# Patient Record
Sex: FEMALE | Race: WHITE | NOT HISPANIC OR LATINO | Employment: OTHER | URBAN - METROPOLITAN AREA
[De-identification: names, ages, dates, MRNs, and addresses within clinical notes are randomized per-mention and may not be internally consistent; named-entity substitution may affect disease eponyms.]

---

## 2018-04-23 ENCOUNTER — HOSPITAL ENCOUNTER (OUTPATIENT)
Dept: RADIOLOGY | Facility: HOSPITAL | Age: 83
Discharge: HOME | End: 2018-04-23
Attending: FAMILY MEDICINE
Payer: MEDICARE

## 2018-04-23 ENCOUNTER — TRANSCRIBE ORDERS (OUTPATIENT)
Dept: REGISTRATION | Facility: HOSPITAL | Age: 83
End: 2018-04-23

## 2018-04-23 DIAGNOSIS — R05.9 COUGH: Primary | ICD-10-CM

## 2018-04-23 DIAGNOSIS — R05.9 COUGH: ICD-10-CM

## 2018-04-23 PROCEDURE — 71046 X-RAY EXAM CHEST 2 VIEWS: CPT

## 2018-05-17 ENCOUNTER — APPOINTMENT (OUTPATIENT)
Dept: LAB | Facility: HOSPITAL | Age: 83
End: 2018-05-17
Attending: OPHTHALMOLOGY
Payer: MEDICARE

## 2018-05-17 ENCOUNTER — APPOINTMENT (OUTPATIENT)
Dept: PREADMISSION TESTING | Facility: HOSPITAL | Age: 83
End: 2018-05-17
Attending: OPHTHALMOLOGY
Payer: MEDICARE

## 2018-05-17 ENCOUNTER — HOSPITAL ENCOUNTER (OUTPATIENT)
Dept: CARDIOLOGY | Facility: HOSPITAL | Age: 83
Discharge: HOME | End: 2018-05-17
Attending: OPHTHALMOLOGY
Payer: MEDICARE

## 2018-05-17 ENCOUNTER — TRANSCRIBE ORDERS (OUTPATIENT)
Dept: PREADMISSION TESTING | Facility: HOSPITAL | Age: 83
End: 2018-05-17

## 2018-05-17 VITALS
RESPIRATION RATE: 18 BRPM | HEIGHT: 67 IN | WEIGHT: 158 LBS | TEMPERATURE: 96.4 F | DIASTOLIC BLOOD PRESSURE: 75 MMHG | SYSTOLIC BLOOD PRESSURE: 150 MMHG | BODY MASS INDEX: 24.8 KG/M2 | HEART RATE: 85 BPM

## 2018-05-17 DIAGNOSIS — D23.10: ICD-10-CM

## 2018-05-17 DIAGNOSIS — H02.835 DERMATOCHALASIS OF LEFT LOWER EYELID: ICD-10-CM

## 2018-05-17 DIAGNOSIS — H02.832 DERMATOCHALASIS OF RIGHT LOWER EYELID: ICD-10-CM

## 2018-05-17 DIAGNOSIS — Z01.818 PREOP EXAMINATION: Primary | ICD-10-CM

## 2018-05-17 DIAGNOSIS — H02.834 DERMATOCHALASIS OF LEFT UPPER EYELID: ICD-10-CM

## 2018-05-17 DIAGNOSIS — H02.423 MYOGENIC PTOSIS OF EYELID OF BOTH EYES: ICD-10-CM

## 2018-05-17 DIAGNOSIS — H02.831 DERMATOCHALASIS OF RIGHT UPPER EYELID: ICD-10-CM

## 2018-05-17 DIAGNOSIS — H02.423 MYOGENIC PTOSIS OF EYELID OF BOTH EYES: Primary | ICD-10-CM

## 2018-05-17 LAB
ANION GAP SERPL CALC-SCNC: 9 MEQ/L (ref 3–15)
ATRIAL RATE: 72
BUN SERPL-MCNC: 15 MG/DL (ref 8–20)
CALCIUM SERPL-MCNC: 10.5 MG/DL (ref 8.9–10.3)
CHLORIDE SERPL-SCNC: 102 MMOL/L (ref 98–109)
CO2 SERPL-SCNC: 24 MMOL/L (ref 22–32)
CREAT SERPL-MCNC: 0.9 MG/DL (ref 0.6–1.1)
ERYTHROCYTE [DISTWIDTH] IN BLOOD BY AUTOMATED COUNT: 12.9 % (ref 11.7–14.4)
GFR SERPL CREATININE-BSD FRML MDRD: >60 ML/MIN/1.73M*2
GLUCOSE SERPL-MCNC: 142 MG/DL (ref 70–99)
HCT VFR BLDCO AUTO: 45.7 % (ref 35–45)
HGB BLD-MCNC: 15.2 G/DL (ref 11.8–15.7)
MCH RBC QN AUTO: 30.2 PG (ref 28–33.2)
MCHC RBC AUTO-ENTMCNC: 33.3 G/DL (ref 32.2–35.5)
MCV RBC AUTO: 90.9 FL (ref 83–98)
P AXIS: 24
PDW BLD AUTO: 10.4 FL (ref 9.4–12.3)
PLATELET # BLD AUTO: 236 K/UL (ref 150–369)
POTASSIUM SERPL-SCNC: 4.1 MMOL/L (ref 3.6–5.1)
PR INTERVAL: 174
QRS DURATION: 86
QT INTERVAL: 378
QTC CALCULATION(BAZETT): 413
R AXIS: -14
RBC # BLD AUTO: 5.03 M/UL (ref 3.93–5.22)
SODIUM SERPL-SCNC: 135 MMOL/L (ref 136–144)
T WAVE AXIS: 29
VENTRICULAR RATE: 72
WBC # BLD AUTO: 6.22 K/UL (ref 3.8–10.5)

## 2018-05-17 PROCEDURE — 80048 BASIC METABOLIC PNL TOTAL CA: CPT

## 2018-05-17 PROCEDURE — 93010 ELECTROCARDIOGRAM REPORT: CPT | Performed by: INTERNAL MEDICINE

## 2018-05-17 PROCEDURE — 85027 COMPLETE CBC AUTOMATED: CPT

## 2018-05-17 PROCEDURE — 93005 ELECTROCARDIOGRAM TRACING: CPT

## 2018-05-17 PROCEDURE — 36415 COLL VENOUS BLD VENIPUNCTURE: CPT

## 2018-05-17 RX ORDER — AMLODIPINE BESYLATE 5 MG/1
5 TABLET ORAL DAILY
COMMUNITY
End: 2021-10-18

## 2018-05-17 RX ORDER — BRIMONIDINE TARTRATE 1 MG/ML
1 SOLUTION/ DROPS OPHTHALMIC 2 TIMES DAILY
Status: ON HOLD | COMMUNITY
End: 2018-06-06 | Stop reason: SDDI

## 2018-05-17 ASSESSMENT — PAIN SCALES - GENERAL: PAINLEVEL: 0-NO PAIN

## 2018-05-17 NOTE — PRE-PROCEDURE INSTRUCTIONS
1. We will call you between 3 pm and 7 pm on June 5, 2018 to determine that arrival time for your procedure. If you do not hear by 6PM. Please call [phone] for arrival time.    2. Please report to [facility] [place], walk into [email or web address] and report to the admission desk on first floor on the day of your procedure.   3. Please follow the following fasting guidelines:   Nothing to eat or drink after midnight unless otherwise instructed by  your physician.    4. Early on the morning of the procedure please take your usual dose of the listed medications AMLODIPINE with a sip of water:    AVOID ASPIRIN . ANITI-INFLAMMATORY MEDICATIONS 1 WEEK PRIOR TO SURGERY   5. Other Instructions:    6. If you develop a cold, cough, fever, rash, or other symptom prior to the data of the procedure, please report it to your physician immediately.   7. If you need to cancel the procedure for any reason, please contact your physician or call the unit listed above.   8. Make arrangements to have someone drive you home from the procedure. If you have not arranged for transportation home, your surgery may be cancelled.    9. You may not take public transportation unless accompanied by a responsible person.   10. You may not drive a car or operate complex or potentially dangerous machinery for 24 hours following anesthesia and/or sedation.   11. If it is medically necessary for you to have a longer stay, you will be informed as soon as the decision is made.   12. Do not wear or bring anything of value to the hospital including jewelry of any kind. Do not wear make-up or contact lenses. DO bring your glasses and hearing aid.   13. No lotion, creams, powders, or oils on skin the morning of procedure    14. Dress in comfortable clothes.   15.  If instructed, please bring a copy of your Advanced Directive (Living Will/Durable Power of ) on the day of your procedure.      Pre operative instructions given as per protocol.  Form  explained by: OSCAR Castillo     I have read and understand the above information. I have had sufficient opportunity to ask questions I might have and they have been answered to my satisfaction. I agree to comply with the Patient Responsibilities listed above and have received a copy of this form.

## 2018-05-17 NOTE — H&P
History and Physical  Pre-admission testing         HISTORY OF PRESENT ILLNESS      Mariya Porras is an 82 y.o. female with a past medical history of Hypertension, Glaucoma Ptosis BUL Lesions Os, Dermatochalasis. She  presents to [facility] for preoperative evaluation. She is scheduled for Ptosis Repair BUL,with skin Pinch Cos , Blepharoplasty BLL TCA peel Excision of  Marginal lesion YEFRI on 6/6/2018.    PAST MEDICAL AND SURGICAL HISTORY      Past Medical History:   Diagnosis Date   • Basal cell carcinoma     forehead   • Glaucoma    • Hypertension        Past Surgical History:   Procedure Laterality Date   • BLADDER SUSPENSION     • CATARACT EXTRACTION, BILATERAL     • COLONOSCOPY     • HYSTERECTOMY     • JOINT REPLACEMENT     • KNEE ARTHROPLASTY  2015    left       PROBLEM LIST   There is no problem list on file for this patient.      MEDICATIONS        Current Outpatient Prescriptions:   •  amLODIPine (NORVASC) 5 mg tablet, Take 5 mg by mouth daily., Disp: , Rfl:   •  bimatoprost (LUMIGAN) 0.01 % ophthalmic drops, Administer 1 drop into both eyes nightly., Disp: , Rfl:   •  brimonidine (ALPHAGAN P) 0.1 % drops, Administer 1 drop into both eyes 2 (two) times a day., Disp: , Rfl:   •  cranberry fruit extract (CRANBERRY EXTRACT ORAL), Take 1 tablet by mouth daily., Disp: , Rfl:   •  Lactobacillus acidophilus (PROBIOTIC ORAL), Take 1 tablet by mouth daily., Disp: , Rfl:     ALLERGIES      Allergies   Allergen Reactions   • Amitriptyline Other (see comments)     Memory changes   • Codeine Hives   • Diazepam Other (see comments)     Blurred vision   • Sulfur Hives       FAMILY HISTORY      No family history on file.    SOCIAL HISTORY      Social History     Social History   • Marital status:      Spouse name: N/A   • Number of children: N/A   • Years of education: N/A     Occupational History   • Not on file.     Social History Main Topics   • Smoking status: Former Smoker   • Smokeless tobacco: Never Used   •  "Alcohol use Yes      Comment: wine   • Drug use: No   • Sexual activity: Not on file     Other Topics Concern   • Not on file     Social History Narrative   • No narrative on file       REVIEW OF SYSTEMS      Review of Systems   All other systems reviewed and are negative.      PHYSICAL EXAMINATION      BP (!) 150/75 (BP Location: Right upper arm, Patient Position: Sitting)   Pulse 85   Temp (!) 35.8 °C (96.4 °F) (Oral)   Resp 18 Comment: oxygen sat 96%  Ht 1.702 m (5' 7\")   Wt 71.7 kg (158 lb)   BMI 24.75 kg/m²   Body mass index is 24.75 kg/m².    Physical Exam   Constitutional: She is oriented to person, place, and time. She appears well-developed and well-nourished.   HENT:   Head: Normocephalic and atraumatic.   Eyes: Conjunctivae and EOM are normal. Pupils are equal, round, and reactive to light.   Neck: Normal range of motion. Neck supple.   Cardiovascular: Normal rate, regular rhythm, normal heart sounds and intact distal pulses.    Pulmonary/Chest: Effort normal and breath sounds normal.   Abdominal: Soft. Bowel sounds are normal.   Genitourinary:   Genitourinary Comments: Examination is deferred   Musculoskeletal: Normal range of motion.   Neurological: She is oriented to person, place, and time.   Skin: Skin is warm and dry.   Keratotic lesions on the patient's back   Psychiatric: She has a normal mood and affect. Her behavior is normal.   Vitals reviewed.         OSCAR Castillo  5/17/2018     "

## 2018-06-06 ENCOUNTER — ANESTHESIA EVENT (OUTPATIENT)
Dept: SURGERY | Facility: HOSPITAL | Age: 83
Setting detail: HOSPITAL OUTPATIENT SURGERY
End: 2018-06-06
Payer: MEDICARE

## 2018-06-06 ENCOUNTER — HOSPITAL ENCOUNTER (OUTPATIENT)
Facility: HOSPITAL | Age: 83
Setting detail: HOSPITAL OUTPATIENT SURGERY
Discharge: HOME | End: 2018-06-06
Attending: OPHTHALMOLOGY | Admitting: OPHTHALMOLOGY
Payer: MEDICARE

## 2018-06-06 VITALS
HEIGHT: 66 IN | RESPIRATION RATE: 16 BRPM | TEMPERATURE: 98.4 F | WEIGHT: 153 LBS | BODY MASS INDEX: 24.59 KG/M2 | SYSTOLIC BLOOD PRESSURE: 170 MMHG | DIASTOLIC BLOOD PRESSURE: 81 MMHG | OXYGEN SATURATION: 94 % | HEART RATE: 80 BPM

## 2018-06-06 PROCEDURE — 080QXZZ ALTERATION OF RIGHT LOWER EYELID, EXTERNAL APPROACH: ICD-10-PCS | Performed by: OPHTHALMOLOGY

## 2018-06-06 PROCEDURE — 25800000 HC PHARMACY IV SOLUTIONS: Performed by: STUDENT IN AN ORGANIZED HEALTH CARE EDUCATION/TRAINING PROGRAM

## 2018-06-06 PROCEDURE — 63600000 HC DRUGS/DETAIL CODE: Performed by: ANESTHESIOLOGY

## 2018-06-06 PROCEDURE — 36000013 HC OR LEVEL 3 EA ADDL MIN

## 2018-06-06 PROCEDURE — 63600000 HC DRUGS/DETAIL CODE: Performed by: OPHTHALMOLOGY

## 2018-06-06 PROCEDURE — 0HB1XZZ EXCISION OF FACE SKIN, EXTERNAL APPROACH: ICD-10-PCS | Performed by: OPHTHALMOLOGY

## 2018-06-06 PROCEDURE — 27200000 HC STERILE SUPPLY: Performed by: OPHTHALMOLOGY

## 2018-06-06 PROCEDURE — 71000011 HC PACU PHASE 1 EA ADDL MIN: Performed by: OPHTHALMOLOGY

## 2018-06-06 PROCEDURE — 36000013 HC OR LEVEL 3 EA ADDL MIN: Performed by: OPHTHALMOLOGY

## 2018-06-06 PROCEDURE — 25000000 HC PHARMACY GENERAL: Performed by: NURSE ANESTHETIST, CERTIFIED REGISTERED

## 2018-06-06 PROCEDURE — 080RXZZ ALTERATION OF LEFT LOWER EYELID, EXTERNAL APPROACH: ICD-10-PCS | Performed by: OPHTHALMOLOGY

## 2018-06-06 PROCEDURE — 08SN0ZZ REPOSITION RIGHT UPPER EYELID, OPEN APPROACH: ICD-10-PCS | Performed by: OPHTHALMOLOGY

## 2018-06-06 PROCEDURE — 36000003 HC OR LEVEL 3 INITIAL 30MIN: Performed by: OPHTHALMOLOGY

## 2018-06-06 PROCEDURE — 71000001 HC PACU PHASE 1 INITIAL 30MIN: Performed by: OPHTHALMOLOGY

## 2018-06-06 PROCEDURE — 63600000 HC DRUGS/DETAIL CODE: Performed by: NURSE ANESTHETIST, CERTIFIED REGISTERED

## 2018-06-06 PROCEDURE — 71000002 HC PACU PHASE 2 INITIAL 30MIN: Performed by: OPHTHALMOLOGY

## 2018-06-06 PROCEDURE — 63700000 HC SELF-ADMINISTRABLE DRUG: Performed by: OPHTHALMOLOGY

## 2018-06-06 PROCEDURE — 37000002 HC ANESTHESIA MAC: Performed by: OPHTHALMOLOGY

## 2018-06-06 PROCEDURE — 08SP0ZZ REPOSITION LEFT UPPER EYELID, OPEN APPROACH: ICD-10-PCS | Performed by: OPHTHALMOLOGY

## 2018-06-06 PROCEDURE — 25000000 HC PHARMACY GENERAL: Performed by: OPHTHALMOLOGY

## 2018-06-06 PROCEDURE — 0H51XZZ DESTRUCTION OF FACE SKIN, EXTERNAL APPROACH: ICD-10-PCS | Performed by: OPHTHALMOLOGY

## 2018-06-06 PROCEDURE — 71000012 HC PACU PHASE 2 EA ADDL MIN: Performed by: OPHTHALMOLOGY

## 2018-06-06 RX ORDER — HYDROMORPHONE HYDROCHLORIDE 2 MG/ML
0.5 INJECTION, SOLUTION INTRAMUSCULAR; INTRAVENOUS; SUBCUTANEOUS
Status: DISCONTINUED | OUTPATIENT
Start: 2018-06-06 | End: 2018-06-06 | Stop reason: HOSPADM

## 2018-06-06 RX ORDER — LIDOCAINE HCL/EPINEPHRINE/PF 2%-1:200K
VIAL (ML) INJECTION AS NEEDED
Status: DISCONTINUED | OUTPATIENT
Start: 2018-06-06 | End: 2018-06-06 | Stop reason: HOSPADM

## 2018-06-06 RX ORDER — PROPOFOL 200MG/20ML
SYRINGE (ML) INTRAVENOUS AS NEEDED
Status: DISCONTINUED | OUTPATIENT
Start: 2018-06-06 | End: 2018-06-06 | Stop reason: SURG

## 2018-06-06 RX ORDER — IBUPROFEN 200 MG
16-32 TABLET ORAL AS NEEDED
Status: DISCONTINUED | OUTPATIENT
Start: 2018-06-06 | End: 2018-06-06 | Stop reason: HOSPADM

## 2018-06-06 RX ORDER — PROPOFOL 10 MG/ML
INJECTION, EMULSION INTRAVENOUS CONTINUOUS PRN
Status: DISCONTINUED | OUTPATIENT
Start: 2018-06-06 | End: 2018-06-06 | Stop reason: SURG

## 2018-06-06 RX ORDER — DEXAMETHASONE SODIUM PHOSPHATE 4 MG/ML
INJECTION, SOLUTION INTRA-ARTICULAR; INTRALESIONAL; INTRAMUSCULAR; INTRAVENOUS; SOFT TISSUE AS NEEDED
Status: DISCONTINUED | OUTPATIENT
Start: 2018-06-06 | End: 2018-06-06 | Stop reason: HOSPADM

## 2018-06-06 RX ORDER — OXYMETAZOLINE HCL 0.05 %
SPRAY, NON-AEROSOL (ML) NASAL AS NEEDED
Status: DISCONTINUED | OUTPATIENT
Start: 2018-06-06 | End: 2018-06-06 | Stop reason: HOSPADM

## 2018-06-06 RX ORDER — SODIUM CHLORIDE 9 MG/ML
INJECTION, SOLUTION INTRAVENOUS CONTINUOUS
Status: DISCONTINUED | OUTPATIENT
Start: 2018-06-06 | End: 2018-06-06 | Stop reason: HOSPADM

## 2018-06-06 RX ORDER — MIDAZOLAM HYDROCHLORIDE 2 MG/2ML
INJECTION, SOLUTION INTRAMUSCULAR; INTRAVENOUS AS NEEDED
Status: DISCONTINUED | OUTPATIENT
Start: 2018-06-06 | End: 2018-06-06 | Stop reason: SURG

## 2018-06-06 RX ORDER — DEXTROSE 50 % IN WATER (D50W) INTRAVENOUS SYRINGE
25 AS NEEDED
Status: DISCONTINUED | OUTPATIENT
Start: 2018-06-06 | End: 2018-06-06 | Stop reason: HOSPADM

## 2018-06-06 RX ORDER — PROPARACAINE HYDROCHLORIDE 5 MG/ML
SOLUTION/ DROPS OPHTHALMIC AS NEEDED
Status: DISCONTINUED | OUTPATIENT
Start: 2018-06-06 | End: 2018-06-06 | Stop reason: HOSPADM

## 2018-06-06 RX ORDER — ONDANSETRON HYDROCHLORIDE 2 MG/ML
INJECTION, SOLUTION INTRAVENOUS AS NEEDED
Status: DISCONTINUED | OUTPATIENT
Start: 2018-06-06 | End: 2018-06-06 | Stop reason: SURG

## 2018-06-06 RX ORDER — DEXTROSE 40 %
15-30 GEL (GRAM) ORAL AS NEEDED
Status: DISCONTINUED | OUTPATIENT
Start: 2018-06-06 | End: 2018-06-06 | Stop reason: HOSPADM

## 2018-06-06 RX ORDER — ACETAMINOPHEN 325 MG/1
650 TABLET ORAL EVERY 4 HOURS PRN
Status: DISCONTINUED | OUTPATIENT
Start: 2018-06-06 | End: 2018-06-06 | Stop reason: HOSPADM

## 2018-06-06 RX ORDER — TRICHLOROACETIC ACID
SOLUTION, NON-ORAL TOPICAL AS NEEDED
Status: DISCONTINUED | OUTPATIENT
Start: 2018-06-06 | End: 2018-06-06 | Stop reason: HOSPADM

## 2018-06-06 RX ORDER — HYDROCODONE BITARTRATE AND ACETAMINOPHEN 5; 325 MG/1; MG/1
1 TABLET ORAL EVERY 6 HOURS PRN
Status: DISCONTINUED | OUTPATIENT
Start: 2018-06-06 | End: 2018-06-06 | Stop reason: HOSPADM

## 2018-06-06 RX ORDER — FENTANYL CITRATE 50 UG/ML
50 INJECTION, SOLUTION INTRAMUSCULAR; INTRAVENOUS
Status: DISCONTINUED | OUTPATIENT
Start: 2018-06-06 | End: 2018-06-06 | Stop reason: HOSPADM

## 2018-06-06 RX ORDER — ONDANSETRON HYDROCHLORIDE 2 MG/ML
4 INJECTION, SOLUTION INTRAVENOUS
Status: DISCONTINUED | OUTPATIENT
Start: 2018-06-06 | End: 2018-06-06 | Stop reason: HOSPADM

## 2018-06-06 RX ORDER — DEXAMETHASONE SODIUM PHOSPHATE 4 MG/ML
INJECTION, SOLUTION INTRA-ARTICULAR; INTRALESIONAL; INTRAMUSCULAR; INTRAVENOUS; SOFT TISSUE AS NEEDED
Status: DISCONTINUED | OUTPATIENT
Start: 2018-06-06 | End: 2018-06-06 | Stop reason: SURG

## 2018-06-06 RX ORDER — FENTANYL CITRATE 50 UG/ML
INJECTION, SOLUTION INTRAMUSCULAR; INTRAVENOUS AS NEEDED
Status: DISCONTINUED | OUTPATIENT
Start: 2018-06-06 | End: 2018-06-06 | Stop reason: SURG

## 2018-06-06 RX ORDER — ONDANSETRON 4 MG/1
4 TABLET, ORALLY DISINTEGRATING ORAL EVERY 8 HOURS PRN
Status: DISCONTINUED | OUTPATIENT
Start: 2018-06-06 | End: 2018-06-06 | Stop reason: HOSPADM

## 2018-06-06 RX ORDER — KETAMINE HYDROCHLORIDE 50 MG/ML
INJECTION, SOLUTION INTRAMUSCULAR; INTRAVENOUS AS NEEDED
Status: DISCONTINUED | OUTPATIENT
Start: 2018-06-06 | End: 2018-06-06 | Stop reason: SURG

## 2018-06-06 RX ADMIN — FENTANYL CITRATE 25 MCG: 50 INJECTION, SOLUTION INTRAMUSCULAR; INTRAVENOUS at 11:32

## 2018-06-06 RX ADMIN — FENTANYL CITRATE 50 MCG: 50 INJECTION, SOLUTION INTRAMUSCULAR; INTRAVENOUS at 14:02

## 2018-06-06 RX ADMIN — FENTANYL CITRATE 12.5 MCG: 50 INJECTION, SOLUTION INTRAMUSCULAR; INTRAVENOUS at 12:40

## 2018-06-06 RX ADMIN — FENTANYL CITRATE 12.5 MCG: 50 INJECTION, SOLUTION INTRAMUSCULAR; INTRAVENOUS at 12:26

## 2018-06-06 RX ADMIN — FENTANYL CITRATE 25 MCG: 50 INJECTION, SOLUTION INTRAMUSCULAR; INTRAVENOUS at 12:45

## 2018-06-06 RX ADMIN — PROPOFOL 25 MCG/KG/MIN: 10 INJECTION, EMULSION INTRAVENOUS at 11:33

## 2018-06-06 RX ADMIN — FENTANYL CITRATE 12.5 MCG: 50 INJECTION, SOLUTION INTRAMUSCULAR; INTRAVENOUS at 12:15

## 2018-06-06 RX ADMIN — FENTANYL CITRATE 12.5 MCG: 50 INJECTION, SOLUTION INTRAMUSCULAR; INTRAVENOUS at 12:20

## 2018-06-06 RX ADMIN — FENTANYL CITRATE 25 MCG: 50 INJECTION, SOLUTION INTRAMUSCULAR; INTRAVENOUS at 11:24

## 2018-06-06 RX ADMIN — SODIUM CHLORIDE: 9 INJECTION, SOLUTION INTRAVENOUS at 09:10

## 2018-06-06 RX ADMIN — ONDANSETRON 4 MG: 2 INJECTION INTRAMUSCULAR; INTRAVENOUS at 12:28

## 2018-06-06 RX ADMIN — PROPOFOL 20 MG: 10 INJECTION, EMULSION INTRAVENOUS at 12:13

## 2018-06-06 RX ADMIN — PROPOFOL 10 MG: 10 INJECTION, EMULSION INTRAVENOUS at 12:00

## 2018-06-06 RX ADMIN — FENTANYL CITRATE 25 MCG: 50 INJECTION, SOLUTION INTRAMUSCULAR; INTRAVENOUS at 12:01

## 2018-06-06 RX ADMIN — FENTANYL CITRATE 25 MCG: 50 INJECTION, SOLUTION INTRAMUSCULAR; INTRAVENOUS at 12:55

## 2018-06-06 RX ADMIN — MIDAZOLAM HYDROCHLORIDE 2 MG: 1 INJECTION, SOLUTION INTRAMUSCULAR; INTRAVENOUS at 11:15

## 2018-06-06 RX ADMIN — DEXAMETHASONE SODIUM PHOSPHATE 4 MG: 4 INJECTION, SOLUTION INTRA-ARTICULAR; INTRALESIONAL; INTRAMUSCULAR; INTRAVENOUS; SOFT TISSUE at 12:28

## 2018-06-06 RX ADMIN — FENTANYL CITRATE 25 MCG: 50 INJECTION, SOLUTION INTRAMUSCULAR; INTRAVENOUS at 11:19

## 2018-06-06 RX ADMIN — KETAMINE HYDROCHLORIDE 20 MG: 50 INJECTION, SOLUTION INTRAMUSCULAR; INTRAVENOUS at 12:28

## 2018-06-06 ASSESSMENT — PAIN DESCRIPTION - DESCRIPTORS: DESCRIPTORS: BURNING

## 2018-06-06 ASSESSMENT — PAIN - FUNCTIONAL ASSESSMENT: PAIN_FUNCTIONAL_ASSESSMENT: 0-10

## 2018-06-06 ASSESSMENT — PAIN SCALES - GENERAL: PAIN_LEVEL: 0

## 2018-06-06 NOTE — ANESTHESIA PREPROCEDURE EVALUATION
Anesthesia ROS/MED HX      Cardiovascular   hypertension  Musculoskeletal   Arthritis  Endo/Other   Body Habitus: Normal      Past Surgical History:   Procedure Laterality Date   • BLADDER SUSPENSION     • CATARACT EXTRACTION, BILATERAL     • COLONOSCOPY     • HYSTERECTOMY     • JOINT REPLACEMENT     • KNEE ARTHROPLASTY  2015    left       Physical Exam    Airway   Mallampati: III   TM distance: >3 FB   Neck ROM: full  Cardiovascular    Rhythm: regular   Rate: normal  Pulmonary    Decreased breath sounds  Dental - normal        Anesthesia Plan    Plan: MAC    Technique: MAC     Airway: natural airway / supplemental oxygen   ASA 2  Anesthetic plan and risks discussed with: patient  Induction:    intravenous   Postop Plan:   Patient Disposition: phase II then home

## 2018-06-06 NOTE — DISCHARGE INSTRUCTIONS
RENITA SUBRAMANIAN M.D., F.A.C.S.                                  SPECIALIZING IN OCULOPLASTICS                             EYELID, LACRIMAL AND ORBITAL SURGERY     POST-OPERATIVE INSTRUCTIONS FOR EYELID SURGERY:    1. Avoid strenous exercise for one week.  No heavy lifting over 10 pounds.  2. Apply ice compresses to operated eye(s) at least four times a day for 7 days then start warm compresses twice a day for two weeks.   3.  If the surgeon has not placed a bandage over your surgical site, you may get your incisions wet.  You may shower but keep your back to the spray.  4. If severe bleeding, pain or loss of vision occurs call Dr. Subramanian/Dr. Garcia immediately at [phone].    MEDICATIONS:    1. Apply Erythromycin ointment twice a day to suture line(s) and into the eye(s) at bedtime.   2. Use eyewash in the morning to remove the residual ointment if needed.   3. Take regular Tylenol or Vicodin as instructed.   4. Use artificial tears four times a day as needed for dry eyes.   5. No  aspirin for 3 days post operatively.   6. Use medrol dose pack as directed.    ADDITIONAL INSTRUCTIONS:    1.  Keep head in position higher than your heart, especially while sleeping.  2.  Protect all bruises from sunshine exposure.  If desired, take Arnica [place] sublingual tablets (an herbal supplement) as directed on package and eat pineapple; these items work naturally to treat bruising.  3.  You may experience temporary blurring of vision, swelling, and/or bruising.  Eyelid should not swell completely shut.  If both eyes were done, these things may be more pronounced on one side.  Symmetry will be assessed at your postop exams.  4. A follow-up appointment in one week(s) can be made at the time of surgery or can be scheduled by calling the office at [phone] with Dr. Subramanian/Dr. Garcia.    FOR ANY QUESTIONS OR CONCERNS, PLEASE CALL:                       [phone]                       OR                    [phone]      [address]  [address]                                                                         [address]  [place] [address]                                                [place] [id number]

## 2018-06-06 NOTE — H&P (VIEW-ONLY)
History and Physical  Pre-admission testing         HISTORY OF PRESENT ILLNESS      Mariya Porras is an 82 y.o. female with a past medical history of Hypertension, Glaucoma Ptosis BUL Lesions Os, Dermatochalasis. She  presents to [facility] for preoperative evaluation. She is scheduled for Ptosis Repair BUL,with skin Pinch Cos , Blepharoplasty BLL TCA peel Excision of  Marginal lesion YEFRI on 6/6/2018.    PAST MEDICAL AND SURGICAL HISTORY      Past Medical History:   Diagnosis Date   • Basal cell carcinoma     forehead   • Glaucoma    • Hypertension        Past Surgical History:   Procedure Laterality Date   • BLADDER SUSPENSION     • CATARACT EXTRACTION, BILATERAL     • COLONOSCOPY     • HYSTERECTOMY     • JOINT REPLACEMENT     • KNEE ARTHROPLASTY  2015    left       PROBLEM LIST   There is no problem list on file for this patient.      MEDICATIONS        Current Outpatient Prescriptions:   •  amLODIPine (NORVASC) 5 mg tablet, Take 5 mg by mouth daily., Disp: , Rfl:   •  bimatoprost (LUMIGAN) 0.01 % ophthalmic drops, Administer 1 drop into both eyes nightly., Disp: , Rfl:   •  brimonidine (ALPHAGAN P) 0.1 % drops, Administer 1 drop into both eyes 2 (two) times a day., Disp: , Rfl:   •  cranberry fruit extract (CRANBERRY EXTRACT ORAL), Take 1 tablet by mouth daily., Disp: , Rfl:   •  Lactobacillus acidophilus (PROBIOTIC ORAL), Take 1 tablet by mouth daily., Disp: , Rfl:     ALLERGIES      Allergies   Allergen Reactions   • Amitriptyline Other (see comments)     Memory changes   • Codeine Hives   • Diazepam Other (see comments)     Blurred vision   • Sulfur Hives       FAMILY HISTORY      No family history on file.    SOCIAL HISTORY      Social History     Social History   • Marital status:      Spouse name: N/A   • Number of children: N/A   • Years of education: N/A     Occupational History   • Not on file.     Social History Main Topics   • Smoking status: Former Smoker   • Smokeless tobacco: Never Used   •  "Alcohol use Yes      Comment: wine   • Drug use: No   • Sexual activity: Not on file     Other Topics Concern   • Not on file     Social History Narrative   • No narrative on file       REVIEW OF SYSTEMS      Review of Systems   All other systems reviewed and are negative.      PHYSICAL EXAMINATION      BP (!) 150/75 (BP Location: Right upper arm, Patient Position: Sitting)   Pulse 85   Temp (!) 35.8 °C (96.4 °F) (Oral)   Resp 18 Comment: oxygen sat 96%  Ht 1.702 m (5' 7\")   Wt 71.7 kg (158 lb)   BMI 24.75 kg/m²   Body mass index is 24.75 kg/m².    Physical Exam   Constitutional: She is oriented to person, place, and time. She appears well-developed and well-nourished.   HENT:   Head: Normocephalic and atraumatic.   Eyes: Conjunctivae and EOM are normal. Pupils are equal, round, and reactive to light.   Neck: Normal range of motion. Neck supple.   Cardiovascular: Normal rate, regular rhythm, normal heart sounds and intact distal pulses.    Pulmonary/Chest: Effort normal and breath sounds normal.   Abdominal: Soft. Bowel sounds are normal.   Genitourinary:   Genitourinary Comments: Examination is deferred   Musculoskeletal: Normal range of motion.   Neurological: She is oriented to person, place, and time.   Skin: Skin is warm and dry.   Keratotic lesions on the patient's back   Psychiatric: She has a normal mood and affect. Her behavior is normal.   Vitals reviewed.         OSCAR Castillo  5/17/2018     "

## 2018-06-06 NOTE — ANESTHESIA POSTPROCEDURE EVALUATION
Patient: Mariya Porras    Procedure Summary     Date:  06/06/18 Room / Location:  [facility]    Anesthesia Start:  1115 Anesthesia Stop:  1305    Procedures:       Ptosis repair BUL with skin pinch cos blepharoplasty BLL with TCA Peel Excision of marginal Lesion YEFRI (Bilateral )      DERM CHEMICAL PEEL (TCA) (N/A ) Diagnosis:  (Ptosis BUL Lesions OS)    Surgeon:  Beth Gutierrez MD Responsible Provider:  Martell Gaytan MD    Anesthesia Type:  MAC ASA Status:  2          Anesthesia Type: MAC  PACU Vitals  6/6/2018 1302 - 6/6/2018 1402      6/6/2018 1310 6/6/2018 1315 6/6/2018 1320 6/6/2018 1330    BP: (!)  190/88 (!)  183/87 - (!)  197/95    Temp: - 36.7 °C (98 °F) - -    Pulse: 77 78 76 78    Resp: 15 (!)  21 16 20    SpO2: 95 % 95 % 96 % 95 %              6/6/2018 1345 6/6/2018 1400          BP: (!)  177/85 (!)  161/83      Temp: - -      Pulse: 78 80      Resp: 18 20      SpO2: 98 % 96 %              Anesthesia Post Evaluation    Pain score: 0  Pain management: satisfactory to patient  Mode of pain management: IV medication  Patient location during evaluation: PACU  Patient participation: complete - patient participated  Level of consciousness: awake and alert  Cardiovascular status: acceptable  Airway Patency: adequate  Respiratory status: acceptable  Hydration status: stable  Anesthetic complications: no

## 2018-06-06 NOTE — BRIEF OP NOTE
Ptosis repair BUL with skin pinch cos blepharoplasty BLL with TCA Peel Excision of marginal Lesion YEFRI (B), DERM CHEMICAL PEEL (TCA) Procedure Note    Procedure:    Ptosis repair BUL with skin pinch cos blepharoplasty BLL with TCA Peel Excision of marginal Lesion YEFRI  CPT(R) Code:  48956 - MA FIX LID PTOSIS,LEVATR RESEC,EXTERN    Procedure:    DERM CHEMICAL PEEL (TCA)        Surgeon(s) and Role:     * Bteh Gutierrez MD - Primary    Anesthesia: General    Staff:   Circulator: Priscila Varela RN; Estephanie Beth RN  Scrub Person: Tiffanie Sibley RN      Estimated Blood Loss: No blood loss documented.    Specimens:                No specimens collected during this procedure.      Drains:      Implants: * No implants in log *           Complications:  None; patient tolerated the procedure well.           Disposition: PACU - hemodynamically stable.           Condition: stable    Beth Gutierrez MD  Phone Number: [phone]

## 2018-06-06 NOTE — OP NOTE
REPORT TYPE:  Operative Note    DATE OF OPERATION:  06/06/2018      OPERATING SURGEON:  Beth Gutierrez MD    ASSISTANT SURGEON:  Thuan Chadwick DO    PREOPERATIVE DIAGNOSES:  1.  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  2.  Bilateral lower lid dermatochalasis.  3.  Lesion of bilateral upper eyelids.    POSTOPERATIVE DIAGNOSES:  1.  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  2.  Bilateral lower lid dermatochalasis.  4.  Lesion of bilateral upper eyelids.    PROCEDURES PERFORMED:  1.  Bilateral upper eyelid aponeurotic ptosis repair, levator advancement and skin pinch.  2.  Bilateral lower lid cosmetic blepharoplasty with TCA peel.  3.  Lesion excision, bilateral upper eyelids, with steroid injections.    ANESTHESIA:  MAC with local.    ESTIMATED BLOOD LOSS:  Minimal.    COMPLICATIONS:  None.    INDICATIONS:  The patient has visually significant upper eyelid ptosis as well as dermatochalasis of both lower eyelids impacting her superior field of vision in both eyes as well as cosmetic disfigurement.    DESCRIPTION OF PROCEDURE:  In the preoperative holding area, the risks, benefits and alternatives of the above procedures were again discussed in detail with the patient.  All questions were answered, informed consent was obtained and placed into the   medical record.  In the operating room, the patient was identified by name and date of birth.  Monitored anesthesia care was initiated by the anesthesiology team and the patient was marked and prepped in the usual fashion for ophthalmic plastic surgery.    Local anesthetic solution was injected into both upper and lower eyelids.  Attention was first directed to the patient's right side, where excess upper eyelid skin was grasped at its superior border and the incision site was marked.  The inferior   border was also demarcated at the level of the eyelid crease.  Incisions were made along the marked line using a 15  blade.  Aimee scissors were used to remove the excess skin as well as a thin strip of orbicularis muscle.  Hemostasis was achieved   using a combination of Gelfoam soaked in thrombin as well as bipolar cautery.  Careful dissection was performed and aponeurosis of the levator muscle was identified and found to be dehisced.  Multiple 6-0 nylon sutures were used to reattach the   aponeurosis to the superior border of tarsus.  A similar procedure was performed on the patient's left side, after symmetry between the upper eyelids was deemed satisfactory, and the surgical incision sites were closed using single running 6-0 plain gut   sutures.  Next, scleral shells were placed into both eyes and a 4-0 silk traction suture was placed through both lower lid margins.  The lids were everted over Desmarres retractors, transconjunctival incisions were made and used to gain entry into the   fat pad to the lower eyelids, which were carefully debulked, taking great care to avoid the inferior oblique muscle.  Following debulking, proper positioning of the lateral canthal region was reapproximated using 5-0 PDS sutures, which were further   reinforced with a superior and lateral transposition at the suborbicularis oculi fat.  The overlying skin incisions were then closed with 6-0 plain gut.  Both lesions on the right upper lid as well as the left upper lid margin were excised using Aimee   forceps and 15 blade, and at conclusion of the case the scleral shells and silk traction sutures were removed and a mixture of 40% trichloroacetic acid was applied to both lower eyelids, taking great care to avoid exposure to the eyes.  Ointment was   then applied to both the operative sites and inside the eyes.  The patient tolerated the procedure well and there were no complications.  She was transferred to the recovery area in stable condition.      RENITA SUBRAMANIAN MD    Dictated by: MELA HOLLEY DO        CC:     DD:  06/06/2018 18:20  DT: 06/06/2018 19:02  Voice ID: [id number]/Report ID: [id number]  [id number]

## 2018-06-06 NOTE — OR SURGEON
Pre-Procedure patient identification:  I am the primary operating surgeon/proceduralist and I have identified the patient on 06/06/18 at 9:10 AM Beth Gutierrez MD  Phone Number: [phone]

## 2018-07-16 ENCOUNTER — OFFICE VISIT (OUTPATIENT)
Dept: UROGYNECOLOGY | Facility: CLINIC | Age: 83
End: 2018-07-16
Payer: MEDICARE

## 2018-07-16 VITALS
BODY MASS INDEX: 23.14 KG/M2 | HEIGHT: 66 IN | DIASTOLIC BLOOD PRESSURE: 82 MMHG | SYSTOLIC BLOOD PRESSURE: 140 MMHG | WEIGHT: 144 LBS

## 2018-07-16 DIAGNOSIS — R35.0 FREQUENCY OF MICTURITION: ICD-10-CM

## 2018-07-16 DIAGNOSIS — N94.819 VULVODYNIA: ICD-10-CM

## 2018-07-16 DIAGNOSIS — Z87.42 HISTORY OF UTERINE PROLAPSE: ICD-10-CM

## 2018-07-16 DIAGNOSIS — R35.0 FREQUENCY OF MICTURITION: Primary | ICD-10-CM

## 2018-07-16 LAB
CLARITY, POC: CLEAR
COLOR, POC: YELLOW
LEUKOCYTE EST, POC: ABNORMAL

## 2018-07-16 PROCEDURE — 51701 INSERT BLADDER CATHETER: CPT | Performed by: OBSTETRICS & GYNECOLOGY

## 2018-07-16 PROCEDURE — 81002 URINALYSIS NONAUTO W/O SCOPE: CPT | Performed by: OBSTETRICS & GYNECOLOGY

## 2018-07-16 PROCEDURE — 87086 URINE CULTURE/COLONY COUNT: CPT | Performed by: OBSTETRICS & GYNECOLOGY

## 2018-07-16 PROCEDURE — 99214 OFFICE O/P EST MOD 30 MIN: CPT | Mod: 25 | Performed by: OBSTETRICS & GYNECOLOGY

## 2018-07-16 RX ORDER — SERTRALINE HYDROCHLORIDE 25 MG/1
25 TABLET, FILM COATED ORAL
Refills: 1 | COMMUNITY
Start: 2018-07-01 | End: 2020-12-10

## 2018-07-16 RX ORDER — AMITRIPTYLINE HYDROCHLORIDE 10 MG/1
10 TABLET, FILM COATED ORAL NIGHTLY
Qty: 90 TABLET | Refills: 3 | Status: SHIPPED | OUTPATIENT
Start: 2018-07-16 | End: 2018-09-24

## 2018-07-16 ASSESSMENT — ENCOUNTER SYMPTOMS
FATIGUE: 0
RESPIRATORY NEGATIVE: 1
CONSTITUTIONAL NEGATIVE: 1
NERVOUS/ANXIOUS: 1
FREQUENCY: 1
CONFUSION: 0
NEUROLOGICAL NEGATIVE: 1
ANAL BLEEDING: 0
ROS GI COMMENTS: HEMORRHOIDS
DECREASED CONCENTRATION: 0
ALLERGIC/IMMUNOLOGIC NEGATIVE: 1
BLOOD IN STOOL: 0
UNEXPECTED WEIGHT CHANGE: 0
CONSTIPATION: 0
MUSCULOSKELETAL NEGATIVE: 1
CHILLS: 0
DEPRESSION: 1
ABDOMINAL PAIN: 0
ENDOCRINE NEGATIVE: 1
DIFFICULTY URINATING: 0
CARDIOVASCULAR NEGATIVE: 1
FEVER: 0

## 2018-07-16 NOTE — PROGRESS NOTES
"Visit Date: 7/16/2018   Mariya Porras is 83 y.o. female  who is here for a re-evaluation of vaginal discomfort and urinary frequency,  ?UTI.  She has irritation and discomfort in vagina and rectum.   She is voiding every hour,  No dysuria, no vaginal bleeding, no vaginal discharge, no itching but has burning.  No fever, flank pain or hematuria    She was last seen a year ago  She has not been compliant with either the elavil or vaginal estrogen. She had two utis last year with proteus.  None since.  She recently had her eyelids lifted.  No other change to health.      /82   Ht 1.676 m (5' 6\")   Wt 65.3 kg (144 lb)   BMI 23.24 kg/m²     Medications:   Current Outpatient Prescriptions:   •  amLODIPine (NORVASC) 5 mg tablet, Take 5 mg by mouth daily., Disp: , Rfl:   •  bimatoprost (LUMIGAN) 0.01 % ophthalmic drops, Administer 1 drop into both eyes nightly., Disp: , Rfl:   •  cranberry fruit extract (CRANBERRY EXTRACT ORAL), Take 1 tablet by mouth daily., Disp: , Rfl:   •  Lactobacillus acidophilus (PROBIOTIC ORAL), Take 1 tablet by mouth daily., Disp: , Rfl:   •  sertraline (ZOLOFT) 25 mg tablet, Take 25 mg by mouth once daily., Disp: , Rfl: 1  •  amitriptyline (ELAVIL) 10 mg tablet, Take 1 tablet (10 mg total) by mouth nightly., Disp: 90 tablet, Rfl: 3    Allergies: is allergic to codeine; diazepam; sulfur; and sulfa (sulfonamide antibiotics).     Past Medical History:  has a past medical history of Basal cell carcinoma; Glaucoma; and Hypertension.  Past Surgical History:  has a past surgical history that includes Joint replacement; Cataract extraction, bilateral; Colonoscopy; Hysterectomy; Bladder suspension (2009); and Knee Arthroplasty (2015).  Family History: family history is not on file.  Social History:  reports that she has quit smoking. She has never used smokeless tobacco. She reports that she drinks alcohol. She reports that she does not use drugs.      Review of Systems   Constitutional: " Negative.  Negative for chills, fatigue, fever and unexpected weight change.   HENT: Negative.    Respiratory: Negative.    Cardiovascular: Negative.    Gastrointestinal: Negative for abdominal pain, anal bleeding, blood in stool and constipation.        Hemorrhoids   Endocrine: Negative.    Genitourinary: Positive for frequency, nocturia, vaginal itching and vaginal pain. Negative for difficulty urinating, pelvic pain, urgency, vaginal bleeding and vaginal discharge.   Musculoskeletal: Negative.    Allergic/Immunologic: Negative.         Updated her allergies   Neurological: Negative.    Psychiatric/Behavioral: Positive for depression. Negative for behavioral problems, confusion and decreased concentration. The patient is nervous/anxious.      Physical Exam   Constitutional: She is oriented to person, place, and time. She appears well-developed and well-nourished. No distress.   Genitourinary: Vagina normal. No labial rash. No signs of atrophy.   Urethral meatus normal. There is no urethral meatus tenderness. There is no urethral tenderness or urethrocele. No stress urinary incontinence.  Vagina normal. No foreign body (no exposed mesh) in the vagina. No erythema or bleeding in the vagina. No vaginal discharge found. There is no cystocele, uterine prolapse or rectocele present.   Cervix exam normal.  Uterus is absent.     POP-Q measurements were:   Aa: -2, Ba: -2, C: -8  gh: pb: TVL: 10  Ap: 0, Bp: 0, D: x  Neck: Normal range of motion.   Musculoskeletal: She exhibits no edema or tenderness.   Neurological: She is alert and oriented to person, place, and time.   Skin: Skin is warm and dry.   Psychiatric: She has a normal mood and affect. Her behavior is normal. Judgment and thought content normal.       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     Fred Vickers MD

## 2018-07-16 NOTE — ASSESSMENT & PLAN NOTE
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

## 2018-07-16 NOTE — PROCEDURES
Procedure Note:  (84006) The urethra was prepped, and a lubricated 12 Palau[place] catheter was inserted to collect a post void residual.  The procedure was well tolerated by the patient  The PVR amount is recorded (120 ml)    UA is negative

## 2018-07-16 NOTE — ASSESSMENT & PLAN NOTE
She is maintaining excellent support nine years s/p vaginal mesh procedure  No evidence of mesh exposure  I do not think current complaints are related

## 2018-07-16 NOTE — ASSESSMENT & PLAN NOTE
Her UA is negative  Will check cath culture  Her PVR is normal  Unclear if this is related to bacterial cystitis

## 2018-07-17 LAB — BACTERIA UR CULT: NORMAL

## 2018-07-30 ENCOUNTER — TELEPHONE (OUTPATIENT)
Dept: UROGYNECOLOGY | Facility: CLINIC | Age: 83
End: 2018-07-30

## 2018-07-30 NOTE — TELEPHONE ENCOUNTER
Pt called stating the Amitriptyline is making her feel very agitated.  Instructed patient to discontinue medication to see if the medication is the cause.

## 2018-08-22 ENCOUNTER — OFFICE VISIT (OUTPATIENT)
Dept: UROGYNECOLOGY | Facility: CLINIC | Age: 83
End: 2018-08-22
Payer: MEDICARE

## 2018-08-22 VITALS
HEIGHT: 66 IN | BODY MASS INDEX: 23.14 KG/M2 | DIASTOLIC BLOOD PRESSURE: 72 MMHG | SYSTOLIC BLOOD PRESSURE: 126 MMHG | WEIGHT: 144 LBS

## 2018-08-22 DIAGNOSIS — N94.819 VULVODYNIA: ICD-10-CM

## 2018-08-22 DIAGNOSIS — R82.71 BACTERIURIA: Primary | ICD-10-CM

## 2018-08-22 PROCEDURE — 99213 OFFICE O/P EST LOW 20 MIN: CPT | Mod: 25 | Performed by: OBSTETRICS & GYNECOLOGY

## 2018-08-22 PROCEDURE — 51701 INSERT BLADDER CATHETER: CPT | Performed by: OBSTETRICS & GYNECOLOGY

## 2018-08-22 RX ORDER — TRIAMCINOLONE ACETONIDE 1 MG/G
OINTMENT TOPICAL
Qty: 30 G | Refills: 1 | Status: SHIPPED | OUTPATIENT
Start: 2018-08-22 | End: 2020-12-10

## 2018-08-22 NOTE — ASSESSMENT & PLAN NOTE
Nitrites detected on a catheterized urine specimen.  A urine culture was therefore ordered.  I will follow-up on the result of her urine culture, and will offer antibiotics, if appropriate.

## 2018-08-22 NOTE — ASSESSMENT & PLAN NOTE
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  I recommended topical steroids, and e-prescribed Triamcinolone 0.1% ointment to be used daily.  I explained that if she is particularly symptomatic, she can use it BID if needed.  She will follow-up with Diann Ortiz in one month to see how she is doing after using the steroid ointment.

## 2018-08-22 NOTE — PATIENT INSTRUCTIONS
If you have any problems or concerns, call our office at [phone].  If you think you are having a medical emergency, please call [phone].

## 2018-08-22 NOTE — PROGRESS NOTES
"Mariya oPrras presents today for follow-up on vulvar pain.    S: Ms. Porras presents today in scheduled follow-up on vulvar pain.  She describes vulvar pain that she describes as \"burning.\"  She notes that when she has urinary tract infections, the burning is high at the top of the vagina, but the vulvar pain feels lower.  She reports the vulvar pain flares 3-4 times per year.  She has low-level discomfort that she thinks may be due to not using estrogen cream (she is too scared of the possible side-effects).  The discomfort is described as \"irritation.\"    She denies vaginal bleeding or discharge.  She denies itching or burning on the outside.    When she gets a flare of the vulvar pain, it spontaneously resolves without treatment.  She hasn't identified any aggravating or alleviating factors.  She stopped taking Amitriptyline because it made her feel \"nervous.\"    Review of Systems   All other systems reviewed and are negative.       O:   Vitals:    08/22/18 1543   BP: 126/72      Body mass index is 23.24 kg/m².   Gen: NAD  Pelvic exam (with a female chaperone present): External genitalia suggestive of mild lichen sclerosus, with thin skin around the introitus and small labia minora with fusion at the interlabial sulci bilaterally.  No raised or suspicious skin lesions warranting vulvar biopsy.    PVR via straight catheterization: 40 mL  POC UA: +ve nitrites.  The catheterized urine specimen was therefore sent for culture and sensitivities.    Assessment/Plan     Bacteriuria  Nitrites detected on a catheterized urine specimen.  A urine culture was therefore ordered.  I will follow-up on the result of her urine culture, and will offer antibiotics, if appropriate.     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  I recommended topical steroids, and e-prescribed Triamcinolone 0.1% ointment to be used daily.  I explained that if she is particularly symptomatic, she can use it BID if needed.  She " will follow-up with Diann Ortiz in one month to see how she is doing after using the steroid ointment.      Return in about 1 month (around 9/22/2018) for medication follow-up, with Diann Ortiz.       Oleg Aponte MD PhD

## 2018-08-23 PROCEDURE — 87186 SC STD MICRODIL/AGAR DIL: CPT | Performed by: OBSTETRICS & GYNECOLOGY

## 2018-08-26 LAB
BACTERIA UR CULT: ABNORMAL
BACTERIA UR CULT: ABNORMAL

## 2018-08-27 ENCOUNTER — TELEPHONE (OUTPATIENT)
Dept: UROGYNECOLOGY | Facility: CLINIC | Age: 83
End: 2018-08-27

## 2018-08-27 RX ORDER — CEFUROXIME AXETIL 250 MG/1
250 TABLET ORAL 2 TIMES DAILY
Qty: 14 TABLET | Refills: 0 | Status: SHIPPED | OUTPATIENT
Start: 2018-08-27 | End: 2018-09-03

## 2018-08-27 NOTE — TELEPHONE ENCOUNTER
I called to let Ms. Porras know that her recent urine culture shows she has a UTI.  I sent a prescription for Ceftin 250 mg, Si tab PO BID x 7 days to her pharmacy ([facility] in [place]).    Oleg Aponte MD PhD

## 2018-09-11 ENCOUNTER — OFFICE VISIT (OUTPATIENT)
Dept: SURGERY | Facility: CLINIC | Age: 83
End: 2018-09-11
Payer: MEDICARE

## 2018-09-11 VITALS
DIASTOLIC BLOOD PRESSURE: 100 MMHG | HEIGHT: 66 IN | WEIGHT: 153 LBS | BODY MASS INDEX: 24.59 KG/M2 | SYSTOLIC BLOOD PRESSURE: 180 MMHG

## 2018-09-11 DIAGNOSIS — R10.2 PELVIC PAIN: Primary | ICD-10-CM

## 2018-09-11 PROCEDURE — 99204 OFFICE O/P NEW MOD 45 MIN: CPT | Performed by: SURGERY

## 2018-09-11 RX ORDER — HYDROGEN PEROXIDE 3 %
20 SOLUTION, NON-ORAL MISCELLANEOUS
COMMUNITY
End: 2020-12-10

## 2018-09-11 NOTE — PROGRESS NOTES
"    Chief Complaint:   Chief Complaint   Patient presents with   • Consult     hemorrhoids   .    HPI     Patient is a 83 y.o. female who presents with the following:  Pt has hemorrhoids and they bother her at night - aching - not burning -     Overall has an ache - \"it hurts\" - more than a year - had a problem with her stomach and she was told she had hemorrhoids by Dr. Richards - this was 3y ago -     Pt feels the ache and this wakes her up night - feels like this is every night - does not have as much during the day - worse when sitting - better walking around -     Can have pain with bM - no blood at all - none in bowl none on TP -     Feels like a dull ache - not sharp - NO itching or irritation    Nothing really affects the ache - uses prep H - maybe soaks in a tub - feels like prep H helps -     Does not have back troubles - had spasm in the past -     Pt had a hysterectomy and bladder lift - Toglia did bladder lift - saw Fay in his office for vaginal burning -     Medical History:   Past Medical History:   Diagnosis Date   • Basal cell carcinoma     forehead   • Glaucoma    • Hypertension        Surgical History:   Past Surgical History:   Procedure Laterality Date   • BLADDER SUSPENSION  2009    Prolift M   • CATARACT EXTRACTION, BILATERAL     • COLONOSCOPY     • HYSTERECTOMY     • JOINT REPLACEMENT     • KNEE ARTHROPLASTY  2015    left       Social History:   Social History     Social History   • Marital status:      Spouse name: N/A   • Number of children: N/A   • Years of education: N/A     Occupational History   • Not on file.     Social History Main Topics   • Smoking status: Former Smoker   • Smokeless tobacco: Never Used   • Alcohol use Yes      Comment: wine   • Drug use: No   • Sexual activity: Not on file     Other Topics Concern   • Not on file     Social History Narrative   • No narrative on file       Family History:   History reviewed. No pertinent family history.    Allergies: "   Codeine; Diazepam; Sulfur; and Sulfa (sulfonamide antibiotics)    Current Medications:      Current Outpatient Prescriptions:   •  amLODIPine (NORVASC) 5 mg tablet, Take 5 mg by mouth daily., Disp: , Rfl:   •  bimatoprost (LUMIGAN) 0.01 % ophthalmic drops, Administer 1 drop into both eyes nightly., Disp: , Rfl:   •  cranberry fruit extract (CRANBERRY EXTRACT ORAL), Take 1 tablet by mouth daily., Disp: , Rfl:   •  esomeprazole (NexIUM) 20 mg capsule, Take 20 mg by mouth daily before breakfast., Disp: , Rfl:   •  Lactobacillus acidophilus (PROBIOTIC ORAL), Take 1 tablet by mouth daily., Disp: , Rfl:   •  sertraline (ZOLOFT) 25 mg tablet, Take 25 mg by mouth once daily., Disp: , Rfl: 1  •  triamcinolone (KENALOG) 0.1 % ointment, Apply a small amount to the affected area daily., Disp: 30 g, Rfl: 1  •  amitriptyline (ELAVIL) 10 mg tablet, Take 1 tablet (10 mg total) by mouth nightly. (Patient not taking: Reported on 8/22/2018 ), Disp: 90 tablet, Rfl: 3    Review of Systems  All 14 systems reviewed and the findings are not pertinent to the current problem.    Objective     Vital Signs  Vitals:    09/11/18 1031   BP: (!) 180/100     Body mass index is 24.69 kg/m².      Physicial Exam  General Appearance:  Well developed.  Well nourished.  In no acute distress. Patient was not obese.  Head:  Posture of the head was normal.  Neck:  External features of the neck was normal.  Trachea:  Showed no abnormalities.  Trachea midline.  Extraocular Movements:  Normal.  Pupils:  Reactive to light.   Not deformed.  Oral Cavity:  Mixed dentition was not observed.  Tongue was midline.  Peripheral edema:  Not present.  Genitalia:  Normal.    Anorectal Examination:    No pilonidal sinus was observed.   No pilonidal cyst was observed.   Perianal skin was not erythematous.  No perianal wart was observed.  No anal fissure was observed.   Anus did not have a fistula.   Anal sphincter tone was normal.   No hemorrhoids were seen.   No external  anal skin tags were observed.   Anus had no mass.  Rectum:  No rectal abscess was observed.   Rectal prolapse was not observed.   No rectal fistula was observed.   Rectal exam was not tender.   No rectal fluctuance was observed.  Rectal exam showed no mass.   Normal roxanna-anal skin with no lesions or dermatitis    She has pelvic floor tenderness and mod int hems.    Other Exam:  Musculoskeletal System:  No gross defecits or defects of the upper or lower extremities.  No cyanosis of the fingers.  Lumbar / Lumbosacral Spine:  Lumbar/lumbosacral spine exhibited no abnormalities.  Bilateral thighs:   Posterior aspect was not swollen.   Posterior aspect showed no induration.   Posterior aspect was not erythematous.   Posterior aspect was not warm.   No mass on the posterior aspect.  Functional Exam:   General/bilateral:  Mobility was not limited.  Neurological:   No confusion was observed.   No disorientation was observed.  Speech:  Normal.  Motor:  No tremor was seen.  Balance:  Normal.  Gait And Stance:  Normal.  Psychiatric:  Mood:  Euthymic.  Affect:  Normal.  Skin:  No clubbing of the fingernails.  Normal upper and lower extremity skin exam.      Problem List Items Addressed This Visit     Pelvic pain - Primary     This lady has pounding pain in the pelvis which wakes her up at night.  She also has vagina pain recently.  On exam its clear that the hemorrhoids are not the source of pain.  She does have pelvic floor tenderness.  My plan is pelvic floor PT in the future.           Relevant Orders    Ambulatory referral to Physical Therapy              Dung Murphy MD 9/11/2018 2:52 PM

## 2018-09-11 NOTE — LETTER
"September 11, 2018     Shant Corona MD  [address] [facility] [address]    Patient: Mariya Porras   YOB: 1935   Date of Visit: 9/11/2018       Dear Dr. Corona:    Thank you for referring Mariya Porras to me for evaluation. Below are my notes for this consultation.    If you have questions, please do not hesitate to call me. I look forward to following your patient along with you.         Sincerely,        Dung Murphy MD        CC: MD Katherine Majano, Dung MARR MD  9/11/2018  2:53 PM  Sign at close encounter      Chief Complaint:   Chief Complaint   Patient presents with   • Consult     hemorrhoids   .    HPI     Patient is a 83 y.o. female who presents with the following:  Pt has hemorrhoids and they bother her at night - aching - not burning -     Overall has an ache - \"it hurts\" - more than a year - had a problem with her stomach and she was told she had hemorrhoids by Dr. Richards - this was 3y ago -     Pt feels the ache and this wakes her up night - feels like this is every night - does not have as much during the day - worse when sitting - better walking around -     Can have pain with bM - no blood at all - none in bowl none on TP -     Feels like a dull ache - not sharp - NO itching or irritation    Nothing really affects the ache - uses prep H - maybe soaks in a tub - feels like prep H helps -     Does not have back troubles - had spasm in the past -     Pt had a hysterectomy and bladder lift - Alee did bladder lift - saw Fay in his office for vaginal burning -     Medical History:   Past Medical History:   Diagnosis Date   • Basal cell carcinoma     forehead   • Glaucoma    • Hypertension        Surgical History:   Past Surgical History:   Procedure Laterality Date   • BLADDER SUSPENSION  2009    Prolift M   • CATARACT EXTRACTION, BILATERAL     • COLONOSCOPY     • HYSTERECTOMY     • JOINT REPLACEMENT     • KNEE ARTHROPLASTY  2015    left       Social History: "   Social History     Social History   • Marital status:      Spouse name: N/A   • Number of children: N/A   • Years of education: N/A     Occupational History   • Not on file.     Social History Main Topics   • Smoking status: Former Smoker   • Smokeless tobacco: Never Used   • Alcohol use Yes      Comment: wine   • Drug use: No   • Sexual activity: Not on file     Other Topics Concern   • Not on file     Social History Narrative   • No narrative on file       Family History:   History reviewed. No pertinent family history.    Allergies:   Codeine; Diazepam; Sulfur; and Sulfa (sulfonamide antibiotics)    Current Medications:      Current Outpatient Prescriptions:   •  amLODIPine (NORVASC) 5 mg tablet, Take 5 mg by mouth daily., Disp: , Rfl:   •  bimatoprost (LUMIGAN) 0.01 % ophthalmic drops, Administer 1 drop into both eyes nightly., Disp: , Rfl:   •  cranberry fruit extract (CRANBERRY EXTRACT ORAL), Take 1 tablet by mouth daily., Disp: , Rfl:   •  esomeprazole (NexIUM) 20 mg capsule, Take 20 mg by mouth daily before breakfast., Disp: , Rfl:   •  Lactobacillus acidophilus (PROBIOTIC ORAL), Take 1 tablet by mouth daily., Disp: , Rfl:   •  sertraline (ZOLOFT) 25 mg tablet, Take 25 mg by mouth once daily., Disp: , Rfl: 1  •  triamcinolone (KENALOG) 0.1 % ointment, Apply a small amount to the affected area daily., Disp: 30 g, Rfl: 1  •  amitriptyline (ELAVIL) 10 mg tablet, Take 1 tablet (10 mg total) by mouth nightly. (Patient not taking: Reported on 8/22/2018 ), Disp: 90 tablet, Rfl: 3    Review of Systems  All 14 systems reviewed and the findings are not pertinent to the current problem.    Objective     Vital Signs  Vitals:    09/11/18 1031   BP: (!) 180/100     Body mass index is 24.69 kg/m².      Physicial Exam  General Appearance:  Well developed.  Well nourished.  In no acute distress. Patient was not obese.  Head:  Posture of the head was normal.  Neck:  External features of the neck was normal.  Trachea:   Showed no abnormalities.  Trachea midline.  Extraocular Movements:  Normal.  Pupils:  Reactive to light.   Not deformed.  Oral Cavity:  Mixed dentition was not observed.  Tongue was midline.  Peripheral edema:  Not present.  Genitalia:  Normal.    Anorectal Examination:    No pilonidal sinus was observed.   No pilonidal cyst was observed.   Perianal skin was not erythematous.  No perianal wart was observed.  No anal fissure was observed.   Anus did not have a fistula.   Anal sphincter tone was normal.   No hemorrhoids were seen.   No external anal skin tags were observed.   Anus had no mass.  Rectum:  No rectal abscess was observed.   Rectal prolapse was not observed.   No rectal fistula was observed.   Rectal exam was not tender.   No rectal fluctuance was observed.  Rectal exam showed no mass.   Normal roxanna-anal skin with no lesions or dermatitis    She has pelvic floor tenderness and mod int hems.    Other Exam:  Musculoskeletal System:  No gross defecits or defects of the upper or lower extremities.  No cyanosis of the fingers.  Lumbar / Lumbosacral Spine:  Lumbar/lumbosacral spine exhibited no abnormalities.  Bilateral thighs:   Posterior aspect was not swollen.   Posterior aspect showed no induration.   Posterior aspect was not erythematous.   Posterior aspect was not warm.   No mass on the posterior aspect.  Functional Exam:   General/bilateral:  Mobility was not limited.  Neurological:   No confusion was observed.   No disorientation was observed.  Speech:  Normal.  Motor:  No tremor was seen.  Balance:  Normal.  Gait And Stance:  Normal.  Psychiatric:  Mood:  Euthymic.  Affect:  Normal.  Skin:  No clubbing of the fingernails.  Normal upper and lower extremity skin exam.      Problem List Items Addressed This Visit     Pelvic pain - Primary     This lady has pounding pain in the pelvis which wakes her up at night.  She also has vagina pain recently.  On exam its clear that the hemorrhoids are not the source  of pain.  She does have pelvic floor tenderness.  My plan is pelvic floor PT in the future.           Relevant Orders    Ambulatory referral to Physical Therapy              Dung Murphy MD 9/11/2018 2:52 PM

## 2018-09-11 NOTE — ASSESSMENT & PLAN NOTE
This lady has pounding pain in the pelvis which wakes her up at night.  She also has vagina pain recently.  On exam its clear that the hemorrhoids are not the source of pain.  She does have pelvic floor tenderness.  My plan is pelvic floor PT in the future.

## 2018-09-11 NOTE — PATIENT INSTRUCTIONS
"Pelvic Floor Disorders    Q. What is the pelvic floor?   A. Both men and women have a pelvic floor. In women, the pelvic floor is the muscles, ligaments, connective tissues and nerves that support the bladder, uterus, vagina and rectum and help these pelvic organs function. In men, the pelvic floor includes the muscles, tissues and nerves that support the bladder, rectum and other pelvic organs.  For many people, particularly women, the pelvic floor does not work as well as it should. Almost one-quarter of women face pelvic floor disorders, according to a study funded the [employer]. The study found that pelvic floor disorders affect about 10 percent of women ages 20 to 39, 27 percent of women ages 40 to 59, 37 percent of women ages 60 to 79 and nearly half of women age 80 or older.    Q. What are pelvic floor disorders?   A. Pelvic floor disorders occur when the \"sling\" or \"hammock\" that supports the pelvic organs becomes weak or damaged. The three main types of pelvic floor disorders are:   • Urinary incontinence, or lack of bladder control   • Fecal incontinence, or lack of bowel control   • Pelvic organ prolapse, a condition in which the uterus, bladder and bowel may \"drop\" onto the vagina and cause a bulge through the vaginal canal    Q. What are the symptoms of pelvic floor disorders?   A. People with pelvic floor disorders may experience:   • Urinary problems, such as an urgent need to urinate, painful urination or incomplete emptying of their bladder   • Constipation, straining or pain during bowel movements   • Pain or pressure in the vagina or rectum   • A heavy feeling in the pelvis or a bulge in the vagina or rectum   • Muscle spasms in the pelvis    Q. Are pelvic floor disorders a normal part of aging?   A. While pelvic floor disorders become more common as women get older, they are not a normal or acceptable part of aging. These problems can have a significant impact on a person’s " quality of life. Fortunately, these disorders often can be reversed with treatment.     Q. What causes pelvic floor disorders?   A. Childbirth is one of the main causes of pelvic floor disorders. A woman’s risk tends to increase the more times she has given birth.  Women who are overweight or obese also have a greater risk for pelvic floor disorders.  Having pelvic surgery or radiation treatments also can cause these disorders. For example, these treatments can damage nerves and other tissues in the pelvic floor.  Other factors that can increase the risk include repeated heavy lifting or even genes.     Q. Who treats pelvic floor disorders?   A. A variety of experts can treat these problems, and often, a combination of experts provides the best outcome for patients. At the [facility], women and men are treated by a multidisciplinary team of specialists that includes:   • Urogynecologists, who are obstetrician/gynecologists who specialize in the care of women with pelvic floor disorders   • Urologists, who specialize in the treatment of urinary disorders in women and men   • Colorectal surgeons, who provide surgical treatment of the digestive system   • Gastroenterologists, who treat the digestive system   • Plastic and reconstructive surgeons, who use advanced reconstructive techniques to rebuild damaged tissues in the pelvis   • Physical therapists, who can help women and men learn exercises to improve their symptoms   • Radiologists, who perform advanced studies to determine the cause of pelvic floor disorders   • Nurses, who are often the first line of contact for patients and can help coordinate care through the [facility]    Q. When should I seek help for pelvic floor disorders?   A. Many people don’t feel comfortable talking about personal topics like pelvic floor disorders and symptoms such as incontinence. But these are actually very common medical problems  that can be treated successfully. Millions of people have the same issues, but many don’t seek treatment and compromise their quality of life.  If you have a pelvic health issue, don’t hesitate to learn more about your treatment options. If your doctor doesn’t treat these issues regularly, seek out an expert. The [facility] includes urologists, urogynecologists and other caregivers who treat their patients with dignity and compassion.     Q. What causes pelvic pain?   A. Pelvic pain is discomfort that occurs in the lower abdomen below the belly button. More common in women than in men, pelvic pain can be a sign of other health problems, some of which may be serious.  In women, pelvic pain may be caused by:   • Endometriosis, a condition in which the tissue that normally lines the uterus grows on the ovaries, bladder or other organs   • Chronic pelvic inflammatory disease, an infection that affects the female reproductive organs   • Fibroids, which are noncancerous tumors that grow in the uterus   • Interstitial cystitis, an inflammation of the bladder that may cause urinary urgency or pelvic pain   • Digestive diseases such as colitis, diverticulosis and diverticulitis  If you have pelvic pain, see a doctor who treats these problems regularly. Many treatments are available to help reduce your discomfort. And if left untreated, pelvic pain could lead to a more serious problem.     Q. What role do pelvic floor muscles play in bowel problems?   A. The pelvic floor muscles play an important role in the process of having a bowel movement. The muscles of the pelvis must relax and contract in a coordinated way to eliminate stool.  However, some people cannot control these muscle movements and need to strain or assume different positions to achieve a bowel movement.     Q. What is pelvic floor dysfunction, and what are the symptoms?   A. Pelvic floor dysfunction is when you are unable to  control the muscles that help you have a complete bowel movement. It can affect women and men. The symptoms include:   • Constipation, straining and pain with bowel movements   • Unexplained pain in the lower back, pelvis, genitals or rectum   • Pelvic muscle spasms   • A frequent need to urinate   • Painful intercourse for women  Experts do not know for sure what causes pelvic floor dysfunction. However, people who have it tend to contract their pelvic floor muscles rather than relax the muscles, which allows the bowel to empty.     Q. How is pelvic floor dysfunction treated?   A. Treatment can have a dramatic effect on pelvic floor dysfunction. For most people, this usually involves:   • Behavior changes, such as avoiding pushing or straining when urinating and having a bowel movement. This also might include learning how to relax the muscles in the pelvic floor area. For example, warm baths and yoga can help relax these muscles.   • Medicines, such as low doses of muscle relaxants like diazepam   • Physical therapy and biofeedback, which can help you learn how to relax and coordinate the movement of your pelvic floor muscles

## 2018-09-18 ENCOUNTER — OFFICE VISIT (OUTPATIENT)
Dept: UROGYNECOLOGY | Facility: CLINIC | Age: 83
End: 2018-09-18
Payer: MEDICARE

## 2018-09-18 VITALS
HEIGHT: 66 IN | BODY MASS INDEX: 23.14 KG/M2 | SYSTOLIC BLOOD PRESSURE: 124 MMHG | DIASTOLIC BLOOD PRESSURE: 84 MMHG | WEIGHT: 144 LBS

## 2018-09-18 DIAGNOSIS — R82.71 BACTERIURIA: Primary | ICD-10-CM

## 2018-09-18 PROCEDURE — 87186 SC STD MICRODIL/AGAR DIL: CPT | Performed by: NURSE PRACTITIONER

## 2018-09-18 PROCEDURE — 99213 OFFICE O/P EST LOW 20 MIN: CPT | Performed by: NURSE PRACTITIONER

## 2018-09-18 ASSESSMENT — ENCOUNTER SYMPTOMS
NAUSEA: 0
LIGHT-HEADEDNESS: 0
CHILLS: 0
CONSTIPATION: 0
WHEEZING: 0
AGITATION: 0
HALLUCINATIONS: 0
NECK PAIN: 0
APPETITE CHANGE: 0
DIARRHEA: 0
FATIGUE: 0
PALPITATIONS: 0
JOINT SWELLING: 0
SEIZURES: 0
DIAPHORESIS: 0
FACIAL ASYMMETRY: 0
COUGH: 0
FEVER: 0
POLYDIPSIA: 0
ADENOPATHY: 0
ABDOMINAL PAIN: 0
BREAST MASS: 0
BRUISES/BLEEDS EASILY: 0
SHORTNESS OF BREATH: 0
NECK STIFFNESS: 0
BREAST PAIN: 0

## 2018-09-18 NOTE — PROGRESS NOTES
"Patient ID: Mariya Porras   : 1935  MRN: [id number]   Visit Date: 2018    Subjective   Mariya Porras is presenting today for Pelvic Pain    She was seen by Dr. Murphy for hemorrhoids. He rx'd PFPT but she has not pursued this yet. She c/o rectal pain since her exam with Dr. Murphy. She was told by Dr. Aponte to use triamcinolone BID but she is unable to articulate if it has been helpful and has not used it as directed as she reports that she hasn't had any irritation of the vulva recently.      Objective   Vital Signs for this encounter: /84   Ht 1.676 m (5' 6\")   Wt 65.3 kg (144 lb)   BMI 23.24 kg/m²   The following have been marked reviewed and updated as appropriate in this visit:  Tobacco  Allergies  Meds  Problems  Med Hx  Surg Hx  Fam Hx  Soc Hx        Review of Systems   Constitutional: Negative for appetite change, chills, diaphoresis, fatigue and fever.   Respiratory: Negative for cough, shortness of breath and wheezing.    Cardiovascular: Negative for chest pain and palpitations.   Gastrointestinal: Negative for abdominal pain, constipation, diarrhea and nausea.   Endocrine: Negative for polydipsia.   Genitourinary:        See HPI   Breast: Negative for breast discharge, breast mass and breast pain.   Musculoskeletal: Negative for joint swelling, neck pain and neck stiffness.   Skin: Negative for pallor and rash.   Neurological: Negative for seizures, facial asymmetry and light-headedness.   Hematological: Negative for adenopathy. Does not bruise/bleed easily.   Psychiatric/Behavioral: Negative for agitation and hallucinations.     Physical Exam   Constitutional: She appears well-developed and well-nourished.   Genitourinary: Pelvic exam was performed with patient in lithotomy exam position.     External female genitalia normal.   Urethral meatus normal.   Urethra normal.   HENT:   Head: Normocephalic and atraumatic.   Neck: No thyromegaly present. "   Cardiovascular: Normal rate, regular rhythm and normal heart sounds.    Pulmonary/Chest: Effort normal.   Skin: Skin is warm and dry.   Psychiatric: She has a normal mood and affect. Her behavior is normal. Judgment and thought content normal.   Vitals reviewed.       Assessment/Plan   Plan:  Vulvodynia  She is not having complaints of vulvar pain today. She is a poor historian and shows little insight into her conditions. Recommended use of the triamcinolone BID if the vulvar irritation reoccurs.  She may benefit from PFPT.    Return if symptoms worsen or fail to improve.

## 2018-09-18 NOTE — ASSESSMENT & PLAN NOTE
She is not having complaints of vulvar pain today. She is a poor historian and shows little insight into her conditions. Recommended use of the triamcinolone BID if the vulvar irritation reoccurs.  She may benefit from PFPT.

## 2018-09-20 LAB
BACTERIA UR CULT: ABNORMAL
BACTERIA UR CULT: ABNORMAL

## 2018-09-21 ENCOUNTER — TELEPHONE (OUTPATIENT)
Dept: UROGYNECOLOGY | Facility: CLINIC | Age: 83
End: 2018-09-21

## 2018-09-21 RX ORDER — GRANULES FOR ORAL 3 G/1
3 POWDER ORAL ONCE
Qty: 1 PACKET | Refills: 0 | Status: SHIPPED | OUTPATIENT
Start: 2018-09-21 | End: 2018-09-21

## 2018-09-24 ENCOUNTER — TELEPHONE (OUTPATIENT)
Dept: UROGYNECOLOGY | Facility: CLINIC | Age: 83
End: 2018-09-24

## 2018-09-24 ENCOUNTER — OFFICE VISIT (OUTPATIENT)
Dept: UROGYNECOLOGY | Facility: CLINIC | Age: 83
End: 2018-09-24
Payer: MEDICARE

## 2018-09-24 VITALS — DIASTOLIC BLOOD PRESSURE: 70 MMHG | SYSTOLIC BLOOD PRESSURE: 168 MMHG

## 2018-09-24 DIAGNOSIS — Z16.12 ESBL (EXTENDED SPECTRUM BETA-LACTAMASE) PRODUCING BACTERIA INFECTION: ICD-10-CM

## 2018-09-24 DIAGNOSIS — N94.819 VULVODYNIA: ICD-10-CM

## 2018-09-24 DIAGNOSIS — R31.29 MICROSCOPIC HEMATURIA: Primary | ICD-10-CM

## 2018-09-24 DIAGNOSIS — A49.9 ESBL (EXTENDED SPECTRUM BETA-LACTAMASE) PRODUCING BACTERIA INFECTION: ICD-10-CM

## 2018-09-24 PROCEDURE — 99213 OFFICE O/P EST LOW 20 MIN: CPT | Performed by: OBSTETRICS & GYNECOLOGY

## 2018-09-24 ASSESSMENT — ENCOUNTER SYMPTOMS
FREQUENCY: 1
CONFUSION: 0
NEUROLOGICAL NEGATIVE: 1
NUMBNESS: 0
ENDOCRINE NEGATIVE: 1
DYSURIA: 0
CARDIOVASCULAR NEGATIVE: 1
CONSTITUTIONAL NEGATIVE: 1
DIARRHEA: 0
AGITATION: 0
FLANK PAIN: 0
NERVOUS/ANXIOUS: 1
RESPIRATORY NEGATIVE: 1
DIFFICULTY URINATING: 0
CONSTIPATION: 0
LIGHT-HEADEDNESS: 0
EYES NEGATIVE: 1
HEMATOLOGIC/LYMPHATIC NEGATIVE: 1
DEPRESSION: 1
ALLERGIC/IMMUNOLOGIC NEGATIVE: 1

## 2018-09-24 NOTE — ASSESSMENT & PLAN NOTE
She has chronic vulvar burining and discomfort  I cannot identify anything else that is treatable  She recently stopped triamcinolone - ? Rebound effect, will restart  Wet prep is negative for yeast  Will check candida one swab  Will recheck UA and culture  No evidence of mesh related complications  I have explained to her that I have limited options to offer

## 2018-09-24 NOTE — PROGRESS NOTES
Visit Date: 9/24/2018   Mariya Porras is 83 y.o. female who is here for a re-evaluation of Follow-up  She has persistent, chronic vaginal pain.  She is a poor historian. She was not compliant with the amitriptyline, which she discontinued after several days, secondary to side effects.  She then Dr. Aponte who felt that she had Lichen sclerosus and was prescribed triamcinolone which she used for several weeks.    Last night she had poor sleep she awoke with vulvovaginal burning and itching and also around the rectum.  No vaginal bleeding or discharge.  She voided several times over night ? 7 times. Denies dysuria, hematuria. She recently took fosfamycin  For e coli ESBL    The following have been reviewed and updated as appropriate in this visit:      BP (!) 168/70     Review of Systems   Constitutional: Negative.    HENT: Negative.    Eyes: Negative.    Respiratory: Negative.    Cardiovascular: Negative.    Gastrointestinal: Negative for constipation and diarrhea.   Endocrine: Negative.    Genitourinary: Positive for frequency, vaginal itching and vaginal pain (vulvar burn and itch). Negative for difficulty urinating, dysuria, flank pain, genital sores, vaginal bleeding and vaginal discharge.   Allergic/Immunologic: Negative.    Neurological: Negative.  Negative for light-headedness and numbness.   Hematological: Negative.    Psychiatric/Behavioral: Positive for depression. Negative for agitation and confusion. The patient is nervous/anxious.      Physical Exam   Constitutional: She is oriented to person, place, and time. She appears well-developed and well-nourished.   Genitourinary: Uterus normal. Pelvic exam was performed with patient in lithotomy exam position.   No labial rash.   External female genitalia normal. No signs of erythema or atrophy.   Urethral meatus normal.   Urethra normal. There is no urethral urethrocele or urethral diverticulum. No stress urinary incontinence.  Normal bladder. The vagina  is not narrow. No foreign body in the vagina. There is tenderness (vestibular tenderness around introitus) in the vagina. No erythema or bleeding in the vagina. No signs of injury around the vagina. No vaginal discharge found. There is no cystocele present. Perineum intact.   Cervix exam normal.  Uterus is normal.   Adnexa normal.   Rectal exam shows no rectal prolapse and no external hemorrhoid.   Neck: No JVD present. No thyromegaly present.   Cardiovascular: Intact distal pulses and normal pulses.    No  JVD  No peripheral edema  Peripheral vascular normal   Pulmonary/Chest: Effort normal and breath sounds normal. No tachypnea. No respiratory distress.   Abdominal: Soft. Normal appearance. She exhibits no distension and no ascites. There is no hepatosplenomegaly. There is no tenderness. There is no CVA tenderness. No hernia.   Lymphadenopathy:        Right: No inguinal adenopathy present.        Left: No inguinal adenopathy present.   Neurological: She is alert and oriented to person, place, and time. No sensory deficit.   Skin: No bruising and no rash noted.   Psychiatric: Her behavior is normal. Her affect is not inappropriate. Cognition and memory are normal. She is attentive.       PLAN:  ESBL (extended spectrum beta-lactamase) producing bacteria infection  She was recently treated for e coli ESBL with fosfomycin  Her UA is negative today  Will recheck culture    Vulvodynia  She has chronic vulvar burining and discomfort  I cannot identify anything else that is treatable  She recently stopped triamcinolone - ? Rebound effect, will restart  Wet prep is negative for yeast  Will check candida one swab  Will recheck UA and culture  No evidence of mesh related complications  I have explained to her that I have limited options to offer      Return if symptoms worsen or fail to improve.      Fred Vickers MD

## 2018-09-25 ENCOUNTER — LAB REQUISITION (OUTPATIENT)
Dept: LAB | Facility: HOSPITAL | Age: 83
End: 2018-09-25
Attending: OBSTETRICS & GYNECOLOGY
Payer: MEDICARE

## 2018-09-25 DIAGNOSIS — R31.29 OTHER MICROSCOPIC HEMATURIA: ICD-10-CM

## 2018-09-25 LAB
BACTERIA URNS QL MICRO: ABNORMAL /HPF
BILIRUB UR QL STRIP.AUTO: NEGATIVE MG/DL
CLARITY UR REFRACT.AUTO: CLEAR
COLOR UR AUTO: YELLOW
GLUCOSE UR STRIP.AUTO-MCNC: NEGATIVE MG/DL
HGB UR QL STRIP.AUTO: NEGATIVE
HYALINE CASTS #/AREA URNS LPF: ABNORMAL /LPF
KETONES UR STRIP.AUTO-MCNC: NEGATIVE MG/DL
LEUKOCYTE ESTERASE UR QL STRIP.AUTO: NEGATIVE
NITRITE UR QL STRIP.AUTO: NEGATIVE
PH UR STRIP.AUTO: 6 [PH]
PROT UR QL STRIP.AUTO: NEGATIVE
RBC #/AREA URNS HPF: ABNORMAL /HPF
SP GR UR REFRACT.AUTO: 1.02
SQUAMOUS URNS QL MICRO: ABNORMAL /HPF
UROBILINOGEN UR STRIP-ACNC: 0.2 EU/DL
WBC #/AREA URNS HPF: ABNORMAL /HPF

## 2018-09-25 PROCEDURE — 87086 URINE CULTURE/COLONY COUNT: CPT | Performed by: OBSTETRICS & GYNECOLOGY

## 2018-09-25 PROCEDURE — 81001 URINALYSIS AUTO W/SCOPE: CPT | Performed by: OBSTETRICS & GYNECOLOGY

## 2018-09-26 LAB — BACTERIA UR CULT: NORMAL

## 2018-09-27 RX ORDER — ESTRADIOL 0.1 MG/G
1 CREAM VAGINAL 2 TIMES WEEKLY
Qty: 42.5 G | Refills: 5 | Status: SHIPPED | OUTPATIENT
Start: 2018-09-27 | End: 2020-12-10

## 2018-11-05 ENCOUNTER — TRANSCRIBE ORDERS (OUTPATIENT)
Dept: SCHEDULING | Age: 83
End: 2018-11-05

## 2018-11-05 DIAGNOSIS — R60.9 EDEMA: Primary | ICD-10-CM

## 2018-11-06 ENCOUNTER — HOSPITAL ENCOUNTER (OUTPATIENT)
Dept: CARDIOLOGY | Facility: HOSPITAL | Age: 83
Discharge: HOME | End: 2018-11-06
Attending: FAMILY MEDICINE
Payer: MEDICARE

## 2018-11-06 ENCOUNTER — TRANSCRIBE ORDERS (OUTPATIENT)
Dept: REGISTRATION | Facility: HOSPITAL | Age: 83
End: 2018-11-06

## 2018-11-06 DIAGNOSIS — R60.9 EDEMA: ICD-10-CM

## 2018-11-06 PROCEDURE — 93971 EXTREMITY STUDY: CPT | Mod: RT

## 2019-05-01 ENCOUNTER — TRANSCRIBE ORDERS (OUTPATIENT)
Dept: SCHEDULING | Facility: REHABILITATION | Age: 84
End: 2019-05-01

## 2019-05-01 DIAGNOSIS — R26.81 UNSTEADINESS ON FEET: Primary | ICD-10-CM

## 2019-05-24 ENCOUNTER — HOSPITAL ENCOUNTER (OUTPATIENT)
Dept: PHYSICAL THERAPY | Facility: REHABILITATION | Age: 84
Setting detail: THERAPIES SERIES
Discharge: HOME | End: 2019-05-24
Attending: FAMILY MEDICINE
Payer: MEDICARE

## 2019-05-24 VITALS — DIASTOLIC BLOOD PRESSURE: 68 MMHG | SYSTOLIC BLOOD PRESSURE: 120 MMHG | HEART RATE: 80 BPM

## 2019-05-24 DIAGNOSIS — R26.81 UNSTEADINESS ON FEET: Primary | ICD-10-CM

## 2019-05-24 PROCEDURE — 97162 PT EVAL MOD COMPLEX 30 MIN: CPT | Mod: GP

## 2019-05-24 RX ORDER — PSYLLIUM HUSK 0.4 G
1 CAPSULE ORAL 2 TIMES DAILY WITH MEALS
COMMUNITY
End: 2021-10-18

## 2019-05-24 NOTE — PROGRESS NOTES
Referring Provider: By co-signing this Plan of Care (POC), you agree with the planned services and interventions recommended by the therapist.         PT EVALUATION FOR OUTPATIENT THERAPY    Patient: Mariya Porras    MRN: [id number]  : 1935 83 y.o.   Referring Physician: Shant Corona MD  Date of Visit: 2019      Certification Dates:   19 through 19    Recommended Frequency & Duration:  2 times/week for up to 3 months     Diagnosis:   1. Unsteadiness on feet        Chief Complaints:   Chief Complaint   Patient presents with   • Difficulty Walking   • Balance Deficits   • Dec Strength       Precautions: fall    Past Medical History:   Past Medical History:   Diagnosis Date   • Anxiety    • Arthritis    • Basal cell carcinoma     forehead   • Glaucoma    • Hypertension        Past Surgical History:   Past Surgical History:   Procedure Laterality Date   • BLADDER SUSPENSION      Prolift M   • CATARACT EXTRACTION, BILATERAL     • COLONOSCOPY     • HYSTERECTOMY     • JOINT REPLACEMENT     • KNEE ARTHROPLASTY      left         OBJECTIVE MEASUREMENTS/DATA:    Eval Assessment          Evaluation Assessment and Plan - 19 1400        Evaluation Assessment and Plan    System Pathology/Pathophysiology Noted musculoskeletal;neuromuscular;cardiovascular    Functional Limitations in Following Categories (PT Eval) self-care;home management;community/leisure    Rehab Potential/Prognosis good, to achieve stated therapy goals    Problem List decreased flexibility;decreased ROM;decreased strength    Clinical Assessment See note     Planned Services CPT 20688 Aquatic therapy/exercises;CPT 17720 Gait training;CPT 60632 Manual therapy;CPT 46573 Neuromuscular Reeducation;CPT 41651 Orthotics training (Initial encounter);CPT 96422 Orthotics/Prosthetics management and training (Subsequent encounters);CPT 47743 Therpeutic exercises;CPT 44852 Hot/Cold Packs therapy;CPT 77052 Electrical  stimulation UNATTENDED;CPT 51313 Electrical stimulation ATTENDED;CPT 51368 Therapeutic activities        General Info         General Information - 05/24/19 1152        Session Details    Document Type initial evaluation    Mode of Treatment individual therapy;physical therapy    OP Specialty Neuro       Time Calculation    Start Time 1000    Stop Time 1100    Time Calculation (min) 60 min       General Information    Patient Profile Reviewed? yes    Referring Physician Dr. Corona     Pertinent History of Current Functional Problem Reports gradual decline in balance over the past year. No Falls. Also reports generalized fatigue.  Reports numbness/ tingling in both feet. She does report persistant upset stomach / nausea which is being treated by GI. She does have history of  Stomach Ulcer.     Existing Precautions/Restrictions fall        Pain and Vitals         Pain/Vitals - 05/24/19 1153        Pain/Comfort/Sleep    Presence of Pain patient sleeping       Pre Activity Vital Signs    Pulse 80    /68       Pain Intervention    Intervention  no c/o pain     Post Intervention Comments no c/o pain        Falls Assessment          Falls - 05/24/19 1017        Initial Falls Assessment    One or more falls in the last year No        Living Environment          Living Environment - 05/24/19 1020        Living Environment    Lives With spouse;child(tony), adult    Living Arrangements house    Living Environment Comment 2 [employer], Powder room on first floor, Bed/ Bathroom on second floor, 12 steps with Left hand rail         PLOF         Prior Level of Function - 05/24/19 1021        OTHER    Previous level of function Attends [facility] silver sneakers program, walk for exercsie has declined over the past month. + .  Previously  Golfs 1 -2  X per week.         Neuromuscular      Sensory Tests         Sensory Testing - 05/24/19 1338        Sensory    Sensory General Assessment no sensation deficits identified;proprioception  deficits identified       Proprioception Assessment    Left Lower Extremity: Proprioception Assessment intact    Right Lower Extremity: Proprioception Assessment intact        Skin         Skin Assessment - 05/24/19 1343        Skin    Skin Condition Intact        ROM         Range of Motion - 05/24/19 1300        RIGHT: Lower Extremity PROM Assessment    Ankle Dorsiflexion Deficit 0 degrees       LEFT: Lower Extremity PROM Assessment    Ankle Dorsiflexion Deficit 0 degrees       Additional ROM Measurements    Additional ROM  bilateral hamstring >  75 degrees         MMT         Manual Muscle Tests - 05/24/19 1344        RIGHT: Lower Extremity Manual Muscle Test Assessment    Hip Flexion gross movement (4+/5) good plus    Hip Extension gross movement (4+/5) good plus    Hip Abduction gross movement (4+/5) good plus    Hip Adduction gross movement (4+/5) good plus    Hip Internal Rotation gross movement (4+/5) good plus    Hip External Rotation gross movement (4+/5) good plus    Knee Flexion strength (5/5) normal    Knee Extension strength (5/5) normal    Ankle Dorsiflexion gross movement (5/5) normal    Ankle Plantarflexion gross movement (5/5) normal    Ankle Inversion gross movement (5/5) normal    Ankle Eversion gross movement (5/5) normal       LEFT: Lower Extremity Manual Muscle Test Assessment    Hip Flexion gross movement (4+/5) good plus    Hip Extension gross movement (4+/5) good plus    Hip Abduction gross movement (4+/5) good plus    Hip Internal Rotation gross movement (4+/5) good plus    Hip External Rotation gross movement (4+/5) good plus    Knee Flexion strength (5/5) normal    Knee Extension strength (5/5) normal    Ankle Dorsiflexion gross movement (5/5) normal        Posture         Posture - 05/24/19 1353        Postural Deviations    Shoulder right shoulder forward;left shoulder forward        Palpation     Transfers         Transfers - 05/24/19 1346        Bed to Chair Transfer    [place],  Bed to Chair modified independence       Stand to Sit Transfer    [place], Stand to Sit Transfer modified independence       Stand Pivot/Stand Step Transfer    [place], Stand Pivot/Stand Step Transfer modified independence        Gait and Mobility         Gait and Mobility - 05/24/19 1349        Gait Training    [place], Gait modified independence    Distance in Feet 500 feet    Gait Deviations Identified decreased aydin;decreased gait speed;decreased step length;decreased stride length       Stairs Assessment    Comment TBA         Balance         Balance - 05/24/19 1352        Timed Up and Go Test    Results, Timed Up and Go Test (Balance) 14 sec        Other Balance Scores    Gait Speed (m/sec) 0.45 m/sec            Goals        Patient Stated    • PT Pt goal  (pt-stated)            Improve Balance Confidence. Return to [place] return golf          Other    • PT Goals             Outcome Eval     ABC  46%     TUG 14 sec     10 MWT .52 m/s.     5 X STS 17 sec      Mot[place] TBA     FGA TBA     PSFS:     5  1. Stairs        2 Folding the laundry       3. Golf        1. 7       2. 5        3. 0     40%         Short term goals   Short Term Goals Time Frame Result Comment/Progress   Improve ABC to 75%  4 weeks     Improve PSFS to 60% 4 weeks      Pt will be  Mod I for FF with rail  4 weeks      Assess  [place], 6 MWT,  FGA 4 weeks      Improve 5 X STS to 15 seconds  4 weeks      Tolerate 10   min of CV activity with VSS 4 weeks      Improve TUG to 12 sec  4 weeks     Progress gait speed to .75 m/sec without decline in safety or quality 4 weeks      Pt and caregiver will be mod I with HEP 4 weeks              Long term goals   Long Term Goals Time Frame Result Comment/Progress   Improve ABC to 80 %  12 weeks     Improve PSFS to 75%  12 weeks      Pt will be I  to ascend/descend FF without rail 12 weeks     Progress TUG to </= 10 seconds 12 weeks     Pt will demonstrate improved balance via Motley >/= 50/ 56 12  weeks     Pt will demonstrate improved dynamic balance and decreased risk for falls via FGA >/= 24/ 30 12 weeks     Progress functional endurance via 6MWT to >/= 1200 ft  12 weeks     Progress comfortable gait speed to 1.2 m/s (age norm) 12 weeks     Pt will be mod I with updated HEP 12 weeks     Tolerate 20  minutes of CV activity with VSS 12 weeks     Pt [facility]  12 weeks       Progress 5 X sit to stand to 15 sec  without decline sin safety or technique 12 weeks                       TREATMENT PLAN:            ASSESSMENT:  Pt is an 82 yo female  who presents to out- patient physical therapy with decline in balance.  Pt currently presents with dcreased balance, endurance, strength, and ROM causing a decrease in functional mobility and independence.  She was advised to use SPC due risk for falls.  She will benefit from skilled out-patient Physical Therapy to address these impairments to improve function and independence.          Planned Services: The patient's treatment will include CPT 46242 Aquatic therapy/exercises, CPT 03911 Gait training, CPT 26108 Manual therapy, CPT 37329 Neuromuscular Reeducation, CPT 66181 Orthotics training (Initial encounter), CPT 05387 Orthotics/Prosthetics management and training (Subsequent encounters), CPT 98707 Therpeutic exercises, CPT 76641 Hot/Cold Packs therapy, CPT 52828 Electrical stimulation UNATTENDED, CPT 54213 Electrical stimulation ATTENDED, CPT 74687 Therapeutic activities, .

## 2019-05-24 NOTE — LETTER
[address] [place] [address]  [phone]  [facility] Fax: [phone]    PHYSICAL THERAPY PLAN OF CARE    Patient Name: Mariya Porras    Certification Dates:  From 19  To: 19  Frequency: 2 times/week Duration: 3 months  Other:      Provider: Nadria Caputo PT   Referring Provider: Shant Corona MD  PCP: Shant Corona MD      Payor: Payor: MEDICARE / Plan: MEDICARE RAILROAD / Product Type: Medicare /   Medical Diagnosis: Unsteadiness on feet [R26.81]     Rehab Potential: good, to achieve stated therapy goals    Planned Services: The patient's treatment will include CPT 73087 Aquatic therapy/exercises, CPT 84955 Gait training, CPT 85413 Manual therapy, CPT 54430 Neuromuscular Reeducation, CPT 45028 Orthotics training (Initial encounter), CPT 89558 Orthotics/Prosthetics management and training (Subsequent encounters), CPT 24092 Therpeutic exercises, CPT 87922 Hot/Cold Packs therapy, CPT 32346 Electrical stimulation UNATTENDED, CPT 62283 Electrical stimulation ATTENDED, CPT 36634 Therapeutic activities, .     By signing this plan of care, I certify this plan of care as correct and necessary for the patient.        Physician Signature: _________________________________________ Date: _________________    Thank you for this referral. Please contact our department with any questions.      Nadira Caputo PT        Referring Provider: By co-signing this Plan of Care (POC), you agree with the planned services and interventions recommended by the therapist.         PT EVALUATION FOR OUTPATIENT THERAPY    Patient: Mariya Porras    MRN: [id number]  : 1935 83 y.o.   Referring Physician: Shant Corona MD  Date of Visit: 2019      Certification Dates:   19 through 19    Recommended Frequency & Duration:  2 times/week for up to 3 months     Diagnosis:   1. Unsteadiness on feet        Chief Complaints:   Chief Complaint   Patient presents with   •  Difficulty Walking   • Balance Deficits   • Dec Strength       Precautions: fall    Past Medical History:   Past Medical History:   Diagnosis Date   • Anxiety    • Arthritis    • Basal cell carcinoma     forehead   • Glaucoma    • Hypertension        Past Surgical History:   Past Surgical History:   Procedure Laterality Date   • BLADDER SUSPENSION  2009    Prolift M   • CATARACT EXTRACTION, BILATERAL     • COLONOSCOPY     • HYSTERECTOMY     • JOINT REPLACEMENT     • KNEE ARTHROPLASTY  2015    left         OBJECTIVE MEASUREMENTS/DATA:    Eval Assessment          Evaluation Assessment and Plan - 05/24/19 1400        Evaluation Assessment and Plan    System Pathology/Pathophysiology Noted musculoskeletal;neuromuscular;cardiovascular    Functional Limitations in Following Categories (PT Eval) self-care;home management;community/leisure    Rehab Potential/Prognosis good, to achieve stated therapy goals    Problem List decreased flexibility;decreased ROM;decreased strength    Clinical Assessment See note     Planned Services CPT 31015 Aquatic therapy/exercises;CPT 15719 Gait training;CPT 51979 Manual therapy;CPT 87522 Neuromuscular Reeducation;CPT 52215 Orthotics training (Initial encounter);CPT 52394 Orthotics/Prosthetics management and training (Subsequent encounters);CPT 70660 Therpeutic exercises;CPT 55300 Hot/Cold Packs therapy;CPT 34932 Electrical stimulation UNATTENDED;CPT 41583 Electrical stimulation ATTENDED;CPT 44609 Therapeutic activities        General Info         General Information - 05/24/19 1152        Session Details    Document Type initial evaluation    Mode of Treatment individual therapy;physical therapy    OP Specialty Neuro       Time Calculation    Start Time 1000    Stop Time 1100    Time Calculation (min) 60 min       General Information    Patient Profile Reviewed? yes    Referring Physician Dr. Corona     Pertinent History of Current Functional Problem Reports gradual decline in balance over  the past year. No Falls. Also reports generalized fatigue.  Reports numbness/ tingling in both feet. She does report persistant upset stomach / nausea which is being treated by GI. She does have history of  Stomach Ulcer.     Existing Precautions/Restrictions fall        Pain and Vitals         Pain/Vitals - 05/24/19 1153        Pain/Comfort/Sleep    Presence of Pain patient sleeping       Pre Activity Vital Signs    Pulse 80    /68       Pain Intervention    Intervention  no c/o pain     Post Intervention Comments no c/o pain        Falls Assessment          Falls - 05/24/19 1017        Initial Falls Assessment    One or more falls in the last year No        Living Environment          Living Environment - 05/24/19 1020        Living Environment    Lives With spouse;child(tony), adult    Living Arrangements house    Living Environment Comment 2 [address], Powder room on first floor, Bed/ Bathroom on second floor, 12 steps with Left hand rail         PLOF         Prior Level of Function - 05/24/19 1021        OTHER    Previous level of function Attends [employer] sneakers program, walk for exercsie has declined over the past month. + .  Previously  Golfs 1 -2  X per week.         Neuromuscular      Sensory Tests         Sensory Testing - 05/24/19 1338        Sensory    Sensory General Assessment no sensation deficits identified;proprioception deficits identified       Proprioception Assessment    Left Lower Extremity: Proprioception Assessment intact    Right Lower Extremity: Proprioception Assessment intact        Skin         Skin Assessment - 05/24/19 1343        Skin    Skin Condition Intact        ROM         Range of Motion - 05/24/19 1300        RIGHT: Lower Extremity PROM Assessment    Ankle Dorsiflexion Deficit 0 degrees       LEFT: Lower Extremity PROM Assessment    Ankle Dorsiflexion Deficit 0 degrees       Additional ROM Measurements    Additional ROM  bilateral hamstring >  75 degrees         MMT          Manual Muscle Tests - 05/24/19 1344        RIGHT: Lower Extremity Manual Muscle Test Assessment    Hip Flexion gross movement (4+/5) good plus    Hip Extension gross movement (4+/5) good plus    Hip Abduction gross movement (4+/5) good plus    Hip Adduction gross movement (4+/5) good plus    Hip Internal Rotation gross movement (4+/5) good plus    Hip External Rotation gross movement (4+/5) good plus    Knee Flexion strength (5/5) normal    Knee Extension strength (5/5) normal    Ankle Dorsiflexion gross movement (5/5) normal    Ankle Plantarflexion gross movement (5/5) normal    Ankle Inversion gross movement (5/5) normal    Ankle Eversion gross movement (5/5) normal       LEFT: Lower Extremity Manual Muscle Test Assessment    Hip Flexion gross movement (4+/5) good plus    Hip Extension gross movement (4+/5) good plus    Hip Abduction gross movement (4+/5) good plus    Hip Internal Rotation gross movement (4+/5) good plus    Hip External Rotation gross movement (4+/5) good plus    Knee Flexion strength (5/5) normal    Knee Extension strength (5/5) normal    Ankle Dorsiflexion gross movement (5/5) normal        Posture         Posture - 05/24/19 1353        Postural Deviations    Shoulder right shoulder forward;left shoulder forward        Palpation     Transfers         Transfers - 05/24/19 1346        Bed to Chair Transfer    [place], Bed to Chair modified independence       Stand to Sit Transfer    [place], Stand to Sit Transfer modified independence       Stand Pivot/Stand Step Transfer    [place], Stand Pivot/Stand Step Transfer modified independence        Gait and Mobility         Gait and Mobility - 05/24/19 1349        Gait Training    [place], Gait modified independence    Distance in Feet 500 feet    Gait Deviations Identified decreased aydin;decreased gait speed;decreased step length;decreased stride length       Stairs Assessment    Comment TBA         Balance          Balance - 05/24/19 1352        Timed Up and Go Test    Results, Timed Up and Go Test (Balance) 14 sec        Other Balance Scores    Gait Speed (m/sec) 0.45 m/sec            Goals        Patient Stated    • PT Pt goal  (pt-stated)            Improve Balance Confidence. Return to [facility] return golf          Other    • PT Goals             Outcome Eval     ABC  46%     TUG 14 sec     10 MWT .52 m/s.     5 X STS 17 sec      Motley TBA     FGA TBA     PSFS:     5  1. Stairs        2 Folding the laundry       3. Golf        1. 7       2. 5        3. 0     40%         Short term goals   Short Term Goals Time Frame Result Comment/Progress   Improve ABC to 75%  4 weeks     Improve PSFS to 60% 4 weeks      Pt will be  Mod I for FF with rail  4 weeks      Assess  MOTLEY, 6 MWT,  FGA 4 weeks      Improve 5 X STS to 15 seconds  4 weeks      Tolerate 10   min of CV activity with VSS 4 weeks      Improve TUG to 12 sec  4 weeks     Progress gait speed to .75 m/sec without decline in safety or quality 4 weeks      Pt and caregiver will be mod I with HEP 4 weeks              Long term goals   Long Term Goals Time Frame Result Comment/Progress   Improve ABC to 80 %  12 weeks     Improve PSFS to 75%  12 weeks      Pt will be I  to ascend/descend FF without rail 12 weeks     Progress TUG to </= 10 seconds 12 weeks     Pt will demonstrate improved balance via Motley >/= 50/ 56 12 weeks     Pt will demonstrate improved dynamic balance and decreased risk for falls via FGA >/= 24/ 30 12 weeks     Progress functional endurance via 6MWT to >/= 1200 ft  12 weeks     Progress comfortable gait speed to 1.2 m/s (age norm) 12 weeks     Pt will be mod I with updated HEP 12 weeks     Tolerate 20  minutes of CV activity with VSS 12 weeks     Pt will return to [facility] silver sneakers program  12 weeks       Progress 5 X sit to stand to 15 sec  without decline sin safety or technique 12 weeks                       TREATMENT PLAN:            ASSESSMENT:  Pt is  an 84 yo female  who presents to out- patient physical therapy with decline in balance.  Pt currently presents with dcreased balance, endurance, strength, and ROM causing a decrease in functional mobility and independence.  She was advised to use SPC due risk for falls.  She will benefit from skilled out-patient Physical Therapy to address these impairments to improve function and independence.          Planned Services: The patient's treatment will include CPT 40799 Aquatic therapy/exercises, CPT 85657 Gait training, CPT 50267 Manual therapy, CPT 79774 Neuromuscular Reeducation, CPT 16022 Orthotics training (Initial encounter), CPT 48406 Orthotics/Prosthetics management and training (Subsequent encounters), CPT 46316 Therpeutic exercises, CPT 86897 Hot/Cold Packs therapy, CPT 73516 Electrical stimulation UNATTENDED, CPT 42940 Electrical stimulation ATTENDED, CPT 41650 Therapeutic activities, .

## 2019-05-24 NOTE — OP PT TREATMENT LOG
PT Neuro Exercises Current Session Time   THER ACT  TOTAL TIME FOR SESSION 0-7 Minutes   Bed mobility    Transfer training    Patient Education Discussed POC role of PT    THER EX TOTAL TIME FOR SESSION Not performed   STRETCHING    Stretching     CARDIOVASCULAR    Nu Step    Stationary Bike    Treadmill    Standing Ther-Ex    Supine Ther-Ex    NEURO RE-ED TOTAL TIME FOR SESSION Not performed   COORDINATION    POSTURAL RE-ED    PRE-GAIT ACTIVITIES     BALANCE TRAINING     Balance Assessment  JENISE Muhammad    Sitting balance    Standing balance - static    Standing balance - dynamic    Standing balance - varied surface    GAIT TRAINING TOTAL TIME FOR SESSION Not performed   Ambulation with AD  Trial SPC    Dynamic gait     Stair negotiation    Curb negotiation    Ramp negotiation    Outdoor ambulation    BWS/vector training    MANUAL TOTAL TIME FOR SESSION Not performed   /PROM    Mobilization     MODALITIES TOTAL TIME FOR SESSION Not performed   Ice    Heat    ATTENDED E-STIM TOTAL TIME FOR SESSION Not performed   Attended E-Stim

## 2019-05-30 ENCOUNTER — HOSPITAL ENCOUNTER (OUTPATIENT)
Dept: PHYSICAL THERAPY | Facility: REHABILITATION | Age: 84
Setting detail: THERAPIES SERIES
Discharge: HOME | End: 2019-05-30
Attending: FAMILY MEDICINE
Payer: MEDICARE

## 2019-05-30 VITALS — SYSTOLIC BLOOD PRESSURE: 128 MMHG | DIASTOLIC BLOOD PRESSURE: 60 MMHG | HEART RATE: 80 BPM

## 2019-05-30 DIAGNOSIS — R26.81 UNSTEADINESS ON FEET: Primary | ICD-10-CM

## 2019-05-30 PROCEDURE — 97110 THERAPEUTIC EXERCISES: CPT | Mod: GP

## 2019-05-30 PROCEDURE — 97116 GAIT TRAINING THERAPY: CPT | Mod: GP

## 2019-05-30 PROCEDURE — 97112 NEUROMUSCULAR REEDUCATION: CPT | Mod: GP

## 2019-05-30 ASSESSMENT — BALANCE ASSESSMENTS: TOTAL SCORE: 45

## 2019-05-30 NOTE — PROGRESS NOTES
PT DAILY NOTE FOR OUTPATIENT THERAPY    Patient: Mariya Porras   MRN: [id number]  : 1935 83 y.o.  Referring Physician: Shant Corona MD  Date of Visit: 2019      Certification Dates: 19 through 19    Diagnosis:   1. Unsteadiness on feet        Chief Complaints: No chief complaint on file.      Precautions: fall      TODAY'S VISIT          General Information - 19 1706        Session Details    Document Type daily treatment    Mode of Treatment individual therapy;physical therapy    Patient/Family Observations Reports continued upset stomach     OP Specialty Neuro       Time Calculation    Start Time 1700    Stop Time 1800    Time Calculation (min) 60 min       General Information    Patient Profile Reviewed? yes    Existing Precautions/Restrictions fall              Pain/Vitals - 19 1709        Pain/Comfort/Sleep    Presence of Pain denies       Pre Activity Vital Signs    Pulse 80    /60       Pain Intervention    Intervention  no c/o pain     Post Intervention Comments no c/o pain               Daily Falls Screen - 19 1706        Daily Falls Assessment    Patient reported fall since last visit No              Daily Treatment Assessment and Plan - 19 1800        Daily Treatment Assessment and Plan    Progress toward goals Progressing    Daily Outcome Summary Issued and reviewed inital HEP. Advised use of SPC of pt has increased gait speed and improve mechanics.     Plan and Recommendations Cont with POC focus on balance, coordination, hip/ trunk strengthening           OBJECTIVE DATA TAKEN TODAY:    None taken    Today's Treatment:      PT Neuro Exercises Current Session Time Done Today    THER ACT  TOTAL TIME FOR SESSION 0-7 Minutes    Bed mobility     Transfer training     HEP Develop and reviewed  1. Clam shell  2. Bridge   3. Repeated sit to stand    Yes   Patient Education Discussed POC role of PT     THER EX TOTAL TIME FOR SESSION 8-22 minutes      STRETCHING     Stretching      CARDIOVASCULAR     Nu Step X 7 minutes  Level 1     Stationary Bike     Treadmill     Standing Ther-Ex Repeated sit to stand 2 x 10  yes   Supine Ther-Ex Bridge 2 X 10   Clam shell 2 X 10 yes   NEURO RE-ED TOTAL TIME FOR SESSION 23-27  minutes     COORDINATION     POSTURAL RE-ED     PRE-GAIT ACTIVITIES      BALANCE TRAINING      Balance Assessment  JENISE Muhammad  Yes   Sitting balance     Standing balance - static     Standing balance - dynamic     Standing balance - varied surface     GAIT TRAINING TOTAL TIME FOR SESSION 8-22 minutes     Ambulation with AD  X 200ft with SPC cues for sequence     Dynamic gait      Stair negotiation     Curb negotiation     Ramp negotiation     Outdoor ambulation     BWS/vector training     MANUAL TOTAL TIME FOR SESSION Not performed    /PROM     Mobilization      MODALITIES TOTAL TIME FOR SESSION Not performed    Ice     Heat     ATTENDED E-STIM TOTAL TIME FOR SESSION Not performed    Attended E-Stim

## 2019-05-30 NOTE — OP PT TREATMENT LOG
PT Neuro Exercises Current Session Time Done Today    THER ACT  TOTAL TIME FOR SESSION 0-7 Minutes    Bed mobility     Transfer training     HEP Develop and reviewed  1. Clam shell  2. Bridge   3. Repeated sit to stand    Yes   Patient Education Discussed POC role of PT     THER EX TOTAL TIME FOR SESSION 8-22 minutes     STRETCHING     Stretching      CARDIOVASCULAR     Nu Step X 7 minutes  Level 1     Stationary Bike     Treadmill     Standing Ther-Ex Repeated sit to stand 2 x 10  yes   Supine Ther-Ex Bridge 2 X 10   Clam shell 2 X 10 yes   NEURO RE-ED TOTAL TIME FOR SESSION 23-27  minutes     COORDINATION     POSTURAL RE-ED     PRE-GAIT ACTIVITIES      BALANCE TRAINING      Balance Assessment  Muhammad , FGA  Yes   Sitting balance     Standing balance - static     Standing balance - dynamic     Standing balance - varied surface     GAIT TRAINING TOTAL TIME FOR SESSION 8-22 minutes     Ambulation with AD  X 200ft with SPC cues for sequence     Dynamic gait      Stair negotiation     Curb negotiation     Ramp negotiation     Outdoor ambulation     BWS/vector training     MANUAL TOTAL TIME FOR SESSION Not performed    /PROM     Mobilization      MODALITIES TOTAL TIME FOR SESSION Not performed    Ice     Heat     ATTENDED E-STIM TOTAL TIME FOR SESSION Not performed    Attended E-Stim

## 2019-05-31 NOTE — ADDENDUM NOTE
Encounter addended by: Nadira Caputo, PT on: 5/30/2019  9:06 PM<BR>    Actions taken: Visit Navigator Flowsheet section accepted, Sign clinical note

## 2019-06-01 NOTE — ADDENDUM NOTE
Encounter addended by: Nadira Caputo PT on: 5/31/2019  8:27 PM<BR>    Actions taken: Charge Capture section accepted

## 2019-06-04 ENCOUNTER — HOSPITAL ENCOUNTER (OUTPATIENT)
Dept: PHYSICAL THERAPY | Facility: REHABILITATION | Age: 84
Setting detail: THERAPIES SERIES
Discharge: HOME | End: 2019-06-04
Attending: FAMILY MEDICINE
Payer: MEDICARE

## 2019-06-04 DIAGNOSIS — R26.81 UNSTEADINESS ON FEET: Primary | ICD-10-CM

## 2019-06-04 PROCEDURE — 97116 GAIT TRAINING THERAPY: CPT | Mod: GP

## 2019-06-04 PROCEDURE — 97110 THERAPEUTIC EXERCISES: CPT | Mod: GP

## 2019-06-04 PROCEDURE — 97112 NEUROMUSCULAR REEDUCATION: CPT | Mod: GP

## 2019-06-04 NOTE — OP PT TREATMENT LOG
PT Neuro Exercises Current Session Time Done Today    THER ACT  TOTAL TIME FOR SESSION 0-7 Minutes    Bed mobility     Transfer training     HEP Develop and reviewed  1. Clam shell with OTB  2. Bridge   3. Repeated sit to stand     Tandem balance at counter 20 sec x 4 B   Yes   Patient Education Discussed POC role of PT     THER EX TOTAL TIME FOR SESSION 8-22 minutes  30   STRETCHING     Stretching      CARDIOVASCULAR     Nu Step X 7 minutes  Level 1  no   Stationary Bike expresso bike L5-7 x10 min yes   Treadmill     Standing Ther-Ex Repeated sit to stand 2 x 10  yes   SLR 2x10 B yes                       Supine Ther-Ex Bridge 2 X 10   Clam shell 2 X 10 yes   NEURO RE-ED TOTAL TIME FOR SESSION 23-27  minutes  20   COORDINATION     POSTURAL RE-ED     PRE-GAIT ACTIVITIES      BALANCE TRAINING      Balance Assessment  Jb , FGA  no   Sitting balance     Standing balance - static NBOS on Airex with head turns x 10 B yes   Standing balance - dynamic A/p on rocker board x 20  Tandem ambulation 10 ft x 4 yes   Standing balance - varied surface     GAIT TRAINING TOTAL TIME FOR SESSION 8-22 minutes  10   Ambulation with AD  X 400 ft with SPC cues for sequence outdoors. LOB x 2 outdoors requiring min A to recover yes   Dynamic gait      Stair negotiation     Curb negotiation     Ramp negotiation     Outdoor ambulation     BWS/vector training     MANUAL TOTAL TIME FOR SESSION Not performed    /PROM     Mobilization      MODALITIES TOTAL TIME FOR SESSION Not performed    Ice     Heat     ATTENDED E-STIM TOTAL TIME FOR SESSION Not performed    Attended E-Stim

## 2019-06-04 NOTE — PROGRESS NOTES
PT DAILY NOTE FOR OUTPATIENT THERAPY    Patient: Mariya Porras   MRN: [id number]  : 1935 83 y.o.  Referring Physician: Shant Corona MD  Date of Visit: 2019      Certification Dates: 19 through 19    Diagnosis:   1. Unsteadiness on feet        Chief Complaints:   Chief Complaint   Patient presents with   • Difficulty Walking   • Balance Deficits   • Dec ROM   • Dec Strength   • Dec Coordination   • Decreased Endurance   • Abnormality Of Gait   •  Imbalance       Precautions: fall      TODAY'S VISIT          General Information - 19 1509        Session Details    Document Type daily treatment    Mode of Treatment individual therapy;physical therapy    Patient/Family Observations Pt reports that she feels a bit uncomfortable due to her calcium levels being off. Pt denied having pain.        Time Calculation    Start Time 1500    Stop Time 1600    Time Calculation (min) 60 min       General Information    Existing Precautions/Restrictions fall              Pain/Vitals - 19 1508        Pain/Comfort/Sleep    Presence of Pain denies              Daily Falls Screen - 19 1511        Daily Falls Assessment    Patient reported fall since last visit No              Daily Treatment Assessment and Plan - 19 1601        Daily Treatment Assessment and Plan    Progress toward goals Progressing    Daily Outcome Summary Pt able to initiate balance and gait activities today. (+) LOB x 2 walking outdoors with SPC with cues for sequencing. Added tandem balance at kitchen counter to HEP for additional balance practice. Pt demonstrated and verablized understanding.     Plan and Recommendations Cont per POC          OBJECTIVE DATA TAKEN TODAY:    None taken    Today's Treatment:      PT Neuro Exercises Current Session Time Done Today    THER ACT  TOTAL TIME FOR SESSION 0-7 Minutes    Bed mobility     Transfer training     HEP Develop and reviewed  1. Clam shell with OTB  2. Bridge    3. Repeated sit to stand     Tandem balance at counter 20 sec x 4 B   Yes   Patient Education Discussed POC role of PT     THER EX TOTAL TIME FOR SESSION 8-22 minutes  30   STRETCHING     Stretching      CARDIOVASCULAR     Nu Step X 7 minutes  Level 1  no   Stationary Bike expresso bike L5-7 x10 min yes   Treadmill     Standing Ther-Ex Repeated sit to stand 2 x 10  yes   SLR 2x10 B yes                       Supine Ther-Ex Bridge 2 X 10   Clam shell 2 X 10 yes   NEURO RE-ED TOTAL TIME FOR SESSION 23-27  minutes  20   COORDINATION     POSTURAL RE-ED     PRE-GAIT ACTIVITIES      BALANCE TRAINING      Balance Assessment  JENISE Muhammad  no   Sitting balance     Standing balance - static NBOS on Airex with head turns x 10 B yes   Standing balance - dynamic A/p on rocker board x 20  Tandem ambulation 10 ft x 4 yes   Standing balance - varied surface     GAIT TRAINING TOTAL TIME FOR SESSION 8-22 minutes  10   Ambulation with AD  X 400 ft with SPC cues for sequence outdoors. LOB x 2 outdoors requiring min A to recover yes   Dynamic gait      Stair negotiation     Curb negotiation     Ramp negotiation     Outdoor ambulation     BWS/vector training     MANUAL TOTAL TIME FOR SESSION Not performed    /PROM     Mobilization      MODALITIES TOTAL TIME FOR SESSION Not performed    Ice     Heat     ATTENDED E-STIM TOTAL TIME FOR SESSION Not performed    Attended E-Stim

## 2019-06-06 ENCOUNTER — HOSPITAL ENCOUNTER (OUTPATIENT)
Dept: PHYSICAL THERAPY | Facility: REHABILITATION | Age: 84
Setting detail: THERAPIES SERIES
Discharge: HOME | End: 2019-06-06
Attending: FAMILY MEDICINE
Payer: MEDICARE

## 2019-06-06 VITALS — OXYGEN SATURATION: 96 % | SYSTOLIC BLOOD PRESSURE: 144 MMHG | HEART RATE: 71 BPM | DIASTOLIC BLOOD PRESSURE: 84 MMHG

## 2019-06-06 DIAGNOSIS — R26.81 UNSTEADINESS ON FEET: Primary | ICD-10-CM

## 2019-06-06 PROCEDURE — 97112 NEUROMUSCULAR REEDUCATION: CPT | Mod: GP

## 2019-06-06 PROCEDURE — 97116 GAIT TRAINING THERAPY: CPT | Mod: GP

## 2019-06-06 PROCEDURE — 97110 THERAPEUTIC EXERCISES: CPT | Mod: GP

## 2019-06-06 NOTE — PROGRESS NOTES
PT DAILY NOTE FOR OUTPATIENT THERAPY    Patient: Mariya Porras   MRN: [id number]  : 1935 83 y.o.  Referring Physician: Shant Corona MD  Date of Visit: 2019      Certification Dates: 19 through 19    Diagnosis:   1. Unsteadiness on feet        Chief Complaints:   Chief Complaint   Patient presents with   • Difficulty Walking   • Balance Deficits   • Dec ROM   • Dec Strength   • Decreased Endurance   • Dec Coordination       Precautions: fall      TODAY'S VISIT          General Information - 19 1702        Session Details    Document Type daily treatment    Mode of Treatment individual therapy;physical therapy    Patient/Family Observations Pt reports minor knee pain when descending stairs.  No change in meds, no falls reported.    OP Specialty Neuro       Time Calculation    Start Time 1700    Stop Time 1800    Time Calculation (min) 60 min       General Information    Patient Profile Reviewed? yes    Existing Precautions/Restrictions fall              Pain/Vitals - 19 1658        Pain/Comfort/Sleep    Presence of Pain complains of pain/discomfort    Preferred Pain Scale number (Numeric Rating Pain Scale)    Pain Body Location knee    Pain Rating (0-10): Pre Activity 0    Pain Rating (0-10): Activity 1    Pain Rating (0-10): Post Activity 0    Pain Onset/Duration Pt reports minor R knee pain she feels was from NuStep       Pre Activity Vital Signs    Pulse 71    SpO2 96 %    BP (!)  144/84    BP Location Left upper arm    BP Method Manual    Patient Position Sitting       Pain Intervention    Intervention  stretches, there ex, stair avoidance    Post Intervention Comments same              Daily Falls Screen - 19 1704        Daily Falls Assessment    Patient reported fall since last visit No              Daily Treatment Assessment and Plan - 19 1755        Daily Treatment Assessment and Plan    Progress toward goals Progressing    Daily Outcome Summary Pt  "tolerated session well.  Slight  LOB x 2 reaching for cones when stdg on airex.  Pt able to navigate over 6\" hurdles initially w then repeated w/o UE support (cued to decrease speed)  w/ LOB x 1.    Plan and Recommendations Cont w/ POC as directed by primary PT for gait mechanics, balance, coordination, strength and endurance.                  Today's Treatment:      PT Neuro Exercises Current Session Time Done Today    THER ACT  TOTAL TIME FOR SESSION 0-7 Minutes    Bed mobility     Transfer training     HEP Develop and reviewed  1. Clam shell with OTB  2. Bridge   3. Repeated sit to stand     Tandem balance at counter 20 sec x 4 B      Patient Education Pt educated on balance strategies, posture, gait mechanics yes   THER EX TOTAL TIME FOR SESSION 8-22 minutes  20   STRETCHING     Stretching  B calf stretches on #2 incline;    B HS stretches seated w/ ft on block  20-30 sec x 3 Yes  yes   CARDIOVASCULAR     Nu Step X 7 minutes  Level 1  no   Stationary Bike Rec bike   L 3 x10 min yes   Treadmill     Standing Ther-Ex Repeated sit to stand 2 x 10   4 way kicks w/ 1 LE on 2\" block, hip flex, abd, ext and add 10x each direction  Mini squats 2 x 10 Yes  Yes    yes   SLR 2x10 B no                       Supine Ther-Ex Bridge 2 X 10   Clam shell 2 X 10 no   NEURO RE-ED TOTAL TIME FOR SESSION 23-27  minutes  30   COORDINATION     POSTURAL RE-ED     PRE-GAIT ACTIVITIES      BALANCE TRAINING      Balance Assessment  Jb , JENISE  no   Sitting balance     Standing balance - static NBOS on Airex with head turns x 10 B yes   Standing balance - dynamic A/p on rocker board x 20  Tandem ambulation 10 ft x 4  6\" hurdles, lateral and fwd step overs Yes  Yes  yes   Standing balance - varied surface Stdg on airex, reaching for cones, all directions, levels,   Stdg on airex, ball toss/bounce, catch Yes    yes   GAIT TRAINING TOTAL TIME FOR SESSION 8-22 minutes  10   Ambulation with AD  Gait indoors w/o AD (pt arrived w/o SPC) w/ " horizontal and vertical head turns w/ cueing for heel strike and postue   ambulation around slalom course of cones Yes      yes   Dynamic gait      Stair negotiation     Curb negotiation     Ramp negotiation     Outdoor ambulation X 400 ft with SPC cues for sequence outdoors. LOB x 2 outdoors requiring min A to recover no   BWS/vector training     MANUAL TOTAL TIME FOR SESSION Not performed    /PROM     Mobilization      MODALITIES TOTAL TIME FOR SESSION Not performed    Ice     Heat     ATTENDED E-STIM TOTAL TIME FOR SESSION Not performed    Attended E-Stim

## 2019-06-10 ENCOUNTER — HOSPITAL ENCOUNTER (OUTPATIENT)
Dept: PHYSICAL THERAPY | Facility: REHABILITATION | Age: 84
Setting detail: THERAPIES SERIES
Discharge: HOME | End: 2019-06-10
Attending: FAMILY MEDICINE
Payer: MEDICARE

## 2019-06-10 VITALS — DIASTOLIC BLOOD PRESSURE: 78 MMHG | SYSTOLIC BLOOD PRESSURE: 154 MMHG

## 2019-06-10 DIAGNOSIS — R26.81 UNSTEADINESS ON FEET: Primary | ICD-10-CM

## 2019-06-10 PROCEDURE — 97112 NEUROMUSCULAR REEDUCATION: CPT | Mod: GP

## 2019-06-10 PROCEDURE — 97116 GAIT TRAINING THERAPY: CPT | Mod: GP

## 2019-06-10 PROCEDURE — 97110 THERAPEUTIC EXERCISES: CPT | Mod: GP

## 2019-06-10 NOTE — OP PT TREATMENT LOG
"PT Neuro Exercises Current Session Time Done Today    THER ACT  TOTAL TIME FOR SESSION 0-7 Minutes    Bed mobility     Transfer training     HEP Develop and reviewed  1. Clam shell with OTB  2. Bridge   3. Repeated sit to stand     Tandem balance at counter 20 sec x 4 B      Patient Education Pt educated on balance strategies, posture, gait mechanics yes   THER EX TOTAL TIME FOR SESSION 23 - 37 minutes (25)    STRETCHING     Stretching  B calf stretches on #3 incline; 1 min x 3   B HS stretches seated w/ ft on block  20-30 sec x 3 each side Yes  yes   CARDIOVASCULAR     Nu Step X 7 minutes  Level 1  no   Stationary Bike Rec bike   L 3 x10 min yes   Treadmill     Standing Ther-Ex Repeated sit to stand 2 x 10   4 way kicks w/ 1 LE on 2\" block, hip flex, abd, ext and add 10x each direction  Mini squats 2 x 10 Yes  Yes    yes   SLR 2x10 B no                       Supine Ther-Ex Bridge 2 X 10   Clam shell 2 X 10 no   NEURO RE-ED TOTAL TIME FOR SESSION 8-22 min (20) 30   COORDINATION     POSTURAL RE-ED     PRE-GAIT ACTIVITIES      BALANCE TRAINING      Balance Assessment  Jb , FGFILOMENA  no   Sitting balance     Standing balance - static NBOS on Airex:  HT 10x  HN 10x  EC 10 sec x 3 Cl S / GCA  Staggered stance 10 sec 3x each foot direction  yes   Standing balance - dynamic A/p on rocker board x 20  Tandem ambulation 10 ft x 4  6\" hurdles, lateral and fwd step overs No  No  Yes     Standing balance - varied surface Stdg on airex, reaching for cones, all directions, levels,   Stdg on airex, ball toss/bounce, catch no   GAIT TRAINING TOTAL TIME FOR SESSION 8-22 minutes (15)    Ambulation with AD  Gait indoors w/o AD (pt arrived w/o SPC) w/ horizontal and vertical head turns w/ cueing for heel strike and postue   ambulation around slalom course of cones yes   Dynamic gait  Side stepping 20' x 2  Backward walking 20' x 2 yes   Stair negotiation     Curb negotiation     Ramp negotiation     Outdoor ambulation X 400 ft with SPC " cues for sequence outdoors. LOB x 2 outdoors requiring min A to recover no   BWS/vector training     MANUAL TOTAL TIME FOR SESSION Not performed    /PROM     Mobilization      MODALITIES TOTAL TIME FOR SESSION Not performed    Ice     Heat     ATTENDED E-STIM TOTAL TIME FOR SESSION Not performed    Attended E-Stim

## 2019-06-10 NOTE — PROGRESS NOTES
PT DAILY NOTE FOR OUTPATIENT THERAPY    Patient: Mariya Porras   MRN: [id number]  : 1935 83 y.o.  Referring Physician: Shant Corona MD  Date of Visit: 6/10/2019      Certification Dates: 19 through 19    Diagnosis:   1. Unsteadiness on feet        Chief Complaints: No chief complaint on file.      Precautions: fall      TODAY'S VISIT          General Information - 06/10/19 1103        Session Details    Document Type daily treatment    Mode of Treatment individual therapy;physical therapy    Patient/Family Observations Denies falls since last session     OP Specialty Neuro       Time Calculation    Start Time 1100    Stop Time 1200    Time Calculation (min) 60 min       General Information    Patient Profile Reviewed? yes    Existing Precautions/Restrictions fall              Pain/Vitals - 06/10/19 1101        Pain/Comfort/Sleep    Presence of Pain complains of pain/discomfort    Preferred Pain Scale number (Numeric Rating Pain Scale)    Pain Body Location knee    Pain Rating (0-10): Pre Activity 1    Pain Rating (0-10): Activity 1    Pain Rating (0-10): Post Activity 1       Pre Activity Vital Signs    BP (!)  154/78       Pain Intervention    Intervention  position / activity modificaiton     Post Intervention Comments not increased at end of session               Daily Falls Screen - 06/10/19 1107        Daily Falls Assessment    Patient reported fall since last visit No              Daily Treatment Assessment and Plan - 06/10/19 1153        Daily Treatment Assessment and Plan    Progress toward goals Progressing    Daily Outcome Summary Challenged with balance EC on foam; with increased sway to R requiring CGA to maintain balance.  No LOB with dynamic gait change in direction.    Plan and Recommendations Cont POC           OBJECTIVE DATA TAKEN TODAY:    None taken    Today's Treatment:      PT Neuro Exercises Current Session Time Done Today    THER ACT  TOTAL TIME FOR SESSION 0-7  "Minutes    Bed mobility     Transfer training     HEP Develop and reviewed  1. Clam shell with OTB  2. Bridge   3. Repeated sit to stand     Tandem balance at counter 20 sec x 4 B      Patient Education Pt educated on balance strategies, posture, gait mechanics yes   THER EX TOTAL TIME FOR SESSION 23 - 37 minutes (25)    STRETCHING     Stretching  B calf stretches on #3 incline; 1 min x 3   B HS stretches seated w/ ft on block  20-30 sec x 3 each side Yes  yes   CARDIOVASCULAR     Nu Step X 7 minutes  Level 1  no   Stationary Bike Rec bike   L 3 x10 min yes   Treadmill     Standing Ther-Ex Repeated sit to stand 2 x 10   4 way kicks w/ 1 LE on 2\" block, hip flex, abd, ext and add 10x each direction  Mini squats 2 x 10 Yes  Yes    yes   SLR 2x10 B no                       Supine Ther-Ex Bridge 2 X 10   Clam shell 2 X 10 no   NEURO RE-ED TOTAL TIME FOR SESSION 8-22 min (20) 30   COORDINATION     POSTURAL RE-ED     PRE-GAIT ACTIVITIES      BALANCE TRAINING      Balance Assessment  Jb , JENISE  no   Sitting balance     Standing balance - static NBOS on Airex:  HT 10x  HN 10x  EC 10 sec x 3 Cl S / GCA  Staggered stance 10 sec 3x each foot direction  yes   Standing balance - dynamic A/p on rocker board x 20  Tandem ambulation 10 ft x 4  6\" hurdles, lateral and fwd step overs No  No  Yes     Standing balance - varied surface Stdg on airex, reaching for cones, all directions, levels,   Stdg on airex, ball toss/bounce, catch no   GAIT TRAINING TOTAL TIME FOR SESSION 8-22 minutes (15)    Ambulation with AD  Gait indoors w/o AD (pt arrived w/o SPC) w/ horizontal and vertical head turns w/ cueing for heel strike and postue   ambulation around slalom course of cones yes   Dynamic gait  Side stepping 20' x 2  Backward walking 20' x 2 yes   Stair negotiation     Curb negotiation     Ramp negotiation     Outdoor ambulation X 400 ft with SPC cues for sequence outdoors. LOB x 2 outdoors requiring min A to recover no   BWS/vector " training     MANUAL TOTAL TIME FOR SESSION Not performed    /PROM     Mobilization      MODALITIES TOTAL TIME FOR SESSION Not performed    Ice     Heat     ATTENDED E-STIM TOTAL TIME FOR SESSION Not performed    Attended E-Stim

## 2019-06-12 ENCOUNTER — HOSPITAL ENCOUNTER (OUTPATIENT)
Dept: PHYSICAL THERAPY | Facility: REHABILITATION | Age: 84
Setting detail: THERAPIES SERIES
Discharge: HOME | End: 2019-06-12
Attending: FAMILY MEDICINE
Payer: MEDICARE

## 2019-06-12 DIAGNOSIS — R26.81 UNSTEADINESS ON FEET: Primary | ICD-10-CM

## 2019-06-12 PROCEDURE — 97112 NEUROMUSCULAR REEDUCATION: CPT | Mod: GP

## 2019-06-12 PROCEDURE — 97110 THERAPEUTIC EXERCISES: CPT | Mod: GP

## 2019-06-12 NOTE — PROGRESS NOTES
PT DAILY NOTE FOR OUTPATIENT THERAPY    Patient: Mariya Porras   MRN: [id number]  : 1935 83 y.o.  Referring Physician: Shant Corona MD  Date of Visit: 2019      Certification Dates: 19 through 19    Diagnosis:   1. Unsteadiness on feet        Chief Complaints:   Chief Complaint   Patient presents with   • Balance Deficits   • Difficulty Walking   • Dec ROM   • Dec Strength   • Dec Coordination   • Decreased Endurance   • Abnormality Of Gait       Precautions:        TODAY'S VISIT          General Information - 19 1602        Session Details    Document Type daily treatment    Mode of Treatment individual therapy;physical therapy    Patient/Family Observations Pt reports that her R knee is sore today /10 pain. She notes that she is still waiting to see her MD for her stomach pain.        Time Calculation    Start Time 1600    Stop Time 1700    Time Calculation (min) 60 min              Pain/Vitals - 19 1604        Pain/Comfort/Sleep    Presence of Pain complains of pain/discomfort    Preferred Pain Scale number (Numeric Rating Pain Scale)    Pain Body Location knee    Pain Rating (0-10): Pre Activity 2              Daily Falls Screen - 19 1604        Daily Falls Assessment    Patient reported fall since last visit No              Daily Treatment Assessment and Plan - 19 1654        Daily Treatment Assessment and Plan    Progress toward goals Progressing    Daily Outcome Summary Pt required cues for sequencing with SPC with gait training. Mild LOB with need for UE assist in // bars with tandem ambulation and balance exercises.     Plan and Recommendations cont per POC          OBJECTIVE DATA TAKEN TODAY:    None taken    Today's Treatment:      PT Neuro Exercises Current Session Time Done Today    THER ACT  TOTAL TIME FOR SESSION 0-7 Minutes    Bed mobility     Transfer training     HEP Develop and reviewed  1. Clam shell with OTB  2. Bridge   3. Repeated  "sit to stand     Tandem balance at counter 20 sec x 4 B      Patient Education Pt educated on balance strategies, posture, gait mechanics yes   THER EX TOTAL TIME FOR SESSION 23 - 37 minutes (25)    STRETCHING     Stretching  B calf stretches on #3 incline; 1 min x 3   B HS stretches seated w/ ft on block  20-30 sec x 3 each side Yes  yes   CARDIOVASCULAR     Nu Step X 10 minutes  Level 1  no   Stationary Bike Rec bike   L 3 x10 min yes   Treadmill     Standing Ther-Ex Repeated sit to stand 2 x 10   4 way kicks w/ 1 LE on 2\" block, hip flex, abd, ext and add 10x each direction  Mini squats 2 x 10 Yes  Yes    yes   SLR 2x10 B yes   Bridge 5\" x20 yes   Hip abd 2x10 B yes   clamshells OTB x20 B yes        Supine Ther-Ex  no   NEURO RE-ED TOTAL TIME FOR SESSION 8-22 min (30) 30   COORDINATION     POSTURAL RE-ED     PRE-GAIT ACTIVITIES      BALANCE TRAINING      Balance Assessment  Jb , JENISE  no   Sitting balance     Standing balance - static NBOS on Airex:  HT 10x  HN 10x  EC 10 sec x 3 Cl S / GCA  Staggered stance 10 sec 3x each foot direction  yes   Standing balance - dynamic A/p on rocker board x 20  Tandem ambulation 10 ft x 4  6\" hurdles, lateral and fwd step overs No  No  no     Standing balance - varied surface Stdg on airex, reaching for cones, all directions, levels,   Stdg on airex, ball toss/bounce, catch no   GAIT TRAINING TOTAL TIME FOR SESSION     Ambulation with AD  Gait indoors w/o AD (pt arrived w/o SPC) w/ horizontal and vertical head turns w/ cueing for heel strike and postue   ambulation around slalom course of cones yes   Dynamic gait  Side stepping 20' x 2  Backward walking 20' x 2 no   Stair negotiation     Curb negotiation     Ramp negotiation     Outdoor ambulation X 400 ft with SPC cues for sequence outdoors. LOB x 2 outdoors requiring min A to recover no   BWS/vector training     MANUAL TOTAL TIME FOR SESSION Not performed    /PROM     Mobilization      MODALITIES TOTAL TIME FOR SESSION Not " performed    Ice     Heat     ATTENDED E-STIM TOTAL TIME FOR SESSION Not performed    Attended E-Stim

## 2019-06-12 NOTE — OP PT TREATMENT LOG
"PT Neuro Exercises Current Session Time Done Today    THER ACT  TOTAL TIME FOR SESSION 0-7 Minutes    Bed mobility     Transfer training     HEP Develop and reviewed  1. Clam shell with OTB  2. Bridge   3. Repeated sit to stand     Tandem balance at counter 20 sec x 4 B      Patient Education Pt educated on balance strategies, posture, gait mechanics yes   THER EX TOTAL TIME FOR SESSION 23 - 37 minutes (25)    STRETCHING     Stretching  B calf stretches on #3 incline; 1 min x 3   B HS stretches seated w/ ft on block  20-30 sec x 3 each side Yes  yes   CARDIOVASCULAR     Nu Step X 10 minutes  Level 1  no   Stationary Bike Rec bike   L 3 x10 min yes   Treadmill     Standing Ther-Ex Repeated sit to stand 2 x 10   4 way kicks w/ 1 LE on 2\" block, hip flex, abd, ext and add 10x each direction  Mini squats 2 x 10 Yes  Yes    yes   SLR 2x10 B yes   Bridge 5\" x20 yes   Hip abd 2x10 B yes   clamshells OTB x20 B yes        Supine Ther-Ex  no   NEURO RE-ED TOTAL TIME FOR SESSION 8-22 min (30) 30   COORDINATION     POSTURAL RE-ED     PRE-GAIT ACTIVITIES      BALANCE TRAINING      Balance Assessment  Muhammad , FGA  no   Sitting balance     Standing balance - static NBOS on Airex:  HT 10x  HN 10x  EC 10 sec x 3 Cl S / GCA  Staggered stance 10 sec 3x each foot direction  yes   Standing balance - dynamic A/p on rocker board x 20  Tandem ambulation 10 ft x 4  6\" hurdles, lateral and fwd step overs No  No  no     Standing balance - varied surface Stdg on airex, reaching for cones, all directions, levels,   Stdg on airex, ball toss/bounce, catch no   GAIT TRAINING TOTAL TIME FOR SESSION     Ambulation with AD  Gait indoors w/o AD (pt arrived w/o SPC) w/ horizontal and vertical head turns w/ cueing for heel strike and postue   ambulation around slalom course of cones yes   Dynamic gait  Side stepping 20' x 2  Backward walking 20' x 2 no   Stair negotiation     Curb negotiation     Ramp negotiation     Outdoor ambulation X 400 ft with SPC " cues for sequence outdoors. LOB x 2 outdoors requiring min A to recover no   BWS/vector training     MANUAL TOTAL TIME FOR SESSION Not performed    /PROM     Mobilization      MODALITIES TOTAL TIME FOR SESSION Not performed    Ice     Heat     ATTENDED E-STIM TOTAL TIME FOR SESSION Not performed    Attended E-Stim

## 2019-06-18 ENCOUNTER — HOSPITAL ENCOUNTER (OUTPATIENT)
Dept: PHYSICAL THERAPY | Facility: REHABILITATION | Age: 84
Setting detail: THERAPIES SERIES
Discharge: HOME | End: 2019-06-18
Attending: FAMILY MEDICINE
Payer: MEDICARE

## 2019-06-18 DIAGNOSIS — R26.81 UNSTEADINESS ON FEET: Primary | ICD-10-CM

## 2019-06-18 PROCEDURE — 97110 THERAPEUTIC EXERCISES: CPT | Mod: GP

## 2019-06-18 PROCEDURE — 97116 GAIT TRAINING THERAPY: CPT | Mod: GP

## 2019-06-18 NOTE — OP PT TREATMENT LOG
"PT Neuro Exercises Current Session Time Done Today    THER ACT  TOTAL TIME FOR SESSION 0-7 Minutes    Bed mobility     Transfer training     HEP Develop and reviewed  1. Clam shell with OTB  2. Bridge   3. Repeated sit to stand   4.Tandem balance at counter 20 sec x 4 B  5. Standing hip flexion  6.  Standing hip abducion  7. Recumbent bike X 10 mintes  8. Heel raises  9. S/ L hip abd  10. SLR.     amb 3000 steps per step counter    Advised performance of HEP so long as not increase in fatigue/ feeling unwel.    Yes   Patient Education Pt educated on balance strategies, posture, gait mechanics. Reviewed and updated HEP. Discussed follow up with physicians about feelings of fatigue, GI issues, weight loss. Pt agreed and reports she has PCP visit this week and GI next week . yes   THER EX TOTAL TIME FOR SESSION 23 - 37 minutes (25)    STRETCHING     Stretching  B calf stretches on #3 incline; 1 min x 3   B HS stretches seated w/ ft on block  20-30 sec x 3 each side Yes  yes   CARDIOVASCULAR     Nu Step X 10 minutes  Level 1  no   Stationary Bike Rec bike   L 3 x10 min yes   Treadmill     Standing Ther-Ex Repeated sit to stand 2 x 10   Heel raise 2 X 10  ip abd 2 X 10  Hip flex 2 X 10  Yes  Yes    yes   SLR 2x10 B yes   Bridge 5\" x20 yes   Hip abd 2x10 B yes   clamshells OTB x20 B yes        STher-Ex SLR no   NEURO RE-ED TOTAL TIME FOR SESSION Not performed     COORDINATION     POSTURAL RE-ED     PRE-GAIT ACTIVITIES      BALANCE TRAINING      Balance Assessment  Jb , JENISE  no   Sitting balance     Standing balance - static NBOS on Airex:  HT 10x  HN 10x  EC 10 sec x 3 Cl S / GCA  Staggered stance 10 sec 3x each foot direction  no   Standing balance - dynamic A/p on rocker board x 20  Tandem ambulation 10 ft x 4  6\" hurdles, lateral and fwd step overs No  No  no     Standing balance - varied surface Stdg on airex, reaching for cones, all directions, levels,   Stdg on airex, ball toss/bounce, catch no   GAIT TRAINING " TOTAL TIME FOR SESSION 15    Ambulation with AD  Gait indoors w/o AD (pt arrived w/o SPC) w/ horizontal and vertical head turns w/ cueing for heel strike and postue   ambulation around slalom course of cones no   Dynamic gait  Side stepping 20' x 2  Backward walking 20' x 2 no   Gait Assessment  10 MWT, TUG yes   Stair negotiation     Curb negotiation     Ramp negotiation     Outdoor ambulation X 400 ft with SPC cues for sequence outdoors. LOB x 2 outdoors requiring min A to recover no   BWS/vector training     MANUAL TOTAL TIME FOR SESSION Not performed    /PROM     Mobilization      MODALITIES TOTAL TIME FOR SESSION Not performed    Ice     Heat     ATTENDED E-STIM TOTAL TIME FOR SESSION Not performed    Attended E-Stim

## 2019-06-18 NOTE — LETTER
[address] [place] [address]  [phone]  [facility] Fax: [phone]    PHYSICAL THERAPY UPDATE    Patient Name: Mariya Porras    Provider: Nadira Caputo PT  Referring Provider: Shant Corona MD  PCP: Shant Corona MD  Payor: Payor: MEDICARE / Plan: MEDICARE RAILROAD / Product Type: Medicare /   Medical Diagnosis: No primary diagnosis found.     Dear Dr. Corona,    Thank you for the referral of your patient Mariya Porras for physical therapy. I am writing to you today to provide you an update on the status of your patient. Please review the latest progress note below and the goals being addressed during this plan of care.     If you have any questions or concerns, please feel free to contact me. Once again, thank you for your referral and the opportunity to work with your patient.     Sincerely,  Nadira Caputo PT    Rehab Potential:        PT DISCHARGE NOTE FOR OUTPATIENT THERAPY    Patient: Mariya Porras   MRN: [id number]  : 1935 83 y.o.  Referring Physician: Shant Corona MD  Date of Visit: 2019      Certification Dates: 19 through 19    Recommended Frequency & Duration:  2 times/week for up to 3 months     Total Visit Count: 7    Diagnosis: No diagnosis found.    Chief Complaints:   Chief Complaint   Patient presents with   • Difficulty Walking   • Balance Deficits   • Dec Strength   • Dec ROM       Precautions:      TODAY'S VISIT:          General Information - 19 1225        Session Details    Document Type discharge evaluation    Mode of Treatment individual therapy;physical therapy    Patient/Family Observations Reports episode of dizziness this morning only lasted several seconds. She would like to stop therapy. She feels continued fatigue and stomach pain. She is going back to see her PCP this week and has GI follow up next week.  Arrived lated due to traffic.        Time Calculation    Start Time 1220    Stop Time 1300    Time  "Calculation (min) 40 min                Daily Falls Screen - 06/18/19 1230        Daily Falls Assessment    Patient reported fall since last visit No              Daily Treatment Assessment and Plan - 06/18/19 2130        Daily Treatment Assessment and Plan    Daily Outcome Summary See note          OBJECTIVE MEASUREMENTS/DATA:    Balance         Balance - 06/18/19 1250        Timed Up and Go Test    Results, Timed Up and Go Test (Balance) 14 sec       Other Balance Scores    Gait Speed (m/sec) 0.6 m/sec          Today's Treatment:      PT Neuro Exercises Current Session Time Done Today    THER ACT  TOTAL TIME FOR SESSION 0-7 Minutes    Bed mobility     Transfer training     HEP Develop and reviewed  1. Clam shell with OTB  2. Bridge   3. Repeated sit to stand   4.Tandem balance at counter 20 sec x 4 B  5. Standing hip flexion  6.  Standing hip abducion  7. Recumbent bike X 10 mintes  8. Heel raises  9. S/ L hip abd  10. SLR.     amb 3000 steps per step counter   Yes   Patient Education Pt educated on balance strategies, posture, gait mechanics. Reviewed and updated HE yes   THER EX TOTAL TIME FOR SESSION 23 - 37 minutes (25)    STRETCHING     Stretching  B calf stretches on #3 incline; 1 min x 3   B HS stretches seated w/ ft on block  20-30 sec x 3 each side Yes  yes   CARDIOVASCULAR     Nu Step X 10 minutes  Level 1  no   Stationary Bike Rec bike   L 3 x10 min yes   Treadmill     Standing Ther-Ex Repeated sit to stand 2 x 10   Heel raise 2 X 10  ip abd 2 X 10  Hip flex 2 X 10  Yes  Yes    yes   SLR 2x10 B yes   Bridge 5\" x20 yes   Hip abd 2x10 B yes   clamshells OTB x20 B yes        STher-Ex SLR no   NEURO RE-ED TOTAL TIME FOR SESSION Not performed     COORDINATION     POSTURAL RE-ED     PRE-GAIT ACTIVITIES      BALANCE TRAINING      Balance Assessment  Muhammad , FGA  no   Sitting balance     Standing balance - static NBOS on Airex:  HT 10x  HN 10x  EC 10 sec x 3 Cl S / GCA  Staggered stance 10 sec 3x each foot " "direction  no   Standing balance - dynamic A/p on rocker board x 20  Tandem ambulation 10 ft x 4  6\" hurdles, lateral and fwd step overs No  No  no     Standing balance - varied surface Stdg on airex, reaching for cones, all directions, levels,   Stdg on airex, ball toss/bounce, catch no   GAIT TRAINING TOTAL TIME FOR SESSION 15    Ambulation with AD  Gait indoors w/o AD (pt arrived w/o SPC) w/ horizontal and vertical head turns w/ cueing for heel strike and postue   ambulation around slalom course of cones no   Dynamic gait  Side stepping 20' x 2  Backward walking 20' x 2 no   Gait Assessment  10 MWT, TUG yes   Stair negotiation     Curb negotiation     Ramp negotiation     Outdoor ambulation X 400 ft with SPC cues for sequence outdoors. LOB x 2 outdoors requiring min A to recover no   BWS/vector training     MANUAL TOTAL TIME FOR SESSION Not performed    /PROM     Mobilization      MODALITIES TOTAL TIME FOR SESSION Not performed    Ice     Heat     ATTENDED E-STIM TOTAL TIME FOR SESSION Not performed    Attended E-Stim             Goals Addressed     • PT Goals                   Outcome Eval     ABC  46% Not re-asssed due to time constraints    TUG 14 sec 14 sec    10 MWT .52 m/s. .66 m/sec withtou AD    5 X STS 17 sec  Not re-asssed due to time constraints    Motley TBA  5/30 - 45 Not re-asssed due to time constraints    FGA TBA  20/30  Not re-asssed due to time constraints    PSFS:     5  1. Stairs        2 Folding the laundry       3. Golf        1. 7       2. 5        3. 0     40% Not assessed        Short term goals   Short Term Goals Time Frame Result Comment/Progress   Improve ABC to 75%  4 weeks Not met    Improve PSFS to 60% 4 weeks  Not met    Pt will be  Mod I for FF with rail  4 weeks  Not met    Assess  MOTLEY, 6 MWT,  FGA 4 weeks  met    Improve 5 X STS to 15 seconds  4 weeks  Not met    Tolerate 10   min of CV activity with VSS 4 weeks  met    Improve TUG to 12 sec  4 weeks Not met    Progress gait " speed to .75 m/sec without decline in safety or quality 4 weeks  Not met    Pt and caregiver will be mod I with HEP 4 weeks  Met             Long term goals   Long Term Goals Time Frame Result Comment/Progress   Improve ABC to 80 %  12 weeks Not met    Improve PSFS to 75%  12 weeks  Not met    Pt will be I  to ascend/descend FF without rail 12 weeks Not met    Progress TUG to </= 10 seconds 12 weeks Not met    Pt will demonstrate improved balance via Muhammad >/= 50/ 56 12 weeks Not met    Pt will demonstrate improved dynamic balance and decreased risk for falls via FGA >/= 24/ 30 12 weeks Not met    Progress functional endurance via 6MWT to >/= 1200 ft  12 weeks Not met    Progress comfortable gait speed to 1.2 m/s (age norm) 12 weeks Not met     Pt will be mod I with updated HEP 12 weeks Not met    Tolerate 20  minutes of CV activity with VSS 12 weeks Not met    Pt will return to [facility] [employer] program  12 weeks Not met      Progress 5 X sit to stand to 15 sec  without decline sin safety or technique 12 weeks Not met                  Clinical Impression:    Pt is an 82 yo female  who presented to out- patient physical therapy with decline in balance.  Pt  presented with dcreased balance, endurance, strength, and ROM causing a decrease in functional mobility and independence. She has attended 7 visits. Pt reported today she would like to stop therapy as she feels she can perform HEP and as her fatigue and GI upset continue and she feels these are also limiting her function. She has follow appointments this week to discuss with her physicians. She was provided updated HEP today and advised to monitor her fatigue levels while performing which she agreed.  Brief re-assessment of outcome measures with minimal improvement in gait speed noted. She was made aware she will require a new rx if she desires to return to PT - she verbalized understanding. She is now discharged from [facility] PT.   Discharge information for  [facility]:    Reason for D/C: Self discharge  Hospitalization during treatment: No  Increased independence: [place] in HEP  Interrupted for medical reason: No  Skin integrity: Intact

## 2019-06-19 NOTE — PROGRESS NOTES
PT DISCHARGE NOTE FOR OUTPATIENT THERAPY    Patient: Mariya Porras   MRN: [id number]  : 1935 83 y.o.  Referring Physician: Shant Corona MD  Date of Visit: 2019      Certification Dates: 19 through 19    Recommended Frequency & Duration:  2 times/week for up to 3 months     Total Visit Count: 7    Diagnosis: No diagnosis found.    Chief Complaints:   Chief Complaint   Patient presents with   • Difficulty Walking   • Balance Deficits   • Dec Strength   • Dec ROM       Precautions:      TODAY'S VISIT:          General Information - 19 1225        Session Details    Document Type discharge evaluation    Mode of Treatment individual therapy;physical therapy    Patient/Family Observations Reports episode of dizziness this morning only lasted several seconds. She would like to stop therapy. She feels continued fatigue and stomach pain. She is going back to see her PCP this week and has GI follow up next week.  Arrived lated due to traffic.        Time Calculation    Start Time 1220    Stop Time 1300    Time Calculation (min) 40 min                Daily Falls Screen - 19 1230        Daily Falls Assessment    Patient reported fall since last visit No              Daily Treatment Assessment and Plan - 19 2130        Daily Treatment Assessment and Plan    Daily Outcome Summary See note          OBJECTIVE MEASUREMENTS/DATA:    Balance         Balance - 19 1250        Timed Up and Go Test    Results, Timed Up and Go Test (Balance) 14 sec       Other Balance Scores    Gait Speed (m/sec) 0.6 m/sec          Today's Treatment:      PT Neuro Exercises Current Session Time Done Today    THER ACT  TOTAL TIME FOR SESSION 0-7 Minutes    Bed mobility     Transfer training     HEP Develop and reviewed  1. Clam shell with OTB  2. Bridge   3. Repeated sit to stand   4.Tandem balance at counter 20 sec x 4 B  5. Standing hip flexion  6.  Standing hip abducion  7. Recumbent bike X 10  "mintes  8. Heel raises  9. S/ L hip abd  10. SLR.     amb 3000 steps per step counter   Yes   Patient Education Pt educated on balance strategies, posture, gait mechanics. Reviewed and updated HE yes   THER EX TOTAL TIME FOR SESSION 23 - 37 minutes (25)    STRETCHING     Stretching  B calf stretches on #3 incline; 1 min x 3   B HS stretches seated w/ ft on block  20-30 sec x 3 each side Yes  yes   CARDIOVASCULAR     Nu Step X 10 minutes  Level 1  no   Stationary Bike Rec bike   L 3 x10 min yes   Treadmill     Standing Ther-Ex Repeated sit to stand 2 x 10   Heel raise 2 X 10  ip abd 2 X 10  Hip flex 2 X 10  Yes  Yes    yes   SLR 2x10 B yes   Bridge 5\" x20 yes   Hip abd 2x10 B yes   clamshells OTB x20 B yes        STher-Ex SLR no   NEURO RE-ED TOTAL TIME FOR SESSION Not performed     COORDINATION     POSTURAL RE-ED     PRE-GAIT ACTIVITIES      BALANCE TRAINING      Balance Assessment  JENISE Muhammad  no   Sitting balance     Standing balance - static NBOS on Airex:  HT 10x  HN 10x  EC 10 sec x 3 Cl S / GCA  Staggered stance 10 sec 3x each foot direction  no   Standing balance - dynamic A/p on rocker board x 20  Tandem ambulation 10 ft x 4  6\" hurdles, lateral and fwd step overs No  No  no     Standing balance - varied surface Stdg on airex, reaching for cones, all directions, levels,   Stdg on airex, ball toss/bounce, catch no   GAIT TRAINING TOTAL TIME FOR SESSION 15    Ambulation with AD  Gait indoors w/o AD (pt arrived w/o SPC) w/ horizontal and vertical head turns w/ cueing for heel strike and postue   ambulation around slalom course of cones no   Dynamic gait  Side stepping 20' x 2  Backward walking 20' x 2 no   Gait Assessment  10 MWT, TUG yes   Stair negotiation     Curb negotiation     Ramp negotiation     Outdoor ambulation X 400 ft with SPC cues for sequence outdoors. LOB x 2 outdoors requiring min A to recover no   BWS/vector training     MANUAL TOTAL TIME FOR SESSION Not performed    /PROM     Mobilization    "   MODALITIES TOTAL TIME FOR SESSION Not performed    Ice     Heat     ATTENDED E-STIM TOTAL TIME FOR SESSION Not performed    Attended E-Stim             Goals Addressed     • PT Goals                   Outcome Eval     ABC  46% Not re-asssed due to time constraints    TUG 14 sec 14 sec    10 MWT .52 m/s. .66 m/sec withtou AD    5 X STS 17 sec  Not re-asssed due to time constraints    Motley TBA  5/30 - 45 Not re-asssed due to time constraints    FGA TBA  20/30  Not re-asssed due to time constraints    PSFS:     5  1. Stairs        2 Folding the laundry       3. Golf        1. 7       2. 5        3. 0     40% Not assessed        Short term goals   Short Term Goals Time Frame Result Comment/Progress   Improve ABC to 75%  4 weeks Not met    Improve PSFS to 60% 4 weeks  Not met    Pt will be  Mod I for FF with rail  4 weeks  Not met    Assess  MOTLEY, 6 MWT,  FGA 4 weeks  met    Improve 5 X STS to 15 seconds  4 weeks  Not met    Tolerate 10   min of CV activity with VSS 4 weeks  met    Improve TUG to 12 sec  4 weeks Not met    Progress gait speed to .75 m/sec without decline in safety or quality 4 weeks  Not met    Pt and caregiver will be mod I with HEP 4 weeks  Met             Long term goals   Long Term Goals Time Frame Result Comment/Progress   Improve ABC to 80 %  12 weeks Not met    Improve PSFS to 75%  12 weeks  Not met    Pt will be I  to ascend/descend FF without rail 12 weeks Not met    Progress TUG to </= 10 seconds 12 weeks Not met    Pt will demonstrate improved balance via Motley >/= 50/ 56 12 weeks Not met    Pt will demonstrate improved dynamic balance and decreased risk for falls via FGA >/= 24/ 30 12 weeks Not met    Progress functional endurance via 6MWT to >/= 1200 ft  12 weeks Not met    Progress comfortable gait speed to 1.2 m/s (age norm) 12 weeks Not met     Pt will be mod I with updated HEP 12 weeks Not met    Tolerate 20  minutes of CV activity with VSS 12 weeks Not met    Pt will return to [facility]  [employer] program  12 weeks Not met      Progress 5 X sit to stand to 15 sec  without decline sin safety or technique 12 weeks Not met                  Clinical Impression:    Pt is an 82 yo female  who presented to out- patient physical therapy with decline in balance.  Pt  presented with dcreased balance, endurance, strength, and ROM causing a decrease in functional mobility and independence. She has attended 7 visits. Pt reported today she would like to stop therapy as she feels she can perform HEP and as her fatigue and GI upset continue and she feels these are also limiting her function. She has follow appointments this week to discuss with her physicians. She was provided updated HEP today and advised to monitor her fatigue levels while performing which she agreed.  Brief re-assessment of outcome measures with minimal improvement in gait speed noted. She was made aware she will require a new rx if she desires to return to PT - she verbalized understanding. She is now discharged from OP PT.   Discharge information for [facility]:    Reason for D/C: Self discharge  Hospitalization during treatment: No  Increased independence: [place] in HEP  Interrupted for medical reason: No  Skin integrity: Intact

## 2019-06-21 ENCOUNTER — TRANSCRIBE ORDERS (OUTPATIENT)
Dept: SCHEDULING | Age: 84
End: 2019-06-21

## 2019-06-21 DIAGNOSIS — F03.90 DEMENTIA WITHOUT BEHAVIORAL DISTURBANCE (CMS/HCC): Primary | ICD-10-CM

## 2019-06-26 ENCOUNTER — HOSPITAL ENCOUNTER (OUTPATIENT)
Dept: RADIOLOGY | Age: 84
Discharge: HOME | End: 2019-06-26
Attending: FAMILY MEDICINE
Payer: MEDICARE

## 2019-06-26 DIAGNOSIS — F03.90 DEMENTIA WITHOUT BEHAVIORAL DISTURBANCE (CMS/HCC): ICD-10-CM

## 2019-06-26 PROCEDURE — 70450 CT HEAD/BRAIN W/O DYE: CPT

## 2019-07-08 ENCOUNTER — TRANSCRIBE ORDERS (OUTPATIENT)
Dept: SCHEDULING | Age: 84
End: 2019-07-08

## 2019-07-08 DIAGNOSIS — R56.9 CONVULSIONS (CMS/HCC): Primary | ICD-10-CM

## 2019-07-08 DIAGNOSIS — R20.2 PARESTHESIA OF SKIN: ICD-10-CM

## 2019-07-09 ENCOUNTER — HOSPITAL ENCOUNTER (OUTPATIENT)
Dept: NEUROLOGY | Facility: HOSPITAL | Age: 84
Discharge: HOME | End: 2019-07-09
Attending: PSYCHIATRY & NEUROLOGY
Payer: MEDICARE

## 2019-07-09 PROCEDURE — 95816 EEG AWAKE AND DROWSY: CPT

## 2019-07-16 ENCOUNTER — TRANSCRIBE ORDERS (OUTPATIENT)
Dept: SCHEDULING | Age: 84
End: 2019-07-16

## 2019-07-16 DIAGNOSIS — E21.0 PRIMARY HYPERPARATHYROIDISM (CMS/HCC): Primary | ICD-10-CM

## 2019-07-18 ENCOUNTER — HOSPITAL ENCOUNTER (OUTPATIENT)
Dept: RADIOLOGY | Facility: HOSPITAL | Age: 84
Discharge: HOME | End: 2019-07-18
Attending: INTERNAL MEDICINE
Payer: MEDICARE

## 2019-07-18 DIAGNOSIS — E21.0 PRIMARY HYPERPARATHYROIDISM (CMS/HCC): ICD-10-CM

## 2019-07-18 PROCEDURE — 77080 DXA BONE DENSITY AXIAL: CPT

## 2019-07-19 ENCOUNTER — HOSPITAL ENCOUNTER (OUTPATIENT)
Dept: RADIOLOGY | Facility: HOSPITAL | Age: 84
Discharge: HOME | End: 2019-07-19
Attending: INTERNAL MEDICINE
Payer: MEDICARE

## 2019-07-19 DIAGNOSIS — E21.0 PRIMARY HYPERPARATHYROIDISM (CMS/HCC): ICD-10-CM

## 2019-07-19 PROCEDURE — 77081 DXA BONE DENSITY APPENDICULR: CPT

## 2019-08-07 ENCOUNTER — TRANSCRIBE ORDERS (OUTPATIENT)
Dept: SCHEDULING | Age: 84
End: 2019-08-07

## 2019-08-07 DIAGNOSIS — R19.8 OTHER SPECIFIED SYMPTOMS AND SIGNS INVOLVING THE DIGESTIVE SYSTEM AND ABDOMEN: Primary | ICD-10-CM

## 2019-08-08 ENCOUNTER — HOSPITAL ENCOUNTER (OUTPATIENT)
Dept: RADIOLOGY | Facility: HOSPITAL | Age: 84
Discharge: HOME | End: 2019-08-08
Attending: PHYSICIAN ASSISTANT
Payer: MEDICARE

## 2019-08-08 ENCOUNTER — TRANSCRIBE ORDERS (OUTPATIENT)
Dept: RADIOLOGY | Facility: HOSPITAL | Age: 84
End: 2019-08-08

## 2019-08-08 DIAGNOSIS — R19.8 OTHER SPECIFIED SYMPTOMS AND SIGNS INVOLVING THE DIGESTIVE SYSTEM AND ABDOMEN: ICD-10-CM

## 2019-08-08 PROCEDURE — 76700 US EXAM ABDOM COMPLETE: CPT

## 2020-02-07 ENCOUNTER — TRANSCRIBE ORDERS (OUTPATIENT)
Dept: RADIOLOGY | Facility: CLINIC | Age: 85
End: 2020-02-07

## 2020-02-07 ENCOUNTER — HOSPITAL ENCOUNTER (OUTPATIENT)
Dept: RADIOLOGY | Facility: CLINIC | Age: 85
Discharge: HOME | End: 2020-02-07
Attending: FAMILY MEDICINE
Payer: MEDICARE

## 2020-02-07 DIAGNOSIS — M25.572 PAIN IN LEFT ANKLE AND JOINTS OF LEFT FOOT: ICD-10-CM

## 2020-02-07 DIAGNOSIS — M79.672 PAIN IN LEFT FOOT: ICD-10-CM

## 2020-02-07 DIAGNOSIS — M79.672 PAIN IN LEFT FOOT: Primary | ICD-10-CM

## 2020-02-07 PROCEDURE — 73630 X-RAY EXAM OF FOOT: CPT | Mod: LT

## 2020-02-07 PROCEDURE — 73610 X-RAY EXAM OF ANKLE: CPT | Mod: LT

## 2020-11-24 ENCOUNTER — TRANSCRIBE ORDERS (OUTPATIENT)
Dept: SCHEDULING | Age: 85
End: 2020-11-24

## 2020-11-24 ENCOUNTER — HOSPITAL ENCOUNTER (OUTPATIENT)
Dept: RADIOLOGY | Facility: CLINIC | Age: 85
Discharge: HOME | End: 2020-11-24
Attending: FAMILY MEDICINE
Payer: MEDICARE

## 2020-11-24 DIAGNOSIS — M54.50 LOW BACK PAIN: Primary | ICD-10-CM

## 2020-11-24 DIAGNOSIS — M54.50 LOW BACK PAIN: ICD-10-CM

## 2020-11-24 PROCEDURE — 72110 X-RAY EXAM L-2 SPINE 4/>VWS: CPT

## 2020-11-25 ENCOUNTER — TRANSCRIBE ORDERS (OUTPATIENT)
Dept: SCHEDULING | Age: 85
End: 2020-11-25

## 2020-11-25 DIAGNOSIS — M89.9 DISORDER OF BONE, UNSPECIFIED: Primary | ICD-10-CM

## 2020-11-28 ENCOUNTER — HOSPITAL ENCOUNTER (OUTPATIENT)
Dept: RADIOLOGY | Facility: HOSPITAL | Age: 85
Discharge: HOME | End: 2020-11-28
Attending: FAMILY MEDICINE
Payer: MEDICARE

## 2020-11-28 ENCOUNTER — HOSPITAL ENCOUNTER (EMERGENCY)
Facility: HOSPITAL | Age: 85
Discharge: HOME | End: 2020-11-28
Payer: MEDICARE

## 2020-11-28 DIAGNOSIS — M89.9 DISORDER OF BONE, UNSPECIFIED: ICD-10-CM

## 2020-11-28 RX ORDER — GADOBUTROL 604.72 MG/ML
7 INJECTION INTRAVENOUS ONCE
Status: COMPLETED | OUTPATIENT
Start: 2020-11-28 | End: 2020-11-28

## 2020-11-28 RX ORDER — MELOXICAM 7.5 MG/1
TABLET ORAL
COMMUNITY
Start: 2020-11-23 | End: 2020-12-10 | Stop reason: SDUPTHER

## 2020-11-28 RX ADMIN — GADOBUTROL 7 ML: 604.72 INJECTION INTRAVENOUS at 13:30

## 2020-12-03 ENCOUNTER — TELEPHONE (OUTPATIENT)
Dept: NEUROSURGERY | Facility: CLINIC | Age: 85
End: 2020-12-03

## 2020-12-03 NOTE — TELEPHONE ENCOUNTER
New Patient      Referring Doctor:  PCP  Dr. Shant Corona  Direct referral to Dr. Foster      Most Recent Studies:    MRI L-spine 11/28/2020 @[facility]  L-spine XRAY 11/24/2020 @[facility]  DEXA 07/19/2019 @[facility]    No EMG        Onset of Symptoms and Reason for Visit:  After a fall 2 weeks ago, L1 compression fracture.  Has been in bed since the fall.  In a lot of pain.  Taking meloxicam and tylenol.  Using walker to get to the bathroom.  Cannot straighten her back.  Can find only 1 comfortable position for sleeping.        Insurance:  Medicare - primary  [facility] [employer] - supplemental      W/C or AUTO:  No

## 2020-12-04 ENCOUNTER — TELEPHONE (OUTPATIENT)
Dept: NEUROSURGERY | Facility: CLINIC | Age: 85
End: 2020-12-04

## 2020-12-04 NOTE — TELEPHONE ENCOUNTER
I've entered a response to the patient's message below.    Response to the message:       TLSO brace and appt.    HW spoke to her and neurologically OK and pain improving.

## 2020-12-04 NOTE — TELEPHONE ENCOUNTER
We have not evaluated this patient as of yet, but received a new patient intake.  I spoke with the patient's , as she has Parkinson and was unable to get to the phone to provide a history.  The patient's  and son were on the line and reported that she suffered a fall approximately 2 weeks ago.  This is resulted in an L1 burst fracture with significant retropulsion.  She has chronic lower back pain and peripheral neuropathy at baseline.  After the fall she has had significant lower back pain and has largely been bedbound.  It appears the patient has been ambulatory, and has been able to get out of bed approximately 4-5 times per day to walk to the bathroom.  She notes no new focal deficits in her lower extremities.    I have asked the  for [employer] to fit her in a TLSO brace as soon as possible.  Due to the fact that she is ambulatory without new deficit, I do not feel that an emergency department visit is necessary at this time. However given the significance of the fracture, we would like to see her in the office as soon as possible and a Monday appointment was given.

## 2020-12-10 ENCOUNTER — OFFICE VISIT (OUTPATIENT)
Dept: NEUROSURGERY | Facility: CLINIC | Age: 85
End: 2020-12-10
Payer: MEDICARE

## 2020-12-10 VITALS
OXYGEN SATURATION: 95 % | HEART RATE: 95 BPM | HEIGHT: 66 IN | DIASTOLIC BLOOD PRESSURE: 80 MMHG | TEMPERATURE: 96.8 F | WEIGHT: 155 LBS | BODY MASS INDEX: 24.91 KG/M2 | SYSTOLIC BLOOD PRESSURE: 130 MMHG | RESPIRATION RATE: 16 BRPM

## 2020-12-10 DIAGNOSIS — S32.010A CLOSED WEDGE COMPRESSION FRACTURE OF L1 VERTEBRA, INITIAL ENCOUNTER (CMS/HCC): Primary | ICD-10-CM

## 2020-12-10 PROCEDURE — 99204 OFFICE O/P NEW MOD 45 MIN: CPT | Performed by: NEUROLOGICAL SURGERY

## 2020-12-10 RX ORDER — CARBIDOPA AND LEVODOPA 25; 100 MG/1; MG/1
TABLET ORAL
Status: ON HOLD | COMMUNITY
Start: 2020-11-18 | End: 2021-10-19 | Stop reason: ENTERED-IN-ERROR

## 2020-12-10 RX ORDER — MELOXICAM 7.5 MG/1
7.5 TABLET ORAL DAILY
Qty: 30 TABLET | Refills: 0 | Status: SHIPPED | OUTPATIENT
Start: 2020-12-10 | End: 2021-01-06

## 2020-12-10 NOTE — PROGRESS NOTES
[facility]  Medical Office Building [address], [address]  [place] [address]  Phone: [phone]  Fax: [phone]      Patient ID: Mariya Porras                              : 1935    Visit Date: 12/10/2020    Referring Provider: Shant Corona MD    Chief Complaint / History of Present Illness:   Mariya Porras is a pleasant 85 y.o. female seen today as a consult for L1 fracture. Patient was in her usual state of health until , when she was looking for the light switch in the dark, lost her balance, and fell onto her backside. She immediately had onset of 10/10 severe low back pain, and was unable to get up from the floor. She went to her PCP for evaluation, completing a lumbar xray and lumbar MRI, showing the L1 fracture. Since the fall, patient has been bed bound due to the severity of the pain. She will sit up for meals, and walk to the bathroom with a rolling walker, but otherwise has been horizontal in bed. Patient was completely independent prior to this fall, ambulating independently and performing all activities of daily living. The pain is localized to the L1 region with some radiation laterally. There is no radiation of pain into her groin or down her legs. She denies paresthesias, numbness, and weakness. She denies any incontinence of bowel/bladder, but does report increased frequency of urination, feeling the urge to go every hour. Patient has been taking Meloxicam for pain, which helps take the edge off. Of note, patient was recently diagnosed with Parkisons. She does take Prolia for osteopenia.     Allergies:  Allergies   Allergen Reactions   • Codeine Hives     Other reaction(s): n/v   • Diazepam Other (see comments)     Blurred vision   • Sulfur Hives   • Sulfa (Sulfonamide Antibiotics)        Medications:     Current Outpatient Medications:   •  amLODIPine (NORVASC) 5 mg tablet, Take 5 mg by mouth daily., Disp: , Rfl:   •  bimatoprost  (LUMIGAN) 0.01 % ophthalmic drops, Administer 1 drop into both eyes nightly., Disp: , Rfl:   •  carbidopa-levodopa (SINEMET)  mg per tablet, 1/2 1 TAB BY MOUTH THREE TIMES A DAY BEFORE MEALS, Disp: , Rfl:   •  esomeprazole (NexIUM) 20 mg capsule, Take 20 mg by mouth daily before breakfast., Disp: , Rfl:   •  meloxicam (MOBIC) 7.5 mg tablet, , Disp: , Rfl:   •  calcium carbonate-vitamin D3 500 mg(1,250mg) -200 unit per tablet, Take 1 tablet by mouth 2 (two) times a day with meals., Disp: , Rfl:   •  cranberry fruit extract (CRANBERRY EXTRACT ORAL), Take 1 tablet by mouth daily., Disp: , Rfl:   •  estradiol (ESTRACE) 0.01 % (0.1 mg/gram) vaginal cream, Insert 1 g into the vagina 2 (two) times a week (Mon, Thu). (Patient not taking: Reported on 12/10/2020 ), Disp: 42.5 g, Rfl: 5  •  Lactobacillus acidophilus (PROBIOTIC ORAL), Take 1 tablet by mouth daily., Disp: , Rfl:   •  sertraline (ZOLOFT) 25 mg tablet, Take 25 mg by mouth once daily., Disp: , Rfl: 1  •  triamcinolone (KENALOG) 0.1 % ointment, Apply a small amount to the affected area daily. (Patient not taking: Reported on 5/24/2019 ), Disp: 30 g, Rfl: 1     Past Medical History:   Past Medical History:   Diagnosis Date   • Anxiety    • Arthritis    • Basal cell carcinoma     forehead   • Glaucoma    • Hypertension    • Low back pain    • Lumbosacral disc disease    • Parkinson's disease (CMS/HCC)    • Peripheral neuropathy        Past Surgical History:   Past Surgical History:   Procedure Laterality Date   • BLADDER SUSPENSION  2009    Prolift M   • CATARACT EXTRACTION, BILATERAL     • COLONOSCOPY     • HYSTERECTOMY     • JOINT REPLACEMENT     • KNEE ARTHROPLASTY  2015    left       Family History:  Family History   Problem Relation Age of Onset   • No Known Problems Biological Mother    • No Known Problems Biological Father        Social History:  Social History     Socioeconomic History   • Marital status:      Spouse name: Not on file   • Number  of children: Not on file   • Years of education: Not on file   • Highest education level: Not on file   Occupational History   • Not on file   Social Needs   • Financial resource strain: Not on file   • Food insecurity     Worry: Not on file     Inability: Not on file   • Transportation needs     Medical: Not on file     Non-medical: Not on file   Tobacco Use   • Smoking status: Former Smoker   • Smokeless tobacco: Never Used   • Tobacco comment: as a young teenager   Substance and Sexual Activity   • Alcohol use: Yes     Comment: wine   • Drug use: No   • Sexual activity: Not Currently     Partners: Male   Lifestyle   • Physical activity     Days per week: Not on file     Minutes per session: Not on file   • Stress: Not on file   Relationships   • Social connections     Talks on phone: Not on file     Gets together: Not on file     Attends Baptism service: Not on file     Active member of club or organization: Not on file     Attends meetings of clubs or organizations: Not on file     Relationship status: Not on file   • Intimate partner violence     Fear of current or ex partner: Not on file     Emotionally abused: Not on file     Physically abused: Not on file     Forced sexual activity: Not on file   Other Topics Concern   • Not on file   Social History Narrative   • Not on file       Vitals:   Vitals:    12/10/20 1416   BP: 130/80   Pulse: 95   Resp: 16   Temp: (!) 36 °C (96.8 °F)   SpO2: 95%       Review of Systems:  A complete 14 point review of systems was conducted and was deemed negative except what was documented in the HPI.    Physical Examination:  Physical Exam   Constitutional: Oriented to person, place, and time. Appears well-developed and well-nourished.   Head: Normocephalic and atraumatic.   Right Ear: External ear normal.   Left Ear: External ear normal.   Nose: Nose normal.   Mouth/Throat: Oropharynx is clear and moist.   Eyes: Conjunctivae and EOM are normal. Pupils are equal, round, and  reactive to light. No scleral icterus.   Neck: Normal range of motion.   Cardiovascular: Normal rate.    Pulmonary/Chest: No respiratory distress.   Abdominal: Soft. Exhibits no distension.  Musculoskeletal: Normal range of motion. No edema. Extreme tenderness to lumbar spine.   Neurological: Oriented to person, place, and time.   Skin: Skin is warm and dry. No rash noted. No erythema.   Psychiatric: Normal mood and affect. Speech is normal and behavior is normal. Thought content normal.     Vitals reviewed.    Neurological Examination:  Neurologic Exam     Mental Status   Oriented to person, place, and time.   Attention: normal.   Speech: speech is normal   Level of consciousness: alert    Cranial Nerves     CN II: Visual fields are full to confrontation.  Pupils are equal and briskly reactive to light.   CN III, IV, VI: Extra-occular muscles are intact  CN V: Facial sensation is intact and equal in V1-V3 distributions bilaterally.   CN VII: Face is symmetric with normal eye closure and smile.  CN VIII: Hearing is normal to rubbing fingers  CN IX, X: Palate elevates symmetrically. Phonation is normal.  CN XI: Head turning and shoulder shrug are intact  CN XII: Tongue is midline with normal movements and no atrophy.    Sensation ( /2)    Right Left  Right Left   C5 2 2 L2 2 2   C6 2 2 L3 2 2   C7 2 2 L4 2 2   C8 2 2 L5 2 2   T1 2 2 S1 2 2     Motor:     Deltoid Biceps Triceps Wrist ext Finger ext Hand Intrinsics Hip flexion Knee ext Dorsi-  flexion EHL Plantar Flexion   R 5 5 5 5 5 5 5 5 5 5 5   L 5 5 5 5 5 5 5 5 5 5 5     There is no pronator drift. Muscle bulk and tone are normal.     Gait, Coordination, and Reflexes       Right ankle clonus: absent  Left ankle clonus: absent    Her back is straight.  She has tenderness to palpation in the thoracolumbar junction in the midline and paraspinal muscles.  No significant spasms appreciated.  No step-off palpable.    Sitting straight leg raise negative bilaterally.   Slump test negative bilaterally.    Gait deferred secondary to pain      Data Review:    Lab Results:   Lab Results   Component Value Date    WBC 6.22 05/17/2018    HGB 15.2 05/17/2018    HCT 45.7 (H) 05/17/2018    MCV 90.9 05/17/2018     05/17/2018    RDW 12.9 05/17/2018      Lab Results   Component Value Date    GLUCOSE 142 (H) 05/17/2018    BUN 15 05/17/2018     (L) 05/17/2018    K 4.1 05/17/2018     05/17/2018    CO2 24 05/17/2018    ANIONGAP 9 05/17/2018        Imaging:   Independent review of all imaging was done by myself as well as review of the radiologists readings and comparison to prior films.      MRI LUMBAR SPINE 11/28/20  Severe compression fracture is noted involving L1 vertebral body.  There is 8 mm  posterior displacement of the fracture into the spinal canal causing mass effect  on the spinal cord.  Additionally, mild paravertebral and anterior epidural  space soft tissue prominence is noted.  Post contrast-enhanced demonstrate  enhancement of the anterior and posterior portion of the L1 vertebral body,  anterior epidural space and paravertebral soft tissue prominence.  Pathologic  fracture is not completely excluded.  No other fracture is noted.  A 1.6 cm x  1.2 cm x 1.1 cm decreased T1 and T2 signal lesion is noted within the right  aspect of L2 vertebral body without enhancement.  This probably represents a  bone island.  No other abnormal T1 signal intensity lesions are noted in.     At T12-L1 disc space, small focal enhancing soft tissue prominence is noted in  the right anterior aspect causing mild mass effect on the anterior portion of  the spinal cord without neural foramina stenosis.     At L1-L2 level, enhancing anterior soft tissue prominence is causing minimal  compression of the thecal sac without neural foramina stenosis.     At L2-L3 level, moderate broad-based posterior disc bulge is seen with minimal  bilateral facet joint hypertrophy causing mild compression of  the thecal sac and  mild bilateral neural foramina stenosis.     At L3-L4 level, mild spondylolisthesis is seen where L4 vertebral body is  anterior to L3 vertebral body.  Mild to moderate broad-based posterior disc  bulge is seen with bilateral facet joint and ligamentum flavum hypertrophy, left  worse then right.  This is causing mild compression of the thecal sac and mild  right, moderate left neural foramina stenosis.     At L4-L5 level, mild broad-based posterior disc bulge is seen with bilateral facet joint hypertrophy.  This is causing mild compression of the thecal sac and moderate right, mild left neural foramina stenosis.  Fluid is visualized within  the L4-L5 interspinous space likely from pseudoarticulation.     At L5-S1 level, moderate broad-based posterior disc bulge is seen with focal right foraminal lateral disc protrusion.  Bilateral facet joint hypertrophy is noted.  This is causing minimal mass effect on the thecal sac and moderate left, moderate to severe right neural foramina stenosis.    Assessment / Plan: In summary, Mariya Porras is a 85 y.o. female seen today for evaluation of severe L1 compression fracture.  There is retropulsed bone with abutment on the conus but no cord compression or cord signal change.  There is lateral recess narrowing left greater than right.  The conus ends at the L1-L2 junction.    I discussed the clinical and radiographic findings at length with the patient and her family.  Patient's primary complaint is back pain.  Currently she has no clear associated radicular findings.  Owing to the degree of bony retropulsion she is not a candidate for kyphoplasty.  Treatment options include continue conservative management in a TLSO brace versus surgical intervention.  She understands that without surgical intervention there is the possibility of progressive or acute neurological deficit or progressive deformity.  If surgery is opted for u would recommend L1 corpectomy  placement of expandable cage with minimally invasive pedicle screw posterior augmentation.    The risk of surgery were discussed at length.  The risk include, but are not limited to, bleeding, infection, CSF leak.  Surgery may not alleviate symptoms.  Patient may develop worsening symptoms including increased pain, weakness, numbness, paralysis, bowel or bladder dysfunction.  The hardware may fail or migrate.  The risk of failure is higher owing to the fact that the patient has osteopenia based on a DEXA scan from 7/19.  It may be suboptimally positioned and require replacement.  There is a possibility of adjacent level disease.  Is possibly positional injury, visceral injury, abdominal wall injury resulting in cosmetic deformity.  There is a possibly of a wound dehiscence.  Patient and family verbalized understanding.      At this point, the patient and family wish to consider the options.  While the fracture is significant and the patient is at risk of neurological injury and progressive deformity, surgical intervention, particularly one so extensive, clearly associated with moderate risk especially in a patient her age.    As the family considers their options, for now I have recommended that she continue to wear the TLSO brace when out of bed.  I have recommended that she call the office if there is any significant neurological change.  If she opts for nonoperative intervention would recommend repeat x-ray of the lumbar spine in approximately 4 to 6 weeks or sooner if there is any significant clinical change.     Quang Zamora MD         Patient seen earlier today. Signature timestamp does not reflect patient encounter time.   More than 50% of the time is spent counseling the patient and family and coordinating care.

## 2021-01-06 RX ORDER — MELOXICAM 7.5 MG/1
TABLET ORAL
Qty: 30 TABLET | Refills: 0 | Status: SHIPPED | OUTPATIENT
Start: 2021-01-06 | End: 2021-06-11 | Stop reason: SDUPTHER

## 2021-01-08 ENCOUNTER — TRANSCRIBE ORDERS (OUTPATIENT)
Dept: SCHEDULING | Age: 86
End: 2021-01-08

## 2021-01-08 DIAGNOSIS — M25.552 PAIN IN LEFT HIP: ICD-10-CM

## 2021-01-08 DIAGNOSIS — M25.551 PAIN IN RIGHT HIP: ICD-10-CM

## 2021-01-08 DIAGNOSIS — R10.2 PELVIC AND PERINEAL PAIN: Primary | ICD-10-CM

## 2021-01-13 ENCOUNTER — HOSPITAL ENCOUNTER (OUTPATIENT)
Dept: RADIOLOGY | Facility: HOSPITAL | Age: 86
Discharge: HOME | End: 2021-01-13
Attending: FAMILY MEDICINE
Payer: MEDICARE

## 2021-01-13 DIAGNOSIS — M25.552 PAIN IN LEFT HIP: ICD-10-CM

## 2021-01-13 DIAGNOSIS — R10.2 PELVIC AND PERINEAL PAIN: ICD-10-CM

## 2021-01-13 DIAGNOSIS — M25.551 PAIN IN RIGHT HIP: ICD-10-CM

## 2021-01-13 PROCEDURE — 73521 X-RAY EXAM HIPS BI 2 VIEWS: CPT

## 2021-01-13 RX ORDER — GADOBUTROL 604.72 MG/ML
6.5 INJECTION INTRAVENOUS ONCE
Status: COMPLETED | OUTPATIENT
Start: 2021-01-13 | End: 2021-01-13

## 2021-01-13 RX ADMIN — GADOBUTROL 6.5 ML: 604.72 INJECTION INTRAVENOUS at 12:23

## 2021-01-25 DIAGNOSIS — Z23 ENCOUNTER FOR IMMUNIZATION: ICD-10-CM

## 2021-01-30 ENCOUNTER — IMMUNIZATION (OUTPATIENT)
Dept: IMMUNIZATION | Facility: CLINIC | Age: 86
End: 2021-01-30

## 2021-03-07 ENCOUNTER — IMMUNIZATION (OUTPATIENT)
Dept: IMMUNIZATION | Facility: CLINIC | Age: 86
End: 2021-03-07
Attending: FAMILY MEDICINE

## 2021-03-15 ENCOUNTER — TRANSCRIBE ORDERS (OUTPATIENT)
Dept: SCHEDULING | Age: 86
End: 2021-03-15

## 2021-03-15 DIAGNOSIS — S32.009A UNSPECIFIED FRACTURE OF UNSPECIFIED LUMBAR VERTEBRA, INITIAL ENCOUNTER FOR CLOSED FRACTURE (CMS/HCC): Primary | ICD-10-CM

## 2021-03-17 ENCOUNTER — HOSPITAL ENCOUNTER (OUTPATIENT)
Dept: RADIOLOGY | Facility: HOSPITAL | Age: 86
Discharge: HOME | End: 2021-03-17
Attending: FAMILY MEDICINE
Payer: MEDICARE

## 2021-03-17 DIAGNOSIS — S32.009A UNSPECIFIED FRACTURE OF UNSPECIFIED LUMBAR VERTEBRA, INITIAL ENCOUNTER FOR CLOSED FRACTURE (CMS/HCC): ICD-10-CM

## 2021-03-17 RX ORDER — GADOBUTROL 604.72 MG/ML
6.4 INJECTION INTRAVENOUS ONCE
Status: COMPLETED | OUTPATIENT
Start: 2021-03-17 | End: 2021-03-17

## 2021-03-17 RX ADMIN — GADOBUTROL 6.4 ML: 604.72 INJECTION INTRAVENOUS at 14:55

## 2021-03-30 ENCOUNTER — TELEMEDICINE (OUTPATIENT)
Dept: NEUROSURGERY | Facility: CLINIC | Age: 86
End: 2021-03-30
Payer: MEDICARE

## 2021-03-30 DIAGNOSIS — S32.010A CLOSED WEDGE COMPRESSION FRACTURE OF L1 VERTEBRA, INITIAL ENCOUNTER (CMS/HCC): Primary | ICD-10-CM

## 2021-03-30 PROCEDURE — 99442 PR PHYS/QHP TELEPHONE EVALUATION 11-20 MIN: CPT | Mod: 95 | Performed by: NEUROLOGICAL SURGERY

## 2021-03-30 NOTE — LETTER
Mariya Vern  1935    Please note that the patient has the following restrictions:    No lifting greater than 15 lbs.  Avoid excessive bending and twisting.  Brace to be worn when ambulating; may be off in bed and chair.    Please contact our office with any questions or concerns.          Quang Zamora MD

## 2021-03-30 NOTE — PROGRESS NOTES
Verification of Patient Location:  The patient affirms they are currently located in the following state:[place]     Request for Consent:  You and I are about to have a telemedicine check-in or visit. This is allowed because you are already my patient, and you have requested it.  This telemedicine visit will be billed to your health insurance or you, if you are self-insured.  You understand you will be responsible for any copayments or coinsurances that apply to your telemedicine visit.  Before starting our telemedicine visit, I am required to get your consent for this virtual check-in or visit by telemedicine. Do you consent?      Patient Response to Request for Consent: Yes    The following have been reviewed and updated as appropriate in this visit:  Tobacco  Allergies  Meds  Med Hx  Surg Hx  Fam Hx  Soc Hx        Visit Documentation:  I spoke to the patient and her family today.  Patient is an 85-year-old female who was last seen in December for an L1 burst fracture after fall on 11/18/2020.  At that time she was found to have a severe burst fracture with significant retropulsed bone.  At that time I explained the treatment options including TLSO brace versus L1 corpectomy and posterior pedicle screw augmentation.  Patient and family opted for conservative management in the TLSO brace.  For the family she has been doing better.  Her pain is negative as well when she is lying flat is improved while upright although still present particularly in the axial spine.  Per the family she has not been very active recently.  Family tells me that she has been accepted to [facility] as an inpatient.  I reviewed her MRI results from 3/17/2021.  The burst fracture at L1 is relatively unchanged.  There continues to be significant retropulsed bone.  The Alves angle between L2 and T12 is approximately 20 degrees and has not changed significantly since her MRI from January.  At this point given the patient's age  and the lack of significant radiographic progression it is reasonable to continue with conservative management although she certainly continues to be at risk for the development of further deformity and/or acute or chronic neurological compromise including weakness, numbness, paralysis or bowel or bladder dysfunction.  The family has requested a list of restrictions to be observed during and after inpatient physical therapy.  I recommend that she avoid lifting greater than 10 pounds.  Would limit the amount of bending, and twisting.  Despite the fact that the fracture has healed I would still recommend the patient wear a TLSO brace when ambulating.  I have told the family that if it anytime she develops any new symptoms they should contact neurosurgery immediately.  They verbalized understanding.  Hopefully she will regain some of her strength while at physical therapy.  I have asked him to contact me with any questions or concerns moving forward.        Time Spent:  I spent 20 minutes on this date of service performing the following activities: obtaining history, documenting, preparing for visit, obtaining / reviewing records, providing counseling and education, independently reviewing study/studies and communicating results.

## 2021-06-10 ENCOUNTER — TRANSCRIBE ORDERS (OUTPATIENT)
Dept: SCHEDULING | Age: 86
End: 2021-06-10

## 2021-06-10 DIAGNOSIS — S32.010G WEDGE COMPRESSION FRACTURE OF FIRST LUMBAR VERTEBRA, SUBSEQUENT ENCOUNTER FOR FRACTURE WITH DELAYED HEALING: Primary | ICD-10-CM

## 2021-06-11 NOTE — TELEPHONE ENCOUNTER
[facility] requesting 90 day supply of meloxicam      Requested Prescriptions     Pending Prescriptions Disp Refills   • meloxicam (MOBIC) 7.5 mg tablet 90 tablet 0     Sig: Take 1 tablet (7.5 mg total) by mouth once daily.         [facility] #[id number] - [place] - [address]  [address]  [place] [address]  Phone: [phone] Fax: [phone]

## 2021-06-12 ENCOUNTER — HOSPITAL ENCOUNTER (OUTPATIENT)
Dept: RADIOLOGY | Facility: HOSPITAL | Age: 86
Discharge: HOME | End: 2021-06-12
Attending: FAMILY MEDICINE
Payer: MEDICARE

## 2021-06-12 DIAGNOSIS — S32.010G WEDGE COMPRESSION FRACTURE OF FIRST LUMBAR VERTEBRA, SUBSEQUENT ENCOUNTER FOR FRACTURE WITH DELAYED HEALING: ICD-10-CM

## 2021-06-12 RX ORDER — GADOBUTROL 604.72 MG/ML
6.4 INJECTION INTRAVENOUS ONCE
Status: COMPLETED | OUTPATIENT
Start: 2021-06-12 | End: 2021-06-12

## 2021-06-12 RX ADMIN — GADOBUTROL 6.4 ML: 604.72 INJECTION INTRAVENOUS at 15:23

## 2021-06-14 RX ORDER — MELOXICAM 7.5 MG/1
7.5 TABLET ORAL
Qty: 90 TABLET | Refills: 0 | Status: SHIPPED | OUTPATIENT
Start: 2021-06-14 | End: 2021-08-16

## 2021-06-30 ENCOUNTER — TELEMEDICINE (OUTPATIENT)
Dept: NEUROSURGERY | Facility: CLINIC | Age: 86
End: 2021-06-30
Payer: MEDICARE

## 2021-06-30 DIAGNOSIS — S32.010A CLOSED WEDGE COMPRESSION FRACTURE OF L1 VERTEBRA, INITIAL ENCOUNTER (CMS/HCC): Primary | ICD-10-CM

## 2021-06-30 PROCEDURE — 99443 PR PHYS/QHP TELEPHONE EVALUATION 21-30 MIN: CPT | Mod: 95 | Performed by: NEUROLOGICAL SURGERY

## 2021-06-30 NOTE — PROGRESS NOTES
Verification of Patient Location:  The patient affirms they are currently located in the following state:[place]     Request for Consent:  You and I are about to have a telemedicine check-in or visit. This is allowed because you are already my patient, and you have requested it.  This telemedicine visit will be billed to your health insurance or you, if you are self-insured.  You understand you will be responsible for any copayments or coinsurances that apply to your telemedicine visit.  Before starting our telemedicine visit, I am required to get your consent for this virtual check-in or visit by telemedicine. Do you consent?      Patient Response to Request for Consent: Yes    The following have been reviewed and updated as appropriate in this visit:  Allergies  Meds  Problems         Visit Documentation:      I spoke to the patient's family today.  Patient is an 85-year-old female who was last seen in December for an L1 burst fracture after fall on 11/18/2020.  At that time she was found to have a severe burst fracture with significant retropulsed bone.  At that time I explained the treatment options including TLSO brace versus L1 corpectomy and posterior pedicle screw augmentation.  Patient and family opted for conservative management in the TLSO brace.  The family reports that she is doing better and is able to tolerate laying in bed much more easily.  She is able to sit in the chair for about an hour to before the pain becomes significant.  She is unable to walk for any length of time owing to pain.  Family feels that her pain is slightly different area than before.  She has less pain at the thoracolumbar junction and more pain in the lumbar region.  She occasionally has pain radiating to the anterior aspect of her chest wall with associated spasms.  Family reports minimal pain radiating to the legs.  They do feel that she is quite weak in the legs but is not clear whether it is due to focal weakness  or more diffuse deconditioning.  She has been doing physical therapy on a regular basis.  She wears the brace only infrequently when going to office appointments or for testing.  She was recently restarted on her Parkinson's medications which were held initially for possible cognitive issues.  Her son feels that perhaps is improved her situation slightly.        I reviewed her MRI results from 3/17/2021 as well as a more recent study from 6/12/2021. The burst fracture at L1 is relatively unchanged.  There continues to be significant retropulsed bone.  The Alves angle between L2 and T12 is approximately 20 degrees on 3/17/2021.  It is now approximate 27 degrees.      I discussed the situation at length with the family.  What the patient has been improving it sounds that her quality of life is still quite impaired.  Currently it is not clear how much of her limitations are due to deconditioning versus ongoing pain secondary to deformity and/or intermittent nerve root compression.  At this point I think it is somewhat unlikely that she will continue to improve with further conservative therapy.  Patient's  is contemplating surgical intervention if there is a reasonable chance of some improvement.  He would like to talk to his family and his wife at length before any decision.  If a are seriously considering surgery I would need to see her in the office to determine location of her pain, her neurological deficits and her degree of deconditioning all of which will play a role in deciding whether surgery is reasonable.  She will also require scoliosis x-rays, CT scan of the thoracolumbar spine, and a DEXA scan before the office appointment.  Family verbalized understanding.  For now she will continue doing physical therapy.  I have told the family that if it anytime she develops any new symptoms they should contact neurosurgery immediately.  They verbalized understanding.  I have asked him to contact the office  after they decided how to proceed.  We will then provide him with prescription for the appropriate radiographic studies.           Time Spent:  I spent 26 minutes on this date of service performing the following activities: documenting, preparing for visit, obtaining / reviewing records, providing counseling and education, independently reviewing study/studies and communicating results.

## 2021-08-16 RX ORDER — MELOXICAM 7.5 MG/1
TABLET ORAL
Qty: 90 TABLET | Refills: 3 | Status: SHIPPED | OUTPATIENT
Start: 2021-08-16 | End: 2021-10-18

## 2021-08-16 NOTE — TELEPHONE ENCOUNTER
Requested Prescriptions     Pending Prescriptions Disp Refills   • meloxicam (MOBIC) 7.5 mg tablet [Pharmacy Med Name: MELOXICAM  7.5MG  TAB] 90 tablet 3     Sig: TAKE 1 TABLET BY MOUTH ONCE DAILY         [id number] - [place] - [address]  [address]  [place] [address]  Phone: [phone] Fax: [phone]    [facility] - [address], [address] [place] [address], [address] [address]  Phone: [phone] Fax: [phone]

## 2021-08-19 ENCOUNTER — LAB REQUISITION (OUTPATIENT)
Dept: LAB | Facility: HOSPITAL | Age: 86
End: 2021-08-19
Attending: INTERNAL MEDICINE
Payer: MEDICARE

## 2021-08-19 DIAGNOSIS — R35.0 FREQUENCY OF MICTURITION: ICD-10-CM

## 2021-08-19 LAB
BACTERIA URNS QL MICRO: 1 /HPF
BILIRUB UR QL STRIP.AUTO: NEGATIVE MG/DL
CLARITY UR REFRACT.AUTO: ABNORMAL
COLOR UR AUTO: YELLOW
GLUCOSE UR STRIP.AUTO-MCNC: NEGATIVE MG/DL
HGB UR QL STRIP.AUTO: NEGATIVE
HYALINE CASTS #/AREA URNS LPF: ABNORMAL /LPF
KETONES UR STRIP.AUTO-MCNC: NEGATIVE MG/DL
LEUKOCYTE ESTERASE UR QL STRIP.AUTO: 3
NITRITE UR QL STRIP.AUTO: NEGATIVE
PH UR STRIP.AUTO: 7 [PH]
PROT UR QL STRIP.AUTO: NEGATIVE
RBC #/AREA URNS HPF: ABNORMAL /HPF
SP GR UR REFRACT.AUTO: 1.01
SQUAMOUS URNS QL MICRO: 1 /HPF
UROBILINOGEN UR STRIP-ACNC: 0.2 EU/DL
WBC #/AREA URNS HPF: ABNORMAL /HPF

## 2021-08-19 PROCEDURE — 81001 URINALYSIS AUTO W/SCOPE: CPT | Performed by: INTERNAL MEDICINE

## 2021-08-19 PROCEDURE — 87077 CULTURE AEROBIC IDENTIFY: CPT | Performed by: INTERNAL MEDICINE

## 2021-08-22 LAB
BACTERIA UR CULT: ABNORMAL
BACTERIA UR CULT: ABNORMAL

## 2021-09-01 PROCEDURE — 81003 URINALYSIS AUTO W/O SCOPE: CPT | Performed by: INTERNAL MEDICINE

## 2021-09-01 PROCEDURE — 87086 URINE CULTURE/COLONY COUNT: CPT | Performed by: INTERNAL MEDICINE

## 2021-09-02 ENCOUNTER — LAB REQUISITION (OUTPATIENT)
Dept: LAB | Facility: HOSPITAL | Age: 86
End: 2021-09-02
Attending: INTERNAL MEDICINE
Payer: MEDICARE

## 2021-09-02 DIAGNOSIS — N39.0 URINARY TRACT INFECTION, SITE NOT SPECIFIED: ICD-10-CM

## 2021-09-02 LAB
BACTERIA URNS QL MICRO: ABNORMAL /HPF
BILIRUB UR QL STRIP.AUTO: NEGATIVE MG/DL
CLARITY UR REFRACT.AUTO: ABNORMAL
COLOR UR AUTO: YELLOW
GLUCOSE UR STRIP.AUTO-MCNC: NEGATIVE MG/DL
HGB UR QL STRIP.AUTO: NEGATIVE
HYALINE CASTS #/AREA URNS LPF: ABNORMAL /LPF
KETONES UR STRIP.AUTO-MCNC: NEGATIVE MG/DL
LEUKOCYTE ESTERASE UR QL STRIP.AUTO: NEGATIVE
NITRITE UR QL STRIP.AUTO: NEGATIVE
PH UR STRIP.AUTO: 7 [PH]
PROT UR QL STRIP.AUTO: NEGATIVE
RBC #/AREA URNS HPF: ABNORMAL /HPF
SP GR UR REFRACT.AUTO: 1.01
SQUAMOUS URNS QL MICRO: 1 /HPF
UROBILINOGEN UR STRIP-ACNC: 0.2 EU/DL
WBC #/AREA URNS HPF: ABNORMAL /HPF

## 2021-09-03 LAB — BACTERIA UR CULT: NORMAL

## 2021-09-08 ENCOUNTER — LAB REQUISITION (OUTPATIENT)
Dept: LAB | Facility: HOSPITAL | Age: 86
End: 2021-09-08
Attending: INTERNAL MEDICINE
Payer: MEDICARE

## 2021-09-08 DIAGNOSIS — Z79.899 OTHER LONG TERM (CURRENT) DRUG THERAPY: ICD-10-CM

## 2021-09-08 DIAGNOSIS — D64.9 ANEMIA, UNSPECIFIED: ICD-10-CM

## 2021-09-08 DIAGNOSIS — E78.5 HYPERLIPIDEMIA, UNSPECIFIED: ICD-10-CM

## 2021-09-08 DIAGNOSIS — E03.9 HYPOTHYROIDISM, UNSPECIFIED: ICD-10-CM

## 2021-09-08 DIAGNOSIS — M12.9 ARTHROPATHY, UNSPECIFIED: ICD-10-CM

## 2021-09-08 DIAGNOSIS — E83.40 DISORDERS OF MAGNESIUM METABOLISM, UNSPECIFIED: ICD-10-CM

## 2021-09-08 LAB
25(OH)D3 SERPL-MCNC: 14 NG/ML (ref 30–100)
ALBUMIN SERPL-MCNC: 3.2 G/DL (ref 3.4–5)
ALP SERPL-CCNC: 73 IU/L (ref 35–126)
ALT SERPL-CCNC: 15 IU/L (ref 11–54)
ANION GAP SERPL CALC-SCNC: 10 MEQ/L (ref 3–15)
AST SERPL-CCNC: 14 IU/L (ref 15–41)
BILIRUB SERPL-MCNC: 0.5 MG/DL (ref 0.3–1.2)
BUN SERPL-MCNC: 10 MG/DL (ref 8–20)
CALCIUM SERPL-MCNC: 10.2 MG/DL (ref 8.9–10.3)
CHLORIDE SERPL-SCNC: 101 MEQ/L (ref 98–109)
CHOLEST SERPL-MCNC: 251 MG/DL
CO2 SERPL-SCNC: 27 MEQ/L (ref 22–32)
CREAT SERPL-MCNC: 0.8 MG/DL (ref 0.6–1.1)
ERYTHROCYTE [DISTWIDTH] IN BLOOD BY AUTOMATED COUNT: 12.9 % (ref 11.7–14.4)
ERYTHROCYTE [SEDIMENTATION RATE] IN BLOOD BY WESTERGREN METHOD: 18 MM/HR
EST. AVERAGE GLUCOSE BLD GHB EST-MCNC: 105 MG/DL
FERRITIN SERPL-MCNC: 285 NG/ML (ref 11–250)
FOLATE SERPL-MCNC: 6.5 NG/ML
GFR SERPL CREATININE-BSD FRML MDRD: >60 ML/MIN/1.73M*2
GLUCOSE SERPL-MCNC: 82 MG/DL (ref 70–99)
HBA1C MFR BLD HPLC: 5.3 %
HCT VFR BLDCO AUTO: 37.2 % (ref 35–45)
HDLC SERPL-MCNC: 49 MG/DL
HDLC SERPL: 5.1 {RATIO}
HGB BLD-MCNC: 12.4 G/DL (ref 11.8–15.7)
IRON SATN MFR SERPL: 28 % (ref 15–45)
IRON SERPL-MCNC: 63 UG/DL (ref 35–150)
LDLC SERPL CALC-MCNC: 183 MG/DL
MAGNESIUM SERPL-MCNC: 1.9 MG/DL (ref 1.8–2.5)
MCH RBC QN AUTO: 31 PG (ref 28–33.2)
MCHC RBC AUTO-ENTMCNC: 33.3 G/DL (ref 32.2–35.5)
MCV RBC AUTO: 93 FL (ref 83–98)
NONHDLC SERPL-MCNC: 202 MG/DL
PDW BLD AUTO: 10 FL (ref 9.4–12.3)
PLATELET # BLD AUTO: 244 K/UL (ref 150–369)
POTASSIUM SERPL-SCNC: 3.9 MEQ/L (ref 3.6–5.1)
PROT SERPL-MCNC: 5.2 G/DL (ref 6–8.2)
RBC # BLD AUTO: 4 M/UL (ref 3.93–5.22)
SODIUM SERPL-SCNC: 138 MEQ/L (ref 136–144)
T4 FREE SERPL-MCNC: 1.05 NG/DL (ref 0.58–1.64)
TIBC SERPL-MCNC: 223 UG/DL (ref 270–460)
TRIGL SERPL-MCNC: 96 MG/DL (ref 30–149)
TSH SERPL DL<=0.05 MIU/L-ACNC: 2.86 MIU/L (ref 0.34–5.6)
UIBC SERPL-MCNC: 160 UG/DL (ref 180–360)
URATE SERPL-MCNC: 4.5 MG/DL (ref 2.6–8)
VIT B12 SERPL-MCNC: 324 PG/ML (ref 180–914)
WBC # BLD AUTO: 6.3 K/UL (ref 3.8–10.5)

## 2021-09-08 PROCEDURE — 83735 ASSAY OF MAGNESIUM: CPT | Performed by: INTERNAL MEDICINE

## 2021-09-08 PROCEDURE — 82728 ASSAY OF FERRITIN: CPT | Performed by: INTERNAL MEDICINE

## 2021-09-08 PROCEDURE — 82607 VITAMIN B-12: CPT | Performed by: INTERNAL MEDICINE

## 2021-09-08 PROCEDURE — 84550 ASSAY OF BLOOD/URIC ACID: CPT | Performed by: INTERNAL MEDICINE

## 2021-09-08 PROCEDURE — 83036 HEMOGLOBIN GLYCOSYLATED A1C: CPT | Performed by: INTERNAL MEDICINE

## 2021-09-08 PROCEDURE — 85652 RBC SED RATE AUTOMATED: CPT | Performed by: INTERNAL MEDICINE

## 2021-09-08 PROCEDURE — 84443 ASSAY THYROID STIM HORMONE: CPT | Performed by: INTERNAL MEDICINE

## 2021-09-08 PROCEDURE — 83550 IRON BINDING TEST: CPT | Performed by: INTERNAL MEDICINE

## 2021-09-08 PROCEDURE — 85027 COMPLETE CBC AUTOMATED: CPT | Performed by: INTERNAL MEDICINE

## 2021-09-08 PROCEDURE — 82746 ASSAY OF FOLIC ACID SERUM: CPT | Performed by: INTERNAL MEDICINE

## 2021-09-08 PROCEDURE — 84439 ASSAY OF FREE THYROXINE: CPT | Performed by: INTERNAL MEDICINE

## 2021-09-08 PROCEDURE — 80053 COMPREHEN METABOLIC PANEL: CPT | Performed by: INTERNAL MEDICINE

## 2021-09-08 PROCEDURE — 80061 LIPID PANEL: CPT | Performed by: INTERNAL MEDICINE

## 2021-09-08 PROCEDURE — 82306 VITAMIN D 25 HYDROXY: CPT | Performed by: INTERNAL MEDICINE

## 2021-09-21 PROCEDURE — 81001 URINALYSIS AUTO W/SCOPE: CPT | Performed by: INTERNAL MEDICINE

## 2021-09-21 PROCEDURE — 87186 SC STD MICRODIL/AGAR DIL: CPT | Performed by: INTERNAL MEDICINE

## 2021-09-21 PROCEDURE — 87086 URINE CULTURE/COLONY COUNT: CPT | Performed by: INTERNAL MEDICINE

## 2021-09-24 ENCOUNTER — LAB REQUISITION (OUTPATIENT)
Dept: LAB | Facility: HOSPITAL | Age: 86
End: 2021-09-24
Attending: INTERNAL MEDICINE
Payer: MEDICARE

## 2021-09-24 DIAGNOSIS — N39.0 URINARY TRACT INFECTION, SITE NOT SPECIFIED: ICD-10-CM

## 2021-09-24 LAB
BILIRUB UR QL STRIP.AUTO: NEGATIVE MG/DL
CLARITY UR REFRACT.AUTO: ABNORMAL
COLOR UR AUTO: ABNORMAL
GLUCOSE UR STRIP.AUTO-MCNC: NEGATIVE MG/DL
HGB UR QL STRIP.AUTO: NEGATIVE
KETONES UR STRIP.AUTO-MCNC: NEGATIVE MG/DL
LEUKOCYTE ESTERASE UR QL STRIP.AUTO: 3
NITRITE UR QL STRIP.AUTO: NEGATIVE
PH UR STRIP.AUTO: 7.5 [PH]
PROT UR QL STRIP.AUTO: NEGATIVE
SP GR UR REFRACT.AUTO: 1.01
UROBILINOGEN UR STRIP-ACNC: 0.2 EU/DL

## 2021-09-25 LAB
BACTERIA #/AREA URNS HPF: 1 /HPF
CAOX CRY URNS QL MICRO: 1 /HPF
HYALINE CASTS #/AREA URNS LPF: ABNORMAL /LPF
RBC #/AREA URNS HPF: ABNORMAL /HPF
SQUAMOUS #/AREA URNS HPF: 1 /HPF
WBC #/AREA URNS HPF: ABNORMAL /HPF

## 2021-09-27 LAB
BACTERIA UR CULT: ABNORMAL
BACTERIA UR CULT: ABNORMAL

## 2021-09-29 ENCOUNTER — LAB REQUISITION (OUTPATIENT)
Dept: LAB | Facility: HOSPITAL | Age: 86
End: 2021-09-29
Attending: INTERNAL MEDICINE
Payer: MEDICARE

## 2021-09-29 DIAGNOSIS — Z87.440 PERSONAL HISTORY OF URINARY (TRACT) INFECTIONS: ICD-10-CM

## 2021-09-30 ENCOUNTER — LAB REQUISITION (OUTPATIENT)
Dept: LAB | Facility: HOSPITAL | Age: 86
End: 2021-09-30
Attending: INTERNAL MEDICINE
Payer: MEDICARE

## 2021-09-30 DIAGNOSIS — N39.0 URINARY TRACT INFECTION, SITE NOT SPECIFIED: ICD-10-CM

## 2021-09-30 LAB
BACTERIA URNS QL MICRO: ABNORMAL /HPF
BILIRUB UR QL STRIP.AUTO: NEGATIVE MG/DL
CLARITY UR REFRACT.AUTO: CLEAR
COLOR UR AUTO: YELLOW
GLUCOSE UR STRIP.AUTO-MCNC: NEGATIVE MG/DL
HGB UR QL STRIP.AUTO: NEGATIVE
HYALINE CASTS #/AREA URNS LPF: ABNORMAL /LPF
KETONES UR STRIP.AUTO-MCNC: NEGATIVE MG/DL
LEUKOCYTE ESTERASE UR QL STRIP.AUTO: 1
NITRITE UR QL STRIP.AUTO: NEGATIVE
PH UR STRIP.AUTO: 7.5 [PH]
PROT UR QL STRIP.AUTO: NEGATIVE
RBC #/AREA URNS HPF: ABNORMAL /HPF
SP GR UR REFRACT.AUTO: 1.01
SQUAMOUS URNS QL MICRO: 1 /HPF
UROBILINOGEN UR STRIP-ACNC: 0.2 EU/DL
WBC #/AREA URNS HPF: ABNORMAL /HPF

## 2021-09-30 PROCEDURE — 87077 CULTURE AEROBIC IDENTIFY: CPT | Performed by: INTERNAL MEDICINE

## 2021-09-30 PROCEDURE — 87186 SC STD MICRODIL/AGAR DIL: CPT | Performed by: INTERNAL MEDICINE

## 2021-09-30 PROCEDURE — 81003 URINALYSIS AUTO W/O SCOPE: CPT | Performed by: INTERNAL MEDICINE

## 2021-09-30 PROCEDURE — 81001 URINALYSIS AUTO W/SCOPE: CPT | Performed by: INTERNAL MEDICINE

## 2021-09-30 PROCEDURE — 87086 URINE CULTURE/COLONY COUNT: CPT | Performed by: INTERNAL MEDICINE

## 2021-10-05 LAB
BACTERIA UR CULT: ABNORMAL
BACTERIA UR CULT: ABNORMAL

## 2021-10-18 ENCOUNTER — APPOINTMENT (EMERGENCY)
Dept: RADIOLOGY | Facility: HOSPITAL | Age: 86
DRG: 543 | End: 2021-10-18
Attending: EMERGENCY MEDICINE
Payer: MEDICARE

## 2021-10-18 ENCOUNTER — HOSPITAL ENCOUNTER (INPATIENT)
Facility: HOSPITAL | Age: 86
LOS: 6 days | Discharge: NURSING, REHAB OR LONG-TERM CARE | DRG: 543 | End: 2021-10-24
Attending: EMERGENCY MEDICINE | Admitting: STUDENT IN AN ORGANIZED HEALTH CARE EDUCATION/TRAINING PROGRAM
Payer: MEDICARE

## 2021-10-18 DIAGNOSIS — R26.2 AMBULATORY DYSFUNCTION: ICD-10-CM

## 2021-10-18 DIAGNOSIS — M54.50 ACUTE EXACERBATION OF CHRONIC LOW BACK PAIN: Primary | ICD-10-CM

## 2021-10-18 DIAGNOSIS — G89.29 ACUTE EXACERBATION OF CHRONIC LOW BACK PAIN: Primary | ICD-10-CM

## 2021-10-18 LAB
ALBUMIN SERPL-MCNC: 4 G/DL (ref 3.4–5)
ALP SERPL-CCNC: 81 IU/L (ref 35–126)
ALT SERPL-CCNC: 11 IU/L (ref 11–54)
ANION GAP SERPL CALC-SCNC: 9 MEQ/L (ref 3–15)
AST SERPL-CCNC: 23 IU/L (ref 15–41)
BASOPHILS # BLD: 0.09 K/UL (ref 0.01–0.1)
BASOPHILS NFR BLD: 1.4 %
BILIRUB SERPL-MCNC: 0.8 MG/DL (ref 0.3–1.2)
BUN SERPL-MCNC: 19 MG/DL (ref 8–20)
CALCIUM SERPL-MCNC: 10.5 MG/DL (ref 8.9–10.3)
CHLORIDE SERPL-SCNC: 104 MEQ/L (ref 98–109)
CO2 SERPL-SCNC: 26 MEQ/L (ref 22–32)
CREAT SERPL-MCNC: 0.8 MG/DL (ref 0.6–1.1)
DIFFERENTIAL METHOD BLD: NORMAL
EOSINOPHIL # BLD: 0.28 K/UL (ref 0.04–0.36)
EOSINOPHIL NFR BLD: 4.4 %
ERYTHROCYTE [DISTWIDTH] IN BLOOD BY AUTOMATED COUNT: 13.3 % (ref 11.7–14.4)
GFR SERPL CREATININE-BSD FRML MDRD: >60 ML/MIN/1.73M*2
GLUCOSE SERPL-MCNC: 105 MG/DL (ref 70–99)
HCT VFR BLDCO AUTO: 44.1 % (ref 35–45)
HGB BLD-MCNC: 14.6 G/DL (ref 11.8–15.7)
IMM GRANULOCYTES # BLD AUTO: 0.01 K/UL (ref 0–0.08)
IMM GRANULOCYTES NFR BLD AUTO: 0.2 %
LYMPHOCYTES # BLD: 2.02 K/UL (ref 1.2–3.5)
LYMPHOCYTES NFR BLD: 32 %
MCH RBC QN AUTO: 30.4 PG (ref 28–33.2)
MCHC RBC AUTO-ENTMCNC: 33.1 G/DL (ref 32.2–35.5)
MCV RBC AUTO: 91.9 FL (ref 83–98)
MONOCYTES # BLD: 0.4 K/UL (ref 0.28–0.8)
MONOCYTES NFR BLD: 6.3 %
NEUTROPHILS # BLD: 3.52 K/UL (ref 1.7–7)
NEUTS SEG NFR BLD: 55.7 %
NRBC BLD-RTO: 0 %
PDW BLD AUTO: 9.5 FL (ref 9.4–12.3)
PLATELET # BLD AUTO: 262 K/UL (ref 150–369)
POTASSIUM SERPL-SCNC: 4 MEQ/L (ref 3.6–5.1)
PROT SERPL-MCNC: 7.5 G/DL (ref 6–8.2)
RBC # BLD AUTO: 4.8 M/UL (ref 3.93–5.22)
SARS-COV-2 RNA RESP QL NAA+PROBE: NEGATIVE
SODIUM SERPL-SCNC: 139 MEQ/L (ref 136–144)
WBC # BLD AUTO: 6.32 K/UL (ref 3.8–10.5)

## 2021-10-18 PROCEDURE — 72100 X-RAY EXAM L-S SPINE 2/3 VWS: CPT

## 2021-10-18 PROCEDURE — 85025 COMPLETE CBC W/AUTO DIFF WBC: CPT

## 2021-10-18 PROCEDURE — 85025 COMPLETE CBC W/AUTO DIFF WBC: CPT | Performed by: EMERGENCY MEDICINE

## 2021-10-18 PROCEDURE — U0003 INFECTIOUS AGENT DETECTION BY NUCLEIC ACID (DNA OR RNA); SEVERE ACUTE RESPIRATORY SYNDROME CORONAVIRUS 2 (SARS-COV-2) (CORONAVIRUS DISEASE [COVID-19]), AMPLIFIED PROBE TECHNIQUE, MAKING USE OF HIGH THROUGHPUT TECHNOLOGIES AS DESCRIBED BY CMS-2020-01-R: HCPCS | Performed by: PHYSICIAN ASSISTANT

## 2021-10-18 PROCEDURE — 96374 THER/PROPH/DIAG INJ IV PUSH: CPT

## 2021-10-18 PROCEDURE — 36415 COLL VENOUS BLD VENIPUNCTURE: CPT

## 2021-10-18 PROCEDURE — 25000000 HC PHARMACY GENERAL: Performed by: PHYSICIAN ASSISTANT

## 2021-10-18 PROCEDURE — 80053 COMPREHEN METABOLIC PANEL: CPT

## 2021-10-18 PROCEDURE — 80053 COMPREHEN METABOLIC PANEL: CPT | Performed by: EMERGENCY MEDICINE

## 2021-10-18 PROCEDURE — 63600000 HC DRUGS/DETAIL CODE: Performed by: PHYSICIAN ASSISTANT

## 2021-10-18 PROCEDURE — 21400000 HC ROOM AND CARE CCU/INTERMEDIATE

## 2021-10-18 PROCEDURE — 63700000 HC SELF-ADMINISTRABLE DRUG: Performed by: PHYSICIAN ASSISTANT

## 2021-10-18 PROCEDURE — 99285 EMERGENCY DEPT VISIT HI MDM: CPT | Mod: 25

## 2021-10-18 RX ORDER — SERTRALINE HYDROCHLORIDE 50 MG/1
200 TABLET, FILM COATED ORAL DAILY
COMMUNITY
End: 2024-01-01 | Stop reason: ALTCHOICE

## 2021-10-18 RX ORDER — ACETAMINOPHEN 325 MG/1
650 TABLET ORAL ONCE
Status: COMPLETED | OUTPATIENT
Start: 2021-10-18 | End: 2021-10-18

## 2021-10-18 RX ORDER — MIRTAZAPINE 30 MG/1
30 TABLET, FILM COATED ORAL NIGHTLY
COMMUNITY
End: 2024-01-01 | Stop reason: DRUGHIGH

## 2021-10-18 RX ORDER — PREDNISONE 20 MG/1
60 TABLET ORAL ONCE
Status: COMPLETED | OUTPATIENT
Start: 2021-10-18 | End: 2021-10-18

## 2021-10-18 RX ORDER — LABETALOL HCL 20 MG/4 ML
10 SYRINGE (ML) INTRAVENOUS ONCE
Status: COMPLETED | OUTPATIENT
Start: 2021-10-18 | End: 2021-10-18

## 2021-10-18 RX ORDER — HYDRALAZINE HYDROCHLORIDE 20 MG/ML
5 INJECTION INTRAMUSCULAR; INTRAVENOUS EVERY 8 HOURS PRN
Status: DISCONTINUED | OUTPATIENT
Start: 2021-10-18 | End: 2021-10-24 | Stop reason: HOSPADM

## 2021-10-18 RX ORDER — LIDOCAINE 560 MG/1
1 PATCH PERCUTANEOUS; TOPICAL; TRANSDERMAL DAILY
Status: DISCONTINUED | OUTPATIENT
Start: 2021-10-19 | End: 2021-10-24 | Stop reason: HOSPADM

## 2021-10-18 RX ORDER — HYDRALAZINE HYDROCHLORIDE 20 MG/ML
10 INJECTION INTRAMUSCULAR; INTRAVENOUS ONCE
Status: COMPLETED | OUTPATIENT
Start: 2021-10-18 | End: 2021-10-18

## 2021-10-18 RX ORDER — KETOROLAC TROMETHAMINE 15 MG/ML
15 INJECTION, SOLUTION INTRAMUSCULAR; INTRAVENOUS EVERY 6 HOURS PRN
Status: DISCONTINUED | OUTPATIENT
Start: 2021-10-18 | End: 2021-10-21

## 2021-10-18 RX ORDER — IBUPROFEN 600 MG/1
600 TABLET ORAL ONCE
Status: COMPLETED | OUTPATIENT
Start: 2021-10-18 | End: 2021-10-18

## 2021-10-18 RX ADMIN — LABETALOL HYDROCHLORIDE 10 MG: 5 INJECTION, SOLUTION INTRAVENOUS at 20:40

## 2021-10-18 RX ADMIN — ACETAMINOPHEN 650 MG: 325 TABLET, FILM COATED ORAL at 22:46

## 2021-10-18 RX ADMIN — PREDNISONE 60 MG: 20 TABLET ORAL at 20:05

## 2021-10-18 RX ADMIN — IBUPROFEN 600 MG: 600 TABLET ORAL at 20:05

## 2021-10-18 RX ADMIN — HYDRALAZINE HYDROCHLORIDE 10 MG: 20 INJECTION INTRAMUSCULAR; INTRAVENOUS at 22:48

## 2021-10-19 LAB
ALBUMIN SERPL-MCNC: 3.8 G/DL (ref 3.4–5)
ALP SERPL-CCNC: 76 IU/L (ref 35–126)
ALT SERPL-CCNC: 15 IU/L (ref 11–54)
ANION GAP SERPL CALC-SCNC: 11 MEQ/L (ref 3–15)
AST SERPL-CCNC: 19 IU/L (ref 15–41)
BACTERIA URNS QL MICRO: ABNORMAL /HPF
BASOPHILS # BLD: 0.05 K/UL (ref 0.01–0.1)
BASOPHILS NFR BLD: 0.9 %
BILIRUB SERPL-MCNC: 0.7 MG/DL (ref 0.3–1.2)
BILIRUB UR QL STRIP.AUTO: NEGATIVE MG/DL
BUN SERPL-MCNC: 18 MG/DL (ref 8–20)
CALCIUM SERPL-MCNC: 10.5 MG/DL (ref 8.9–10.3)
CHLORIDE SERPL-SCNC: 105 MEQ/L (ref 98–109)
CLARITY UR REFRACT.AUTO: ABNORMAL
CO2 SERPL-SCNC: 23 MEQ/L (ref 22–32)
COLOR UR AUTO: YELLOW
CREAT SERPL-MCNC: 0.6 MG/DL (ref 0.6–1.1)
DIFFERENTIAL METHOD BLD: ABNORMAL
EOSINOPHIL # BLD: 0.01 K/UL (ref 0.04–0.36)
EOSINOPHIL NFR BLD: 0.2 %
ERYTHROCYTE [DISTWIDTH] IN BLOOD BY AUTOMATED COUNT: 13.2 % (ref 11.7–14.4)
GFR SERPL CREATININE-BSD FRML MDRD: >60 ML/MIN/1.73M*2
GLUCOSE SERPL-MCNC: 156 MG/DL (ref 70–99)
GLUCOSE UR STRIP.AUTO-MCNC: NEGATIVE MG/DL
HCT VFR BLDCO AUTO: 41.8 % (ref 35–45)
HGB BLD-MCNC: 13.5 G/DL (ref 11.8–15.7)
HGB UR QL STRIP.AUTO: NEGATIVE
HYALINE CASTS #/AREA URNS LPF: ABNORMAL /LPF
IMM GRANULOCYTES # BLD AUTO: 0.01 K/UL (ref 0–0.08)
IMM GRANULOCYTES NFR BLD AUTO: 0.2 %
KETONES UR STRIP.AUTO-MCNC: NEGATIVE MG/DL
LEUKOCYTE ESTERASE UR QL STRIP.AUTO: ABNORMAL
LYMPHOCYTES # BLD: 0.82 K/UL (ref 1.2–3.5)
LYMPHOCYTES NFR BLD: 14.5 %
MAGNESIUM SERPL-MCNC: 2.2 MG/DL (ref 1.8–2.5)
MCH RBC QN AUTO: 29.8 PG (ref 28–33.2)
MCHC RBC AUTO-ENTMCNC: 32.3 G/DL (ref 32.2–35.5)
MCV RBC AUTO: 92.3 FL (ref 83–98)
MONOCYTES # BLD: 0.07 K/UL (ref 0.28–0.8)
MONOCYTES NFR BLD: 1.2 %
NEUTROPHILS # BLD: 4.7 K/UL (ref 1.7–7)
NEUTS SEG NFR BLD: 83 %
NITRITE UR QL STRIP.AUTO: NEGATIVE
NRBC BLD-RTO: 0 %
PDW BLD AUTO: 9.8 FL (ref 9.4–12.3)
PH UR STRIP.AUTO: 8 [PH]
PHOSPHATE SERPL-MCNC: 3.4 MG/DL (ref 2.4–4.7)
PLATELET # BLD AUTO: 259 K/UL (ref 150–369)
POTASSIUM SERPL-SCNC: 4 MEQ/L (ref 3.6–5.1)
PROT SERPL-MCNC: 6.8 G/DL (ref 6–8.2)
PROT UR QL STRIP.AUTO: NEGATIVE
RBC # BLD AUTO: 4.53 M/UL (ref 3.93–5.22)
RBC #/AREA URNS HPF: ABNORMAL /HPF
SODIUM SERPL-SCNC: 139 MEQ/L (ref 136–144)
SP GR UR REFRACT.AUTO: 1.01
SQUAMOUS URNS QL MICRO: 1 /HPF
UROBILINOGEN UR STRIP-ACNC: 0.2 EU/DL
WBC # BLD AUTO: 5.66 K/UL (ref 3.8–10.5)
WBC #/AREA URNS HPF: ABNORMAL /HPF

## 2021-10-19 PROCEDURE — 63700000 HC SELF-ADMINISTRABLE DRUG: Performed by: STUDENT IN AN ORGANIZED HEALTH CARE EDUCATION/TRAINING PROGRAM

## 2021-10-19 PROCEDURE — 21400000 HC ROOM AND CARE CCU/INTERMEDIATE

## 2021-10-19 PROCEDURE — 80053 COMPREHEN METABOLIC PANEL: CPT | Performed by: STUDENT IN AN ORGANIZED HEALTH CARE EDUCATION/TRAINING PROGRAM

## 2021-10-19 PROCEDURE — 200200 PR NO CHARGE: Performed by: STUDENT IN AN ORGANIZED HEALTH CARE EDUCATION/TRAINING PROGRAM

## 2021-10-19 PROCEDURE — 84100 ASSAY OF PHOSPHORUS: CPT | Performed by: STUDENT IN AN ORGANIZED HEALTH CARE EDUCATION/TRAINING PROGRAM

## 2021-10-19 PROCEDURE — 85025 COMPLETE CBC W/AUTO DIFF WBC: CPT | Performed by: STUDENT IN AN ORGANIZED HEALTH CARE EDUCATION/TRAINING PROGRAM

## 2021-10-19 PROCEDURE — 36415 COLL VENOUS BLD VENIPUNCTURE: CPT | Performed by: STUDENT IN AN ORGANIZED HEALTH CARE EDUCATION/TRAINING PROGRAM

## 2021-10-19 PROCEDURE — 87086 URINE CULTURE/COLONY COUNT: CPT | Performed by: STUDENT IN AN ORGANIZED HEALTH CARE EDUCATION/TRAINING PROGRAM

## 2021-10-19 PROCEDURE — 83735 ASSAY OF MAGNESIUM: CPT | Performed by: STUDENT IN AN ORGANIZED HEALTH CARE EDUCATION/TRAINING PROGRAM

## 2021-10-19 PROCEDURE — 99223 1ST HOSP IP/OBS HIGH 75: CPT | Performed by: STUDENT IN AN ORGANIZED HEALTH CARE EDUCATION/TRAINING PROGRAM

## 2021-10-19 PROCEDURE — 81003 URINALYSIS AUTO W/O SCOPE: CPT | Performed by: STUDENT IN AN ORGANIZED HEALTH CARE EDUCATION/TRAINING PROGRAM

## 2021-10-19 PROCEDURE — 99222 1ST HOSP IP/OBS MODERATE 55: CPT | Performed by: NEUROLOGICAL SURGERY

## 2021-10-19 PROCEDURE — 63600000 HC DRUGS/DETAIL CODE: Performed by: STUDENT IN AN ORGANIZED HEALTH CARE EDUCATION/TRAINING PROGRAM

## 2021-10-19 RX ORDER — HEPARIN SODIUM 5000 [USP'U]/ML
5000 INJECTION, SOLUTION INTRAVENOUS; SUBCUTANEOUS EVERY 8 HOURS
Status: DISCONTINUED | OUTPATIENT
Start: 2021-10-19 | End: 2021-10-24 | Stop reason: HOSPADM

## 2021-10-19 RX ORDER — LANOLIN ALCOHOL/MO/W.PET/CERES
400 CREAM (GRAM) TOPICAL EVERY MORNING
Status: DISCONTINUED | OUTPATIENT
Start: 2021-10-20 | End: 2021-10-24 | Stop reason: HOSPADM

## 2021-10-19 RX ORDER — ACETAMINOPHEN 325 MG/1
650 TABLET ORAL EVERY 4 HOURS PRN
Status: DISCONTINUED | OUTPATIENT
Start: 2021-10-19 | End: 2021-10-23

## 2021-10-19 RX ORDER — CARBIDOPA AND LEVODOPA 25; 100 MG/1; MG/1
0.5 TABLET ORAL 3 TIMES DAILY
Status: DISCONTINUED | OUTPATIENT
Start: 2021-10-19 | End: 2021-10-19

## 2021-10-19 RX ORDER — POLYETHYLENE GLYCOL 3350 17 G/17G
17 POWDER, FOR SOLUTION ORAL DAILY
COMMUNITY
End: 2022-06-07 | Stop reason: ENTERED-IN-ERROR

## 2021-10-19 RX ORDER — LATANOPROST 50 UG/ML
1 SOLUTION/ DROPS OPHTHALMIC NIGHTLY
Status: DISCONTINUED | OUTPATIENT
Start: 2021-10-19 | End: 2021-10-24 | Stop reason: HOSPADM

## 2021-10-19 RX ORDER — DEXTROSE 40 %
15-30 GEL (GRAM) ORAL AS NEEDED
Status: DISCONTINUED | OUTPATIENT
Start: 2021-10-19 | End: 2021-10-24 | Stop reason: HOSPADM

## 2021-10-19 RX ORDER — MIRTAZAPINE 30 MG/1
30 TABLET, FILM COATED ORAL NIGHTLY
Status: DISCONTINUED | OUTPATIENT
Start: 2021-10-19 | End: 2021-10-24 | Stop reason: HOSPADM

## 2021-10-19 RX ORDER — POLYETHYLENE GLYCOL 3350 17 G/17G
17 POWDER, FOR SOLUTION ORAL EVERY EVENING
Status: DISCONTINUED | OUTPATIENT
Start: 2021-10-19 | End: 2021-10-24 | Stop reason: HOSPADM

## 2021-10-19 RX ORDER — SERTRALINE HYDROCHLORIDE 50 MG/1
50 TABLET, FILM COATED ORAL DAILY
Status: DISCONTINUED | OUTPATIENT
Start: 2021-10-19 | End: 2021-10-19

## 2021-10-19 RX ORDER — METOPROLOL TARTRATE 25 MG/1
12.5 TABLET, FILM COATED ORAL 2 TIMES DAILY
Status: DISCONTINUED | OUTPATIENT
Start: 2021-10-19 | End: 2021-10-19

## 2021-10-19 RX ORDER — SERTRALINE HYDROCHLORIDE 50 MG/1
50 TABLET, FILM COATED ORAL 2 TIMES DAILY
Status: DISCONTINUED | OUTPATIENT
Start: 2021-10-19 | End: 2021-10-24 | Stop reason: HOSPADM

## 2021-10-19 RX ORDER — DEXTROSE 50 % IN WATER (D50W) INTRAVENOUS SYRINGE
25 AS NEEDED
Status: DISCONTINUED | OUTPATIENT
Start: 2021-10-19 | End: 2021-10-24 | Stop reason: HOSPADM

## 2021-10-19 RX ORDER — IBUPROFEN 200 MG
16-32 TABLET ORAL AS NEEDED
Status: DISCONTINUED | OUTPATIENT
Start: 2021-10-19 | End: 2021-10-24 | Stop reason: HOSPADM

## 2021-10-19 RX ORDER — METOPROLOL TARTRATE 25 MG/1
25 TABLET, FILM COATED ORAL 2 TIMES DAILY
Status: DISCONTINUED | OUTPATIENT
Start: 2021-10-20 | End: 2021-10-24 | Stop reason: HOSPADM

## 2021-10-19 RX ORDER — CEFUROXIME AXETIL 250 MG/1
500 TABLET ORAL EVERY 12 HOURS
Status: COMPLETED | OUTPATIENT
Start: 2021-10-19 | End: 2021-10-23

## 2021-10-19 RX ORDER — TALC
3 POWDER (GRAM) TOPICAL NIGHTLY
COMMUNITY
End: 2022-06-07 | Stop reason: ENTERED-IN-ERROR

## 2021-10-19 RX ORDER — ACETAMINOPHEN 500 MG
1000 TABLET ORAL 2 TIMES DAILY
COMMUNITY
End: 2021-10-24 | Stop reason: HOSPADM

## 2021-10-19 RX ORDER — CARBIDOPA AND LEVODOPA 25; 100 MG/1; MG/1
1 TABLET ORAL 4 TIMES DAILY
Status: DISCONTINUED | OUTPATIENT
Start: 2021-10-19 | End: 2021-10-24 | Stop reason: HOSPADM

## 2021-10-19 RX ORDER — LANOLIN ALCOHOL/MO/W.PET/CERES
400 CREAM (GRAM) TOPICAL EVERY MORNING
COMMUNITY
End: 2022-06-06

## 2021-10-19 RX ORDER — CARBIDOPA AND LEVODOPA 25; 100 MG/1; MG/1
1 TABLET ORAL 3 TIMES DAILY
COMMUNITY

## 2021-10-19 RX ORDER — IBUPROFEN/PSEUDOEPHEDRINE HCL 200MG-30MG
3 TABLET ORAL NIGHTLY
Status: DISCONTINUED | OUTPATIENT
Start: 2021-10-19 | End: 2021-10-24 | Stop reason: HOSPADM

## 2021-10-19 RX ADMIN — HEPARIN SODIUM 5000 UNITS: 5000 INJECTION INTRAVENOUS; SUBCUTANEOUS at 15:55

## 2021-10-19 RX ADMIN — METOPROLOL TARTRATE 12.5 MG: 25 TABLET, FILM COATED ORAL at 20:00

## 2021-10-19 RX ADMIN — HEPARIN SODIUM 5000 UNITS: 5000 INJECTION INTRAVENOUS; SUBCUTANEOUS at 05:38

## 2021-10-19 RX ADMIN — CEFUROXIME AXETIL 500 MG: 250 TABLET ORAL at 09:57

## 2021-10-19 RX ADMIN — CEFUROXIME AXETIL 500 MG: 250 TABLET ORAL at 20:00

## 2021-10-19 RX ADMIN — LIDOCAINE 1 PATCH: 4 PATCH TOPICAL at 09:58

## 2021-10-19 RX ADMIN — CARBIDOPA AND LEVODOPA 1 TABLET: 25; 100 TABLET ORAL at 15:55

## 2021-10-19 RX ADMIN — HEPARIN SODIUM 5000 UNITS: 5000 INJECTION INTRAVENOUS; SUBCUTANEOUS at 21:11

## 2021-10-19 RX ADMIN — LATANOPROST 1 DROP: 50 SOLUTION OPHTHALMIC at 21:11

## 2021-10-19 RX ADMIN — Medication 3 MG: at 21:11

## 2021-10-19 RX ADMIN — CARBIDOPA AND LEVODOPA 1 TABLET: 25; 100 TABLET ORAL at 20:00

## 2021-10-19 RX ADMIN — SERTRALINE HYDROCHLORIDE 50 MG: 50 TABLET ORAL at 23:15

## 2021-10-19 RX ADMIN — MIRTAZAPINE 30 MG: 30 TABLET, FILM COATED ORAL at 21:11

## 2021-10-19 RX ADMIN — METOPROLOL TARTRATE 12.5 MG: 25 TABLET, FILM COATED ORAL at 09:57

## 2021-10-19 ASSESSMENT — PATIENT HEALTH QUESTIONNAIRE - PHQ9: SUM OF ALL RESPONSES TO PHQ9 QUESTIONS 1 & 2: 0

## 2021-10-19 NOTE — PLAN OF CARE
Problem: Adult Inpatient Plan of Care  Goal: Readiness for Transition of Care  Intervention: Mutually Develop Transition Plan  Flowsheets (Taken 10/19/2021 1318)  Anticipated Discharge Disposition: assisted living facility  Equipment Needed After Discharge: none  Assistive Device/Animal Currently Used at Home:  • walker, front-wheeled  • wheelchair  Current Discharge Risk:  • chronically ill  • cognitively impaired  • physical impairment  • dependent with mobility/activities of daily living  Readmission Within the Last 30 Days: no previous admission in last 30 days  Patient/Family Anticipated Services at Transition:   Patient/Family Anticipates Transition to: ([facility] since August 2021) --  Concerns to be Addressed:  • cognitive/perceptual  • adjustment to diagnosis/illness  • discharge planning  Offered/Gave Vendor List: no  Current Outpatient/Agency/Support Group: assisted living facility   CM performed a chart review prior to meeting the patient who provided some answers but CM noted a lack of memory recall.  CM called patient's , John Fish. [phone] / cell [phone], who updated the CM regarding the patient's current situation.  Patient is being evaluated for ambulatory dysfunction and elevated blood pressure.  Patient resides at [facility] since August 2021.  She has additional aides through [employer] from 2823-1383 then 1700 to 0700.   states that the patient's short term memory is diminished but her long term memory remains intact.   states that the patient is continuing to wear the TLSO brace which was recommended by Dr. Zamora in November 2020 for her L1 burst fracture with retropulsed bone after falling.  The patient has a history of vulvodynia, osteopenia and Parkinsons disease.  Patient follows with Dr. Braswell for chronic pain management, Dr. Hurt is the PCP, previous PCP is Dr. Corona and Dr. Payan is the neurologist.  PT/OT and consultations  are pending.  PLAN:  TO BE DETERMINED.  Reagan Saavedra RN

## 2021-10-19 NOTE — ED ATTESTATION NOTE
ED ATTENDING ATTESTATION NOTE  10/18/2021, 9:38 PM    I supervised care provided by the PA (Quinn).  We have discussed the case. I have reviewed their note and agree with the plan of treatment. I personally interviewed the patient and examined the patient.I have personally seen and examined the patient.  I reviewed and agree with their assessment and plan of care.    My examination, assessment, and plan of care of Mariya Porras is as follows:  The patient presents with acute on chronic low back pain over the past several days. The pt. Has a h/o chronic back pain d/t a L1 burst fracture after a fall on 11/18/20.  She was felt to be a nonoperative candidate at that time.  There is been no known recent trauma.  The pain is worse with movement and ambulation.  She reports paresthesias in her bilateral feet.  No weakness, numbness, fever, chest pain, shortness of breath, flank pain, dysuria/hematuria.  She has been having difficulty walking due to the pain.    Vital Signs Review: Vital signs have been reviewed. The initial oxygen saturation is SpO2: 95 % on room air. Interpretation: normal  ED Vitals    Date/Time Temp Pulse Resp BP SpO2 [facility]   10/18/21 2046 -- 60 18 190/85 97 % MT   10/18/21 2015 -- -- 16 218/104 98 % MT   10/18/21 1745 -- -- 16 192/91 95 % LB   10/18/21 1720 36.7 °C (98 °F) 82 16 232/100 95 % MT        PHYSICAL EXAM  Constitutional:    Comments: Appears in no acute distress  HENT:   Head: Normocephalic and atraumatic.   Cardiovascular:   Rate and Rhythm: Normal rate and regular rhythm.   Heart sounds: Normal heart sounds.   Pulmonary:   Effort: Pulmonary effort is normal. No respiratory distress.   Breath sounds: Normal breath sounds.   Abdominal:   Palpation: Abdomen is soft.  Tenderness: There is no abdominal tenderness, guarding or rebound.   Back: No C-spine, T-spine, L-spine midline vertebral tenderness.  Neurological:   Mental Status: Alert and oriented to person, place. Mental status is  at baseline. Grossly nonfocal.    Impression/Plan: Acute on chronic low back pain with difficulty ambulating.  CBC, CMP, LS spine x-ray.  Back pain protocol.  Blood pressure control.  Reevaluate      Records Reviewed: Old medical records.    *This document was created using dragon dictation software.  There might be some typographical errors due to this technology.     Malissa Reis MD  10/25/21 7344

## 2021-10-19 NOTE — PROGRESS NOTES
Patient: Mariya Porras  Location: [place] [address]  MRN: [id number]  Today's date: 10/19/2021    Attempted to see patient for therapy. Unable due to medical hold.   Currently pending neuro/ortho consult. Will hold PT eval, will continue to follow

## 2021-10-19 NOTE — PATIENT CARE CONFERENCE
Care Progression Rounds Note  Date: 10/19/2021  Time: 11:29 AM     Patient Name: Mariya Porras     Medical Record Number: [id number]   YOB: 1935  Sex: Female      Room/Bed: 0417    Admitting Diagnosis: Acute exacerbation of chronic low back pain [M54.50, G89.29]  Ambulatory dysfunction [R26.2]   Admit Date/Time: 10/18/2021  5:16 PM    Primary Diagnosis: Acute exacerbation of chronic low back pain  Principal Problem: Acute exacerbation of chronic low back pain    GMLOS: pending  Anticipated Discharge Date: 10/21/2021    AM-PAC:  Mobility Score:      Discharge Planning:       Barriers to Discharge:  Barriers to Discharge: Medical issues not resolved, Consultant recommendations pending  Comment: PT OT neuro consult    Participants:  nursing, physician, physical therapy, , occupational therapy

## 2021-10-19 NOTE — ASSESSMENT & PLAN NOTE
Patient complains of chronic low back pain  She is scheduled for an epidural in November States the pain has been worsening and she feels unstable on her feet  X-ray does not show acute abnormality  PT/OT  Pain control    MRI LS spine -.severe compression fracture of L1 with retropulsion of the fracture fragment which is stable but severe right subarticular and neural foraminal narrowing, progression of the neural foraminal narrowing compared to the prior examination.   Neurosurgery reviewed the MRI scan and advised scoliosis Xray -showed stable alignment  She is stable for discharge  Family and patient requesting epidural injection before discharge- unavailable in inpatient setting, discussed this with patient and family  Spoke with pain management today, Dr. Bal's office will contact family Monday to arrange close outpatient f/u, recommending adding gabapentin 100mg TID and RTC Tylenol, will start  DC planning to SNF

## 2021-10-19 NOTE — H&P
Hospital Medicine Service -  History & Physical        CHIEF COMPLAINT     Chief Complaint   Patient presents with   • Back Pain        HISTORY OF PRESENT ILLNESS      86 y.o. female with a past medical history of anxiety, hypertension, low back pain and Parkinson's disease presents after having intractable back pain and ambulatory dysfunction.  She states that her pain has been worsening and she feels unstable on her feet.  She also complains of spasms in her legs that prohibit her from walking and that this has been going on for months. States that it is worse when she first starts moving.  She believes this is related to her Parkinson's.  She is not aware that she sees any specialists, believes that her PCP has been managing.  She has some confusion about which doctors are providing her care.  In the ED blood pressure was 200/100.  She was admitted for further evaluation and treatment of her back pain, ambulatory dysfunction and elevated blood pressure.    PAST MEDICAL AND SURGICAL HISTORY      Past Medical History:   Diagnosis Date   • Anxiety    • Arthritis    • Basal cell carcinoma     forehead   • Glaucoma    • Hypertension    • Low back pain    • Lumbosacral disc disease    • Parkinson's disease (CMS/[facility])    • Peripheral neuropathy        Past Surgical History:   Procedure Laterality Date   • BLADDER SUSPENSION  2009    Prolift M   • CATARACT EXTRACTION, BILATERAL     • COLONOSCOPY     • HYSTERECTOMY     • JOINT REPLACEMENT     • KNEE ARTHROPLASTY  2015    left       MEDICATIONS      Prior to Admission medications    Medication Sig Start Date End Date Taking? Authorizing Provider   carbidopa-levodopa (SINEMET)  mg per tablet 1/2 1 TAB BY MOUTH THREE TIMES A DAY BEFORE MEALS 11/18/20  Yes Alexandra Birmingham MD   mirtazapine 30 mg tablet Take 30 mg by mouth nightly.   Yes Alexandra Birmingham MD   sertraline 50 mg tablet Take 50 mg by mouth daily.   Yes Alexandra Birmingham MD   bimatoprost  (LUMIGAN) 0.01 % ophthalmic drops Administer 1 drop into both eyes nightly.    Provider, Alexandra, MD       ALLERGIES      Codeine, Diazepam, Sulfa (sulfonamide antibiotics), and Sulfur    FAMILY HISTORY      Family History   Problem Relation Age of Onset   • No Known Problems Biological Mother    • No Known Problems Biological Father        SOCIAL HISTORY      Social History     Socioeconomic History   • Marital status:      Spouse name: None   • Number of children: None   • Years of education: None   • Highest education level: None   Occupational History   • None   Tobacco Use   • Smoking status: Former Smoker   • Smokeless tobacco: Never Used   • Tobacco comment: as a young teenager   Substance and Sexual Activity   • Alcohol use: Yes     Comment: wine   • Drug use: No   • Sexual activity: Not Currently     Partners: Male   Other Topics Concern   • None   Social History Narrative   • None     Social Determinants of Health     Financial Resource Strain:    • Difficulty of Paying Living Expenses: Not on file   Food Insecurity: No Food Insecurity   • Worried About Running Out of Food in the Last Year: Never true   • Ran Out of Food in the Last Year: Never true   Transportation Needs:    • Lack of Transportation (Medical): Not on file   • Lack of Transportation (Non-Medical): Not on file   Physical Activity:    • Days of Exercise per Week: Not on file   • Minutes of Exercise per Session: Not on file   Stress:    • Feeling of Stress : Not on file   Social Connections:    • Frequency of Communication with Friends and Family: Not on file   • Frequency of Social Gatherings with Friends and Family: Not on file   • Attends Islam Services: Not on file   • Active Member of Clubs or Organizations: Not on file   • Attends Club or Organization Meetings: Not on file   • Marital Status: Not on file   Intimate Partner Violence:    • Fear of Current or Ex-Partner: Not on file   • Emotionally Abused: Not on file   •  Physically Abused: Not on file   • Sexually Abused: Not on file   Housing Stability:    • Unable to Pay for Housing in the Last Year: Not on file   • Number of Places Lived in the Last Year: Not on file   • Unstable Housing in the Last Year: Not on file       REVIEW OF SYSTEMS        Review of Systems  Constitutional: Negative for fatigue and unexpected weight change.   Eyes: Negative for visual disturbance. Mouth no sore throat  Respiratory: Negative for cough and shortness of breath.    Cardiovascular: Negative for chest pain and palpitations.   Gastrointestinal: Negative for abdominal pain, constipation, diarrhea, nausea and vomiting.   Genitourinary: Negative for difficulty urinating.  Positive for frequency and vaginal pain.  musculoskeletal: Positive for spasms and weakness  Skin: Negative for rash.   Neurological: Negative for dizziness and headaches.   Hematological: Does not bruise/bleed easily.   Psychiatric/Behavioral: Negative for confusion and dysphoric mood no suicidal ideations          PHYSICAL EXAMINATION      Temp:  [36.5 °C (97.7 °F)-36.7 °C (98 °F)] 36.5 °C (97.7 °F)  Heart Rate:  [60-82] 75  Resp:  [16-18] 18  BP: (190-232)/() 207/88  Body mass index is 23.4 kg/m².    General exam : appears age stated, well nourished, not in distress  Head: atraumatic, normocephalic  Eyes : PERRLA, EOMI, no pallor, no icterus  ENT: no lesions, oropharynx pink, mucous membranes moist   Neck: supple, no Lymph nodes, no Thyromegaly, no JVD   CVS : normal rate, normal rhythm, S1 and S2 heard, no murmurs, rubs or gallops  Resp:normal accessory muscle usage, clear to auscultation Bilaterally  Abdomen : soft, Nt, BS +, no organomegaly   Extremities : no edema, no cyanosis   MSK: no DJD, no joint swellings, no joint tenderness   Skin: intact, warm, no rash  Neuro: AAO x3.  Mild memory lapse  Psych: normal mood.cooperative      LABS / IMAGING / EKG        Labs  CBC Results       10/18/21 09/08/21 05/17/18     9672  0645 0951    WBC 6.32 6.30 6.22    RBC 4.80 4.00 5.03    HGB 14.6 12.4 15.2    HCT 44.1 37.2 45.7    MCV 91.9 93.0 90.9    MCH 30.4 31.0 30.2    MCHC 33.1 33.3 33.3     244 236        CMP Results       10/18/21 09/08/21 05/17/18     1726 0645 0951     138 135    K 4.0 3.9 4.1    Cl 104 101 102    CO2 26 27 24    Glucose 105 82 142    BUN 19 10 15    Creatinine 0.8 0.8 0.9    Calcium 10.5 10.2 10.5    Anion Gap 9 10 9    AST 23 14 --    ALT 11 15 --    Albumin 4.0 3.2 --    EGFR >60.0 >60.0 >60.0         Comment for K at 1726 on 10/18/21: Results obtained on plasma. Plasma Potassium values may be up to 0.4 mEQ/L less than serum values. The differences may be greater for patients with high platelet or white cell counts.  SLIGHT HEMOLYSIS, RESULT MAY BE INCREASED.    Comment for AST at 1726 on 10/18/21: SLIGHT HEMOLYSIS, RESULT MAY BE INCREASED.    Comment for ALT at 1726 on 10/18/21: SLIGHT HEMOLYSIS, RESULT MAY BE INCREASED.          Troponin I Results    No lab values to display.       Microbiology Results     Procedure Component Value Units Date/Time    SARS-CoV-2 (COVID-19), PCR Nasopharynx [[id number]]  (Normal) Collected: 10/18/21 2152    Specimen: Nasopharyngeal Swab from Nasopharynx Updated: 10/18/21 2235    Narrative:      The following orders were created for panel order SARS-CoV-2 (COVID-19), PCR Nasopharynx.  Procedure                               Abnormality         Status                     ---------                               -----------         ------                     SARS-CoV-2 (COVID-19), P...[[id number]]  Normal              Final result                 Please view results for these tests on the individual orders.    SARS-CoV-2 (COVID-19), PCR Nasopharynx [[id number]]  (Normal) Collected: 10/18/21 2152    Specimen: Nasopharyngeal Swab from Nasopharynx Updated: 10/18/21 2235     SARS-CoV-2 (COVID-19) Negative    Urine culture [[id number]]  (Abnormal)  (Susceptibility) Collected:  09/30/21 1040    Specimen: Urine, Clean Catch Updated: 10/05/21 1537     Urine Culture **Positive Culture**      >1 x 10^5 CFU/mL Enterococcus faecalis    Urine culture [[id number]]  (Abnormal)  (Susceptibility) Collected: 09/21/21 2035    Specimen: Urine, Clean Catch Updated: 09/27/21 0736     Urine Culture **Positive Culture**      >1 x 10^5 CFU/mL Enterococcus faecalis        UA Results       09/30/21     1040    Color Yellow    Clarity Clear    Glucose Negative    Bilirubin Negative    Ketones Negative    Sp Grav 1.009    Blood Negative    Ph 7.5    Protein Negative    Urobilinogen 0.2    Nitrite Negative    Leuk Est +1    WBC 0 TO 3    RBC 0 TO 4    Bacteria Rare         Comment for Blood at 1040 on 09/30/21: The sensitivity of the occult blood test is equivalent to approximately 4 intact RBC/HPF.          Imaging  X-RAY LUMBAR SPINE 2 OR 3 VIEWS   Final Result   IMPRESSION: Stable chronic severe compression deformity at L1 and multilevel   degenerative disc disease. No significant change is noted compared to the   6/12/2021 lumbar spine MRI.      COMPARISON: Lumbar MRI June 12, 2021.      COMMENT: 3 views of the lumbar spine show a  severe L1 compression deformity   with retropulsion, grossly stable.   No acute interval fracture. Moderate to advanced multilevel degenerative disc   changes. Stable with relative preservation of the L4-5 disc space, also stable.   Dextrocurvature noted in the lumbar spine on AP imaging, with apex at L3-4.   Intact bony pelvis and sacrum as demonstrated.   Advanced atheromatous changes in the abdominal aorta.               ECG/Telemetry  reviewed by me    ASSESSMENT AND PLAN           Hypertension  Assessment & Plan  Patient states that she has history of hypertension, but her doctor at [facility] did not feel that she needed medication anymore as her blood pressure had improved  May be related to pain or anxiety  Patient with hypertensive urgency here.  /100  Given  hydralazine and labetalol  Hydralazine as needed  Pain control    Parkinson's disease (CMS/[facility])  Assessment & Plan  Patient with history of Parkinson's disease  Continue Sinemet  Complains of spasms and weakness and inability to walk which she attributes to her Parkinson's.  States that it is worsening.    Consult neurology    Vulvodynia  Assessment & Plan  Patient with history of vulvodynia  Patient complains of vaginal pain  She states that it feels like she has a UTI  We will check UA    * Acute exacerbation of chronic low back pain  Assessment & Plan  Patient complains of chronic low back pain  She is scheduled for an epidural in November  States the pain has been worsening and she feels unstable on her feet  X-ray does not show acute abnormality  PT/OT  Pain control      VTE Assessment: Padua    VTE Prophylaxis Plan: Heparin subcu  Code Status: Full Code       Sonja Chiu, DO  10/19/2021

## 2021-10-19 NOTE — NURSING NOTE
Patient brought up to floor with transport via stretcher. Patient is unable to ambulate d/t weakness and inability to move her lower extremities. Patient was safely transferred into bed with a transfer board. Patient is oriented to room and cardiac monitor verified. Wheels locked, bed alarm in place and call bell within reach.

## 2021-10-19 NOTE — CONSULTS
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  Relays that PD symptoms have been relatively under control.  History of chronic lower back pain due to degenerative disease and compression fractures.  Admitted with several weeks of worsening back pain.  Denies any symptoms concerning for cauda equina syndrome.      ROS: A 10-point review of systems was performed and was negative unless noted above.    • carbidopa-levodopa  0.5 tablet oral TID   • cefUROXime  500 mg oral q12h ERMA   • heparin (porcine)  5,000 Units subcutaneous q8h ERMA   • latanoprost  1 drop Both Eyes Nightly   • lidocaine  1 patch Topical Daily   • metoprolol tartrate  12.5 mg oral BID   • mirtazapine  30 mg oral Nightly   • sertraline  50 mg oral Daily     Past Medical History:   Diagnosis Date   • Anxiety    • Arthritis    • Basal cell carcinoma     forehead   • Glaucoma    • Hypertension    • Low back pain    • Lumbosacral disc disease    • Parkinson's disease (CMS/HCC)    • Peripheral neuropathy      family history includes No Known Problems in her biological father and biological mother.  Social History     Tobacco Use   • Smoking status: Former Smoker   • Smokeless tobacco: Never Used   • Tobacco comment: as a young teenager   Substance Use Topics   • Alcohol use: Yes     Comment: wine   • Drug use: No     Examination:   Wide awake and alert.  Normal comprehension.  No aphasia.  Face is symmetric.  Cogwheeling rigidity in the bilateral UE.  No tremor.  Moving all extremities, right ankle eversion possibly dystonic.  Weakness of the distal LE.      Assessment & Recommendations:    Patient's Parkinson's disease appears to be under adequate control, I would not make any changes.     Her main issue is back pain which appears structural in nature.  Treatment/management per primary team.     She describes neuropathy in her feet and has some distal weakness.  She also has some ankle dystonia which can be seen in the setting of levodopa therapy.      Molina Howard M.D.  Neurology

## 2021-10-19 NOTE — PROGRESS NOTES
Patient: Mariya Porras  Location: [place] [address]  MRN: [id number]  Today's date: 10/19/2021    Attempted to see patient for therapy. Unable due to medical hold (pending neuro/ortho consult.).

## 2021-10-19 NOTE — CONSULTS
"Neurosurgery Consultation    CC:   Chief Complaint   Patient presents with   • Back Pain       Reason for Consultation:  L1 fracture    Requesting Provider:  Dr. Christopher  Patient seen and examined 10/19/21 at 4:30pm    History of Present Illness:   Mariya Porras is a 86 y.o.  female, with significant past medical history of anxiety, arthritis, Parkinson's disease, HTN, and L1 fracture, who presented to [facility] ED for evaluation of back pain and ambulatory dysfunction. Patient is known to our service as we have followed her L1 burst fracture caused by a fall on 11/18/20.  At that time it was explained the treatment options including TLSO brace versus L1 corpectomy and posterior pedicle screw augmentation.  Patient and family opted for conservative management in the TLSO brace.     Patient appears confused, but explained that for the past week her pain has been increasing in severity. At rest, she is pain free, but with any movement the pain across her lower back becomes excruciating. Pain also radiates into the anterior aspect of bilateral thighs. She also feels \"her feet aren't working\". She reports ambulating at home short distances with a rolling walker, but was unable to do that anymore.  Patient has chronic numbness in bilateral feet, which she reports as stable. She denies saddle anesthesia and bowel/bladder changes.     Medical History:   Past Medical History:   Diagnosis Date   • Anxiety    • Arthritis    • Basal cell carcinoma     forehead   • Glaucoma    • Hypertension    • Low back pain    • Lumbosacral disc disease    • Parkinson's disease (CMS/HCC)    • Peripheral neuropathy        Surgical History:   Past Surgical History:   Procedure Laterality Date   • BLADDER SUSPENSION  2009    [facility] M   • CATARACT EXTRACTION, BILATERAL     • COLONOSCOPY     • HYSTERECTOMY     • JOINT REPLACEMENT     • KNEE ARTHROPLASTY  2015    left       Family History:   Family History   Problem Relation Age of Onset   • No " Known Problems Biological Mother    • No Known Problems Biological Father        Social History:   Social History     Socioeconomic History   • Marital status:      Spouse name: None   • Number of children: None   • Years of education: None   • Highest education level: None   Occupational History   • None   Tobacco Use   • Smoking status: Former Smoker   • Smokeless tobacco: Never Used   • Tobacco comment: as a young teenager   Substance and Sexual Activity   • Alcohol use: Yes     Comment: wine   • Drug use: No   • Sexual activity: Not Currently     Partners: Male   Other Topics Concern   • None   Social History Narrative   • None     Social Determinants of Health     Financial Resource Strain:    • Difficulty of Paying Living Expenses: Not on file   Food Insecurity: No Food Insecurity   • Worried About Running Out of Food in the Last Year: Never true   • Ran Out of Food in the Last Year: Never true   Transportation Needs:    • Lack of Transportation (Medical): Not on file   • Lack of Transportation (Non-Medical): Not on file   Physical Activity:    • Days of Exercise per Week: Not on file   • Minutes of Exercise per Session: Not on file   Stress:    • Feeling of Stress : Not on file   Social Connections:    • Frequency of Communication with Friends and Family: Not on file   • Frequency of Social Gatherings with Friends and Family: Not on file   • Attends Judaism Services: Not on file   • Active Member of Clubs or Organizations: Not on file   • Attends Club or Organization Meetings: Not on file   • Marital Status: Not on file   Intimate Partner Violence:    • Fear of Current or Ex-Partner: Not on file   • Emotionally Abused: Not on file   • Physically Abused: Not on file   • Sexually Abused: Not on file   Housing Stability:    • Unable to Pay for Housing in the Last Year: Not on file   • Number of Places Lived in the Last Year: Not on file   • Unstable Housing in the Last Year: Not on file       Allergies:  Codeine, Diazepam, Sulfa (sulfonamide antibiotics), and Sulfur    Home Medications:   •  acetaminophen, Take 1,000 mg by mouth 2 (two) times a day. 0800,2000  •  carbidopa-levodopa, Take 1 tablet by mouth 4 (four) times a day. 0800,1200,1600,2000  •  magnesium oxide, Take 400 mg by mouth every morning.  •  melatonin, Take 3 mg by mouth nightly.  •  mirtazapine, Take 30 mg by mouth nightly.  •  polyethylene glycol, Take 17 g by mouth every evening.  •  sertraline, Take 50 mg by mouth 2 (two) times a day. 0800,1700   •  bimatoprost, Administer 1 drop into both eyes nightly.    Current Medications:   •  acetaminophen, 650 mg, oral, q4h PRN  •  carbidopa-levodopa, 1 tablet, oral, QID  •  cefUROXime, 500 mg, oral, q12h ERMA  •  glucose, 16-32 g of dextrose, oral, PRN **OR** dextrose, 15-30 g of dextrose, oral, PRN **OR** glucagon, 1 mg, intramuscular, PRN **OR** dextrose in water, 25 mL, intravenous, PRN  •  heparin (porcine), 5,000 Units, subcutaneous, q8h ERMA  •  hydrALAZINE, 5 mg, intravenous, q8h PRN  •  ketorolac, 15 mg, intravenous, q6h PRN  •  latanoprost, 1 drop, Both Eyes, Nightly  •  lidocaine, 1 patch, Topical, Daily  •  melatonin, 3 mg, oral, Nightly  •  metoprolol tartrate, 12.5 mg, oral, BID  •  mirtazapine, 30 mg, oral, Nightly  •  polyethylene glycol, 17 g, oral, q PM  •  sertraline, 50 mg, oral, Daily    Review of Systems: A 14 point review of systems was performed and aside from what is mentioned above is otherwise negative.    General physical examination:  Temp:  [36.1 °C (97 °F)-37 °C (98.6 °F)] 37 °C (98.6 °F)  Heart Rate:  [60-91] 71  Resp:  [16-20] 20  BP: (153-232)/() 166/75     The patient is resting comfortably in her bed in no acute distress, appears her stated age.   There is no peripheral edema and there are 2+ radial and dorsal pedis pulses.      General Appearance: Alert and awake   Head: Normocephalic, atraumatic.   Eyes:  ENMT: No conjunctival injection. Symmetrical lids  Atraumatic  external nose and ears. Moist MM.   Neck: Supple, no nuchal rigidity.   Respiratory: Good respiratory effort.   Cardiovascular: Regular rate.   Abdomen: Soft without distension   Extremities: No edema.   Skin: Dry, no rashes.       Neck: non-tender to midline palpation, no pain on flexion, extension or rotation.    Neurologic examination:  Mental status:  The patient is alert, attentive, and oriented. Speech is clear and fluent with good repetition, comprehension, and naming.  Memory impaired.    Cranial nerves:  CN II: Visual fields are full to confrontation.  Pupils are equal and briskly reactive to light.   CN III, IV, VI: Extra-occular muscles are intact  CN V: Facial sensation is intact and equal in V1-V3 distributions bilaterally.   CN VII: Face is symmetric with normal eye closure and smile.  CN VIII: Hearing is normal to rubbing fingers  CN IX, X: Palate elevates symmetrically. Phonation is normal.  CN XI: Head turning and shoulder shrug are intact  CN XII: Tongue is midline with normal movements and no atrophy.    Motor:  There is no pronator drift. Muscle bulk and tone are normal. Strength is full bilaterally.     Deltoid Biceps Triceps Wrist ext Finger ext Hand Intrinsics Hip flexion Knee ext Dorsi-  flexion EHL Plantar Flexion   R 5 5 5 5 5 5 5 5 5 5 5   L 5 5 5 5 5 5 5 5 4+ 4+ 5       Reflexes:  Reflexes are 2+ and symmetric at the biceps, brachialis, triceps, patellar, and Achilli's. There is no Curtis's sign or ankle clonus.    Sensory:  Intact light touch throughout all 4 extremities.    Coordination:  There is no dysmetria on finger-to-nose testing.      Gait:  Deferred due to pain.      Data Review:    Labs  WBC   Date Value Ref Range Status   10/19/2021 5.66 3.80 - 10.50 K/uL Final     Hemoglobin   Date Value Ref Range Status   10/19/2021 13.5 11.8 - 15.7 g/dL Final     Hematocrit   Date Value Ref Range Status   10/19/2021 41.8 35.0 - 45.0 % Final     MCV   Date Value Ref Range Status    10/19/2021 92.3 83.0 - 98.0 fL Final     Platelets   Date Value Ref Range Status   10/19/2021 259 150 - 369 K/uL Final     Sodium   Date Value Ref Range Status   10/19/2021 139 136 - 144 mEQ/L Final     Potassium   Date Value Ref Range Status   10/19/2021 4.0 3.6 - 5.1 mEQ/L Final     BUN   Date Value Ref Range Status   10/19/2021 18 8 - 20 mg/dL Final     Creatinine   Date Value Ref Range Status   10/19/2021 0.6 0.6 - 1.1 mg/dL Final       All labs were personally reviewed and interpreted at time of note.     Images  LUMBAR XRAY 10/18/21  Stable chronic severe compression deformity at L1 and multilevel  degenerative disc disease. No significant change is noted compared to the  6/12/2021 lumbar spine MRI.    All imaging was personally reviewed and interpreted at time of note.     Assessment and Plan:  In summary, Mariya Porras is a 86 y.o. female s/p fall on 11/18/20 with L1 burst fracture. She has been managed in a TLSO brace since her injury, as patient and family did not want surgery. She presents to the hospital now with severe lower back pain with movement, and progressive ambulatory dysfunction. She has no signs of cauda equina syndrome at this time, and overall has good strength and sensation in her lower extremities. No acute neurosurgical intervention. Xray appears stable. We will review MRI of the lumbar spine once complete and provide further recommendations at that time.       NASRIN Pacheco    D/w Dr. Zamora.

## 2021-10-19 NOTE — ED PROVIDER NOTES
Emergency Medicine Note  HPI   HISTORY OF PRESENT ILLNESS     86 year old F with pmhx of parkinson's disease, HTN, chronic lumbar pain, and anxiety presnets to the ED for evaluation. Pt resides in an assisted living facility at [facility].  She has been experiencing worsening of her chronic lumbar pain for the past several days.  Her pain exacerbated by movement and walking.  She locates the pain to the low lumbar area. She notes paresthesias in bilateral feet. She denies focal weakness, numbness, saddle anesthesia, fever/chills, CP, SOB, flank pain, urinary sxs, or HA. Facility staff is concerned that pt is unable to ambulate safely. Pt denies IVDU, T2DM, etoh use/cirrhosis, chronic steroid use, use of immunosupressing Rx, and any recent spinal procedures.             Patient History   PAST HISTORY     Reviewed from Nursing Triage:      Past Medical History:   Diagnosis Date   • Anxiety    • Arthritis    • Basal cell carcinoma     forehead   • Glaucoma    • Hypertension    • Low back pain    • Lumbosacral disc disease    • Parkinson's disease (CMS/HCC)    • Peripheral neuropathy        Past Surgical History:   Procedure Laterality Date   • BLADDER SUSPENSION  2009    Prolift M   • CATARACT EXTRACTION, BILATERAL     • COLONOSCOPY     • HYSTERECTOMY     • JOINT REPLACEMENT     • KNEE ARTHROPLASTY  2015    left       Family History   Problem Relation Age of Onset   • No Known Problems Biological Mother    • No Known Problems Biological Father        Social History     Tobacco Use   • Smoking status: Former Smoker   • Smokeless tobacco: Never Used   • Tobacco comment: as a young teenager   Substance Use Topics   • Alcohol use: Yes     Comment: wine   • Drug use: No         Review of Systems   REVIEW OF SYSTEMS     Review of Systems   Constitutional: Positive for activity change. Negative for chills and fever.   HENT: Negative for congestion and rhinorrhea.    Respiratory: Negative for cough and shortness of breath.     Cardiovascular: Negative for chest pain and palpitations.   Gastrointestinal: Negative for abdominal pain, nausea and vomiting.   Genitourinary: Negative for dysuria and flank pain.   Musculoskeletal: Positive for back pain and gait problem (d/t back pain). Negative for neck pain.   Skin: Negative for color change, pallor and rash.   Neurological: Positive for numbness (bilateral feet). Negative for dizziness, syncope, speech difficulty, weakness and headaches.         VITALS     ED Vitals    Date/Time Temp Pulse Resp BP SpO2 [facility]   10/18/21 2015 -- -- 16 218/104 98 % MT   10/18/21 1745 -- -- 16 192/91 95 % LB   10/18/21 1720 36.7 °C (98 °F) 82 16 232/100 95 % MT                       Physical Exam   PHYSICAL EXAM     Physical Exam  Vitals and nursing note reviewed.   Constitutional:       General: She is not in acute distress.     Appearance: Normal appearance. She is well-developed and normal weight. She is not ill-appearing.   HENT:      Head: Normocephalic and atraumatic.   Eyes:      Conjunctiva/sclera: Conjunctivae normal.   Cardiovascular:      Rate and Rhythm: Normal rate and regular rhythm.   Pulmonary:      Effort: Pulmonary effort is normal.      Breath sounds: Normal breath sounds.   Musculoskeletal:         General: No deformity. Normal range of motion.      Cervical back: Normal range of motion and neck supple.      Comments: No C-spine, T-spine, L-spine vertebral body tenderness.    Skin:     General: Skin is warm and dry.   Neurological:      General: No focal deficit present.      Mental Status: She is alert and oriented to person, place, and time.      Comments: Normal function of extensor hallucis longus/flexor hallucis longus bilaterally.  Deep peroneal sensation intact bilaterally. DP and PT pulses 2+ bilaterally. No saddle anesthesia.    Psychiatric:         Mood and Affect: Mood normal.         Behavior: Behavior normal.           PROCEDURES     Procedures     DATA     Results     Procedure  Component Value Units Date/Time    Comprehensive metabolic panel [[id number]]  (Abnormal) Collected: 10/18/21 1726    Specimen: Blood, Venous Updated: 10/18/21 1748     Sodium 139 mEQ/L      Potassium 4.0 mEQ/L      Comment: Results obtained on plasma. Plasma Potassium values may be up to 0.4 mEQ/L less than serum values. The differences may be greater for patients with high platelet or white cell counts.  SLIGHT HEMOLYSIS, RESULT MAY BE INCREASED.        Chloride 104 mEQ/L      CO2 26 mEQ/L      BUN 19 mg/dL      Creatinine 0.8 mg/dL      Glucose 105 mg/dL      Calcium 10.5 mg/dL      AST (SGOT) 23 IU/L      Comment: SLIGHT HEMOLYSIS, RESULT MAY BE INCREASED.        ALT (SGPT) 11 IU/L      Comment: SLIGHT HEMOLYSIS, RESULT MAY BE INCREASED.        Alkaline Phosphatase 81 IU/L      Total Protein 7.5 g/dL      Comment: Test performed on plasma which typically contains approximately 0.4 g/dL more protein than serum.        Albumin 4.0 g/dL      Bilirubin, Total 0.8 mg/dL      Comment: SLIGHT HEMOLYSIS, RESULT MAY BE INCREASED.        eGFR >60.0 mL/min/1.73m*2      Anion Gap 9 mEQ/L     CBC and differential [[id number]] Collected: 10/18/21 1726    Specimen: Blood, Venous Updated: 10/18/21 1735     WBC 6.32 K/uL      RBC 4.80 M/uL      Hemoglobin 14.6 g/dL      Hematocrit 44.1 %      MCV 91.9 fL      MCH 30.4 pg      MCHC 33.1 g/dL      RDW 13.3 %      Platelets 262 K/uL      MPV 9.5 fL      Differential Type Auto     nRBC 0.0 %      Immature Granulocytes 0.2 %      Neutrophils 55.7 %      Lymphocytes 32.0 %      Monocytes 6.3 %      Eosinophils 4.4 %      Basophils 1.4 %      Immature Granulocytes, Absolute 0.01 K/uL      Neutrophils, Absolute 3.52 K/uL      Lymphocytes, Absolute 2.02 K/uL      Monocytes, Absolute 0.40 K/uL      Eosinophils, Absolute 0.28 K/uL      Basophils, Absolute 0.09 K/uL     RAINBOW LT GREEN [[id number]] Collected: 10/18/21 1726    Specimen: Blood, Venous Updated: 10/18/21 1729    RAINBOW RED  [[id number]] Collected: 10/18/21 1726    Specimen: Blood, Venous Updated: 10/18/21 1729    RAINBOW GOLD [[id number]] Collected: 10/18/21 1726    Specimen: Blood, Venous Updated: 10/18/21 1729    [facility] Draw Panel [[id number]] Collected: 10/18/21 1726    Specimen: Blood, Venous Updated: 10/18/21 1729    Narrative:      The following orders were created for panel order [facility] Draw Panel.  Procedure                               Abnormality         Status                     ---------                               -----------         ------                     RAINBOW RED[[id number]]                                      In process                 RAINBOW LT BLUE[[id number]]                                  In process                 RAINBOW LT GREEN[[id number]]                                 In process                 RAINBOW GOLD[[id number]]                                     In process                   Please view results for these tests on the individual orders.    RAINBOW LT BLUE [[id number]] Collected: 10/18/21 1726    Specimen: Blood, Venous Updated: 10/18/21 1729          Imaging Results          X-RAY LUMBAR SPINE 2 OR 3 VIEWS (In process)                 No orders to display       Scoring tools                                 ED Course & MDM   MDM / ED COURSE and CLINICAL IMPRESSIONS     MDM    ED Course as of 10/19/21 0150   Mon Oct 18, 2021   1932 Impression: intractable back pain; ambulatory dysfunction Plan: IV, labs, xray, PO motrin, PO prednisone  [CW]   2037 BP elevated; IV labetalol ordered  [CW]   2059 RN to ambulate to ambulate the patient  [CW]   2136 X-RAY LUMBAR SPINE 2 OR 3 VIEWS  IMPRESSION: Stable chronic severe compression deformity at L1 and multilevel  degenerative disc disease. No significant change is noted compared to the  6/12/2021 lumbar spine MRI. [CW]      ED Course User Index  [CW] Daphne Ball PA C         Clinical Impressions as of 10/19/21 0150   Acute exacerbation of  chronic low back pain   Ambulatory dysfunction            Daphne Ball PA C  10/20/21 1548

## 2021-10-19 NOTE — ASSESSMENT & PLAN NOTE
Patient with history of Parkinson's disease- stable  Continue Sinemet  Neurology recommendations appreciated  Outpatient follow up with them

## 2021-10-20 ENCOUNTER — APPOINTMENT (INPATIENT)
Dept: RADIOLOGY | Facility: HOSPITAL | Age: 86
DRG: 543 | End: 2021-10-20
Attending: STUDENT IN AN ORGANIZED HEALTH CARE EDUCATION/TRAINING PROGRAM
Payer: MEDICARE

## 2021-10-20 LAB — BACTERIA UR CULT: NORMAL

## 2021-10-20 PROCEDURE — 72148 MRI LUMBAR SPINE W/O DYE: CPT | Mod: ME

## 2021-10-20 PROCEDURE — 63700000 HC SELF-ADMINISTRABLE DRUG: Performed by: STUDENT IN AN ORGANIZED HEALTH CARE EDUCATION/TRAINING PROGRAM

## 2021-10-20 PROCEDURE — 21400000 HC ROOM AND CARE CCU/INTERMEDIATE

## 2021-10-20 PROCEDURE — 97166 OT EVAL MOD COMPLEX 45 MIN: CPT | Mod: GO

## 2021-10-20 PROCEDURE — 63600000 HC DRUGS/DETAIL CODE: Performed by: STUDENT IN AN ORGANIZED HEALTH CARE EDUCATION/TRAINING PROGRAM

## 2021-10-20 PROCEDURE — 99232 SBSQ HOSP IP/OBS MODERATE 35: CPT | Performed by: STUDENT IN AN ORGANIZED HEALTH CARE EDUCATION/TRAINING PROGRAM

## 2021-10-20 PROCEDURE — 97162 PT EVAL MOD COMPLEX 30 MIN: CPT | Mod: GP

## 2021-10-20 RX ADMIN — CARBIDOPA AND LEVODOPA 1 TABLET: 25; 100 TABLET ORAL at 13:27

## 2021-10-20 RX ADMIN — Medication 3 MG: at 22:03

## 2021-10-20 RX ADMIN — SERTRALINE HYDROCHLORIDE 50 MG: 50 TABLET ORAL at 09:49

## 2021-10-20 RX ADMIN — METOPROLOL TARTRATE 25 MG: 25 TABLET, FILM COATED ORAL at 09:48

## 2021-10-20 RX ADMIN — CEFUROXIME AXETIL 500 MG: 250 TABLET ORAL at 09:48

## 2021-10-20 RX ADMIN — HEPARIN SODIUM 5000 UNITS: 5000 INJECTION INTRAVENOUS; SUBCUTANEOUS at 22:04

## 2021-10-20 RX ADMIN — SERTRALINE HYDROCHLORIDE 50 MG: 50 TABLET ORAL at 22:03

## 2021-10-20 RX ADMIN — LIDOCAINE 1 PATCH: 4 PATCH TOPICAL at 09:49

## 2021-10-20 RX ADMIN — POLYETHYLENE GLYCOL (3350) 17 G: 17 POWDER, FOR SOLUTION ORAL at 18:34

## 2021-10-20 RX ADMIN — CARBIDOPA AND LEVODOPA 1 TABLET: 25; 100 TABLET ORAL at 22:03

## 2021-10-20 RX ADMIN — MAGNESIUM OXIDE TAB 400 MG (241.3 MG ELEMENTAL MG) 400 MG: 400 (241.3 MG) TAB at 09:49

## 2021-10-20 RX ADMIN — HEPARIN SODIUM 5000 UNITS: 5000 INJECTION INTRAVENOUS; SUBCUTANEOUS at 05:53

## 2021-10-20 RX ADMIN — MIRTAZAPINE 30 MG: 30 TABLET, FILM COATED ORAL at 22:03

## 2021-10-20 RX ADMIN — HEPARIN SODIUM 5000 UNITS: 5000 INJECTION INTRAVENOUS; SUBCUTANEOUS at 13:27

## 2021-10-20 RX ADMIN — CARBIDOPA AND LEVODOPA 1 TABLET: 25; 100 TABLET ORAL at 09:49

## 2021-10-20 RX ADMIN — CEFUROXIME AXETIL 500 MG: 250 TABLET ORAL at 22:37

## 2021-10-20 RX ADMIN — LATANOPROST 1 DROP: 50 SOLUTION OPHTHALMIC at 22:04

## 2021-10-20 RX ADMIN — METOPROLOL TARTRATE 25 MG: 25 TABLET, FILM COATED ORAL at 22:02

## 2021-10-20 RX ADMIN — CARBIDOPA AND LEVODOPA 1 TABLET: 25; 100 TABLET ORAL at 15:51

## 2021-10-20 RX ADMIN — ACETAMINOPHEN 650 MG: 325 TABLET, FILM COATED ORAL at 22:03

## 2021-10-20 ASSESSMENT — COGNITIVE AND FUNCTIONAL STATUS - GENERAL
CLIMB 3 TO 5 STEPS WITH RAILING: 1 - TOTAL
AFFECT: CONFUSED;ANXIOUS
WALKING IN HOSPITAL ROOM: 2 - A LOT
DRESSING REGULAR UPPER BODY CLOTHING: 3 - A LITTLE
STANDING UP FROM CHAIR USING ARMS: 2 - A LOT
HELP NEEDED FOR BATHING: 2 - A LOT
HELP NEEDED FOR PERSONAL GROOMING: 3 - A LITTLE
EATING MEALS: 3 - A LITTLE
TOILETING: 2 - A LOT
MOVING TO AND FROM BED TO CHAIR: 2 - A LOT
DRESSING REGULAR LOWER BODY CLOTHING: 2 - A LOT

## 2021-10-20 ASSESSMENT — ENCOUNTER SYMPTOMS
ABDOMINAL PAIN: 0
SPEECH DIFFICULTY: 0
NUMBNESS: 1
COUGH: 0
PALPITATIONS: 0
FLANK PAIN: 0
NECK PAIN: 0
HEADACHES: 0
SHORTNESS OF BREATH: 0
RHINORRHEA: 0
VOMITING: 0
CHILLS: 0
WEAKNESS: 0
COLOR CHANGE: 0
NAUSEA: 0
DIZZINESS: 0
BACK PAIN: 1
FEVER: 0
DYSURIA: 0
ACTIVITY CHANGE: 1

## 2021-10-20 NOTE — PLAN OF CARE
Problem: Adult Inpatient Plan of Care  Goal: Plan of Care Review  Outcome: Progressing  Flowsheets (Taken 10/20/2021 1442)  Progress: improving  Plan of Care Reviewed With: patient  Outcome Summary: OT evaluation complete. ADL [facility] 15. Patient presents with functional limitations affecting areas of ADL’s and functional transfers. Currently, patient performs functional mobility MOD A X2 assist for SPT using RW, MAX A for LB dressing and total assist for grooming. Pt limited by endurance, and pain with movement, . Pt would benefit from skilled OT services to maximize safety and independence with daily tasks. Recommend SNF when medically stable.  Goal: Patient-Specific Goal (Individualized)  Outcome: Progressing  Flowsheets (Taken 10/20/2021 1442)  Patient-Specific Goals (Include Timeframe): less pain

## 2021-10-20 NOTE — PROGRESS NOTES
Patient: Mariya Porras  Location:  [place] [address]  MRN:  [id number]  Today's date:  10/20/2021     Pt cleared for PT by RN/MD Christopher. Pt left in bedside chair, chair alarm on, reclined. Needs in reach, concerns addressed. RN notified post-session    Vicki is a 86 y.o. female admitted on 10/18/2021 with Acute exacerbation of chronic low back pain [M54.50, G89.29]  Ambulatory dysfunction [R26.2]. Principal problem is Acute exacerbation of chronic low back pain.    Past Medical History  Vicki has a past medical history of Anxiety, Arthritis, Basal cell carcinoma, Glaucoma, Hypertension, Low back pain, Lumbosacral disc disease, Parkinson's disease (CMS/HCC), and Peripheral neuropathy.    History of Present Illness   86 y.o. female with a past medical history of anxiety, hypertension, low back pain and Parkinson's disease presents after having intractable back pain and ambulatory dysfunction.  She states that her pain has been worsening and she feels unstable on her feet.  She also complains of spasms in her legs that prohibit her from walking and that this has been going on for months. States that it is worse when she first starts moving.  She believes this is related to her Parkinson's.  She is not aware that she sees any specialists, believes that her PCP has been managing.  She has some confusion about which doctors are providing her care.  In the ED blood pressure was 200/100.  She was admitted for further evaluation and treatment of her back pain, ambulatory dysfunction and elevated blood pressure.      PT Vitals    Date/Time Pulse HR Source SpO2 Pt Activity O2 Therapy BP BP Location BP Method Pt Position Who   10/20/21 0915 81 Monitor 95 % At rest None (Room air) 186/84 Right upper arm Automatic Lying LF   10/20/21 0925 81 Monitor -- At rest None (Room air) 168/88 Right upper arm Automatic Sitting LF      PT Pain    Date/Time Pain Type Side/Orientation Location Rating: Rest Rating: Activity  Interventions [facility]   10/20/21 0915 Pain Assessment lower back 0 - no pain 6 - moderate-severe pain position adjusted; pillow support provided LF          Prior Living Environment      Most Recent Value   Current Living Arrangements assisted living facility   Living Environment Comment Pt recently moved to [facility] alone,  continues to live at home. Close to 24/7 aides per EMR. Unable to state further information        Prior Level of Function      Most Recent Value   Dominant Hand right   Ambulation assistive equipment  [states RW]   Transferring assistive equipment   Prior Level of Function Comment Pt is a poor historian. Per EMR, pt with aides daily, close to 24/7. Pt states she uses a RW at home, unable to state level of assistance for transfers/ambulation or ADLs. Pt states she has not been wearing her TLSO brace at home due to inability to tolerate   Assistive Device Currently Used at Home walker, front-wheeled           PT Evaluation and Treatment - 10/20/21 0910        PT Time Calculation    Start Time 0910     Stop Time 0929     Time Calculation (min) 19 min        Session Details    Document Type initial evaluation     Mode of Treatment physical therapy        General Information    Patient Profile Reviewed yes     Onset of Illness/Injury or Date of Surgery 10/18/21     Referring Physician Candi     Patient/Family/Caregiver Comments/Observations RN cleared for PT session. Discussed activity with MD Candi. Order placed for OOB without brace, spinal precautions     General Observations of Patient Pt received awake in bed, agreeable to therapy     Existing Precautions/Restrictions fall; contact; spinal     Limitations/Impairments safety/cognitive        Cognition/Psychosocial    Affect/Mental Status (Cognition) confused; anxious     Orientation Status (Cognition) oriented x 3   looks at board for date, able to state year    Follows Commands (Cognition) follows one-step commands; 75-90% accuracy   "   Cognitive Function memory deficit; executive function deficit     Executive Function Deficit (Cognition) minimal deficit; abstract thinking; insight/awareness of deficits; planning/decision-making; problem-solving/reasoning     Memory Deficit (Cognition) moderate deficit; short-term memory; immediate recall     Comment, Cognition pt is oriented however is unable to state recent PLOF. Anxious/fearful with mobility        Sensory    Hearing Status WFL        Vision Assessment/Intervention    Visual Impairment/Limitations corrective lenses for reading        Sensory Assessment (Somatosensory)    Left LE Sensory Assessment impaired; light touch awareness; other (see comments)   L3-S2 dermatomal pattern    Right LE Sensory Assessment light touch awareness; impaired   L3-S2 dermatomal pattern    Sensory Subjective Reports numbness   \"I can feel you, but it is numb\"       Range of Motion (ROM)    Left Lower Extremity (ROM) ankle     Ankle, Left (ROM) positioned into plantar flexion and inversion, unable to passively achieve neutral     Right Lower Extremity (ROM) ankle     Ankle, Right (ROM) positioned into plantar flexion, unable to passively achieve neutral        Strength (Manual Muscle Testing)    Left Lower Extremity Strength knee; ankle     Knee, Left (Strength) 3-/5 knee extension     Ankle, Left (Strength) 2-/5 DF     Right Lower Extremity Strength knee; ankle     Knee, Right (Strength) 3-/5 knee extension     Ankle, Right (Strength) 2-/5 DF        Bed Mobility    [place], Supine to Sit moderate assist (50-74% patient effort); 1 person assist; verbal cues     Verbal Cues (Supine to Sit) maintaining precautions; hand placement; safety; technique     Assistive Device head of bed elevated; bed rails     Comment (Bed Mobility) HOB elevated, log roll technique OOB to the OSWALDO BARBOZA order for spine stable OOB without brace. Chief complaint pain with transitional movement        Bed to Chair Transfer    [place], " Bed to Chair moderate assist (50-74% patient effort); 2 person assist; verbal cues     Verbal Cues hand placement; safety; technique     Assistive Device walker, front-wheeled     Comment to the R, shuffling steps, fearful of falling        Sit to Stand Transfer    [place], Sit to Stand Transfer moderate assist (50-74% patient effort); 2 person assist; verbal cues     Verbal Cues hand placement; technique; safety     Assistive Device walker, front-wheeled     Comment from EOB for 2 trials. Pt incontinent of urine prior to stand        Stand to Sit Transfer    [place], Stand to Sit Transfer moderate assist (50-74% patient effort); 2 person assist; verbal cues     Verbal Cues hand placement; safety; technique     Assistive Device walker, front-wheeled     Comment to bedside/to bedside chair, poor eccentric control, no carryover of hand placement prior to sitting.        Gait Training    [place], Gait moderate assist (50-74% patient effort); 2 person assist; verbal cues     Assistive Device walker, front-wheeled     Distance in Feet 1 feet     Pattern (Gait) step-to     Deviations/Abnormal Patterns (Gait) gait speed decreased; festinating/shuffling; antalgic; step length decreased     Comment (Gait/Stairs) side-stepping to the R during bed to chair, forward flexed trunk with shuffling steps. No heel strike bilaterally observed, initial contact with forefoot        Safety Issues, Functional Mobility    Safety Issues Affecting Function (Mobility) problem-solving; sequencing abilities     Impairments Affecting Function (Mobility) balance; endurance/activity tolerance; cognition; pain; range of motion (ROM); postural/trunk control; strength; sensation/sensory awareness     Cognitive Impairments, Mobility Safety/Performance problem-solving/reasoning; sequencing abilities        Balance    Balance Assessment sitting static balance; sit to stand dynamic balance; sitting dynamic balance; standing static balance;  standing dynamic balance     Static Sitting Balance unsupported; sitting, edge of bed; WFL     Dynamic Sitting Balance mild impairment; unsupported; sitting, edge of bed     Sit to Stand Dynamic Balance moderate impairment; supported; standing     Static Standing Balance moderate impairment; supported; standing     Dynamic Standing Balance moderate impairment; supported; standing     Comment, Balance BUE support on RW, mod A x 2 functional transfers. Increased UE support on RW, NBOS        AM-PAC (TM) - Mobility (Current Function)    Turning from your back to your side while in a flat bed without using bedrails? 2 - A Lot     Moving from lying on your back to sitting on the side of a flat bed without using bedrails? 2 - A Lot     Moving to and from a bed to a chair? 2 - A Lot     Standing up from a chair using your arms? 2 - A Lot     To walk in a hospital room? 2 - A Lot     Climbing 3-5 steps with a railing? 1 - Total     AM-PAC (TM) Mobility Score 11        Assessment/Plan (PT)    Daily Outcome Statement [facility] 11. Pt seen for PT initial eval. Discussed with MD prior, spinal precautions and spine stable for OOB without brace ordered. Pt states she did not wear her TLSO at home due to inability to tolerate. Pt states 0/10 pain at rest, increases with movement. Pt presenting with significant deficits in bilateral ankle strength and ROM. Pt requires mod Ax 1 bed mobility via log roll technique, mod Ax 2 functional transfers. Rec SNF upon d/c once medically stable.     Rehab Potential fair, will monitor progress closely     Therapy Frequency 3-5 times/wk     Planned Therapy Interventions balance training; bed mobility training; gait training; patient/family education; strengthening; transfer training               PT Assessment/Plan      Most Recent Value   PT Recommended Discharge Disposition skilled nursing facility at 10/20/2021 0910   Anticipated Equipment Needs at Discharge (PT) --  [tbd next level of care] at  10/20/2021 0910   Patient/Family Therapy Goals Statement reduce pain at 10/20/2021 0910                    Education Documentation  Signs/Symptoms, taught by Leonie Shaw PT at 10/20/2021 10:58 AM.  Learner: Patient  Readiness: Acceptance  Method: Explanation  Response: Needs Reinforcement  Comment: PT role, plan of care, goals. Importance of OOB, spinal precautions          PT Goals      Most Recent Value   Bed Mobility Goal 1    Activity/Assistive Device bed mobility activities, all at 10/20/2021 0910   [place] minimum assist (75% or more patient effort) at 10/20/2021 0910   Time Frame by discharge at 10/20/2021 0910   Progress/Outcome goal ongoing at 10/20/2021 0910   Transfer Goal 1    Activity/Assistive Device bed-to-chair/chair-to-bed,  sit-to-stand/stand-to-sit,  walker, front-wheeled at 10/20/2021 0910   [place] minimum assist (75% or more patient effort) at 10/20/2021 0910   Time Frame by discharge at 10/20/2021 0910   Progress/Outcome goal ongoing at 10/20/2021 0910   Gait Training Goal 1    Activity/Assistive Device assistive device use,  gait (walking locomotion),  walker, front-wheeled at 10/20/2021 0910   [place] minimum assist (75% or more patient effort) at 10/20/2021 0910   Distance 50 at 10/20/2021 0910   Time Frame by discharge at 10/20/2021 0910   Progress/Outcome goal ongoing at 10/20/2021 0910

## 2021-10-20 NOTE — PLAN OF CARE
Problem: Adult Inpatient Plan of Care  Goal: Plan of Care Review  Flowsheets (Taken 10/20/2021 1417)  Plan of Care Reviewed With: patient  PT/OT evaluation noted with [facility] 11, moderate 2 person assist with sts/transfers recommending snf.   Patient is waiting for LS spine with bending study, scoliosis study and MRI. Reagan Saavedra RN

## 2021-10-20 NOTE — PLAN OF CARE
Problem: Adult Inpatient Plan of Care  Goal: Plan of Care Review  Outcome: Progressing  Flowsheets (Taken 10/20/2021 105)  Progress: improving  Plan of Care Reviewed With: patient  Outcome Summary: PT eval complete, [facility] 11. Mod A x 2 functional transfers with RW. Rec SNF

## 2021-10-20 NOTE — PATIENT CARE CONFERENCE
Care Progression Rounds Note  Date: 10/20/2021  Time: 8:32 AM     Patient Name: Mariya Porras     Medical Record Number: [id number]   YOB: 1935  Sex: Female      Room/Bed: 0417    Admitting Diagnosis: Acute exacerbation of chronic low back pain [M54.50, G89.29]  Ambulatory dysfunction [R26.2]   Admit Date/Time: 10/18/2021  5:16 PM    Primary Diagnosis: Acute exacerbation of chronic low back pain  Principal Problem: Acute exacerbation of chronic low back pain    GMLOS: pending  Anticipated Discharge Date: 10/21/2021    AM-PAC:  Mobility Score:      Discharge Planning:  Anticipated Discharge Disposition: assisted living facility    Barriers to Discharge:  Barriers to Discharge: Test pending, Other (see comments)  Comment: MRI, PT/OT, likely dc after PT findings    Participants:  nursing, physician, physical therapy, , occupational therapy

## 2021-10-20 NOTE — PROGRESS NOTES
OCCUPATIONAL THERAPY ACUTE CARE - INITIAL EVALUATION     Patient:  Mariya Porras  Location:  [place] [address]  MRN:  [id number]  Today's date:  10/20/2021    Vicki is a 86 y.o. female admitted on 10/18/2021 with Acute exacerbation of chronic low back pain [M54.50, G89.29]  Ambulatory dysfunction [R26.2]. Principal problem is Acute exacerbation of chronic low back pain.    Past Medical History  Vicki has a past medical history of Anxiety, Arthritis, Basal cell carcinoma, Glaucoma, Hypertension, Low back pain, Lumbosacral disc disease, Parkinson's disease (CMS/HCC), and Peripheral neuropathy.    History of Present Illness   86 y.o. female with a past medical history of anxiety, hypertension, low back pain and Parkinson's disease presents after having intractable back pain and ambulatory dysfunction.  She states that her pain has been worsening and she feels unstable on her feet.  She also complains of spasms in her legs that prohibit her from walking and that this has been going on for months. States that it is worse when she first starts moving.  She believes this is related to her Parkinson's.  She is not aware that she sees any specialists, believes that her PCP has been managing.  She has some confusion about which doctors are providing her care.  In the ED blood pressure was 200/100.  She was admitted for further evaluation and treatment of her back pain, ambulatory dysfunction and elevated blood pressure.      Session details: initial evaluation   occupational therapy    Start time:   911  End time:  930  Time ca min    General Observations  Start: Pt received supine in bed; she was agreeable to therapy and no issues or concerns identified by nurse prior to session   End: Pt reclined in chair at end of session; call bell in reach, posey chair alarm activated, all needs met and pt in NAD    Precautions:   contact, fall and spinal  Limitations: safety/cognition    VITALS     OT Vitals     Date/Time Pulse HR Source SpO2 Pt Activity O2 Therapy BP BP Location BP Method Pt Position [facility]   10/20/21 0915 81 Monitor 95 % At rest None (Room air) 186/84 Right upper arm Automatic Lying LF   10/20/21 0925 81 Monitor -- At rest None (Room air) 168/88 Right upper arm Automatic Sitting LF      OT Pain    Date/Time Pain Type Side/Orientation Location Rating: Rest Rating: Activity Interventions [facility]   10/20/21 0915 Pain Assessment lower back 0 - no pain 6 - moderate-severe pain position adjusted; pillow support provided LF          PRIOR LEVEL OF FUNCTION AND LIVING ENVIRONMENT     Prior Level of Function      Most Recent Value   Dominant Hand right   Ambulation assistive equipment  [states RW]   Transferring assistive equipment   Prior Level of Function Comment Pt is a poor historian. Per EMR, pt with aides daily, close to 24/7. Pt states she uses a RW at home, unable to state level of assistance for transfers/ambulation or ADLs. Pt states she has not been wearing her TLSO brace at home due to inability to tolerate   Assistive Device Currently Used at Home walker, front-wheeled          Prior Living Environment      Most Recent Value   Current Living Arrangements assisted living facility   Living Environment Comment Pt recently moved to [facility] alone,  continues to live at home. Close to 24/7 aides per EMR. Unable to state further information          Occupational Profile      Most Recent Value   Reason for Services/Referral impiared ADLs /functional mobility   Successful Occupations retired   Patient Goals get stronger          OBJECTIVE     Cognition   Affect/MentalStatus: anxious and confused  Orientation: oriented x 3  Follow Commands: follows one step commands 75-90% of the time and with demonstrated physical/tactile prompts required and repetition of directions required  Cognitive Function: executive function deficit: moderate deficit (planning/decision making, problem solving/reasoning and  self-monitoring/self-correction) and safety deficit: moderate deficit (insight into deficits/self-awareness, safety precautions awareness and safety precautions follow-through)    Sensory  Vision (impairment/limitations): WFL  Sensation: sensation intact  Hearing: WFL    Motor Skills: functional activity tolerance/endurance (Poor)    Upper Extremity   Range of Motion (AROM/PROM) Strength (MMT)   RUE WFL WFL   LUE WFL WFL       Balance   Static Dynamic   Sitting Fair - (maintains balance with UE support or CGA) Fair - (CGA; unable to weight shift without UE support)   Standing Poor (mod A to maintain balance) Poor (mod A to maintain balance or right self; unable to weight shift or cross midline)   General Comments:   Balance functionally assessed during ADL's with use of RW while standing      Functional Transfers   [place] Level DME / AD Cues / Comments   Supine to Sit moderate assist and 1 person assist bed rails, draw sheet and head of bed elevated Exit to right    Bed to/from Chair moderate assist and 2 person assist walker (front-wheeled) SPT to left side    Sit to Stand moderate assist and 2 person assist walker (front-wheeled) From bed    Stand to Sit moderate assist and 2 person assist walker (front-wheeled) To chair    Increase timing and cuing required     Activities of Daily Living   [place] Level Cues / DME / Comments   Dressing: UB                      LB      maximum assist Underpants, socks seated EOB    Grooming/hygiene minimum assist MIN A for stability while seated to wash hands   Toileting dependent incontinent required total assist for hygiene         AM-PAC ™ - ADL (Current Function)     Putting on/taking off regular LB clothing 2 - A Lot   Bathing 2 - A Lot   Toileting 2 - A Lot   Putting on/taking off regular UB clothing 3 - A Little   Help for taking care of personal grooming 3 - A Little   Eating meals 3 - A Little   AM-PAC ™ ADL Score 15     EDUCATION     Education  Documentation  Self-Care, taught by Daphne Choudhury, OT at 10/20/2021 12:21 PM.  Learner: Patient  Readiness: Acceptance  Method: Explanation  Response: Verbalizes Understanding, Demonstrated Understanding        Role of OT/ goals, safe bed mobility, safe functional mobility using RW,, spinal precautions,  Energy conservation techniques /pursed lip breathing. Use of call light for assist and purpose of chair alarm.    ASSESSMENT AND RECOMMENDATIONS     Progress Summary     OT evaluation complete. ADL [facility] 15. Patient presents with functional limitations affecting areas of ADL’s and functional transfers. Currently, patient performs functional mobility MOD A X2 assist for SPT using RW, MAX A for LB dressing and total assist for grooming. Pt limited by endurance, and pain with movement, . Pt would benefit from skilled OT services to maximize safety and independence with daily tasks. Recommend SNF when medically stable.       Therapy Plan  Rehab potential:  good, to achieve stated therapy goals  Therapy frequency:  3-5 times/wk  Therapy interventions:  activity tolerance training, adaptive equipment training, BADL retraining, functional balance retraining, patient/caregiver education/training, passive ROM/stretching, ROM/therapeutic exercise, strengthening exercise, transfer/mobility retraining    Discharge Plan  Recommended discharge:  skilled nursing facility  Anticipated equipment needs:  other (see comments) (TBD)    Patient/Family Therapy Goal Statement: get stronger    OT Goals      Most Recent Value   Transfer Goal 1    Activity/Assistive Device toilet at 10/20/2021 0911   [place] supervision required at 10/20/2021 0911   Time Frame by discharge at 10/20/2021 0911   Progress/Outcome goal ongoing at 10/20/2021 0911   Dressing Goal 1    Activity/Adaptive Equipment dressing skills, all at 10/20/2021 0911   [place] supervision required at 10/20/2021 0911   Time Frame by discharge at 10/20/2021 0911    Progress/Outcome goal ongoing at 10/20/2021 0911   Grooming Goal 1    Activity/Assistive Device grooming skills, all at 10/20/2021 0911   [place] set-up required,  supervision required at 10/20/2021 0911   Time Frame by discharge at 10/20/2021 0911   Progress/Outcome goal ongoing at 10/20/2021 0911

## 2021-10-20 NOTE — HOSPITAL COURSE
Vicki is a 86 y.o. female admitted on 10/18/2021 with Acute exacerbation of chronic low back pain [M54.50, G89.29]  Ambulatory dysfunction [R26.2]. Principal problem is Acute exacerbation of chronic low back pain.    Past Medical History  Vicki has a past medical history of Anxiety, Arthritis, Basal cell carcinoma, Glaucoma, Hypertension, Low back pain, Lumbosacral disc disease, Parkinson's disease (CMS/HCC), and Peripheral neuropathy.    History of Present Illness   86 y.o. female with a past medical history of anxiety, hypertension, low back pain and Parkinson's disease presents after having intractable back pain and ambulatory dysfunction.  She states that her pain has been worsening and she feels unstable on her feet.  She also complains of spasms in her legs that prohibit her from walking and that this has been going on for months. States that it is worse when she first starts moving.  She believes this is related to her Parkinson's.  She is not aware that she sees any specialists, believes that her PCP has been managing.  She has some confusion about which doctors are providing her care.  In the ED blood pressure was 200/100.  She was admitted for further evaluation and treatment of her back pain, ambulatory dysfunction and elevated blood pressure.

## 2021-10-20 NOTE — PROGRESS NOTES
Hospital Medicine Service -  Daily Progress Note       SUBJECTIVE   Interval History: Reports that she feels better.  Pain is really bad when she tries to move.  Also think she is having UTI.  Reports some numbness in her left leg.  Says that she was in severe distress last night.    Review of system- Negative for fever, cough, shortness of breath, chest pain, palpitations, dizziness, abdominal pain, nausea, vomiting, constipation, diarrhea, i leg swelling, confusion, loss of consciousness.         OBJECTIVE      Vital signs in last 24 hours:  Temp:  [36.1 °C (97 °F)-37 °C (98.6 °F)] 36.7 °C (98 °F)  Heart Rate:  [63-91] 78  Resp:  [16-20] 18  BP: (153-207)/() 163/76    Intake/Output Summary (Last 24 hours) at 10/19/2021 2058  Last data filed at 10/19/2021 1636  Gross per 24 hour   Intake --   Output 1100 ml   Net -1100 ml       PHYSICAL EXAMINATION        General exam : appears age stated, well nourished, not in distress  Head: atraumatic, normocephalic  Eyes : PERRLA, EOMI, no pallor, no icterus  ENT: no lesions, oropharynx pink, mucous membranes moist   Neck: supple, no Lymph nodes, no Thyromegaly  CVS : normal rate, normal rhythm, S1 and S2 heard, no murmurs, rubs or gallops  Resp:normal accessory muscle usage, clear to auscultation Bilaterally  Abdomen : soft, non tender, BS +, no organomegaly   Extremities : no edema, no cyanosis   MSK: no DJD, no joint swellings, no joint tenderness   Skin: intact, warm, no rash  Neuro: AAO x3.  Mild memory lapse  Psych: normal mood.cooperative   LINES, CATHETERS, DRAINS, AIRWAYS, AND WOUNDS   Lines, Drains, and Airways:  Wounds (agree with documentation and present on admission):  Peripheral IV (Adult) 10/18/21 Left Antecubital (Active)   Number of days: 1        LABS / IMAGING / TELE      Labs  Lab Results   Component Value Date    WBC 5.66 10/19/2021    HGB 13.5 10/19/2021    HCT 41.8 10/19/2021    MCV 92.3 10/19/2021     10/19/2021     Lab Results    Component Value Date    GLUCOSE 156 (H) 10/19/2021    CALCIUM 10.5 (H) 10/19/2021     10/19/2021    K 4.0 10/19/2021    CO2 23 10/19/2021     10/19/2021    BUN 18 10/19/2021    CREATININE 0.6 10/19/2021       SARS-CoV-2 (COVID-19) (no units)   Date/Time Value   10/18/2021 2152 Negative       Imaging  Xray LS spine- no fracture    ECG/Telemetry  reviewed    ASSESSMENT AND PLAN      Hypertension  Assessment & Plan  Patient states that she has history of hypertension, but her doctor at [place] did not feel that she needed medication anymore as her blood pressure had improved  May be related to pain or anxiety  Patient with hypertensive urgency here.  /100  Given hydralazine and labetalol  Hydralazine as needed  Pain control    Parkinson's disease (CMS/[facility])  Assessment & Plan  Patient with history of Parkinson's disease  Continue Sinemet  Complains of spasms and weakness and inability to walk which she attributes to her Parkinson's.  States that it is worsening.    Consult neurology    Vulvodynia  Assessment & Plan  Patient with history of vulvodynia  Patient complains of vaginal pain  She states that it feels like she has a UTI  We will check UA    * Acute exacerbation of chronic low back pain  Assessment & Plan  Patient complains of chronic low back pain  She is scheduled for an epidural in November  States the pain has been worsening and she feels unstable on her feet  X-ray does not show acute abnormality  PT/OT  Pain control    Diagnosis, management and plan of care discussed with patient in detail.      VTE Assessment: Padua    VTE Prophylaxis:  Current anticoagulants:  heparin (porcine) 5,000 unit/mL injection 5,000 Units, subcutaneous, q8h ERMA      Code Status: Full Code      Estimated Discharge Date: 10/21/2021     Disposition Planning: Home after evaluation and pain control     Meredith Christopher MD  10/19/2021

## 2021-10-20 NOTE — PROGRESS NOTES
Hospital Medicine Service -  Daily Progress Note       SUBJECTIVE   Interval History: Reports that she feels better.  Pain is still  bad when she tries to move.  She is receiving Tylenol which is helping her.  OT/PT evaluated her.    Review of system- Negative for fever, cough, shortness of breath, chest pain, palpitations, dizziness, abdominal pain, nausea, vomiting, constipation, diarrhea, i leg swelling, confusion, loss of consciousness.      Spoke to patient's  over the phone.  Updates given questions answered.  He is concerned about her back pain.   OBJECTIVE      Vital signs in last 24 hours:  Temp:  [36.4 °C (97.5 °F)-36.9 °C (98.4 °F)] 36.4 °C (97.5 °F)  Heart Rate:  [55-81] 75  Resp:  [16-18] 16  BP: (135-186)/(70-88) 155/70  No intake or output data in the 24 hours ending 10/20/21 1943    PHYSICAL EXAMINATION        General exam : appears age stated, well nourished, not in distress  Head: atraumatic, normocephalic  Eyes : PERRLA, EOMI, no pallor, no icterus  ENT: no lesions, oropharynx pink, mucous membranes moist   Neck: supple, no Lymph nodes, no Thyromegaly  CVS : normal rate, normal rhythm, S1 and S2 heard, no murmurs, rubs or gallops  Resp:normal accessory muscle usage, clear to auscultation Bilaterally  Abdomen : soft, non tender, BS +, no organomegaly   Extremities : no edema, no cyanosis   MSK: no DJD, no joint swellings, no joint tenderness   Skin: intact, warm, no rash  Neuro: AAO x3.  Mild memory lapse  Psych: normal mood.cooperative   LINES, CATHETERS, DRAINS, AIRWAYS, AND WOUNDS   Lines, Drains, and Airways:  Wounds (agree with documentation and present on admission):  Peripheral IV (Adult) 10/18/21 Left Antecubital (Active)   Number of days: 1        LABS / IMAGING / TELE      Labs  Lab Results   Component Value Date    WBC 5.66 10/19/2021    HGB 13.5 10/19/2021    HCT 41.8 10/19/2021    MCV 92.3 10/19/2021     10/19/2021     Lab Results   Component Value Date    GLUCOSE 156  (H) 10/19/2021    CALCIUM 10.5 (H) 10/19/2021     10/19/2021    K 4.0 10/19/2021    CO2 23 10/19/2021     10/19/2021    BUN 18 10/19/2021    CREATININE 0.6 10/19/2021       SARS-CoV-2 (COVID-19) (no units)   Date/Time Value   10/18/2021 2152 Negative       Imaging  Xray LS spine- no fracture    ECG/Telemetry  reviewed    ASSESSMENT AND PLAN      Hypertension  Assessment & Plan  May be related to pain or anxiety  Patient with hypertensive urgency in ED likely due to pain  Started on Metoprolol since BP still elevated despite patient not being in pain  Pain control    Parkinson's disease (CMS/[facility])  Assessment & Plan  Patient with history of Parkinson's disease  Continue Sinemet  Complains of spasms and weakness and inability to walk which she attributes to her Parkinson's.  States that it is worsening.    Neurology recommendations appreciated    Vulvodynia  Assessment & Plan  Patient with history of vulvodynia  Patient complains of burning while peeing  She states that it feels like she has a UTI  UA positive for UTI- started on Cefuroxime    * Acute exacerbation of chronic low back pain  Assessment & Plan  Patient complains of chronic low back pain  She is scheduled for an epidural in November  States the pain has been worsening and she feels unstable on her feet  X-ray does not show acute abnormality  PT/OT  Pain control  Reports that pain in intolerable when they try to move her. She thinks there is something wrong with her back. Doesn't feel like her usual pain. Also has some numb feeling over left foot.   MRI LS spine -completed in the afternoon awaiting results.  Neurosurgery recommendations appreciated.  They will review the MRI.    Diagnosis, management and plan of care discussed with patient and her  in detail.      VTE Assessment: Padua    VTE Prophylaxis:  Current anticoagulants:  heparin (porcine) 5,000 unit/mL injection 5,000 Units, subcutaneous, q8h ERMA      Code Status: Full Code       Estimated Discharge Date: 10/21/2021     Disposition Planning: Assisted living after MRI, and pain control.  OT/PT recommending SNF.     Meredith Christopher MD  10/20/2021

## 2021-10-21 ENCOUNTER — APPOINTMENT (INPATIENT)
Dept: RADIOLOGY | Facility: HOSPITAL | Age: 86
DRG: 543 | End: 2021-10-21
Attending: PHYSICIAN ASSISTANT
Payer: MEDICARE

## 2021-10-21 LAB
ALBUMIN ?TM UR ELPH-MCNC: NORMAL MG/DL
ALBUMIN MFR UR ELPH: NORMAL %
ALPHA1 GLOB MFR UR ELPH: NORMAL %
ALPHA1 GLOB UR ELPH-MCNC: NORMAL G/DL
ALPHA2 GLOB MFR UR ELPH: NORMAL %
ALPHA2 GLOB UR ELPH-MCNC: NORMAL MG/DL
B-GLOBULIN MFR UR ELPH: NORMAL %
B-GLOBULIN UR ELPH-MCNC: NORMAL G/DL
GAMMA GLOB MFR UR ELPH: NORMAL %
GAMMA GLOB UR ELPH-MCNC: NORMAL G/DL
PROT UR-MCNC: 6 MG/DL

## 2021-10-21 PROCEDURE — 63700000 HC SELF-ADMINISTRABLE DRUG: Performed by: STUDENT IN AN ORGANIZED HEALTH CARE EDUCATION/TRAINING PROGRAM

## 2021-10-21 PROCEDURE — 72082 X-RAY EXAM ENTIRE SPI 2/3 VW: CPT

## 2021-10-21 PROCEDURE — 63600000 HC DRUGS/DETAIL CODE: Performed by: STUDENT IN AN ORGANIZED HEALTH CARE EDUCATION/TRAINING PROGRAM

## 2021-10-21 PROCEDURE — 99232 SBSQ HOSP IP/OBS MODERATE 35: CPT | Performed by: STUDENT IN AN ORGANIZED HEALTH CARE EDUCATION/TRAINING PROGRAM

## 2021-10-21 PROCEDURE — 21400000 HC ROOM AND CARE CCU/INTERMEDIATE

## 2021-10-21 PROCEDURE — 99232 SBSQ HOSP IP/OBS MODERATE 35: CPT | Performed by: NEUROLOGICAL SURGERY

## 2021-10-21 PROCEDURE — 72114 X-RAY EXAM L-S SPINE BENDING: CPT

## 2021-10-21 RX ORDER — KETOROLAC TROMETHAMINE 15 MG/ML
15 INJECTION, SOLUTION INTRAMUSCULAR; INTRAVENOUS EVERY 6 HOURS PRN
Status: DISCONTINUED | OUTPATIENT
Start: 2021-10-21 | End: 2021-10-24 | Stop reason: HOSPADM

## 2021-10-21 RX ORDER — KETOROLAC TROMETHAMINE 15 MG/ML
15 INJECTION, SOLUTION INTRAMUSCULAR; INTRAVENOUS ONCE
Status: COMPLETED | OUTPATIENT
Start: 2021-10-21 | End: 2021-10-21

## 2021-10-21 RX ADMIN — CARBIDOPA AND LEVODOPA 1 TABLET: 25; 100 TABLET ORAL at 13:02

## 2021-10-21 RX ADMIN — CEFUROXIME AXETIL 500 MG: 250 TABLET ORAL at 21:34

## 2021-10-21 RX ADMIN — MAGNESIUM OXIDE TAB 400 MG (241.3 MG ELEMENTAL MG) 400 MG: 400 (241.3 MG) TAB at 08:38

## 2021-10-21 RX ADMIN — ACETAMINOPHEN 650 MG: 325 TABLET, FILM COATED ORAL at 08:45

## 2021-10-21 RX ADMIN — POLYETHYLENE GLYCOL (3350) 17 G: 17 POWDER, FOR SOLUTION ORAL at 17:03

## 2021-10-21 RX ADMIN — HEPARIN SODIUM 5000 UNITS: 5000 INJECTION INTRAVENOUS; SUBCUTANEOUS at 05:08

## 2021-10-21 RX ADMIN — CARBIDOPA AND LEVODOPA 1 TABLET: 25; 100 TABLET ORAL at 17:04

## 2021-10-21 RX ADMIN — LATANOPROST 1 DROP: 50 SOLUTION OPHTHALMIC at 21:34

## 2021-10-21 RX ADMIN — SERTRALINE HYDROCHLORIDE 50 MG: 50 TABLET ORAL at 19:46

## 2021-10-21 RX ADMIN — HEPARIN SODIUM 5000 UNITS: 5000 INJECTION INTRAVENOUS; SUBCUTANEOUS at 13:02

## 2021-10-21 RX ADMIN — KETOROLAC TROMETHAMINE 15 MG: 15 INJECTION, SOLUTION INTRAMUSCULAR; INTRAVENOUS at 05:13

## 2021-10-21 RX ADMIN — SERTRALINE HYDROCHLORIDE 50 MG: 50 TABLET ORAL at 08:38

## 2021-10-21 RX ADMIN — KETOROLAC TROMETHAMINE 15 MG: 15 INJECTION, SOLUTION INTRAMUSCULAR; INTRAVENOUS at 17:06

## 2021-10-21 RX ADMIN — MIRTAZAPINE 30 MG: 30 TABLET, FILM COATED ORAL at 21:34

## 2021-10-21 RX ADMIN — Medication 3 MG: at 21:34

## 2021-10-21 RX ADMIN — CEFUROXIME AXETIL 500 MG: 250 TABLET ORAL at 08:38

## 2021-10-21 RX ADMIN — LIDOCAINE 1 PATCH: 4 PATCH TOPICAL at 08:39

## 2021-10-21 RX ADMIN — CARBIDOPA AND LEVODOPA 1 TABLET: 25; 100 TABLET ORAL at 08:38

## 2021-10-21 RX ADMIN — CARBIDOPA AND LEVODOPA 1 TABLET: 25; 100 TABLET ORAL at 21:34

## 2021-10-21 RX ADMIN — METOPROLOL TARTRATE 25 MG: 25 TABLET, FILM COATED ORAL at 08:38

## 2021-10-21 RX ADMIN — METOPROLOL TARTRATE 25 MG: 25 TABLET, FILM COATED ORAL at 19:46

## 2021-10-21 RX ADMIN — HEPARIN SODIUM 5000 UNITS: 5000 INJECTION INTRAVENOUS; SUBCUTANEOUS at 21:34

## 2021-10-21 NOTE — PLAN OF CARE
Problem: Adult Inpatient Plan of Care  Goal: Plan of Care Review  Flowsheets (Taken 10/21/2021 5071)  Plan of Care Reviewed With: other (see comments)   CM discussed case with Dr. Christopher.  Dr. Christopher states that the MRI was performed but the results were not there to be able to have Dr. Christopher discuss the disposition with the patient's  therefore CM will await disposition since the  has not made a decision.  Reagan Saavedra RN

## 2021-10-21 NOTE — PATIENT CARE CONFERENCE
Care Progression Rounds Note  Date: 10/21/2021  Time: 8:21 AM     Patient Name: Mariya Porras     Medical Record Number: [id number]   YOB: 1935  Sex: Female      Room/Bed: 0417    Admitting Diagnosis: Acute exacerbation of chronic low back pain [M54.50, G89.29]  Ambulatory dysfunction [R26.2]   Admit Date/Time: 10/18/2021  5:16 PM    Primary Diagnosis: Acute exacerbation of chronic low back pain  Principal Problem: Acute exacerbation of chronic low back pain    GMLOS: pending  Anticipated Discharge Date: 10/21/2021    AM-PAC:  Mobility Score: 11    Discharge Planning:  Current Living Arrangements: assisted living facility  Anticipated Discharge Disposition: assisted living facility    Barriers to Discharge:  Barriers to Discharge: Consultant recommendations pending, Other (see comments)  Comment: snf vs back to [facility]    Participants:  nursing, physician, physical therapy, , occupational therapy

## 2021-10-21 NOTE — PROGRESS NOTES
Neurosurgery Progress Note      Subjective     Interval History:  Patient seen and examined this AM  Reports improved back pain overall but pain is still severe with movement.  Pain is at the lumbosacral junction.  Denies any radicular pain  MRI lumbar completed in the afternoon yesterday, pending Xrays today    Objective     Vital signs in last 24 hours:  Temp:  [36.3 °C (97.3 °F)-36.8 °C (98.3 °F)] 36.5 °C (97.7 °F)  Heart Rate:  [52-85] 85  Resp:  [16-17] 17  BP: (126-155)/(62-80) 131/62    Intake/Output this shift:    Intake/Output Summary (Last 24 hours) at 10/21/2021 1348  Last data filed at 10/21/2021 0523  Gross per 24 hour   Intake --   Output 650 ml   Net -650 ml     No intake/output data recorded.      Current Facility-Administered Medications:   •  acetaminophen (TYLENOL) tablet 650 mg, 650 mg, oral, q4h PRN, Sonja Chiu DO, 650 mg at 10/21/21 0845  •  carbidopa-levodopa (SINEMET)  mg per tablet 1 tablet, 1 tablet, oral, QIDCandi Sneha, MD, 1 tablet at 10/21/21 1302  •  cefUROXime (CEFTIN) tablet 500 mg, 500 mg, oral, q12h [facility], [facility], 500 mg at 10/21/21 0838  •  glucose chewable tablet 16-32 g of dextrose, 16-32 g of dextrose, oral, PRN **OR** dextrose 40 % oral gel 15-30 g of dextrose, 15-30 g of dextrose, oral, PRN **OR** glucagon (GLUCAGEN) injection 1 mg, 1 mg, intramuscular, PRN **OR** dextrose in water injection 12.5 g, 25 mL, intravenous, PRN, Sonja Chiu DO  •  heparin (porcine) 5,000 unit/mL injection 5,000 Units, 5,000 Units, subcutaneous, q8h Apple RITCHIE Kristin C, DO, 5,000 Units at 10/21/21 1302  •  hydrALAZINE (APRESOLINE) injection 5 mg, 5 mg, intravenous, q8h PRN, Sonja Chiu DO  •  ketorolac (TORADOL) injection 15 mg, 15 mg, intravenous, q6h PRN, Sonja Chiu DO, 15 mg at 10/21/21 0559  •  latanoprost (XALATAN) 0.005 % ophthalmic solution 1 drop, 1 drop, Both Eyes, Nightly, Sonja Chiu DO, 1 drop at 10/20/21 2208  •  lidocaine (ASPERCREME)  4 % topical patch 1 patch, 1 patch, Topical, Daily, Sonja Chiu DO, 1 patch at 10/21/21 0839  •  magnesium oxide (MAG-OX) tablet 400 mg, 400 mg, oral, q AM, Meredith Christopher MD, 400 mg at 10/21/21 0838  •  melatonin ODT 3 mg, 3 mg, oral, Nightly, Meredith Christopher MD, 3 mg at 10/20/21 2203  •  metoprolol tartrate (LOPRESSOR) tablet 25 mg, 25 mg, oral, BID, Meredith Christopher MD, 25 mg at 10/21/21 0838  •  mirtazapine (REMERON) tablet 30 mg, 30 mg, oral, Nightly, Sonja Chiu DO, 30 mg at 10/20/21 2203  •  polyethylene glycol (MIRALAX) 17 gram packet 17 g, 17 g, oral, q PM, Meredith Christopher MD, 17 g at 10/20/21 1834  •  sertraline (ZOLOFT) tablet 50 mg, 50 mg, oral, BID, Sonja Chiu DO, 50 mg at 10/21/21 0838      Labs  Lab Results   Component Value Date    GLUCOSE 156 (H) 10/19/2021    CALCIUM 10.5 (H) 10/19/2021     10/19/2021    K 4.0 10/19/2021    CO2 23 10/19/2021     10/19/2021    BUN 18 10/19/2021    CREATININE 0.6 10/19/2021     Lab Results   Component Value Date    WBC 5.66 10/19/2021    HGB 13.5 10/19/2021    HCT 41.8 10/19/2021    MCV 92.3 10/19/2021     10/19/2021     Lab Results   Component Value Date    ALBUMIN 3.8 10/19/2021    BILITOT 0.7 10/19/2021    ALKPHOS 76 10/19/2021    AST 19 10/19/2021    ALT 15 10/19/2021    PROTEIN 6.8 10/19/2021     No results found for: PT, PTT, INR      Physical Exam    General: lying supine in bed in no acute distress  Neuro: A&O x 3, interacting appropriately with fluent speech  CN: PERRL, EOMI, no facial asymmetry, tongue midline  Motor:        Deltoid Biceps Triceps Wrist ext Finger ext Hand Intrinsics Hip flexion Knee ext Dorsi-  flexion EHL Plantar Flexion   R 5 5 5 5 5 5 5 5 4 4 5   L 5 5 5 5 5 5 5 5 4 4 5        Sensation: Partial sensory deficit BL feet - chronic neuropathy          Imaging:  Independent review of most recent imaging and all relevant previous imaging was compared with the reading of the attending radiologist. Significant  findings are as below:    MRI L 10/20: Findings:  There is redemonstration of a severe compression fracture involving the L1  vertebral body which is diminished in T1 and T2 signal. A pathologic fracture is  not excluded. There is a dextroscoliosis of the lumbar spine with the apex at  L3. There is a grade 1 retrolisthesis of T12 on L2 as the anterior aspect of the  L1 vertebral body is severely diminished in height. There is retropulsion of the  posterior aspect of the vertebral body by approximately 1 cm into the epidural  space. There is resultant severe central canal narrowing at the level of T12-L1  with mass effect on the distal spinal cord. The spinal cord above this level  appears mildly tortuous, similar to previous examinations. Mild bilateral neural  foraminal narrowing is present at T12-L1, similar to the prior. The remainder of  the lumbar vertebral bodies are maintained in height. There is redemonstration  of a rounded T1 and T2 hypointense lesion in the right side of the L2 vertebral  body which could represent a bone island. There is a T1 and T2 hyperintense  lesion in the T12 vertebral body thought to represent a hemangioma. Endplate  irregularity, marginal hypertrophy and discogenic marrow signal changes are  present throughout the lumbar spine. There is desiccation of the lumbar  intervertebral discs with associated loss of intervertebral disc space height  that is moderate to severe at L2-L3 and at L3-L4, is mild at L4-L5 and is severe  at L5-S1. These findings appear stable.     The conus medullaris terminates at the top of L2.        At L1-L2, there is a disc osseous complex and facet and ligamentous hypertrophy  in addition to retropulsion of the fracture fragment of the L1 vertebral body  into the spinal canal. A superimposed left subarticular protruding component is  present. There is also a superimposed right subarticular to foraminal protruding  component. There is resultant mild to  moderate spinal canal narrowing and severe  bilateral subarticular narrowing. Severe right and mild to moderate left neural  foraminal narrowing is present. The right neural foraminal narrowing has  progressed compared to the prior.  At L2-L3, there is a disc osseous complex and facet hypertrophy. A small amount  of fluid is present in the facet joints. The central canal is mildly narrowed.  Mild bilateral subarticular and neural foraminal narrowing is present. This  appears stable.  At L3-L4, there is a disc osseous complex and facet and ligamentous hypertrophy.  A small amount of fluid is present in the right facet joint. The central canal  is mildly narrowed. Moderate to severe left and mild right neural foraminal  narrowing is present. This appears similar.  At L4-L5, there is a diffuse disc bulge and facet and ligamentous hypertrophy.  Moderate right and mild left subarticular and neural foraminal narrowing is  present. This appears similar.  At L5-S1, there is a disc osseous complex and facet hypertrophy. The ventral  thecal sac is mildly effaced. A focal right foraminal to far lateral disc  osteophyte protrusion is present with moderate to severe right and moderate left  neural foraminal narrowing. This appears stable.        --  IMPRESSION:  1. Redemonstration of a severe compression fracture of L1 with retropulsion of  the fracture fragment into the spinal canal resulting in severe spinal canal  narrowing. Superimposed right subarticular to foraminal protrusion with severe  right subarticular and neural foraminal narrowing, noting progression of the  neural foraminal narrowing compared to the prior examination. Severe left  subarticular and moderate left neural foraminal narrowing appears similar.  2. Dextroscoliosis with degenerative and discogenic disease in the remainder of  the lumbar spine, similar to the prior, as above.        Assessment/Plan    In summary, Mariya AMALIA Porras is a 86 y.o. female s/p  fall on 11/18/20 with L1 burst fracture. She has been managed in a TLSO brace since her injury, as patient and family did not want surgery. She presents to the hospital now with severe lower back pain with movement, and progressive ambulatory dysfunction. She has no signs of cauda equina syndrome at this time, and overall has stable strength and sensation in her lower extremities. No acute neurosurgical intervention. Xray appears stable      - MRI lumbar stable, fracture appears stable with similar canal stenosis due to retropulsion.  Increase in the R subarticular and neuro foraminal stenosis  - XR lumbar with stable alignment  - Pending XR lumbar flexion extension to assess for instability which may be causing her pain with movement as well as scoliosis study to assess global alignment of the spine  - Pain control PRN, may require pre-medication for standing imaging  - PT/OT evals OOB as tolerated  - OK for chemical DVT PPx      Discussed with NASRIN Silva

## 2021-10-22 PROCEDURE — 21400000 HC ROOM AND CARE CCU/INTERMEDIATE

## 2021-10-22 PROCEDURE — 97530 THERAPEUTIC ACTIVITIES: CPT | Mod: GO

## 2021-10-22 PROCEDURE — 97530 THERAPEUTIC ACTIVITIES: CPT | Mod: GP

## 2021-10-22 PROCEDURE — 63700000 HC SELF-ADMINISTRABLE DRUG: Performed by: STUDENT IN AN ORGANIZED HEALTH CARE EDUCATION/TRAINING PROGRAM

## 2021-10-22 PROCEDURE — 99232 SBSQ HOSP IP/OBS MODERATE 35: CPT | Performed by: NEUROLOGICAL SURGERY

## 2021-10-22 PROCEDURE — 63600000 HC DRUGS/DETAIL CODE: Performed by: STUDENT IN AN ORGANIZED HEALTH CARE EDUCATION/TRAINING PROGRAM

## 2021-10-22 PROCEDURE — 99232 SBSQ HOSP IP/OBS MODERATE 35: CPT | Performed by: STUDENT IN AN ORGANIZED HEALTH CARE EDUCATION/TRAINING PROGRAM

## 2021-10-22 RX ORDER — ALUMINUM HYDROXIDE, MAGNESIUM HYDROXIDE, AND SIMETHICONE 1200; 120; 1200 MG/30ML; MG/30ML; MG/30ML
15 SUSPENSION ORAL EVERY 6 HOURS PRN
Status: DISCONTINUED | OUTPATIENT
Start: 2021-10-22 | End: 2021-10-24 | Stop reason: HOSPADM

## 2021-10-22 RX ADMIN — METOPROLOL TARTRATE 25 MG: 25 TABLET, FILM COATED ORAL at 08:49

## 2021-10-22 RX ADMIN — CEFUROXIME AXETIL 500 MG: 250 TABLET ORAL at 08:49

## 2021-10-22 RX ADMIN — SERTRALINE HYDROCHLORIDE 50 MG: 50 TABLET ORAL at 19:57

## 2021-10-22 RX ADMIN — CARBIDOPA AND LEVODOPA 1 TABLET: 25; 100 TABLET ORAL at 08:49

## 2021-10-22 RX ADMIN — CARBIDOPA AND LEVODOPA 1 TABLET: 25; 100 TABLET ORAL at 19:57

## 2021-10-22 RX ADMIN — METOPROLOL TARTRATE 25 MG: 25 TABLET, FILM COATED ORAL at 19:57

## 2021-10-22 RX ADMIN — ACETAMINOPHEN 650 MG: 325 TABLET, FILM COATED ORAL at 08:49

## 2021-10-22 RX ADMIN — CARBIDOPA AND LEVODOPA 1 TABLET: 25; 100 TABLET ORAL at 13:26

## 2021-10-22 RX ADMIN — CEFUROXIME AXETIL 500 MG: 250 TABLET ORAL at 19:57

## 2021-10-22 RX ADMIN — LATANOPROST 1 DROP: 50 SOLUTION OPHTHALMIC at 21:15

## 2021-10-22 RX ADMIN — LIDOCAINE 1 PATCH: 4 PATCH TOPICAL at 08:50

## 2021-10-22 RX ADMIN — MIRTAZAPINE 30 MG: 30 TABLET, FILM COATED ORAL at 21:15

## 2021-10-22 RX ADMIN — Medication 3 MG: at 21:15

## 2021-10-22 RX ADMIN — POLYETHYLENE GLYCOL (3350) 17 G: 17 POWDER, FOR SOLUTION ORAL at 16:26

## 2021-10-22 RX ADMIN — KETOROLAC TROMETHAMINE 15 MG: 15 INJECTION, SOLUTION INTRAMUSCULAR; INTRAVENOUS at 08:49

## 2021-10-22 RX ADMIN — SERTRALINE HYDROCHLORIDE 50 MG: 50 TABLET ORAL at 08:50

## 2021-10-22 RX ADMIN — MAGNESIUM OXIDE TAB 400 MG (241.3 MG ELEMENTAL MG) 400 MG: 400 (241.3 MG) TAB at 08:49

## 2021-10-22 RX ADMIN — ALUMINA, MAGNESIA, AND SIMETHICONE ORAL SUSPENSION REGULAR STRENGTH 15 ML: 1200; 1200; 120 SUSPENSION ORAL at 21:41

## 2021-10-22 RX ADMIN — HEPARIN SODIUM 5000 UNITS: 5000 INJECTION INTRAVENOUS; SUBCUTANEOUS at 13:26

## 2021-10-22 RX ADMIN — KETOROLAC TROMETHAMINE 15 MG: 15 INJECTION, SOLUTION INTRAMUSCULAR; INTRAVENOUS at 16:26

## 2021-10-22 RX ADMIN — HEPARIN SODIUM 5000 UNITS: 5000 INJECTION INTRAVENOUS; SUBCUTANEOUS at 06:29

## 2021-10-22 RX ADMIN — CARBIDOPA AND LEVODOPA 1 TABLET: 25; 100 TABLET ORAL at 16:26

## 2021-10-22 RX ADMIN — HEPARIN SODIUM 5000 UNITS: 5000 INJECTION INTRAVENOUS; SUBCUTANEOUS at 21:15

## 2021-10-22 ASSESSMENT — COGNITIVE AND FUNCTIONAL STATUS - GENERAL
TOILETING: 2 - A LOT
STANDING UP FROM CHAIR USING ARMS: 3 - A LITTLE
CLIMB 3 TO 5 STEPS WITH RAILING: 2 - A LOT
AFFECT: CONFUSED;ANXIOUS
MOVING TO AND FROM BED TO CHAIR: 3 - A LITTLE
DRESSING REGULAR UPPER BODY CLOTHING: 2 - A LOT
DRESSING REGULAR LOWER BODY CLOTHING: 1 - TOTAL
WALKING IN HOSPITAL ROOM: 2 - A LOT
HELP NEEDED FOR BATHING: 2 - A LOT
EATING MEALS: 3 - A LITTLE
HELP NEEDED FOR PERSONAL GROOMING: 3 - A LITTLE
AFFECT: ANXIOUS;CONFUSED

## 2021-10-22 NOTE — PROGRESS NOTES
Neurosurgery Progress Note      Subjective     Interval History:  Patient seen and examined this AM  Reports pain is at the lumbosacral junction, and remains severe with movement but she is comfortably at rest.  Denies any radicular pain  MRI lumbar and xrays completed.   Extensive discussion with family yesterday, additional discussion to take place today.     Objective     Vital signs in last 24 hours:  Temp:  [36.3 °C (97.4 °F)-36.9 °C (98.4 °F)] 36.3 °C (97.4 °F)  Heart Rate:  [56-85] 62  Resp:  [17-18] 18  BP: (131-154)/(62-76) 139/73    Intake/Output this shift:    Intake/Output Summary (Last 24 hours) at 10/22/2021 0950  Last data filed at 10/22/2021 0642  Gross per 24 hour   Intake --   Output 1155 ml   Net -1155 ml     No intake/output data recorded.      Current Facility-Administered Medications:   •  acetaminophen (TYLENOL) tablet 650 mg, 650 mg, oral, q4h PRN, Sonja Chiu DO, 650 mg at 10/22/21 0849  •  carbidopa-levodopa (SINEMET)  mg per tablet 1 tablet, 1 tablet, oral, QID, Meredith Christopher MD, 1 tablet at 10/22/21 0849  •  cefUROXime (CEFTIN) tablet 500 mg, 500 mg, oral, q12h [facility] Center[facility] Sneha, MD, 500 mg at 10/22/21 0849  •  glucose chewable tablet 16-32 g of dextrose, 16-32 g of dextrose, oral, PRN **OR** dextrose 40 % oral gel 15-30 g of dextrose, 15-30 g of dextrose, oral, PRN **OR** glucagon (GLUCAGEN) injection 1 mg, 1 mg, intramuscular, PRN **OR** dextrose in water injection 12.5 g, 25 mL, intravenous, PRN, Sonja Chiu DO  •  heparin (porcine) 5,000 unit/mL injection 5,000 Units, 5,000 Units, subcutaneous, q8h Apple RITCHIE Kristin C, DO, 5,000 Units at 10/22/21 0629  •  hydrALAZINE (APRESOLINE) injection 5 mg, 5 mg, intravenous, q8h PRN, Sonja Chiu,   •  ketorolac (TORADOL) injection 15 mg, 15 mg, intravenous, q6h PRN, Meredith Christopher MD, 15 mg at 10/22/21 0849  •  latanoprost (XALATAN) 0.005 % ophthalmic solution 1 drop, 1 drop, Both Eyes, Nightly, Sonja Chiu DO, 1  drop at 10/21/21 2134  •  lidocaine (ASPERCREME) 4 % topical patch 1 patch, 1 patch, Topical, Daily, Sonja Chiu DO, 1 patch at 10/22/21 0850  •  magnesium oxide (MAG-OX) tablet 400 mg, 400 mg, oral, q AM, Meredith Christopher MD, 400 mg at 10/22/21 0849  •  melatonin ODT 3 mg, 3 mg, oral, Nightly, Meredith Christopher MD, 3 mg at 10/21/21 2134  •  metoprolol tartrate (LOPRESSOR) tablet 25 mg, 25 mg, oral, BID, Meredith Christopher MD, 25 mg at 10/22/21 0849  •  mirtazapine (REMERON) tablet 30 mg, 30 mg, oral, Nightly, Sonja Chiu DO, 30 mg at 10/21/21 2134  •  polyethylene glycol (MIRALAX) 17 gram packet 17 g, 17 g, oral, q PM, Meredith Christopher MD, 17 g at 10/21/21 1703  •  sertraline (ZOLOFT) tablet 50 mg, 50 mg, oral, BID, Sonja Chiu DO, 50 mg at 10/22/21 0850      Labs  Lab Results   Component Value Date    GLUCOSE 156 (H) 10/19/2021    CALCIUM 10.5 (H) 10/19/2021     10/19/2021    K 4.0 10/19/2021    CO2 23 10/19/2021     10/19/2021    BUN 18 10/19/2021    CREATININE 0.6 10/19/2021     Lab Results   Component Value Date    WBC 5.66 10/19/2021    HGB 13.5 10/19/2021    HCT 41.8 10/19/2021    MCV 92.3 10/19/2021     10/19/2021     Lab Results   Component Value Date    ALBUMIN 3.8 10/19/2021    BILITOT 0.7 10/19/2021    ALKPHOS 76 10/19/2021    AST 19 10/19/2021    ALT 15 10/19/2021    PROTEIN 6.8 10/19/2021     No results found for: PT, PTT, INR      Physical Exam    General: lying supine in bed in no acute distress  Neuro: A&O x 3, interacting appropriately with fluent speech  CN: PERRL, EOMI, no facial asymmetry, tongue midline  Motor:        Deltoid Biceps Triceps Wrist ext Finger ext Hand Intrinsics Hip flexion Knee ext Dorsi-  flexion EHL Plantar Flexion   R 5 5 5 5 5 5 5 5 4 4 5   L 5 5 5 5 5 5 5 5 5 5 5        Sensation: Partial sensory deficit BL feet - chronic neuropathy  Sensory deficit in bilateral hands in non-dermatomal pattern        Imaging:  Independent review of most recent imaging  and all relevant previous imaging was compared with the reading of the attending radiologist. Significant findings are as below:    MRI L 10/20: Findings:  There is redemonstration of a severe compression fracture involving the L1  vertebral body which is diminished in T1 and T2 signal. A pathologic fracture is  not excluded. There is a dextroscoliosis of the lumbar spine with the apex at  L3. There is a grade 1 retrolisthesis of T12 on L2 as the anterior aspect of the  L1 vertebral body is severely diminished in height. There is retropulsion of the  posterior aspect of the vertebral body by approximately 1 cm into the epidural  space. There is resultant severe central canal narrowing at the level of T12-L1  with mass effect on the distal spinal cord. The spinal cord above this level  appears mildly tortuous, similar to previous examinations. Mild bilateral neural  foraminal narrowing is present at T12-L1, similar to the prior. The remainder of  the lumbar vertebral bodies are maintained in height. There is redemonstration  of a rounded T1 and T2 hypointense lesion in the right side of the L2 vertebral  body which could represent a bone island. There is a T1 and T2 hyperintense  lesion in the T12 vertebral body thought to represent a hemangioma. Endplate  irregularity, marginal hypertrophy and discogenic marrow signal changes are  present throughout the lumbar spine. There is desiccation of the lumbar  intervertebral discs with associated loss of intervertebral disc space height  that is moderate to severe at L2-L3 and at L3-L4, is mild at L4-L5 and is severe  at L5-S1. These findings appear stable.     The conus medullaris terminates at the top of L2.        At L1-L2, there is a disc osseous complex and facet and ligamentous hypertrophy  in addition to retropulsion of the fracture fragment of the L1 vertebral body  into the spinal canal. A superimposed left subarticular protruding component is  present. There is  also a superimposed right subarticular to foraminal protruding  component. There is resultant mild to moderate spinal canal narrowing and severe  bilateral subarticular narrowing. Severe right and mild to moderate left neural  foraminal narrowing is present. The right neural foraminal narrowing has  progressed compared to the prior.  At L2-L3, there is a disc osseous complex and facet hypertrophy. A small amount  of fluid is present in the facet joints. The central canal is mildly narrowed.  Mild bilateral subarticular and neural foraminal narrowing is present. This  appears stable.  At L3-L4, there is a disc osseous complex and facet and ligamentous hypertrophy.  A small amount of fluid is present in the right facet joint. The central canal  is mildly narrowed. Moderate to severe left and mild right neural foraminal  narrowing is present. This appears similar.  At L4-L5, there is a diffuse disc bulge and facet and ligamentous hypertrophy.  Moderate right and mild left subarticular and neural foraminal narrowing is  present. This appears similar.  At L5-S1, there is a disc osseous complex and facet hypertrophy. The ventral  thecal sac is mildly effaced. A focal right foraminal to far lateral disc  osteophyte protrusion is present with moderate to severe right and moderate left  neural foraminal narrowing. This appears stable.        --  IMPRESSION:  1. Redemonstration of a severe compression fracture of L1 with retropulsion of  the fracture fragment into the spinal canal resulting in severe spinal canal  narrowing. Superimposed right subarticular to foraminal protrusion with severe  right subarticular and neural foraminal narrowing, noting progression of the  neural foraminal narrowing compared to the prior examination. Severe left  subarticular and moderate left neural foraminal narrowing appears similar.  2. Dextroscoliosis with degenerative and discogenic disease in the remainder of  the lumbar spine, similar to  the prior, as above.        Assessment/Plan    In summary, Mariya Porras is a 86 y.o. female s/p fall on 11/18/20 with L1 burst fracture. She has been managed in a TLSO brace since her injury, as patient and family did not want surgery. She presents to the hospital now with severe lower back pain with movement, and progressive ambulatory dysfunction. She has no signs of cauda equina syndrome at this time, and overall has stable strength and sensation in her lower extremities. No acute neurosurgical intervention. Xray and MRI appears stable.      - MRI lumbar stable, fracture appears with similar canal stenosis due to retropulsion.  Increase in the R subarticular and neuro foraminal stenosis  - XR lumbar with stable alignment  - Pain control PRN  - PT/OT evals OOB as tolerated  - OK for chemical DVT PPx  - Further family discussion of options today  - Will contact Dr. Langston for possible PRISCILA    Patient seen and examined with NASRIN Freed

## 2021-10-22 NOTE — PROGRESS NOTES
Patient: Mariya Porras  Location:  [place] [address]  MRN:  [id number]  Today's date:  10/22/2021     Concluded session w/ pt in chair. Call bell in reach, alarm set and RN aware of mobility session    Vicki is a 86 y.o. female admitted on 10/18/2021 with Acute exacerbation of chronic low back pain [M54.50, G89.29]  Ambulatory dysfunction [R26.2]. Principal problem is Acute exacerbation of chronic low back pain.    Past Medical History  Vicki has a past medical history of Anxiety, Arthritis, Basal cell carcinoma, Glaucoma, Hypertension, Low back pain, Lumbosacral disc disease, Parkinson's disease (CMS/HCC), and Peripheral neuropathy.    History of Present Illness   86 y.o. female with a past medical history of anxiety, hypertension, low back pain and Parkinson's disease presents after having intractable back pain and ambulatory dysfunction.  She states that her pain has been worsening and she feels unstable on her feet.  She also complains of spasms in her legs that prohibit her from walking and that this has been going on for months. States that it is worse when she first starts moving.  She believes this is related to her Parkinson's.  She is not aware that she sees any specialists, believes that her PCP has been managing.  She has some confusion about which doctors are providing her care.  In the ED blood pressure was 200/100.  She was admitted for further evaluation and treatment of her back pain, ambulatory dysfunction and elevated blood pressure.      PT Vitals    Date/Time Pulse HR Source SpO2 Pt Activity O2 Therapy BP BP Location BP Method Pt Position [facility]   10/22/21 0824 74 Monitor 95 % At rest None (Room air) 190/84 Left upper arm Automatic Lying NWR      PT Pain    Date/Time Pain Type Side/Orientation Location Rating: Rest Rating: Activity Description Interventions [facility]   10/22/21 0824 Pain Assessment generalized back 0 5 aching position adjusted NWR          Prior Living Environment       "Most Recent Value   Current Living Arrangements assisted living facility   Living Environment Comment Pt recently moved to [facility] alone,  continues to live at home. Close to 24/7 aides per EMR. Unable to state further information        Prior Level of Function      Most Recent Value   Dominant Hand right   Ambulation assistive equipment  [states RW]   Transferring assistive equipment   Prior Level of Function Comment Pt is a poor historian. Per EMR, pt with aides daily, close to 24/7. Pt states she uses a RW at home, unable to state level of assistance for transfers/ambulation or ADLs. Pt states she has not been wearing her TLSO brace at home due to inability to tolerate   Assistive Device Currently Used at Home walker, front-wheeled           PT Evaluation and Treatment - 10/22/21 0824        PT Time Calculation    Start Time 0824     Stop Time 0840     Time Calculation (min) 16 min        Session Details    Document Type daily treatment/progress note     Mode of Treatment physical therapy        General Information    Patient Profile Reviewed yes     Onset of Illness/Injury or Date of Surgery 10/18/21     Patient/Family/Caregiver Comments/Observations Spoke to RN; cleared for therapy     General Observations of Patient rec'd sitting up in bed; agreeable     Existing Precautions/Restrictions fall;contact;spinal     Limitations/Impairments safety/cognitive        Cognition/Psychosocial    Affect/Mental Status (Cognition) confused;anxious     Orientation Status (Cognition) oriented x 3     Follows Commands (Cognition) follows one-step commands;75-90% accuracy     Executive Function Deficit (Cognition) moderate deficit;information processing;initiation;insight/awareness of deficits;judgment;planning/decision-making;self-monitoring/self-correction;organization/sequencing     Comment, Cognition needs constant cuing in order to complete task, frequently asking \"what do I do now?\" to therapists.        Bed " Mobility    Garza, Supine to Sit minimum assist (75% or more patient effort);1 person assist     Verbal Cues (Supine to Sit) maintaining precautions;safety     Assistive Device head of bed elevated;bed rails     Comment (Bed Mobility) OOB to R, assist for trunk movements. complaint of pain once upright        Sit to Stand Transfer    Garza, Sit to Stand Transfer minimum assist (75% or more patient effort);1 person assist     Verbal Cues hand placement;technique     Assistive Device walker, front-wheeled     Comment from raised EOB, mild posterior lean. increased time need w/ consistent cuing        Stand to Sit Transfer    Garza, Stand to Sit Transfer minimum assist (75% or more patient effort)     Verbal Cues hand placement;safety;technique     Assistive Device walker, front-wheeled     Comment Fair eccentric control w/ constant cuing        Gait Training    Garza, Gait minimum assist (75% or more patient effort);1 person assist     Assistive Device walker, front-wheeled     Distance in Feet 4 feet     Pattern (Gait) step-to     Deviations/Abnormal Patterns (Gait) aydin decreased;gait speed decreased;step length decreased;stride length decreased;antalgic     Comment (Gait/Stairs) continued forward flexed posture w/ cues to stand more upright. Pt increased pain w/ upright posture.        Safety Issues, Functional Mobility    Safety Issues Affecting Function (Mobility) judgment;problem-solving;safety precaution awareness;sequencing abilities     Impairments Affecting Function (Mobility) balance;cognition;endurance/activity tolerance;pain;postural/trunk control;range of motion (ROM);strength     Cognitive Impairments, Mobility Safety/Performance problem-solving/reasoning;sequencing abilities        Balance    Static Sitting Balance WFL;supported;sitting, edge of bed     Dynamic Sitting Balance mild impairment;supported     Sit to Stand Dynamic Balance moderate impairment;supported      Static Standing Balance mild impairment;supported     Dynamic Standing Balance moderate impairment;supported     Comment, Balance benefits from RW, cues for posture and increased stability w/ body position in AD        AM-PAC (TM) - Mobility (Current Function)    Turning from your back to your side while in a flat bed without using bedrails? 3 - A Little     Moving from lying on your back to sitting on the side of a flat bed without using bedrails? 3 - A Little     Moving to and from a bed to a chair? 3 - A Little     Standing up from a chair using your arms? 3 - A Little     To walk in a hospital room? 2 - A Lot     Climbing 3-5 steps with a railing? 2 - A Lot     AM-PAC (TM) Mobility Score 16        Assessment/Plan (PT)    Daily Outcome Statement Min A x 1 for bed mobility, transfers and very short ambulation w/ RW. limited pain at rest but increases w/ movement. Pt very anxious needing cues frequently for direction of task, safety and technique. Would continue recommendation for SNF vs return to [facility] w/ 24/7 care w/ all mobility tasks     Rehab Potential fair, will monitor progress closely     Therapy Frequency 3-5 times/wk               PT Assessment/Plan      Most Recent Value   PT Recommended Discharge Disposition skilled nursing facility  [vs return to [facility] w/ 24/7 assist w/ all mobility] at 10/22/2021 0824   Anticipated Equipment Needs at Discharge (PT) --  [tbd next level of care] at 10/20/2021 0910   Patient/Family Therapy Goals Statement reduce pain at 10/20/2021 0910                    Education Documentation  Joint Mobility/Strength, taught by Raffy Berkowitz, PT at 10/22/2021 11:35 AM.  Learner: Patient  Readiness: Acceptance  Method: Explanation  Response: Needs Reinforcement  Comment: safety, technique, use of RW, pain tolerance, progress mobility, POC    Home Safety, taught by Raffy Berkowitz, PT at 10/22/2021 11:35 AM.  Learner: Patient  Readiness: Acceptance  Method: Explanation  Response:  Needs Reinforcement  Comment: safety, technique, use of RW, pain tolerance, progress mobility, POC    Assistive/Adaptive Devices, taught by Raffy Berkowitz, PT at 10/22/2021 11:35 AM.  Learner: Patient  Readiness: Acceptance  Method: Explanation  Response: Needs Reinforcement  Comment: safety, technique, use of RW, pain tolerance, progress mobility, POC          PT Goals      Most Recent Value   Bed Mobility Goal 1    Activity/Assistive Device bed mobility activities, all at 10/20/2021 0910   [place] minimum assist (75% or more patient effort) at 10/20/2021 0910   Time Frame by discharge at 10/20/2021 0910   Progress/Outcome goal ongoing at 10/22/2021 0824   Transfer Goal 1    Activity/Assistive Device bed-to-chair/chair-to-bed, sit-to-stand/stand-to-sit, walker, front-wheeled at 10/20/2021 0910   [place] minimum assist (75% or more patient effort) at 10/20/2021 0910   Time Frame by discharge at 10/20/2021 0910   Progress/Outcome goal ongoing at 10/22/2021 0824   Gait Training Goal 1    Activity/Assistive Device assistive device use, gait (walking locomotion), walker, front-wheeled at 10/20/2021 0910   [place] minimum assist (75% or more patient effort) at 10/20/2021 0910   Distance 50 at 10/20/2021 0910   Time Frame by discharge at 10/20/2021 0910   Progress/Outcome goal ongoing at 10/22/2021 0824

## 2021-10-22 NOTE — PROGRESS NOTES
Hospital Medicine Service -  Daily Progress Note       SUBJECTIVE   Interval History: Reports that she feels better.  Pain is still  bad when she tries to move.  OT/PT evaluated her recommended SNF.    Review of system- Negative for fever, cough, shortness of breath, chest pain, palpitations, dizziness, abdominal pain, nausea, vomiting, constipation, diarrhea, i leg swelling, confusion, loss of consciousness.      Spoke to patient's  over the phone.  Updates given questions answered.   OBJECTIVE      Vital signs in last 24 hours:  Temp:  [36.3 °C (97.3 °F)-36.9 °C (98.4 °F)] 36.8 °C (98.3 °F)  Heart Rate:  [52-85] 71  Resp:  [16-18] 18  BP: (126-154)/(62-80) 143/71    Intake/Output Summary (Last 24 hours) at 10/21/2021 6138  Last data filed at 10/21/2021 1959  Gross per 24 hour   Intake --   Output 775 ml   Net -775 ml       PHYSICAL EXAMINATION        General exam : appears age stated, well nourished, not in distress  Head: atraumatic, normocephalic  Eyes : PERRLA, EOMI, no pallor, no icterus  ENT: no lesions, oropharynx pink, mucous membranes moist   Neck: supple, no Lymph nodes, no Thyromegaly  CVS : normal rate, normal rhythm, S1 and S2 heard, no murmurs, rubs or gallops  Resp:normal accessory muscle usage, clear to auscultation Bilaterally  Abdomen : soft, non tender, BS +, no organomegaly   Extremities : no edema, no cyanosis   MSK: no DJD, no joint swellings, no joint tenderness   Skin: intact, warm, no rash  Neuro: AAO x3.  Mild memory lapse  Psych: normal mood.cooperative   LINES, CATHETERS, DRAINS, AIRWAYS, AND WOUNDS   Lines, Drains, and Airways:  Wounds (agree with documentation and present on admission):  Peripheral IV (Adult) 10/18/21 Left Antecubital (Active)   Number of days: 1        LABS / IMAGING / TELE      Labs  Lab Results   Component Value Date    WBC 5.66 10/19/2021    HGB 13.5 10/19/2021    HCT 41.8 10/19/2021    MCV 92.3 10/19/2021     10/19/2021     Lab Results    Component Value Date    GLUCOSE 156 (H) 10/19/2021    CALCIUM 10.5 (H) 10/19/2021     10/19/2021    K 4.0 10/19/2021    CO2 23 10/19/2021     10/19/2021    BUN 18 10/19/2021    CREATININE 0.6 10/19/2021       SARS-CoV-2 (COVID-19) (no units)   Date/Time Value   10/18/2021 2152 Negative       Imaging  Xray LS spine- no fracture    MRI spine- . Redemonstration of a severe compression fracture of L1 with retropulsion of the fracture fragment into the spinal canal resulting in severe spinal canal narrowing. Superimposed right subarticular to foraminal protrusion with severe  right subarticular and neural foraminal narrowing, noting progression of the  neural foraminal narrowing compared to the prior examination. Severe left  subarticular and moderate left neural foraminal narrowing appears similar.    ECG/Telemetry  reviewed    ASSESSMENT AND PLAN      Hypertension  Assessment & Plan  May be related to pain or anxiety  Continue Metoprolol   Pain control    Parkinson's disease (CMS/[facility])  Assessment & Plan  Patient with history of Parkinson's disease- stable  Continue Sinemet  Neurology recommendations appreciated    Vulvodynia  Assessment & Plan  Patient with history of vulvodynia  Patient complains of burning while peeing  She states that it feels like she has a UTI  UA positive for UTI- started on Cefuroxime    * Acute exacerbation of chronic low back pain  Assessment & Plan  Patient complains of chronic low back pain  She is scheduled for an epidural in November  States the pain has been worsening and she feels unstable on her feet  X-ray does not show acute abnormality  PT/OT  Pain control  Reports that pain in intolerable when they try to move her.  Also has some numb feeling over left foot.   MRI LS spine -.severe compression fracture of L1 with retropulsion of the fracture fragment which is stable but severe right subarticular and neural foraminal narrowing, progression of the neural foraminal  narrowing compared to the prior examination.   Neurosurgery reviewed the scan and advised scoliosis Xray and decision based on that image.    Diagnosis, management and plan of care discussed with patient and her  in detail.      VTE Assessment: Padua    VTE Prophylaxis:  Current anticoagulants:  heparin (porcine) 5,000 unit/mL injection 5,000 Units, subcutaneous, q8h ERMA      Code Status: Full Code      Estimated Discharge Date: 10/22/2021  Disposition Planning: recommending SNF.     Meredith Christopher MD  10/21/2021

## 2021-10-22 NOTE — PROGRESS NOTES
Patient:  Mariya Porras  Location:  [place] [address]  MRN:  [id number]  Today's date:  10/22/2021     Pt left sitting in chair with call bell in reach and chair alarm on.      Vicki is a 86 y.o. female admitted on 10/18/2021 with Acute exacerbation of chronic low back pain [M54.50, G89.29]  Ambulatory dysfunction [R26.2]. Principal problem is Acute exacerbation of chronic low back pain.    Past Medical History  Vicki has a past medical history of Anxiety, Arthritis, Basal cell carcinoma, Glaucoma, Hypertension, Low back pain, Lumbosacral disc disease, Parkinson's disease (CMS/HCC), and Peripheral neuropathy.    History of Present Illness   86 y.o. female with a past medical history of anxiety, hypertension, low back pain and Parkinson's disease presents after having intractable back pain and ambulatory dysfunction.  She states that her pain has been worsening and she feels unstable on her feet.  She also complains of spasms in her legs that prohibit her from walking and that this has been going on for months. States that it is worse when she first starts moving.  She believes this is related to her Parkinson's.  She is not aware that she sees any specialists, believes that her PCP has been managing.  She has some confusion about which doctors are providing her care.  In the ED blood pressure was 200/100.  She was admitted for further evaluation and treatment of her back pain, ambulatory dysfunction and elevated blood pressure.      OT Vitals    Date/Time Pulse HR Source SpO2 Pt Activity O2 Therapy BP BP Location BP Method Pt Position [facility]   10/22/21 0824 74 Monitor 95 % At rest None (Room air) 190/84 Left upper arm Automatic Lying NWR      OT Pain    Date/Time Pain Type Side/Orientation Location Rating: Rest Rating: Activity Description Interventions [facility]   10/22/21 0824 Pain Assessment generalized back 0 5 aching position adjusted NWR          Prior Living Environment      Most Recent Value   Current  Living Arrangements assisted living facility   Living Environment Comment Pt recently moved to [facility] alone,  continues to live at home. Close to 24/7 aides per EMR. Unable to state further information        Prior Level of Function      Most Recent Value   Dominant Hand right   Ambulation assistive equipment  [states RW]   Transferring assistive equipment   Prior Level of Function Comment Pt is a poor historian. Per EMR, pt with aides daily, close to 24/7. Pt states she uses a RW at home, unable to state level of assistance for transfers/ambulation or ADLs. Pt states she has not been wearing her TLSO brace at home due to inability to tolerate   Assistive Device Currently Used at Home walker, front-wheeled        Occupational Profile      Most Recent Value   Reason for Services/Referral impiared ADLs /functional mobility   Successful Occupations retired   Patient Goals get stronger           OT Evaluation and Treatment - 10/22/21 0823        OT Time Calculation    Start Time 0823     Stop Time 0840     Time Calculation (min) 17 min        Session Details    Document Type daily treatment/progress note     Mode of Treatment occupational therapy        General Information    Patient Profile Reviewed yes     General Observations of Patient recd supine in bed, consent with OT     Existing Precautions/Restrictions fall;contact;spinal     Limitations/Impairments safety/cognitive        Cognition/Psychosocial    Affect/Mental Status (Cognition) anxious;confused     Orientation Status (Cognition) oriented x 3     Follows Commands (Cognition) follows one-step commands;75-90% accuracy;increased processing time needed;repetition of directions required;unable/difficult to assess     Cognitive Function executive function deficit     Executive Function Deficit (Cognition) moderate deficit;insight/awareness of deficits;judgment;organization/sequencing;planning/decision-making;problem-solving/reasoning        Bed  Mobility    [place], Supine to Sit minimum assist (75% or more patient effort)     Verbal Cues (Supine to Sit) hand placement;technique     Assistive Device bed rails;head of bed elevated     Comment (Bed Mobility) educated on log roll tech        Sit to Stand Transfer    [place], Sit to Stand Transfer minimum assist (75% or more patient effort)     Verbal Cues hand placement;technique     Assistive Device walker, front-wheeled     Comment posterior lean with standing , assistance to shift weight forward        Stand to Sit Transfer    [place], Stand to Sit Transfer minimum assist (75% or more patient effort)     Verbal Cues hand placement;proper use of assistive device;safety     Assistive Device walker, front-wheeled     Comment decreased eccentric control, fear of falling with functional mob and transfers.        Toilet Transfer    [place], Toilet Transfer minimum assist (75% or more patient effort)     Verbal Cues hand placement;safety;proper use of assistive device     Assistive Device commode, 3-in-1     Comment decreased eccentric control, fear of alling        Safety Issues, Functional Mobility    Safety Issues Affecting Function (Mobility) insight into deficits/self-awareness;awareness of need for assistance;judgment;positioning of assistive device;problem-solving;sequencing abilities     Impairments Affecting Function (Mobility) balance;cognition;endurance/activity tolerance;pain;postural/trunk control;strength     Cognitive Impairments, Mobility Safety/Performance awareness, need for assistance;impulsivity;insight into deficits/self-awareness;judgment;problem-solving/reasoning        Balance    Static Sitting Balance unsupported;sitting, edge of bed;WFL     Dynamic Sitting Balance moderate impairment;unsupported;sitting, edge of bed     Sit to Stand Dynamic Balance moderate impairment;supported;standing     Static Standing Balance supported;standing;mild impairment        Upper Body  Dressing    Tasks don;hospital gown     Story moderate assist (50-74% patient effort)     Position unsupported sitting     Comment self limiting        Lower Body Dressing    Tasks don;socks     Story dependent (less than 25% patient effort)     Position supine     Comment pt reports she has assistance at baseline        Toileting    Story adjust/manage clothing;perform bladder hygiene;maximum assist (25-49% patient effort)     Position supported sitting;supported standing     Comment assistance needed for CM and bladder hygiene and balance in standing.  Pt fearfull of falling while standing with ADLs.        BADL Safety/Performance    Impairments, BADL Safety/Performance balance;cognition;endurance/activity tolerance;strength;trunk/postural control;motor planning     Cognitive Impairments, BADL Safety/Performance attention;awareness, need for assistance;impulsivity;judgment;problem-solving/reasoning     Skilled BADL Treatment/Intervention BADL process/adaptation training;cognitive/safety deficit modifications;compensatory training;energy conservation;hand-over-hand training/cues        ADL Interventions    Energy Conservation Techniques activity pacing encouraged;activity adapted to sitting;correct body mechanics utilized        AM-PAC (TM) - ADL (Current Function)    Putting on and taking off regular lower body clothing? 1 - Total     Bathing? 2 - A Lot     Toileting? 2 - A Lot     Putting on/taking off regular upper body clothing? 2 - A Lot     How much help for taking care of personal grooming? 3 - A Little     Eating meals? 3 - A Little     AM-PAC (TM) ADL Score 13        Assessment/Plan (OT)    Daily Outcome Statement OT treatment session completed. Pt with improved bed mob and transfer to chair today. pt min A with sit to stand from bed and commode.  Vicki is fearful of falling and demos posterior lean when standing requiring Abbe and constant vcs with functional mob and transfers.  cont  OT services to maximize ind with ADLs.     Rehab Potential fair, will monitor progress closely               OT Assessment/Plan      Most Recent Value   OT Recommended Discharge Disposition skilled nursing facility  [vs return to 24 hour care with home OT] at 10/22/2021 0823   Anticipated Equipment Needs At Discharge (OT) other (see comments)  [TBD] at 10/20/2021 0911   Patient/Family Therapy Goal Statement get stronger at 10/20/2021 0911                         OT Goals      Most Recent Value   Transfer Goal 1    Activity/Assistive Device toilet at 10/20/2021 0911   [place] supervision required at 10/20/2021 0911   Time Frame by discharge at 10/20/2021 0911   Progress/Outcome goal ongoing at 10/22/2021 0823   Dressing Goal 1    Activity/Adaptive Equipment dressing skills, all at 10/20/2021 0911   [place] supervision required at 10/20/2021 0911   Time Frame by discharge at 10/20/2021 0911   Progress/Outcome goal ongoing at 10/22/2021 0823   Grooming Goal 1    Activity/Assistive Device grooming skills, all at 10/20/2021 0911   Mcalester set-up required, supervision required at 10/20/2021 0911   Time Frame by discharge at 10/20/2021 0911   Progress/Outcome goal ongoing at 10/20/2021 0911

## 2021-10-22 NOTE — PROGRESS NOTES
Patient seen and examined.  She reports worsening stiffness in her lower extremities but denies any stiffness in her upper extremities.  She denies any shaking of her upper extremities.  No other neurological complaints were offered.    I have reviewed her vital signs, labs and medication list in the chart.    Neurologic examination:  The patient is awake, alert and interactive.  She is able to follow commands and answer questions appropriately.  There is no aphasia or dysarthria.  On cranial nerve examination, pupils are 2 mm bilateral round and reactive to light and accommodation, visual fields are full to confrontation bilaterally.  Extraocular movements are intact.  There is no facial asymmetry, hearing is intact bilaterally to normal conversation volume, tongue protrudes midline and shoulder shrugs are full bilaterally.  She is able to move all 4 extremities spontaneously and against gravity. There is no drift or involuntary movement noted.  Coordination is intact by finger-to-nose testing bilaterally.    Medical records were reviewed.    Impression:  1.  Parkinson's disease  2.  Lumbar radiculopathy  3.  Hypertension  4.  Ambulatory dysfunction    Recommendations:  1.  Please continue with Sinemet  mg tablets, 1 tablet 4 times a day.  2.  Please follow fall precautions.  3.  Please continue with lidocaine 4% patch for lumbar radiculopathy.  4.  No further inpatient neurologic work-up is indicated or recommended at this time.  5.  Rest of the management can continue per the primary team.  6.  She will benefit from outpatient follow-up with neurology (Francisco Oquendo MD, PhD, [facility], office phone no. [phone]) 1-2 months after discharge from the hospital.    Discussed with the patient.    We shall follow her as needed.

## 2021-10-22 NOTE — PLAN OF CARE
Problem: Adult Inpatient Plan of Care  Goal: Plan of Care Review  Flowsheets (Taken 10/22/2021 1101)  Plan of Care Reviewed With:  • patient  • spouse  • son   CM met with the patient to provide update on the plan of care.  TONY spoke with the patient's , John Fish and son, Jose Roberto, on the speaker phone regarding plan of care and request for SNF choices.   is requesting [facility] in [place] but does not want his wife to leave the hospital until she gets an epidural injection.  TONY notified. Dr. Christopher of the 's request.  [employer] referral initiated.  PT/OT notes pending.  VRE isolation remains.  Herminia AMIN updated.  Reagan Saavedra RN    1200:  TONY met with the patient's son, Jose Roberto and daughter, Tiffanie, who is requesting a referral to [facility].  Both state that they spoke with Dr. Zamora about surgical interventions versus epidural injections.  Dr. Christopher and Herminia AMIN notified.  Reagan Saavedra RN

## 2021-10-22 NOTE — PLAN OF CARE
Plan of Care Review  Plan of Care Reviewed With: patient  Progress: no change  Outcome Summary: Pt is AAOx2-3, forgetful.  Pt appeared to sleep well overnight.  Voids to commode with an assist of 2.  VSS.  Bed alarm set, call bell within reach, bed in low position.

## 2021-10-22 NOTE — PROGRESS NOTES
PMR      Patient does not meet criteria for acute rehab.  Social service to continue to look for SNF placemant

## 2021-10-22 NOTE — PATIENT CARE CONFERENCE
Care Progression Rounds Note  Date: 10/22/2021  Time: 7:10 PM     Patient Name: Mariya Porras     Medical Record Number: [id number]   YOB: 1935  Sex: Female      Room/Bed: 0417    Admitting Diagnosis: Acute exacerbation of chronic low back pain [M54.50, G89.29]  Ambulatory dysfunction [R26.2]   Admit Date/Time: 10/18/2021  5:16 PM    Primary Diagnosis: Acute exacerbation of chronic low back pain  Principal Problem: Acute exacerbation of chronic low back pain    GMLOS: pending  Anticipated Discharge Date: 10/23/2021    AM-PAC:  Mobility Score: 16    Discharge Planning:  Current Living Arrangements: assisted living facility  Anticipated Discharge Disposition: assisted living facility    Barriers to Discharge:  Barriers to Discharge: Consultant recommendations pending  Comment:  wants epidural injection prior to dc to snf    Participants:  nursing,physician,

## 2021-10-23 PROBLEM — N30.00 ACUTE CYSTITIS WITHOUT HEMATURIA: Status: ACTIVE | Noted: 2018-07-16

## 2021-10-23 LAB — SARS-COV-2 RNA RESP QL NAA+PROBE: NEGATIVE

## 2021-10-23 PROCEDURE — 99232 SBSQ HOSP IP/OBS MODERATE 35: CPT | Performed by: STUDENT IN AN ORGANIZED HEALTH CARE EDUCATION/TRAINING PROGRAM

## 2021-10-23 PROCEDURE — 63600000 HC DRUGS/DETAIL CODE: Performed by: STUDENT IN AN ORGANIZED HEALTH CARE EDUCATION/TRAINING PROGRAM

## 2021-10-23 PROCEDURE — U0002 COVID-19 LAB TEST NON-CDC: HCPCS | Performed by: STUDENT IN AN ORGANIZED HEALTH CARE EDUCATION/TRAINING PROGRAM

## 2021-10-23 PROCEDURE — 63700000 HC SELF-ADMINISTRABLE DRUG: Performed by: STUDENT IN AN ORGANIZED HEALTH CARE EDUCATION/TRAINING PROGRAM

## 2021-10-23 PROCEDURE — 21400000 HC ROOM AND CARE CCU/INTERMEDIATE

## 2021-10-23 RX ORDER — AMOXICILLIN 250 MG
2 CAPSULE ORAL 2 TIMES DAILY
Status: DISCONTINUED | OUTPATIENT
Start: 2021-10-23 | End: 2021-10-24 | Stop reason: HOSPADM

## 2021-10-23 RX ORDER — AMOXICILLIN 250 MG
1 CAPSULE ORAL 2 TIMES DAILY
Status: DISCONTINUED | OUTPATIENT
Start: 2021-10-23 | End: 2021-10-23

## 2021-10-23 RX ORDER — ACETAMINOPHEN 325 MG/1
975 TABLET ORAL EVERY 6 HOURS
Status: DISCONTINUED | OUTPATIENT
Start: 2021-10-23 | End: 2021-10-24 | Stop reason: HOSPADM

## 2021-10-23 RX ORDER — GABAPENTIN 100 MG/1
100 CAPSULE ORAL 3 TIMES DAILY
Status: DISCONTINUED | OUTPATIENT
Start: 2021-10-23 | End: 2021-10-24 | Stop reason: HOSPADM

## 2021-10-23 RX ADMIN — GABAPENTIN 100 MG: 100 CAPSULE ORAL at 20:31

## 2021-10-23 RX ADMIN — CARBIDOPA AND LEVODOPA 1 TABLET: 25; 100 TABLET ORAL at 16:37

## 2021-10-23 RX ADMIN — MAGNESIUM OXIDE TAB 400 MG (241.3 MG ELEMENTAL MG) 400 MG: 400 (241.3 MG) TAB at 08:25

## 2021-10-23 RX ADMIN — CARBIDOPA AND LEVODOPA 1 TABLET: 25; 100 TABLET ORAL at 12:23

## 2021-10-23 RX ADMIN — SERTRALINE HYDROCHLORIDE 50 MG: 50 TABLET ORAL at 08:25

## 2021-10-23 RX ADMIN — KETOROLAC TROMETHAMINE 15 MG: 15 INJECTION, SOLUTION INTRAMUSCULAR; INTRAVENOUS at 16:56

## 2021-10-23 RX ADMIN — LATANOPROST 1 DROP: 50 SOLUTION OPHTHALMIC at 21:30

## 2021-10-23 RX ADMIN — LIDOCAINE 1 PATCH: 4 PATCH TOPICAL at 08:25

## 2021-10-23 RX ADMIN — POLYETHYLENE GLYCOL (3350) 17 G: 17 POWDER, FOR SOLUTION ORAL at 16:37

## 2021-10-23 RX ADMIN — CARBIDOPA AND LEVODOPA 1 TABLET: 25; 100 TABLET ORAL at 20:32

## 2021-10-23 RX ADMIN — Medication 3 MG: at 21:31

## 2021-10-23 RX ADMIN — METOPROLOL TARTRATE 25 MG: 25 TABLET, FILM COATED ORAL at 08:24

## 2021-10-23 RX ADMIN — SERTRALINE HYDROCHLORIDE 50 MG: 50 TABLET ORAL at 20:31

## 2021-10-23 RX ADMIN — HEPARIN SODIUM 5000 UNITS: 5000 INJECTION INTRAVENOUS; SUBCUTANEOUS at 14:52

## 2021-10-23 RX ADMIN — METOPROLOL TARTRATE 25 MG: 25 TABLET, FILM COATED ORAL at 20:32

## 2021-10-23 RX ADMIN — ACETAMINOPHEN 975 MG: 325 TABLET, FILM COATED ORAL at 18:50

## 2021-10-23 RX ADMIN — CEFUROXIME AXETIL 500 MG: 250 TABLET ORAL at 08:24

## 2021-10-23 RX ADMIN — HEPARIN SODIUM 5000 UNITS: 5000 INJECTION INTRAVENOUS; SUBCUTANEOUS at 21:31

## 2021-10-23 RX ADMIN — DOCUSATE SODIUM AND SENNOSIDES 2 TABLET: 8.6; 5 TABLET, FILM COATED ORAL at 20:31

## 2021-10-23 RX ADMIN — HEPARIN SODIUM 5000 UNITS: 5000 INJECTION INTRAVENOUS; SUBCUTANEOUS at 05:27

## 2021-10-23 RX ADMIN — CEFUROXIME AXETIL 500 MG: 250 TABLET ORAL at 20:31

## 2021-10-23 RX ADMIN — MIRTAZAPINE 30 MG: 30 TABLET, FILM COATED ORAL at 21:30

## 2021-10-23 RX ADMIN — CARBIDOPA AND LEVODOPA 1 TABLET: 25; 100 TABLET ORAL at 08:25

## 2021-10-23 RX ADMIN — DOCUSATE SODIUM AND SENNOSIDES 2 TABLET: 8.6; 5 TABLET, FILM COATED ORAL at 10:12

## 2021-10-23 NOTE — DISCHARGE INSTR - APPOINTMENTS
Pt [facility] November 10,2021 at 10am. Pt will need transportation arranged by [facility].

## 2021-10-23 NOTE — PROGRESS NOTES
Hospital Medicine Service -  Daily Progress Note       SUBJECTIVE   Interval History: Feels okay today.  Pain controlled but has not been out of bed.  Spoke with patient's family answered all questions.  Still inquiring about inpatient epidural injection, discussed this cannot be done in the inpatient setting, confirmed with pain management     OBJECTIVE      Vital signs in last 24 hours:  Temp:  [36.6 °C (97.8 °F)-36.8 °C (98.2 °F)] 36.8 °C (98.2 °F)  Heart Rate:  [59-72] 72  Resp:  [18] 18  BP: (129-163)/(61-82) 163/72    Intake/Output Summary (Last 24 hours) at 10/23/2021 1923  Last data filed at 10/23/2021 1850  Gross per 24 hour   Intake --   Output 2150 ml   Net -2150 ml       PHYSICAL EXAMINATION      Physical Exam  HENT:      Head: Normocephalic.      Nose: Nose normal.      Mouth/Throat:      Mouth: Mucous membranes are dry.      Pharynx: Oropharynx is clear.   Eyes:      Conjunctiva/sclera: Conjunctivae normal.   Cardiovascular:      Rate and Rhythm: Normal rate and regular rhythm.      Pulses: Normal pulses.      Heart sounds: Normal heart sounds.   Pulmonary:      Effort: Pulmonary effort is normal.      Breath sounds: Normal breath sounds.   Abdominal:      General: Bowel sounds are normal. There is no distension.      Palpations: Abdomen is soft.      Tenderness: There is no abdominal tenderness. There is no guarding.   Musculoskeletal:         General: Tenderness present.   Skin:     General: Skin is warm and dry.      Capillary Refill: Capillary refill takes less than 2 seconds.   Neurological:      General: No focal deficit present.      Mental Status: She is alert and oriented to person, place, and time. Mental status is at baseline.   Psychiatric:         Mood and Affect: Mood normal.         Behavior: Behavior normal.            LINES, CATHETERS, DRAINS, AIRWAYS, AND WOUNDS   Lines, Drains, and Airways:  Wounds (agree with documentation and present on admission):  Peripheral IV (Adult)  10/18/21 Left Antecubital (Active)   Number of days: 5         Comments:      LABS / IMAGING / TELE      Labs  I have reviewed the patient's pertinent labs. Pertinent labs are within normal limits.    SARS-CoV-2 (COVID-19) (no units)   Date/Time Value   10/23/2021 1825 Negative       Imaging  I have independently reviewed the patient's pertinent imaging for this hospital visit.     ECG/Telemetry  I have independently reviewed the telemetry. No events for the last 24 hours.    ASSESSMENT AND PLAN      Hypertension  Assessment & Plan  Continue Metoprolol   Pain control    Parkinson's disease (CMS/[facility])  Assessment & Plan  Patient with history of Parkinson's disease- stable  Continue Sinemet  Neurology recommendations appreciated  Outpatient follow up with them    Acute cystitis without hematuria  Assessment & Plan  cont Cefuroxime    * Acute exacerbation of chronic low back pain  Assessment & Plan  Patient complains of chronic low back pain  She is scheduled for an epidural in November States the pain has been worsening and she feels unstable on her feet  X-ray does not show acute abnormality  PT/OT  Pain control    MRI LS spine -.severe compression fracture of L1 with retropulsion of the fracture fragment which is stable but severe right subarticular and neural foraminal narrowing, progression of the neural foraminal narrowing compared to the prior examination.   Neurosurgery reviewed the MRI scan and advised scoliosis Xray -showed stable alignment  She is stable for discharge  Family and patient requesting epidural injection before discharge- unavailable in inpatient setting, discussed this with patient and family  Spoke with pain management today, Dr. Bal's office will contact family Monday to arrange close outpatient f/u, recommending adding gabapentin 100mg TID and RTC Tylenol, will start  DC planning to SNF       VTE Assessment: Padua    VTE Prophylaxis:  Current anticoagulants:  heparin (porcine) 5,000  unit/mL injection 5,000 Units, subcutaneous, q8h ERMA      Code Status: Full Code      Estimated Discharge Date: 10/24/2021     Disposition Planning: SACHA Ferguson DO  10/23/2021

## 2021-10-23 NOTE — PLAN OF CARE
Patient has had minimal pain today in her back; patient going to [facility] later today for additional rehab.

## 2021-10-23 NOTE — PROGRESS NOTES
Hospital Medicine Service -  Daily Progress Note       SUBJECTIVE   Interval History: Reports that she feels better. Painful when she tries to move. OT/PT evaluated her recommended SNF. Physical medicine and rehab for [facility] consulted and she does not meet the criteria for acute Rehab. Informed family.     Review of system- Negative for fever, cough, shortness of breath, chest pain, palpitations, dizziness, abdominal pain, nausea, vomiting, constipation, diarrhea, i leg swelling, confusion, loss of consciousness.      Spoke to patient's  over the phone.  Updates given questions answered.   OBJECTIVE      Vital signs in last 24 hours:  Temp:  [36.3 °C (97.4 °F)-36.8 °C (98.3 °F)] 36.6 °C (97.8 °F)  Heart Rate:  [56-89] 68  Resp:  [16-18] 18  BP: (139-190)/(71-84) 155/71    Intake/Output Summary (Last 24 hours) at 10/22/2021 8457  Last data filed at 10/22/2021 0642  Gross per 24 hour   Intake --   Output 730 ml   Net -730 ml       PHYSICAL EXAMINATION        General exam : appears age stated, well nourished, not in distress  Head: atraumatic, normocephalic  Eyes : PERRLA, EOMI, no pallor, no icterus  ENT: no lesions, oropharynx pink, mucous membranes moist   Neck: supple, no Lymph nodes, no Thyromegaly  CVS : normal rate, normal rhythm, S1 and S2 heard, no murmurs, rubs or gallops  Resp:normal accessory muscle usage, clear to auscultation Bilaterally  Abdomen : soft, non tender, BS +, no organomegaly   Extremities : no edema, no cyanosis   MSK: no DJD, no joint swellings, no joint tenderness   Skin: intact, warm, no rash  Neuro: AAO x3.  Mild memory lapse  Psych: normal mood.cooperative   LINES, CATHETERS, DRAINS, AIRWAYS, AND WOUNDS   Lines, Drains, and Airways:  Wounds (agree with documentation and present on admission):  Peripheral IV (Adult) 10/18/21 Left Antecubital (Active)   Number of days: 1        LABS / IMAGING / TELE      Labs  Lab Results   Component Value Date    WBC 5.66 10/19/2021     HGB 13.5 10/19/2021    HCT 41.8 10/19/2021    MCV 92.3 10/19/2021     10/19/2021     Lab Results   Component Value Date    GLUCOSE 156 (H) 10/19/2021    CALCIUM 10.5 (H) 10/19/2021     10/19/2021    K 4.0 10/19/2021    CO2 23 10/19/2021     10/19/2021    BUN 18 10/19/2021    CREATININE 0.6 10/19/2021       SARS-CoV-2 (COVID-19) (no units)   Date/Time Value   10/18/2021 2152 Negative       Imaging  Xray LS spine- no fracture    MRI spine- . Redemonstration of a severe compression fracture of L1 with retropulsion of the fracture fragment into the spinal canal resulting in severe spinal canal narrowing. Superimposed right subarticular to foraminal protrusion with severe  right subarticular and neural foraminal narrowing, noting progression of the  neural foraminal narrowing compared to the prior examination. Severe left  subarticular and moderate left neural foraminal narrowing appears similar.    ECG/Telemetry  reviewed    ASSESSMENT AND PLAN      Hypertension  Assessment & Plan  May be related to pain or anxiety  Continue Metoprolol   Pain control    Parkinson's disease (CMS/[facility])  Assessment & Plan  Patient with history of Parkinson's disease- stable  Continue Sinemet  Neurology recommendations appreciated  Outpatient follow up with them    Vulvodynia  Assessment & Plan  Patient with history of vulvodynia  Patient complains of burning while peeing  She states that it feels like she has a UTI  UA positive for UTI- started on Cefuroxime    * Acute exacerbation of chronic low back pain  Assessment & Plan  Patient complains of chronic low back pain  She is scheduled for an epidural in November  States the pain has been worsening and she feels unstable on her feet  X-ray does not show acute abnormality  PT/OT  Pain control  Reports that pain in intolerable when they try to move her.  Also has some numb feeling over left foot.   MRI LS spine -.severe compression fracture of L1 with retropulsion of the  fracture fragment which is stable but severe right subarticular and neural foraminal narrowing, progression of the neural foraminal narrowing compared to the prior examination.   Neurosurgery reviewed the MRI scan and advised scoliosis Xray -showed stable alignment  She is stable for discharge  Family and patient requesting epidural injection before discharge- unavailable in inpatient setting  Pain management contacted- [phone]- for scheduling (also spoke to Dr. Marina Bal and gave patient details)  Now for placement     Diagnosis, management and plan of care discussed with patient and her  in detail.      VTE Assessment: Padua    VTE Prophylaxis:  Current anticoagulants:  heparin (porcine) 5,000 unit/mL injection 5,000 Units, subcutaneous, q8h ERMA      Code Status: Full Code      Estimated Discharge Date: 10/22/2021  Disposition Planning: recommending SNF     Meredith Christopher MD  10/22/2021

## 2021-10-23 NOTE — PROGRESS NOTES
Pt stable for dc to snf.spoke with  who told me about an apt with dr nargis araiza in [facility] for nov 10 at 10am.i spoke with indu at [facility] and the facility is able to transport pt on that date.arranged for 4pm transport with [facility] but they were not able to pick her up until after midnight.she also needs another covid test.dr reynolds aware of transport time and need for new covid testing.i have now scheduled pt to be trsnsported by [facility] ambulance for 10am tomorrow.family not able to transport pt to facility. aware of new time and date.i gave floor nurse phone number to call report.

## 2021-10-24 VITALS
SYSTOLIC BLOOD PRESSURE: 173 MMHG | RESPIRATION RATE: 18 BRPM | DIASTOLIC BLOOD PRESSURE: 77 MMHG | HEIGHT: 66 IN | OXYGEN SATURATION: 93 % | WEIGHT: 145 LBS | BODY MASS INDEX: 23.3 KG/M2 | HEART RATE: 69 BPM | TEMPERATURE: 98 F

## 2021-10-24 PROBLEM — K64.9 HEMORRHOIDS: Chronic | Status: ACTIVE | Noted: 2021-10-24

## 2021-10-24 PROBLEM — N30.00 ACUTE CYSTITIS WITHOUT HEMATURIA: Status: RESOLVED | Noted: 2018-07-16 | Resolved: 2021-10-24

## 2021-10-24 PROBLEM — N30.90 CYSTITIS: Status: ACTIVE | Noted: 2018-07-16

## 2021-10-24 PROCEDURE — 63700000 HC SELF-ADMINISTRABLE DRUG: Performed by: STUDENT IN AN ORGANIZED HEALTH CARE EDUCATION/TRAINING PROGRAM

## 2021-10-24 PROCEDURE — 63600000 HC DRUGS/DETAIL CODE: Performed by: STUDENT IN AN ORGANIZED HEALTH CARE EDUCATION/TRAINING PROGRAM

## 2021-10-24 PROCEDURE — 99239 HOSP IP/OBS DSCHRG MGMT >30: CPT | Performed by: STUDENT IN AN ORGANIZED HEALTH CARE EDUCATION/TRAINING PROGRAM

## 2021-10-24 RX ORDER — ACETAMINOPHEN 500 MG
1000 TABLET ORAL EVERY 8 HOURS
Qty: 180 TABLET | Refills: 0
Start: 2021-10-24 | End: 2021-11-23

## 2021-10-24 RX ORDER — OXYCODONE HYDROCHLORIDE 5 MG/1
5 TABLET ORAL EVERY 6 HOURS PRN
Qty: 20 TABLET | Refills: 0 | Status: SHIPPED | OUTPATIENT
Start: 2021-10-24 | End: 2021-10-29

## 2021-10-24 RX ORDER — AMOXICILLIN 250 MG
2 CAPSULE ORAL NIGHTLY
Qty: 60 TABLET | Refills: 0
Start: 2021-10-24 | End: 2021-11-27 | Stop reason: ENTERED-IN-ERROR

## 2021-10-24 RX ORDER — GABAPENTIN 100 MG/1
100 CAPSULE ORAL 3 TIMES DAILY
Qty: 90 CAPSULE | Refills: 0
Start: 2021-10-24 | End: 2021-11-27 | Stop reason: ENTERED-IN-ERROR

## 2021-10-24 RX ORDER — LIDOCAINE 560 MG/1
1 PATCH PERCUTANEOUS; TOPICAL; TRANSDERMAL DAILY
Qty: 30 PATCH | Refills: 0
Start: 2021-10-24 | End: 2021-11-27 | Stop reason: ENTERED-IN-ERROR

## 2021-10-24 RX ORDER — HYDROCORTISONE 25 MG/G
CREAM TOPICAL 2 TIMES DAILY
Qty: 28 G | Refills: 0
Start: 2021-10-24 | End: 2021-11-27 | Stop reason: ENTERED-IN-ERROR

## 2021-10-24 RX ADMIN — ACETAMINOPHEN 975 MG: 325 TABLET, FILM COATED ORAL at 05:42

## 2021-10-24 RX ADMIN — GABAPENTIN 100 MG: 100 CAPSULE ORAL at 08:29

## 2021-10-24 RX ADMIN — HEPARIN SODIUM 5000 UNITS: 5000 INJECTION INTRAVENOUS; SUBCUTANEOUS at 05:43

## 2021-10-24 RX ADMIN — SERTRALINE HYDROCHLORIDE 50 MG: 50 TABLET ORAL at 08:29

## 2021-10-24 RX ADMIN — METOPROLOL TARTRATE 25 MG: 25 TABLET, FILM COATED ORAL at 08:28

## 2021-10-24 RX ADMIN — CARBIDOPA AND LEVODOPA 1 TABLET: 25; 100 TABLET ORAL at 13:03

## 2021-10-24 RX ADMIN — LIDOCAINE 1 PATCH: 4 PATCH TOPICAL at 08:29

## 2021-10-24 RX ADMIN — DOCUSATE SODIUM AND SENNOSIDES 2 TABLET: 8.6; 5 TABLET, FILM COATED ORAL at 08:28

## 2021-10-24 RX ADMIN — CARBIDOPA AND LEVODOPA 1 TABLET: 25; 100 TABLET ORAL at 08:29

## 2021-10-24 RX ADMIN — MAGNESIUM OXIDE TAB 400 MG (241.3 MG ELEMENTAL MG) 400 MG: 400 (241.3 MG) TAB at 08:29

## 2021-10-24 RX ADMIN — ACETAMINOPHEN 975 MG: 325 TABLET, FILM COATED ORAL at 13:04

## 2021-10-24 NOTE — NURSING NOTE
Patient transferred to an outpatient rehab; report given to Niya; patient's son Jose Roberto called with frequent updates and about when patient left via ambulance; patient and her belongs were transferred via ambulance to [facility],

## 2021-10-24 NOTE — DISCHARGE INSTRUCTIONS
Pt needs to be transported to [facility] on November 10,2021 at 10am. [phone].    Dr. Marina Bal's office from pain management will call you on Monday to schedule an appointment within the next 2 weeks.  You can contact her office on Monday, [phone]

## 2021-10-25 NOTE — DISCHARGE SUMMARY
Hospital Medicine Service -  Inpatient Discharge Summary        BRIEF OVERVIEW   Admitting Provider: Sonja Chiu DO  Attending Provider: No att. providers found Attending phys phone: N/A    PCP: Esther Hurt [phone]    Admission Date: 10/18/2021  Discharge Date: 10/24/2021     DISCHARGE DIAGNOSES      Primary Discharge Diagnosis  Acute exacerbation of chronic low back pain    Secondary Discharge Diagnoses  Active Hospital Problems    Diagnosis Date Noted   • Hemorrhoids 10/24/2021   • Parkinson's disease (CMS/[facility])    • Hypertension    • Acute exacerbation of chronic low back pain 10/18/2021   • Cystitis 07/16/2018      Resolved Hospital Problems   No resolved problems to display.       Problem List on Day of Discharge  Hemorrhoids  Assessment & Plan  Topical hydrocoristone     Parkinson's disease (CMS/[facility])  Assessment & Plan  Patient with history of Parkinson's disease- stable  Continue Sinemet  Neurology recommendations appreciated  Outpatient follow up with them    Cystitis  Assessment & Plan  S/p Cefuroxime    * Acute exacerbation of chronic low back pain  Assessment & Plan  Patient complains of chronic low back pain  She is scheduled for an epidural in [place] the pain has been worsening and she feels unstable on her feet  X-ray does not show acute abnormality  PT/OT  Pain control    MRI LS spine -.severe compression fracture of L1 with retropulsion of the fracture fragment which is stable but severe right subarticular and neural foraminal narrowing, progression of the neural foraminal narrowing compared to the prior examination.   Neurosurgery reviewed the MRI scan and advised scoliosis Xray -showed stable alignment  She is stable for discharge  Family and patient requesting epidural injection before discharge- unavailable in inpatient setting, discussed this with patient and family  Spoke with pain management , Dr. Bal's office will contact family Monday to arrange close outpatient  f/u, recommending adding gabapentin 100mg TID and RTC Tylenol, will start  DC today to snf    SUMMARY OF HOSPITALIZATION      Presenting Problem/History of Present Illness  This is a 86 y.o. year-old female admitted on 10/18/2021 with Acute exacerbation of chronic low back pain [M54.50, G89.29]  Ambulatory dysfunction [R26.2].       Hospital Course    Patient admitted for worsening back pain.  Seen by neurosurgery, no surgical intervention needed.  Patient worked with PT, recommended for SNF due to functional deficits.  Treated for UTI.  Also case discussed with pain management, patient to be arranged for outpatient f/u for epidural evaluation.     Exam on Day of Discharge  Physical Exam  HENT:      Head: Normocephalic and atraumatic.      Nose: Nose normal.      Mouth/Throat:      Mouth: Mucous membranes are dry.      Pharynx: Oropharynx is clear.   Eyes:      Conjunctiva/sclera: Conjunctivae normal.   Cardiovascular:      Rate and Rhythm: Normal rate.      Pulses: Normal pulses.      Heart sounds: Normal heart sounds.   Pulmonary:      Effort: Pulmonary effort is normal.      Breath sounds: Normal breath sounds.   Abdominal:      General: Bowel sounds are normal. There is no distension.      Palpations: Abdomen is soft.      Tenderness: There is no abdominal tenderness. There is no guarding.   Musculoskeletal:         General: Tenderness present.   Skin:     General: Skin is warm.   Neurological:      Mental Status: She is alert. Mental status is at baseline.   Psychiatric:         Mood and Affect: Mood normal.         Behavior: Behavior normal.         Consults During Admission  IP CONSULT TO NEUROLOGY  IP CONSULT TO NEUROSURGERY    DISCHARGE MEDICATIONS        Medication List      START taking these medications    gabapentin 100 mg capsule  Commonly known as: NEURONTIN  Take 1 capsule (100 mg total) by mouth 3 (three) times a day.  Dose: 100 mg     hydrocortisone 2.5 % rectal cream  Commonly known as:  ANUSOL-HC  Insert into the rectum 2 (two) times a day for 7 days. Apply to affected areas     lidocaine 4 % adhesive patch,medicated topical patch  Commonly known as: ASPERCREME  Apply 1 patch topically daily.  Dose: 1 patch     oxyCODONE 5 mg immediate release tablet  Commonly known as: ROXICODONE  Take 1 tablet (5 mg total) by mouth every 6 (six) hours as needed for severe pain for up to 5 days.  Dose: 5 mg     sennosides-docusate sodium 8.6-50 mg  Commonly known as: SENOKOT-S  Take 2 tablets by mouth nightly.  Dose: 2 tablet        CHANGE how you take these medications    acetaminophen 500 mg tablet  Commonly known as: TYLENOL EXTRA STRENGTH  Take 2 tablets (1,000 mg total) by mouth every 8 (eight) hours.  Dose: 1,000 mg  What changed:   · when to take this  · additional instructions        CONTINUE taking these medications    carbidopa-levodopa  mg per tablet  Commonly known as: SINEMET  Take 1 tablet by mouth 4 (four) times a day. 0800,1200,1600,2000  Dose: 1 tablet     LUMIGAN 0.01 % ophthalmic drops  Administer 1 drop into both eyes nightly.  Dose: 1 drop  Generic drug: bimatoprost     magnesium oxide 400 mg (241.3 mg magnesium) tablet  Commonly known as: MAG-OX  Take 400 mg by mouth every morning.  Dose: 400 mg     melatonin 3 mg tablet  Take 3 mg by mouth nightly.  Dose: 3 mg     mirtazapine 30 mg tablet  Commonly known as: REMERON  Take 30 mg by mouth nightly.  Dose: 30 mg     polyethylene glycol 17 gram packet  Commonly known as: MIRALAX  Take 17 g by mouth every evening.  Dose: 17 g     sertraline 50 mg tablet  Commonly known as: ZOLOFT  Take 50 mg by mouth 2 (two) times a day. 0800,1700  Dose: 50 mg                    PROCEDURES / LABS / IMAGING      Operative Procedures  none    Other Procedures      Pertinent Labs      SARS-CoV-2 (COVID-19) (no units)   Date/Time Value   10/23/2021 1825 Negative       Pertinent Imaging  X-RAY LUMBAR SPINE 2 OR 3 VIEWS    Result Date: 10/18/2021  IMPRESSION:  Stable chronic severe compression deformity at L1 and multilevel degenerative disc disease. No significant change is noted compared to the 6/12/2021 lumbar spine MRI. COMPARISON: Lumbar MRI June 12, 2021. COMMENT: 3 views of the lumbar spine show a  severe L1 compression deformity with retropulsion, grossly stable. No acute interval fracture. Moderate to advanced multilevel degenerative disc changes. Stable with relative preservation of the L4-5 disc space, also stable. Dextrocurvature noted in the lumbar spine on AP imaging, with apex at L3-4. Intact bony pelvis and sacrum as demonstrated. Advanced atheromatous changes in the abdominal aorta.     X-RAY LUMBAR SPINE COMPLETE WITH BENDING    Result Date: 10/21/2021  IMPRESSION: 1. Severe L1 compression fracture with posterior displacement into the spinal canal. 2. Possible progression of spondylolisthesis at T12-L1 and L1-2 levels on the flexion image.    MRI LUMBAR SPINE WITHOUT CONTRAST    Result Date: 10/21/2021  IMPRESSION: 1. Redemonstration of a severe compression fracture of L1 with retropulsion of the fracture fragment into the spinal canal resulting in severe spinal canal narrowing. Superimposed right subarticular to foraminal protrusion with severe right subarticular and neural foraminal narrowing, noting progression of the neural foraminal narrowing compared to the prior examination. Severe left subarticular and moderate left neural foraminal narrowing appears similar. 2. Dextroscoliosis with degenerative and discogenic disease in the remainder of the lumbar spine, similar to the prior, as above.    X-RAY SCOLIOSIS STUDY 2-3 VIEWS    Result Date: 10/21/2021  IMPRESSION: 1. Mild lumbar scoliosis as described. 2. Severe L1 compression fracture extending into the spinal canal.      OUTPATIENT  FOLLOW-UP / REFERRALS / PENDING TESTS        Outpatient Follow-Up Appointments  Encounter Information    This patient does not currently have any appointments  scheduled.         Referrals  No orders of the defined types were placed in this encounter.      Test Results Pending at Discharge  Unresulted Labs (From admission, onward)            None          Important Issues to Address in Follow-Up  Follow up with pain management within 1 week   Follow up pcp within 1 week    DISCHARGE DISPOSITION AND DESTINATION      Disposition: Skilled Nursing Facility - Other   Destination: Skilled Nursing Facility                            Code Status At Discharge: Full Code     >30 mins spent in dc process including discusion with patient, nursing, patient's family, medical management, case management    Physician Order for Life-Sustaining Treatment Document Status      No documents found

## 2021-10-27 ENCOUNTER — TELEPHONE (OUTPATIENT)
Dept: PAIN MEDICINE | Facility: CLINIC | Age: 86
End: 2021-10-27

## 2021-10-27 NOTE — TELEPHONE ENCOUNTER
Spoke with Tiffanie Porras (daughter) at [phone] to discuss her questions about what appointment to keep and answer questions about when the soonest an injection could be scheduled. She states she will keep her appointment in clinic with me on 11/3 and also keep her epidural appointment with an outside physician.    Made it clear that she does not have to follow both offices if she is receiving injections and pain care.  Tiffanie states she wants to keep the appointment with me for an evaluation anyways.

## 2021-11-01 ENCOUNTER — TELEMEDICINE (OUTPATIENT)
Dept: NEUROSURGERY | Facility: CLINIC | Age: 86
End: 2021-11-01
Payer: MEDICARE

## 2021-11-01 DIAGNOSIS — S32.010A CLOSED WEDGE COMPRESSION FRACTURE OF L1 VERTEBRA, INITIAL ENCOUNTER (CMS/HCC): Primary | ICD-10-CM

## 2021-11-01 PROCEDURE — 99443 PR PHYS/QHP TELEPHONE EVALUATION 21-30 MIN: CPT | Mod: 95 | Performed by: NEUROLOGICAL SURGERY

## 2021-11-01 NOTE — LETTER
November 1, 2021                                                                                                                                                                                                                                                                                                                   [facility]  [facility] NEUROSURGERY AT [place] [address], [address]  [place] [id number]  Dept: [phone]  Loc: [phone]    Patient: Mariya Porras   YOB: 1935   Date of Visit: 11/1/2021       Dear Dr. Rodríguez Recipients:    Thank you for referring Mariya Porras to me for evaluation. Below are the relevant portions of my assessment and plan of care.    If you have questions, please do not hesitate to call me. I look forward to following Mariya along with you.      Assessment / Plan:                                            Sincerely,        Quang Zamora MD        CC:   No Recipients

## 2021-11-01 NOTE — PROGRESS NOTES
Verification of Patient Location:  The patient affirms they are currently located in the following state:[place]     Audio Only Telemedicine Visit    Request for Consent:  You and I are about to have a telemedicine check-in or visit. This is allowed because you are already my patient, and you have requested it.  This telemedicine visit will be billed to your health insurance or you, if you are self-insured.  You understand you will be responsible for any copayments or coinsurances that apply to your telemedicine visit.  Before starting our telemedicine visit, I am required to get your consent for this virtual check-in or visit by telemedicine. Do you consent?      Patient Response to Request for Consent: Yes    The following have been reviewed and updated as appropriate in this visit:        Visit Documentation:    Spoke to the patient's family at regarding treatment options for Mrs. Porras.  According to the family she is still having pain primarily when attempting to stand and walk.  She continues to be relatively comfortable while sitting.  Therefore most of her pain is in her low back below the level of the fracture particular at the lumbosacral junction but occasionally she will have some pain in the hip and anterior groin particularly on the, what they think is, the right side.  They do report that she had some improvement in her pain after taking oxycodone recently but they were unaware whether that allowed her to be more active.    We talked for approximately 25 minutes regarding various treatment options.  The first option is conservative management.  I suspect given the mechanical instability that she will continue to have pain even with physical therapy and a brace.  This will likely lead to increased failure to thrive I suspect ultimately she may become functionally bedbound.  She is scheduled for a epidural injection in approximately 2 weeks.  Again not entirely sure if this is provide her with  any benefit although there is some foraminal stenosis and subarticular recess narrowing at the L1 level that may respond.  It is I think entirely appropriate to consider an injection although again it will not address the mechanical instability which I think is at the heart of most of her positional pain.  Final option is surgical intervention which would likely include L1 corpectomy and posterior lateral instrumented fusion probably 2 levels above and to level level.    We discussed the risks of but is a relatively large operation in a patient her age with her comorbidities.  I explained that while there is a considerable risk associated with surgery he may at this point be the only option that may allow her to resume anything resembling her previous level of activity.  The risk of surgery were discussed and include bleeding, infection, neurological injury including weakness, numbness, paralysis, bowel or bladder dysfunction.  Discussed the possibly she may not fuse that she may develop adjacent level disease.  In particular discussed the possibility of hardware failure or subsidence.    At this point I recommended that they see how she respond to the epidural injection for making a final decision.  I have recommended that she put in for a updated DEXA scan.  Her last scan from 2019 showed osteopenia.  Her previous T score is not enough to contraindication to surgical intervention.  I have explained though that T score over -3.0 would likely preclude surgery.  We will order the scan.  The family will touch base with us after the injection.        time Spent:  I spent 30 minutes on this date of service performing the following activities: documenting, preparing for visit, providing counseling and education, independently reviewing study/studies, communicating results and coordinating care.

## 2021-11-27 ENCOUNTER — APPOINTMENT (EMERGENCY)
Dept: RADIOLOGY | Facility: HOSPITAL | Age: 86
DRG: 175 | End: 2021-11-27
Attending: STUDENT IN AN ORGANIZED HEALTH CARE EDUCATION/TRAINING PROGRAM
Payer: MEDICARE

## 2021-11-27 ENCOUNTER — APPOINTMENT (INPATIENT)
Dept: RADIOLOGY | Facility: HOSPITAL | Age: 86
DRG: 175 | End: 2021-11-27
Attending: HOSPITALIST
Payer: MEDICARE

## 2021-11-27 ENCOUNTER — HOSPITAL ENCOUNTER (INPATIENT)
Facility: HOSPITAL | Age: 86
LOS: 4 days | Discharge: NURSING, REHAB OR LONG-TERM CARE | DRG: 175 | End: 2021-12-01
Attending: EMERGENCY MEDICINE | Admitting: HOSPITALIST
Payer: MEDICARE

## 2021-11-27 DIAGNOSIS — I26.92 ACUTE SADDLE PULMONARY EMBOLISM, UNSPECIFIED WHETHER ACUTE COR PULMONALE PRESENT (CMS/HCC): Primary | ICD-10-CM

## 2021-11-27 DIAGNOSIS — R09.02 HYPOXIA: ICD-10-CM

## 2021-11-27 DIAGNOSIS — I82.401 ACUTE DEEP VEIN THROMBOSIS (DVT) OF RIGHT LOWER EXTREMITY, UNSPECIFIED VEIN (CMS/HCC): ICD-10-CM

## 2021-11-27 DIAGNOSIS — I95.9 HYPOTENSION, UNSPECIFIED HYPOTENSION TYPE: ICD-10-CM

## 2021-11-27 DIAGNOSIS — R79.89 ELEVATED TROPONIN: ICD-10-CM

## 2021-11-27 PROBLEM — R55 SYNCOPE: Status: ACTIVE | Noted: 2021-11-27

## 2021-11-27 PROBLEM — Z71.89 ACP (ADVANCE CARE PLANNING): Status: ACTIVE | Noted: 2021-11-27

## 2021-11-27 LAB
ALBUMIN SERPL-MCNC: 3.7 G/DL (ref 3.4–5)
ALP SERPL-CCNC: 129 IU/L (ref 35–126)
ALT SERPL-CCNC: 20 IU/L (ref 11–54)
ANION GAP SERPL CALC-SCNC: 13 MEQ/L (ref 3–15)
APTT PPP: 28 SEC (ref 23–35)
AST SERPL-CCNC: 25 IU/L (ref 15–41)
BACTERIA URNS QL MICRO: NORMAL /HPF
BASE EXCESS BLDV CALC-SCNC: -1.6 MEQ/L
BASOPHILS # BLD: 0.07 K/UL (ref 0.01–0.1)
BASOPHILS NFR BLD: 0.6 %
BILIRUB SERPL-MCNC: 0.7 MG/DL (ref 0.3–1.2)
BILIRUB UR QL STRIP.AUTO: NEGATIVE MG/DL
BNP SERPL-MCNC: <5 PG/ML
BUN SERPL-MCNC: 19 MG/DL (ref 8–20)
CALCIUM SERPL-MCNC: 10.3 MG/DL (ref 8.9–10.3)
CHLORIDE SERPL-SCNC: 104 MEQ/L (ref 98–109)
CLARITY UR REFRACT.AUTO: CLEAR
CO2 BLDV-SCNC: 27.2 MEQ/L (ref 22–32)
CO2 SERPL-SCNC: 20 MEQ/L (ref 22–32)
COLOR UR AUTO: YELLOW
CREAT SERPL-MCNC: 0.9 MG/DL (ref 0.6–1.1)
DIFFERENTIAL METHOD BLD: ABNORMAL
EOSINOPHIL # BLD: 0.09 K/UL (ref 0.04–0.36)
EOSINOPHIL NFR BLD: 0.8 %
ERYTHROCYTE [DISTWIDTH] IN BLOOD BY AUTOMATED COUNT: 14.2 % (ref 11.7–14.4)
FIO2 ON VENT: ABNORMAL %
FLUAV RNA SPEC QL NAA+PROBE: NEGATIVE
FLUBV RNA SPEC QL NAA+PROBE: NEGATIVE
GFR SERPL CREATININE-BSD FRML MDRD: 59.5 ML/MIN/1.73M*2
GLUCOSE SERPL-MCNC: 140 MG/DL (ref 70–99)
GLUCOSE UR STRIP.AUTO-MCNC: NEGATIVE MG/DL
HCO3 BLDV-SCNC: 21.7 MEQ/L (ref 21–28)
HCT VFR BLDCO AUTO: 45.8 % (ref 35–45)
HGB BLD-MCNC: 14.5 G/DL (ref 11.8–15.7)
HGB UR QL STRIP.AUTO: NEGATIVE
HYALINE CASTS #/AREA URNS LPF: NORMAL /LPF
IMM GRANULOCYTES # BLD AUTO: 0.03 K/UL (ref 0–0.08)
IMM GRANULOCYTES NFR BLD AUTO: 0.3 %
INHALED O2 CONCENTRATION: ABNORMAL %
INR PPP: 1.2
KETONES UR STRIP.AUTO-MCNC: NEGATIVE MG/DL
LACTATE SERPL-SCNC: 1.6 MMOL/L (ref 0.4–2)
LEUKOCYTE ESTERASE UR QL STRIP.AUTO: NEGATIVE
LIPASE SERPL-CCNC: 24 U/L (ref 20–51)
LYMPHOCYTES # BLD: 2.27 K/UL (ref 1.2–3.5)
LYMPHOCYTES NFR BLD: 18.9 %
MAGNESIUM SERPL-MCNC: 2.1 MG/DL (ref 1.8–2.5)
MCH RBC QN AUTO: 29.5 PG (ref 28–33.2)
MCHC RBC AUTO-ENTMCNC: 31.7 G/DL (ref 32.2–35.5)
MCV RBC AUTO: 93.1 FL (ref 83–98)
MONOCYTES # BLD: 0.64 K/UL (ref 0.28–0.8)
MONOCYTES NFR BLD: 5.3 %
NEUTROPHILS # BLD: 8.9 K/UL (ref 1.7–7)
NEUTS SEG NFR BLD: 74.1 %
NITRITE UR QL STRIP.AUTO: POSITIVE
NRBC BLD-RTO: 0 %
PCO2 BLDV: 52 MM HG (ref 41–51)
PDW BLD AUTO: 9.9 FL (ref 9.4–12.3)
PH BLDV: 7.3 [PH] (ref 7.32–7.42)
PH UR STRIP.AUTO: 7 [PH]
PLATELET # BLD AUTO: 197 K/UL (ref 150–369)
PO2 BLDV: 23 MM HG (ref 25–40)
POTASSIUM SERPL-SCNC: 4.4 MEQ/L (ref 3.6–5.1)
PROT SERPL-MCNC: 7.1 G/DL (ref 6–8.2)
PROT UR QL STRIP.AUTO: NEGATIVE
PROTHROMBIN TIME: 14.5 SEC (ref 12.2–14.5)
RBC # BLD AUTO: 4.92 M/UL (ref 3.93–5.22)
RBC #/AREA URNS HPF: NORMAL /HPF
RSV RNA SPEC QL NAA+PROBE: NEGATIVE
SARS-COV-2 RNA RESP QL NAA+PROBE: NEGATIVE
SODIUM SERPL-SCNC: 137 MEQ/L (ref 136–144)
SP GR UR REFRACT.AUTO: 1.01
SQUAMOUS #/AREA URNS HPF: NORMAL /HPF
TROPONIN I SERPL-MCNC: 0.05 NG/ML
TROPONIN I SERPL-MCNC: 1.5 NG/ML
TSH SERPL DL<=0.05 MIU/L-ACNC: 2.72 MIU/L (ref 0.34–5.6)
UROBILINOGEN UR STRIP-ACNC: 0.2 EU/DL
WBC # BLD AUTO: 12 K/UL (ref 3.8–10.5)
WBC #/AREA URNS HPF: NORMAL /HPF

## 2021-11-27 PROCEDURE — 63600105 HC IODINE BASED CONTRAST: Performed by: STUDENT IN AN ORGANIZED HEALTH CARE EDUCATION/TRAINING PROGRAM

## 2021-11-27 PROCEDURE — 63600000 HC DRUGS/DETAIL CODE: Performed by: STUDENT IN AN ORGANIZED HEALTH CARE EDUCATION/TRAINING PROGRAM

## 2021-11-27 PROCEDURE — 99285 EMERGENCY DEPT VISIT HI MDM: CPT | Mod: 25

## 2021-11-27 PROCEDURE — 63600105 HC IODINE BASED CONTRAST: Mod: JW | Performed by: HOSPITALIST

## 2021-11-27 PROCEDURE — 96375 TX/PRO/DX INJ NEW DRUG ADDON: CPT | Mod: 59

## 2021-11-27 PROCEDURE — 96365 THER/PROPH/DIAG IV INF INIT: CPT | Mod: 59

## 2021-11-27 PROCEDURE — 76937 US GUIDE VASCULAR ACCESS: CPT

## 2021-11-27 PROCEDURE — 83880 ASSAY OF NATRIURETIC PEPTIDE: CPT | Performed by: STUDENT IN AN ORGANIZED HEALTH CARE EDUCATION/TRAINING PROGRAM

## 2021-11-27 PROCEDURE — 20600000 HC ROOM AND CARE INTERMEDIATE/TELEMETRY

## 2021-11-27 PROCEDURE — 36100330 IR FILTER PLACEMENT

## 2021-11-27 PROCEDURE — 99223 1ST HOSP IP/OBS HIGH 75: CPT | Performed by: HOSPITALIST

## 2021-11-27 PROCEDURE — 85025 COMPLETE CBC W/AUTO DIFF WBC: CPT

## 2021-11-27 PROCEDURE — 85025 COMPLETE CBC W/AUTO DIFF WBC: CPT | Performed by: EMERGENCY MEDICINE

## 2021-11-27 PROCEDURE — 93005 ELECTROCARDIOGRAM TRACING: CPT

## 2021-11-27 PROCEDURE — 85610 PROTHROMBIN TIME: CPT | Performed by: EMERGENCY MEDICINE

## 2021-11-27 PROCEDURE — 36415 COLL VENOUS BLD VENIPUNCTURE: CPT

## 2021-11-27 PROCEDURE — 25000000 HC PHARMACY GENERAL: Performed by: STUDENT IN AN ORGANIZED HEALTH CARE EDUCATION/TRAINING PROGRAM

## 2021-11-27 PROCEDURE — 87637 SARSCOV2&INF A&B&RSV AMP PRB: CPT | Performed by: STUDENT IN AN ORGANIZED HEALTH CARE EDUCATION/TRAINING PROGRAM

## 2021-11-27 PROCEDURE — 83690 ASSAY OF LIPASE: CPT | Performed by: STUDENT IN AN ORGANIZED HEALTH CARE EDUCATION/TRAINING PROGRAM

## 2021-11-27 PROCEDURE — 93005 ELECTROCARDIOGRAM TRACING: CPT | Performed by: EMERGENCY MEDICINE

## 2021-11-27 PROCEDURE — 84484 ASSAY OF TROPONIN QUANT: CPT | Performed by: EMERGENCY MEDICINE

## 2021-11-27 PROCEDURE — 27200000 HC STERILE SUPPLY

## 2021-11-27 PROCEDURE — 87040 BLOOD CULTURE FOR BACTERIA: CPT | Performed by: STUDENT IN AN ORGANIZED HEALTH CARE EDUCATION/TRAINING PROGRAM

## 2021-11-27 PROCEDURE — 83735 ASSAY OF MAGNESIUM: CPT | Performed by: STUDENT IN AN ORGANIZED HEALTH CARE EDUCATION/TRAINING PROGRAM

## 2021-11-27 PROCEDURE — 63700000 HC SELF-ADMINISTRABLE DRUG: Performed by: STUDENT IN AN ORGANIZED HEALTH CARE EDUCATION/TRAINING PROGRAM

## 2021-11-27 PROCEDURE — 85730 THROMBOPLASTIN TIME PARTIAL: CPT | Performed by: EMERGENCY MEDICINE

## 2021-11-27 PROCEDURE — 84484 ASSAY OF TROPONIN QUANT: CPT | Performed by: HOSPITALIST

## 2021-11-27 PROCEDURE — 25000000 HC PHARMACY GENERAL: Performed by: RADIOLOGY

## 2021-11-27 PROCEDURE — 82803 BLOOD GASES ANY COMBINATION: CPT | Performed by: STUDENT IN AN ORGANIZED HEALTH CARE EDUCATION/TRAINING PROGRAM

## 2021-11-27 PROCEDURE — G1004 CDSM NDSC: HCPCS

## 2021-11-27 PROCEDURE — 84443 ASSAY THYROID STIM HORMONE: CPT | Performed by: STUDENT IN AN ORGANIZED HEALTH CARE EDUCATION/TRAINING PROGRAM

## 2021-11-27 PROCEDURE — 63600000 HC DRUGS/DETAIL CODE: Performed by: EMERGENCY MEDICINE

## 2021-11-27 PROCEDURE — 74174 CTA ABD&PLVS W/CONTRAST: CPT | Mod: MG

## 2021-11-27 PROCEDURE — 25800000 HC PHARMACY IV SOLUTIONS: Performed by: STUDENT IN AN ORGANIZED HEALTH CARE EDUCATION/TRAINING PROGRAM

## 2021-11-27 PROCEDURE — 63700000 HC SELF-ADMINISTRABLE DRUG: Performed by: HOSPITALIST

## 2021-11-27 PROCEDURE — 71275 CT ANGIOGRAPHY CHEST: CPT | Mod: MG

## 2021-11-27 PROCEDURE — 93005 ELECTROCARDIOGRAM TRACING: CPT | Performed by: STUDENT IN AN ORGANIZED HEALTH CARE EDUCATION/TRAINING PROGRAM

## 2021-11-27 PROCEDURE — 93970 EXTREMITY STUDY: CPT

## 2021-11-27 PROCEDURE — 83605 ASSAY OF LACTIC ACID: CPT | Performed by: EMERGENCY MEDICINE

## 2021-11-27 PROCEDURE — 87086 URINE CULTURE/COLONY COUNT: CPT | Performed by: STUDENT IN AN ORGANIZED HEALTH CARE EDUCATION/TRAINING PROGRAM

## 2021-11-27 PROCEDURE — 81001 URINALYSIS AUTO W/SCOPE: CPT | Performed by: STUDENT IN AN ORGANIZED HEALTH CARE EDUCATION/TRAINING PROGRAM

## 2021-11-27 PROCEDURE — 83605 ASSAY OF LACTIC ACID: CPT

## 2021-11-27 PROCEDURE — 71045 X-RAY EXAM CHEST 1 VIEW: CPT

## 2021-11-27 PROCEDURE — 80053 COMPREHEN METABOLIC PANEL: CPT | Performed by: EMERGENCY MEDICINE

## 2021-11-27 PROCEDURE — C1894 INTRO/SHEATH, NON-LASER: HCPCS

## 2021-11-27 PROCEDURE — 06H03DZ INSERTION OF INTRALUMINAL DEVICE INTO INFERIOR VENA CAVA, PERCUTANEOUS APPROACH: ICD-10-PCS | Performed by: RADIOLOGY

## 2021-11-27 PROCEDURE — C1769 GUIDE WIRE: HCPCS

## 2021-11-27 PROCEDURE — C1880 VENA CAVA FILTER: HCPCS

## 2021-11-27 RX ORDER — OXYCODONE HYDROCHLORIDE 5 MG/1
5 TABLET ORAL EVERY 6 HOURS PRN
COMMUNITY
End: 2022-08-16

## 2021-11-27 RX ORDER — DEXTROSE 40 %
15-30 GEL (GRAM) ORAL AS NEEDED
Status: DISCONTINUED | OUTPATIENT
Start: 2021-11-27 | End: 2021-12-01 | Stop reason: HOSPADM

## 2021-11-27 RX ORDER — CYANOCOBALAMIN (VITAMIN B-12) 500 MCG
3 TABLET ORAL NIGHTLY
Status: DISCONTINUED | OUTPATIENT
Start: 2021-11-27 | End: 2021-12-01 | Stop reason: HOSPADM

## 2021-11-27 RX ORDER — PANTOPRAZOLE SODIUM 40 MG/10ML
40 INJECTION, POWDER, LYOPHILIZED, FOR SOLUTION INTRAVENOUS ONCE
Status: COMPLETED | OUTPATIENT
Start: 2021-11-27 | End: 2021-11-27

## 2021-11-27 RX ORDER — LIDOCAINE 560 MG/1
1 PATCH PERCUTANEOUS; TOPICAL; TRANSDERMAL DAILY PRN
COMMUNITY
End: 2022-06-07 | Stop reason: ENTERED-IN-ERROR

## 2021-11-27 RX ORDER — HEPARIN SODIUM 10000 [USP'U]/100ML
100-4000 INJECTION, SOLUTION INTRAVENOUS
Status: DISCONTINUED | OUTPATIENT
Start: 2021-11-27 | End: 2021-11-30

## 2021-11-27 RX ORDER — VANCOMYCIN HYDROCHLORIDE
20 ONCE
Status: COMPLETED | OUTPATIENT
Start: 2021-11-27 | End: 2021-11-27

## 2021-11-27 RX ORDER — ONDANSETRON HYDROCHLORIDE 2 MG/ML
4 INJECTION, SOLUTION INTRAVENOUS ONCE
Status: COMPLETED | OUTPATIENT
Start: 2021-11-27 | End: 2021-11-27

## 2021-11-27 RX ORDER — LIDOCAINE HYDROCHLORIDE 10 MG/ML
INJECTION, SOLUTION INFILTRATION; PERINEURAL
Status: COMPLETED | OUTPATIENT
Start: 2021-11-27 | End: 2021-11-27

## 2021-11-27 RX ORDER — CALCIUM CARBONATE 200(500)MG
500 TABLET,CHEWABLE ORAL 2 TIMES DAILY PRN
Status: DISCONTINUED | OUTPATIENT
Start: 2021-11-27 | End: 2021-12-01 | Stop reason: HOSPADM

## 2021-11-27 RX ORDER — LATANOPROST 50 UG/ML
1 SOLUTION/ DROPS OPHTHALMIC NIGHTLY
Status: DISCONTINUED | OUTPATIENT
Start: 2021-11-27 | End: 2021-12-01 | Stop reason: HOSPADM

## 2021-11-27 RX ORDER — GABAPENTIN 100 MG/1
100 CAPSULE ORAL 3 TIMES DAILY
Status: DISCONTINUED | OUTPATIENT
Start: 2021-11-27 | End: 2021-12-01 | Stop reason: HOSPADM

## 2021-11-27 RX ORDER — GABAPENTIN 300 MG/1
300 CAPSULE ORAL 3 TIMES DAILY
COMMUNITY

## 2021-11-27 RX ORDER — LISINOPRIL 20 MG/1
20 TABLET ORAL DAILY
COMMUNITY
End: 2024-01-01 | Stop reason: DRUGHIGH

## 2021-11-27 RX ORDER — POLYETHYLENE GLYCOL 3350 17 G/17G
17 POWDER, FOR SOLUTION ORAL EVERY EVENING
Status: DISCONTINUED | OUTPATIENT
Start: 2021-11-27 | End: 2021-12-01 | Stop reason: HOSPADM

## 2021-11-27 RX ORDER — LIDOCAINE 560 MG/1
1 PATCH PERCUTANEOUS; TOPICAL; TRANSDERMAL DAILY
Status: DISCONTINUED | OUTPATIENT
Start: 2021-11-27 | End: 2021-12-01 | Stop reason: HOSPADM

## 2021-11-27 RX ORDER — SERTRALINE HYDROCHLORIDE 50 MG/1
50 TABLET, FILM COATED ORAL 2 TIMES DAILY
Status: DISCONTINUED | OUTPATIENT
Start: 2021-11-27 | End: 2021-12-01 | Stop reason: HOSPADM

## 2021-11-27 RX ORDER — ACETAMINOPHEN 500 MG
1000 TABLET ORAL 2 TIMES DAILY
COMMUNITY

## 2021-11-27 RX ORDER — IBUPROFEN 200 MG
16-32 TABLET ORAL AS NEEDED
Status: DISCONTINUED | OUTPATIENT
Start: 2021-11-27 | End: 2021-12-01 | Stop reason: HOSPADM

## 2021-11-27 RX ORDER — CARBIDOPA AND LEVODOPA 25; 100 MG/1; MG/1
1 TABLET ORAL 4 TIMES DAILY
Status: DISCONTINUED | OUTPATIENT
Start: 2021-11-27 | End: 2021-12-01 | Stop reason: HOSPADM

## 2021-11-27 RX ORDER — NITROGLYCERIN 0.4 MG/1
0.4 TABLET SUBLINGUAL EVERY 5 MIN PRN
Status: DISCONTINUED | OUTPATIENT
Start: 2021-11-27 | End: 2021-12-01 | Stop reason: HOSPADM

## 2021-11-27 RX ORDER — AMOXICILLIN 250 MG
2 CAPSULE ORAL NIGHTLY
Status: DISCONTINUED | OUTPATIENT
Start: 2021-11-27 | End: 2021-12-01 | Stop reason: HOSPADM

## 2021-11-27 RX ORDER — HYDRALAZINE HYDROCHLORIDE 25 MG/1
50 TABLET, FILM COATED ORAL 3 TIMES DAILY
COMMUNITY
End: 2023-09-27 | Stop reason: HOSPADM

## 2021-11-27 RX ORDER — MIRTAZAPINE 15 MG/1
30 TABLET, FILM COATED ORAL NIGHTLY
Status: DISCONTINUED | OUTPATIENT
Start: 2021-11-27 | End: 2021-12-01 | Stop reason: HOSPADM

## 2021-11-27 RX ORDER — AMOXICILLIN 250 MG
2 CAPSULE ORAL NIGHTLY
COMMUNITY
End: 2022-06-07 | Stop reason: ENTERED-IN-ERROR

## 2021-11-27 RX ORDER — DEXTROSE 50 % IN WATER (D50W) INTRAVENOUS SYRINGE
25 AS NEEDED
Status: DISCONTINUED | OUTPATIENT
Start: 2021-11-27 | End: 2021-12-01 | Stop reason: HOSPADM

## 2021-11-27 RX ORDER — LANOLIN ALCOHOL/MO/W.PET/CERES
400 CREAM (GRAM) TOPICAL EVERY MORNING
Status: DISCONTINUED | OUTPATIENT
Start: 2021-11-27 | End: 2021-12-01 | Stop reason: HOSPADM

## 2021-11-27 RX ORDER — LATANOPROST 50 UG/ML
1 SOLUTION/ DROPS OPHTHALMIC NIGHTLY
Status: DISCONTINUED | OUTPATIENT
Start: 2021-11-27 | End: 2021-11-27

## 2021-11-27 RX ADMIN — VANCOMYCIN HYDROCHLORIDE 1250 MG: 500 INJECTION, POWDER, LYOPHILIZED, FOR SOLUTION INTRAVENOUS at 14:05

## 2021-11-27 RX ADMIN — GABAPENTIN 100 MG: 100 CAPSULE ORAL at 20:23

## 2021-11-27 RX ADMIN — PIPERACILLIN SODIUM AND TAZOBACTAM SODIUM 3.38 G: 3; .375 INJECTION, POWDER, LYOPHILIZED, FOR SOLUTION INTRAVENOUS at 12:47

## 2021-11-27 RX ADMIN — IOHEXOL 30 ML: 300 INJECTION, SOLUTION INTRAVENOUS at 19:22

## 2021-11-27 RX ADMIN — SENNOSIDES AND DOCUSATE SODIUM 2 TABLET: 50; 8.6 TABLET ORAL at 22:40

## 2021-11-27 RX ADMIN — HEPARIN SODIUM 1150 UNITS/HR: 10000 INJECTION, SOLUTION INTRAVENOUS at 15:43

## 2021-11-27 RX ADMIN — LATANOPROST 1 DROP: 50 SOLUTION OPHTHALMIC at 22:42

## 2021-11-27 RX ADMIN — CALCIUM CARBONATE (ANTACID) CHEW TAB 500 MG 500 MG: 500 CHEW TAB at 22:41

## 2021-11-27 RX ADMIN — SODIUM CHLORIDE 1000 ML: 9 INJECTION, SOLUTION INTRAVENOUS at 12:09

## 2021-11-27 RX ADMIN — IOHEXOL 125 ML: 350 INJECTION, SOLUTION INTRAVENOUS at 15:03

## 2021-11-27 RX ADMIN — MIRTAZAPINE 30 MG: 30 TABLET, FILM COATED ORAL at 22:40

## 2021-11-27 RX ADMIN — SERTRALINE HYDROCHLORIDE 50 MG: 50 TABLET ORAL at 20:22

## 2021-11-27 RX ADMIN — LIDOCAINE 1 PATCH: 4 PATCH TOPICAL at 22:41

## 2021-11-27 RX ADMIN — PANTOPRAZOLE SODIUM 40 MG: 40 INJECTION, POWDER, FOR SOLUTION INTRAVENOUS at 20:28

## 2021-11-27 RX ADMIN — MAGNESIUM OXIDE TAB 400 MG (241.3 MG ELEMENTAL MG) 400 MG: 400 (241.3 MG) TAB at 20:22

## 2021-11-27 RX ADMIN — CARBIDOPA AND LEVODOPA 1 TABLET: 25; 100 TABLET ORAL at 20:23

## 2021-11-27 RX ADMIN — Medication 3 MG: at 22:41

## 2021-11-27 RX ADMIN — LIDOCAINE HYDROCHLORIDE 10 ML: 10 INJECTION, SOLUTION INFILTRATION; PERINEURAL at 19:07

## 2021-11-27 RX ADMIN — ONDANSETRON HYDROCHLORIDE 4 MG: 2 SOLUTION INTRAMUSCULAR; INTRAVENOUS at 11:41

## 2021-11-27 ASSESSMENT — PATIENT HEALTH QUESTIONNAIRE - PHQ9: SUM OF ALL RESPONSES TO PHQ9 QUESTIONS 1 & 2: 0

## 2021-11-27 NOTE — NURSING NOTE
Received from ER, NO CP or SOB reported. Remains on 2LNC. US results called to Dr. Ahumada. IR consulted to place IVC filter. Heparin drip at 1150 units/hr.    1830-Per IR, Heparin drip stopped for IVC filter placement. Pt transported monitored by IR RN.

## 2021-11-27 NOTE — ASSESSMENT & PLAN NOTE
Starting Heparin drip  DVT study  Consult pulmonary Service  Per Chest CT study  Recent Dx of Back pain with L1 Fx.  Admit to PCU  Echo    11/28  With positive leg DVT, IVC Filter was placed by IR on 11/27  On Heparin drip  CBC in am      11/28  Positive DVT  IVC Filter placed yesterday  D/w Dr. Quach  C/w  Heparin drip

## 2021-11-27 NOTE — ED PROVIDER NOTES
EM Resident Note    Patient Seen: 11/27/2021 11:35 AM    CC: Nausea and Syncope      HPI:  Mariya Porras is a 86 y.o. female with PMH of anxiety, hypertension, low back pain and Parkinson's disease, presenting to the ED for evaluation of pre-syncope.     Pt BIBEMS from [place]. Syncopal episode after transferring from wheelchair to bed, for a few minutes. Initial BP was reportedly normal per family members but started to drop on route. In the ED, pt c/o nausea and thick saliva.   Cannot recall details of events leading up to being in the ED, but is A&O x3.       Review of Systems  ROS otherwise negative except for those included in history and noted above.    Past Medical History:   Diagnosis Date   • Anxiety    • Arthritis    • Basal cell carcinoma     forehead   • Glaucoma    • Hypertension    • Low back pain    • Lumbosacral disc disease    • Parkinson's disease (CMS/HCC)    • Peripheral neuropathy      Past Surgical History:   Procedure Laterality Date   • BLADDER SUSPENSION  2009    Prolift M   • CATARACT EXTRACTION, BILATERAL     • COLONOSCOPY     • HYSTERECTOMY     • JOINT REPLACEMENT     • KNEE ARTHROPLASTY  2015    left     Social History     Socioeconomic History   • Marital status:      Spouse name: Not on file   • Number of children: Not on file   • Years of education: Not on file   • Highest education level: Not on file   Occupational History   • Not on file   Tobacco Use   • Smoking status: Former Smoker   • Smokeless tobacco: Never Used   • Tobacco comment: as a young teenager   Substance and Sexual Activity   • Alcohol use: Yes     Comment: wine   • Drug use: No   • Sexual activity: Not Currently     Partners: Male   Other Topics Concern   • Not on file   Social History Narrative   • Not on file     Social Determinants of Health     Financial Resource Strain:    • Difficulty of Paying Living Expenses: Not on file   Food Insecurity: No Food Insecurity   • Worried About Running Out of Food  in the Last Year: Never true   • Ran Out of Food in the Last Year: Never true   Transportation Needs:    • Lack of Transportation (Medical): Not on file   • Lack of Transportation (Non-Medical): Not on file   Physical Activity:    • Days of Exercise per Week: Not on file   • Minutes of Exercise per Session: Not on file   Stress:    • Feeling of Stress : Not on file   Social Connections:    • Frequency of Communication with Friends and Family: Not on file   • Frequency of Social Gatherings with Friends and Family: Not on file   • Attends [employer] Services: Not on file   • Active Member of Clubs or Organizations: Not on file   • Attends Club or Organization Meetings: Not on file   • Marital Status: Not on file   Intimate Partner Violence:    • Fear of Current or Ex-Partner: Not on file   • Emotionally Abused: Not on file   • Physically Abused: Not on file   • Sexually Abused: Not on file   Housing Stability:    • Unable to Pay for Housing in the Last Year: Not on file   • Number of Places Lived in the Last Year: Not on file   • Unstable Housing in the Last Year: Not on file     Family History   Problem Relation Age of Onset   • No Known Problems Biological Mother    • No Known Problems Biological Father      No current facility-administered medications on file prior to encounter.     Current Outpatient Medications on File Prior to Encounter   Medication Sig Dispense Refill   • bimatoprost (LUMIGAN) 0.01 % ophthalmic drops Administer 1 drop into both eyes nightly.     • carbidopa-levodopa  mg per tablet Take 1 tablet by mouth 4 (four) times a day. 0800,1200,1600,2000     • gabapentin 100 mg capsule Take 1 capsule (100 mg total) by mouth 3 (three) times a day. 90 capsule 0   • hydrocortisone 2.5 % rectal cream Insert into the rectum 2 (two) times a day for 7 days. Apply to affected areas 28 g 0   • lidocaine 4 % adhesive patch,medicated topical patch Apply 1 patch topically daily. 30 patch 0   • magnesium oxide  400 mg (241.3 mg magnesium) tablet Take 400 mg by mouth every morning.     • melatonin 3 mg tablet Take 3 mg by mouth nightly.     • mirtazapine 30 mg tablet Take 30 mg by mouth nightly.     • polyethylene glycol 17 gram packet Take 17 g by mouth every evening.     • sennosides-docusate sodium 8.6-50 mg Take 2 tablets by mouth nightly. 60 tablet 0   • sertraline 50 mg tablet Take 50 mg by mouth 2 (two) times a day. 0800,1700        Allergies   Allergen Reactions   • Codeine Hives      n/v   • Diazepam Other (see comments)     Blurred vision   • Sulfa (Sulfonamide Antibiotics) Hives   • Sulfur Hives     ED Triage Vitals [11/27/21 1123]   Temp Heart Rate Resp BP SpO2   -- (!) 101 (!) 22 (!) 84/52 (!) 86 %      Temp src Heart Rate Source Patient Position BP Location FiO2 (%) (Set)   -- -- -- -- --     Physical Exam  Constitutional:       General: She is not in acute distress.     Appearance: She is ill-appearing. She is not toxic-appearing or diaphoretic.   HENT:      Head: Normocephalic and atraumatic.      Mouth/Throat:      Mouth: Mucous membranes are dry.      Pharynx: Oropharynx is clear.   Eyes:      Pupils: Pupils are equal, round, and reactive to light.   Cardiovascular:      Rate and Rhythm: Regular rhythm. Tachycardia present.      Pulses: Normal pulses.   Pulmonary:      Effort: Tachypnea present. No respiratory distress.      Breath sounds: Normal breath sounds. No wheezing.   Abdominal:      General: There is no distension.      Palpations: Abdomen is soft.      Tenderness: There is no abdominal tenderness. There is no guarding or rebound.   Musculoskeletal:      Cervical back: Neck supple.      Right lower leg: Edema present.      Left lower leg: No edema.   Skin:     General: Skin is warm and dry.      Findings: Bruising (right shin) present.   Neurological:      General: No focal deficit present.      Mental Status: She is alert and oriented to person, place, and time. Mental status is at baseline.       Cranial Nerves: No cranial nerve deficit.      Sensory: No sensory deficit.      Motor: No weakness.   Psychiatric:         Mood and Affect: Mood normal.         Behavior: Behavior normal.         Procedures  Medications   heparin infusion in 0.45%  units/mL (1,150 Units/hr intravenous New Bag 11/27/21 1543)   heparin (porcine) bolus from bag 2,550-5,100 Units (has no administration in time range)   ondansetron (ZOFRAN) injection 4 mg (4 mg intravenous Given 11/27/21 1141)   sodium chloride 0.9 % bolus 1,000 mL (0 mL intravenous Stopped 11/27/21 1304)   piperacillin-tazobactam (ZOSYN) 3.375 g/100 mL IVPB in NSS (0 g intravenous Stopped 11/27/21 1343)   vancomycin 1.25 gram/250 mL IVPB in NSS (1,250 mg intravenous New Bag 11/27/21 1405)   iohexoL (OMNIPAQUE 350) 350 mg iodine/mL solution 125 mL (125 mL intravenous Given 11/27/21 1503)   heparin (porcine) bolus from bag 5,250 Units (5,250 Units intravenous Bolus from Bag 11/27/21 1543)           MDM:    Mariya Porras is a 86 y.o. female  with PMH of anxiety, hypertension, low back pain and Parkinson's disease, presenting to the ED for evaluation of pre-syncope.     Plan:  #sepsis  -will obtain labs, blood cx, UA/cx  -CXR  -EKG  -IVF  -IV abx    #hypoxia  -Well's Score of 6  -CT PE   #nausea  -no focal abd tenderness (mild suprapubic)  -CT AP      See ED Course/Discharge Instructions for further workup and care.    ED Course as of 11/27/21 1549   Sat Nov 27, 2021   1137 WBC(!): 12.00 [SC]   1155 [facility] called x2, no answer. [SC]   1219 Troponin I(!): 0.05 [SC]   1219 Hemoglobin: 14.5 [SC]   1220 ECG 12 lead, If upper abdominal pain and age of 40 or higher [SC]   1220 pH, Venous(!): 7.30 [SC]   1229 Nitrite, Urine(!): Positive [SC]   1256 X-RAY CHEST 1 VIEW  IMPRESSION: No acute pulmonary abnormality. [SC]   1345 SARS-CoV-2 (COVID-19): Negative [SC]   1445 US guided IV placed in the right forearm, 20G. Flushed and secured. Pt tolerated well without  complications. [SC]   1505 Extensive b/l PE w/ R heart strain [SC]   1515 I spoke w/ MICU PA and pulm Dr. Quach. Recommend for standard therapy, heparin gtt, PCU admit, DVT US of B/L Le's.  [SC]   1530 CT ANGIOGRAPHY CHEST WITH AND WITHOUT IV CONTRAST  IMPRESSION:  Extensive saddle pulmonary embolus with extension into all lobar branches  bilaterally. Flattening of the interventricular septum and reflux of contrast  into the IVC consistent with right heart strain. RV:LV ratio 2:1.     Mosaic attenuation may be on the basis of altered perfusion in the setting of  significant burden of emboli.     Gallbladder wall appears mildly thickened, correlate for right upper quadrant  tenderness. If clinically indicated this can be further evaluated with  ultrasound. [SC]   1544 I spoke with [facility], Dr. Ahumada, will see and admit the patient. []   1547 Updated pt's , Joel, on the phone [phone] [SC]      ED Course User Index  [] Maciej Milan DO  [SC] Amy Marcelo DO         Clinical Impressions as of 11/27/21 1549   Acute saddle pulmonary embolism, unspecified whether acute cor pulmonale present (CMS/HCC)       Amy Marcelo DO  PGY3 Resident  11/27/21 11:35 AM    ----------  CT ANGIOGRAPHY CHEST WITH AND WITHOUT IV CONTRAST   Final Result   IMPRESSION:   Extensive saddle pulmonary embolus with extension into all lobar branches   bilaterally. Flattening of the interventricular septum and reflux of contrast   into the IVC consistent with right heart strain. RV:LV ratio 2:1.      Mosaic attenuation may be on the basis of altered perfusion in the setting of   significant burden of emboli.      Gallbladder wall appears mildly thickened, correlate for right upper quadrant   tenderness. If clinically indicated this can be further evaluated with   ultrasound.      Finding:    Acute pulmonary embolism   Acuity: Critical  Status:  CLOSED      Critical read back was performed and results were read back by   Amy Marcelo DO   on 11/27/2021 at 3:07 PM.         CT ANGIOGRAPHY ABDOMEN PELVIS WITH AND WITHOUT IV CONTRAST   Final Result   IMPRESSION:   Extensive saddle pulmonary embolus with extension into all lobar branches   bilaterally. Flattening of the interventricular septum and reflux of contrast   into the IVC consistent with right heart strain. RV:LV ratio 2:1.      Mosaic attenuation may be on the basis of altered perfusion in the setting of   significant burden of emboli.      Gallbladder wall appears mildly thickened, correlate for right upper quadrant   tenderness. If clinically indicated this can be further evaluated with   ultrasound.      Finding:    Acute pulmonary embolism   Acuity: Critical  Status:  CLOSED      Critical read back was performed and results were read back by  Amy Marcelo DO   on 11/27/2021 at 3:07 PM.         X-RAY CHEST 1 VIEW   Final Result   IMPRESSION: No acute pulmonary abnormality.         ECG 12 lead, If upper abdominal pain and age of 40 or higher    (Results Pending)   US venous leg bilateral    (Results Pending)        Amy Marcelo DO  Resident  11/27/21 7816

## 2021-11-27 NOTE — H&P
Hospital Medicine Service -  History & Physical        CHIEF COMPLAINT   Chief Complaint   Patient presents with   • Nausea   • Syncope       HISTORY OF PRESENT ILLNESS      Mariya Porras is a 86 y.o. female with with a history of Parkinson's disease followed by Dr. Payan, low back pain with L1 compression fracture, wheelchair bounded who was sent here for syncope episode today.  Patient was just finished SNF program in [facility] and passed out in her chair while she was [place] transfer from wheelchair to bed.  According to the son patient lost consciousness for about 2.5 minutes.  No seizure type activity or incontinence.  She did complain nauseous and me the chest discomfort after regaining consciousness.  Then she complaint shortness of breath with mild activity.  EMS checked her blood pressure was low.  Initial blood pressure in the ED was 84/52 and oxygen saturation 86% room air.  After IV hydration patient blood pressure improved to 110/62 and oxygen saturation 96% room air.  She is fully alert and oriented, and following commands. The chest CT angiogram reported bilateral central PE with right heart strain.  DVT study pending.  Patient denied any change of bowel movement or voiding habit.  PAST MEDICAL AND SURGICAL HISTORY      Past Medical History:   Diagnosis Date   • Anxiety    • Arthritis    • Basal cell carcinoma     forehead   • Glaucoma    • Hypertension    • Low back pain    • Lumbosacral disc disease    • Parkinson's disease (CMS/HCC)    • Peripheral neuropathy      Past Surgical History:   Procedure Laterality Date   • BLADDER SUSPENSION  2009    Prolift M   • CATARACT EXTRACTION, BILATERAL     • COLONOSCOPY     • HYSTERECTOMY     • JOINT REPLACEMENT     • KNEE ARTHROPLASTY  2015    left     MEDICATIONS      Prior to Admission medications    Medication Sig Start Date End Date Taking? Authorizing Provider   bimatoprost (LUMIGAN) 0.01 % ophthalmic drops Administer 1 drop into both eyes  nightly.    Alexandra Birmingham MD   carbidopa-levodopa  mg per tablet Take 1 tablet by mouth 4 (four) times a day. 0800,1200,1600,2000    Alexandra Birmingham MD   gabapentin 100 mg capsule Take 1 capsule (100 mg total) by mouth 3 (three) times a day. 10/24/21 11/23/21  Arturo Ferguson DO   hydrocortisone 2.5 % rectal cream Insert into the rectum 2 (two) times a day for 7 days. Apply to affected areas 10/24/21 10/31/21  Arturo Ferguson DO   lidocaine 4 % adhesive patch,medicated topical patch Apply 1 patch topically daily. 10/24/21 11/23/21  Arturo Ferguson DO   magnesium oxide 400 mg (241.3 mg magnesium) tablet Take 400 mg by mouth every morning.    Alexandra Birmingham MD   melatonin 3 mg tablet Take 3 mg by mouth nightly.    Alexandra Birmingham MD   mirtazapine 30 mg tablet Take 30 mg by mouth nightly.    Alexandra Birmingham MD   polyethylene glycol 17 gram packet Take 17 g by mouth every evening.    Alexandra Birmingham MD   sennosides-docusate sodium 8.6-50 mg Take 2 tablets by mouth nightly. 10/24/21 11/23/21  Arturo Ferguson DO   sertraline 50 mg tablet Take 50 mg by mouth 2 (two) times a day. 0800,1700     Alexandra Birmingham MD     ALLERGIES      Codeine, Diazepam, Sulfa (sulfonamide antibiotics), and Sulfur  FAMILY HISTORY      Family History   Problem Relation Age of Onset   • No Known Problems Biological Mother    • No Known Problems Biological Father      SOCIAL HISTORY      Social History     Socioeconomic History   • Marital status:      Spouse name: Not on file   • Number of children: Not on file   • Years of education: Not on file   • Highest education level: Not on file   Occupational History   • Not on file   Tobacco Use   • Smoking status: Former Smoker   • Smokeless tobacco: Never Used   • Tobacco comment: as a young teenager   Substance and Sexual Activity   • Alcohol use: Yes     Comment: wine   • Drug use: No   • Sexual activity: Not Currently     Partners:  Male   Other Topics Concern   • Not on file   Social History Narrative   • Not on file     Social Determinants of Health     Financial Resource Strain:    • Difficulty of Paying Living Expenses: Not on file   Food Insecurity: No Food Insecurity   • Worried About Running Out of Food in the Last Year: Never true   • Ran Out of Food in the Last Year: Never true   Transportation Needs:    • Lack of Transportation (Medical): Not on file   • Lack of Transportation (Non-Medical): Not on file   Physical Activity:    • Days of Exercise per Week: Not on file   • Minutes of Exercise per Session: Not on file   Stress:    • Feeling of Stress : Not on file   Social Connections:    • Frequency of Communication with Friends and Family: Not on file   • Frequency of Social Gatherings with Friends and Family: Not on file   • Attends Sabianism Services: Not on file   • Active Member of Clubs or Organizations: Not on file   • Attends Club or Organization Meetings: Not on file   • Marital Status: Not on file   Intimate Partner Violence:    • Fear of Current or Ex-Partner: Not on file   • Emotionally Abused: Not on file   • Physically Abused: Not on file   • Sexually Abused: Not on file   Housing Stability:    • Unable to Pay for Housing in the Last Year: Not on file   • Number of Places Lived in the Last Year: Not on file   • Unstable Housing in the Last Year: Not on file     REVIEW OF SYSTEMS      Review of Systems  Constitutional: Negative for fatigue and unexpected weight change.   Eyes: Negative for visual disturbance.   Respiratory:Shortness of breath.    Cardiovascular: Brief chest pain    Gastrointestinal: Negative for abdominal pain, constipation, diarrhea, nausea and vomiting.   Genitourinary: Negative for difficulty urinating.   Musculoskeletal: Negative for arthralgias.   Skin: Negative for rash.   Neurological: Negative for dizziness and headaches.   Hematological: Does not bruise/bleed easily.   Psychiatric/Behavioral:  "Negative for confusion and dysphoric mood  PHYSICAL EXAMINATION      Visit Vitals  BP (!) 109/58 (BP Location: Right upper arm, Patient Position: Lying)   Pulse 99   Temp 36.4 °C (97.5 °F) (Temporal)   Resp 18   Ht 1.676 m (5' 6\")   Wt 65.8 kg (145 lb)   SpO2 98%   BMI 23.40 kg/m²     Body mass index is 23.4 kg/m².  Physical Exam  Constitutional: She is oriented to person, place, and time. She appears well-developed and well-nourished. No distress.   HENT:   Right Ear: External ear normal.   Left Ear: External ear normal.   Nose: Nose normal.   Eyes: EOM are normal. Pupils are equal, round, and reactive to light. Right eye exhibits no discharge. Left eye exhibits no discharge.   Neck: Normal range of motion. Neck supple.   Cardiovascular: Normal rate, regular rhythm, normal heart sounds and intact distal pulses.    Pulmonary/Chest: Effort normal and breath sounds normal.   Abdominal: Bowel sounds are normal. There is no tenderness.   Musculoskeletal: Normal range of motion.   Neurological: She is alert and oriented to person, place, and time.   Skin: Skin is warm and dry.   LABS / IMAGING / EKG        Labs  Lab Results   Component Value Date    WBC 12.00 (H) 11/27/2021    HGB 14.5 11/27/2021    HCT 45.8 (H) 11/27/2021    MCV 93.1 11/27/2021     11/27/2021     Lab Results   Component Value Date    GLUCOSE 140 (H) 11/27/2021    CALCIUM 10.3 11/27/2021     11/27/2021    K 4.4 11/27/2021    CO2 20 (L) 11/27/2021     11/27/2021    BUN 19 11/27/2021    CREATININE 0.9 11/27/2021     )  Lab Results   Component Value Date    INR 1.2 11/27/2021       Imaging  CT ANGIOGRAPHY CHEST WITH AND WITHOUT IV CONTRAST    Result Date: 11/27/2021  IMPRESSION: Extensive saddle pulmonary embolus with extension into all lobar branches bilaterally. Flattening of the interventricular septum and reflux of contrast into the IVC consistent with right heart strain. RV:LV ratio 2:1. Mosaic attenuation may be on the basis of " altered perfusion in the setting of significant burden of emboli. Gallbladder wall appears mildly thickened, correlate for right upper quadrant tenderness. If clinically indicated this can be further evaluated with ultrasound. Finding:    Acute pulmonary embolism   Acuity: Critical  Status:  CLOSED Critical read back was performed and results were read back by  Amy Marcelo DO   on 11/27/2021 at 3:07 PM.     CT ANGIOGRAPHY ABDOMEN PELVIS WITH AND WITHOUT IV CONTRAST    Result Date: 11/27/2021  IMPRESSION: Extensive saddle pulmonary embolus with extension into all lobar branches bilaterally. Flattening of the interventricular septum and reflux of contrast into the IVC consistent with right heart strain. RV:LV ratio 2:1. Mosaic attenuation may be on the basis of altered perfusion in the setting of significant burden of emboli. Gallbladder wall appears mildly thickened, correlate for right upper quadrant tenderness. If clinically indicated this can be further evaluated with ultrasound. Finding:    Acute pulmonary embolism   Acuity: Critical  Status:  CLOSED Critical read back was performed and results were read back by  Amy Marcelo DO   on 11/27/2021 at 3:07 PM.     X-RAY CHEST 1 VIEW    Result Date: 11/27/2021  IMPRESSION: No acute pulmonary abnormality.       ECG/Telemetry  I have independently reviewed the telemetry. No events for the last 24 hours.  ASSESSMENT AND PLAN         Assessment   Elevated troponin  Assessment & Plan  No EKG change  With PE  Demand ischemia  Consult CV   TTE  Trend Troponin    Anxiety  Assessment & Plan  Tx prn    Syncope  Assessment & Plan  2/2 Acute PE with hypoxia and hypotention  With parkinson  Consult neurologist(Dr. Payan)  Stable now    Parkinson disease (CMS/[facility])  Assessment & Plan   meds  Consult Dr. Paayn    * Acute saddle pulmonary embolism without acute cor pulmonale (CMS/[facility])  Assessment & Plan  Starting Heparin drip  DVT study  Consult pulmonary Service  Per  Chest CT study  Recent Dx of Back pain with L1 Fx.  Admit to PCU  Echo                            VTE Assessment: Pad[place]    Code Status: Prior  Estimated discharge date: 11/30/2021   No discharge date for patient encounter.     Glynn Ahumada MD  11/27/2021  4:36 PM

## 2021-11-27 NOTE — Clinical Note
Patient placed on procedure table in supine position with arms at side. Positioning devices: all pressure points padded, arm board under arms and safety strap applied.

## 2021-11-27 NOTE — CONSULTS
Pulmonary Consult Note     Mariya Porras is a 86 y.o. female with history of Parkinson's disease, peripheral neuropathy, lumbosacral disc disease, hypertension, bed and wheelchair bound who had syncopal episode after transferring from wheelchair to bed.  Apparently blood pressure was low on route to ER.  She felt nauseous and dizzy but this has subsequently resolved.  She is now awake alert and oriented.  Chemistries normal.  Lactate 1.6 VBG 7.3 0/52/23.  Troponin 0 0.05 and BNP less than 5  Covid negative.  Vital signs-afebrile heart rate 98 respirate 18 BP was 84/52 is now 109/58 O2 sat 98% room air  CT chest-extensive saddle pulmonary embolism with extension into all lobar branches bilaterally.  Flattening of interventricular septum and radiographic right heart strain.  Mosaic attenuation.  She has been started on IV heparin.  ER staff called me to discuss management  Past Medical History:   Diagnosis Date   • Anxiety    • Arthritis    • Basal cell carcinoma     forehead   • Glaucoma    • Hypertension    • Low back pain    • Lumbosacral disc disease    • Parkinson's disease (CMS/HCC)    • Peripheral neuropathy      Past Surgical History:   Procedure Laterality Date   • BLADDER SUSPENSION  2009    [facility]   • CATARACT EXTRACTION, BILATERAL     • COLONOSCOPY     • HYSTERECTOMY     • JOINT REPLACEMENT     • KNEE ARTHROPLASTY  2015    left     Social History     Socioeconomic History   • Marital status:      Spouse name: Not on file   • Number of children: Not on file   • Years of education: Not on file   • Highest education level: Not on file   Occupational History   • Not on file   Tobacco Use   • Smoking status: Former Smoker   • Smokeless tobacco: Never Used   • Tobacco comment: as a young teenager   Substance and Sexual Activity   • Alcohol use: Yes     Comment: wine   • Drug use: No   • Sexual activity: Not Currently     Partners: Male   Other Topics Concern   • Not on file   Social History  Narrative   • Not on file     Social Determinants of Health     Financial Resource Strain:    • Difficulty of Paying Living Expenses: Not on file   Food Insecurity: No Food Insecurity   • Worried About Running Out of Food in the Last Year: Never true   • Ran Out of Food in the Last Year: Never true   Transportation Needs:    • Lack of Transportation (Medical): Not on file   • Lack of Transportation (Non-Medical): Not on file   Physical Activity:    • Days of Exercise per Week: Not on file   • Minutes of Exercise per Session: Not on file   Stress:    • Feeling of Stress : Not on file   Social Connections:    • Frequency of Communication with Friends and Family: Not on file   • Frequency of Social Gatherings with Friends and Family: Not on file   • Attends [employer] Services: Not on file   • Active Member of Clubs or Organizations: Not on file   • Attends Club or Organization Meetings: Not on file   • Marital Status: Not on file   Intimate Partner Violence:    • Fear of Current or Ex-Partner: Not on file   • Emotionally Abused: Not on file   • Physically Abused: Not on file   • Sexually Abused: Not on file   Housing Stability:    • Unable to Pay for Housing in the Last Year: Not on file   • Number of Places Lived in the Last Year: Not on file   • Unstable Housing in the Last Year: Not on file     Family History   Problem Relation Age of Onset   • No Known Problems Biological Mother    • No Known Problems Biological Father        Allergies: Codeine, Diazepam, Sulfa (sulfonamide antibiotics), and Sulfur    HOME MEDS  Not in a hospital admission.    CURRENT MEDS  •  heparin (porcine), 40-80 Units/kg, intravenous, q6h PRN  •  heparin infusion - MAR calculator by PTT, 100-4,000 Units/hr, intravenous, Titrated  •  bimatoprost  •  carbidopa-levodopa  •  gabapentin  •  hydrocortisone  •  lidocaine  •  magnesium oxide  •  melatonin  •  mirtazapine  •  polyethylene glycol  •  sennosides-docusate sodium  •  sertraline    REVIEW  "OF SYSTEMS  Review of Systems   All other systems reviewed and are negative.      VITAL SIGNS  Vitals:    11/27/21 1440 11/27/21 1527 11/27/21 1536 11/27/21 1550   BP: 112/64   (!) 109/58   BP Location: Right upper arm   Right upper arm   Patient Position: Lying   Lying   Pulse: 99      Resp: 18   18   Temp:       TempSrc:       SpO2: 97%   98%   Weight:   65.8 kg (145 lb)    Height:  1.676 m (5' 6\")         Oxygen:  Oxygen Therapy: None (Room air)              INTAKE/OUTPUT    Intake/Output Summary (Last 24 hours) at 11/27/2021 1624  Last data filed at 11/27/2021 1304  Gross per 24 hour   Intake 1000 ml   Output 500 ml   Net 500 ml         PHYSICAL EXAM  Physical Exam  Constitutional:       General: She is not in acute distress.     Appearance: She is well-developed.   HENT:      Head: Normocephalic and atraumatic.   Eyes:      Conjunctiva/sclera: Conjunctivae normal.      Pupils: Pupils are equal, round, and reactive to light.   Cardiovascular:      Rate and Rhythm: Normal rate and regular rhythm.      Heart sounds: Normal heart sounds.   Pulmonary:      Effort: Pulmonary effort is normal.      Breath sounds: Normal breath sounds.   Abdominal:      General: Bowel sounds are normal.      Palpations: Abdomen is soft.   Musculoskeletal:         General: No swelling or tenderness.      Cervical back: Normal range of motion and neck supple.   Skin:     General: Skin is warm and dry.   Neurological:      Mental Status: She is alert and oriented to person, place, and time.      Comments: Peripheral neuropathy         LAB RESULTS  ABG  Results from last 7 days   Lab Units 11/27/21  1221   SOURCE OF OXYGEN  2l     CBC  Results from last 7 days   Lab Units 11/27/21  1136   WBC K/uL 12.00*   RBC M/uL 4.92   HEMOGLOBIN g/dL 14.5   HEMATOCRIT % 45.8*   MCV fL 93.1   MCH pg 29.5   MCHC g/dL 31.7*   PLATELETS K/uL 197   RDW % 14.2   MPV fL 9.9     BMP  Results from last 7 days   Lab Units 11/27/21  1136   SODIUM mEQ/L 137 "   POTASSIUM mEQ/L 4.4   CHLORIDE mEQ/L 104   CO2 mEQ/L 20*   BUN mg/dL 19   CREATININE mg/dL 0.9   CALCIUM mg/dL 10.3   ALBUMIN g/dL 3.7   BILIRUBIN TOTAL mg/dL 0.7   ALK PHOS IU/L 129*   ALT IU/L 20   AST IU/L 25   GLUCOSE mg/dL 140*     Coag  Results from last 7 days   Lab Units 11/27/21  1539   PROTIME sec 14.5   INR  1.2   PTT sec 28     Micro  Microbiology Results     Procedure Component Value Units Date/Time    SARS-CoV-2 (COVID-19), PCR Nasopharynx [[id number]]  (Normal) Collected: 11/27/21 1207    Specimen: Nasopharyngeal Swab from Nasopharynx Updated: 11/27/21 1300    Narrative:      The following orders were created for panel order SARS-CoV-2 (COVID-19), PCR Nasopharynx.  Procedure                               Abnormality         Status                     ---------                               -----------         ------                     SARS-COV-2 (COVID-19)/ F...[[id number]]  Normal              Final result                 Please view results for these tests on the individual orders.    SARS-COV-2 (COVID-19)/ FLU A/B, AND RSV, PCR Nasopharynx [[id number]]  (Normal) Collected: 11/27/21 1207    Specimen: Nasopharyngeal Swab from Nasopharynx Updated: 11/27/21 1300     SARS-CoV-2 (COVID-19) Negative     Influenza A Negative     Influenza B Negative     Respiratory Syncytial Virus Negative          IMAGING  All imaging reviewed by me.  Extensive saddle PE  CT ANGIOGRAPHY CHEST WITH AND WITHOUT IV CONTRAST    Result Date: 11/27/2021  IMPRESSION: Extensive saddle pulmonary embolus with extension into all lobar branches bilaterally. Flattening of the interventricular septum and reflux of contrast into the IVC consistent with right heart strain. RV:LV ratio 2:1. Mosaic attenuation may be on the basis of altered perfusion in the setting of significant burden of emboli. Gallbladder wall appears mildly thickened, correlate for right upper quadrant tenderness. If clinically indicated this can be further evaluated  with ultrasound. Finding:    Acute pulmonary embolism   Acuity: Critical  Status:  CLOSED Critical read back was performed and results were read back by  Amy Marcelo DO   on 11/27/2021 at 3:07 PM.     CT ANGIOGRAPHY ABDOMEN PELVIS WITH AND WITHOUT IV CONTRAST    Result Date: 11/27/2021  IMPRESSION: Extensive saddle pulmonary embolus with extension into all lobar branches bilaterally. Flattening of the interventricular septum and reflux of contrast into the IVC consistent with right heart strain. RV:LV ratio 2:1. Mosaic attenuation may be on the basis of altered perfusion in the setting of significant burden of emboli. Gallbladder wall appears mildly thickened, correlate for right upper quadrant tenderness. If clinically indicated this can be further evaluated with ultrasound. Finding:    Acute pulmonary embolism   Acuity: Critical  Status:  CLOSED Critical read back was performed and results were read back by  Amy Marcelo DO   on 11/27/2021 at 3:07 PM.     X-RAY CHEST 1 VIEW    Result Date: 11/27/2021  IMPRESSION: No acute pulmonary abnormality.        ECG/Telemetry  I have independently reviewed the telemetry. No events for the last 24 hours.    ASSESSMENT & PLAN    86-year-old woman, bedbound with presentation of syncope due to extensive bilateral pulmonary embolism-submassive in nature.  Initial mild hemodynamic instability but has improved with fluids and IV heparin initiation.  Minimal oxygen requirements  High risk PE    Recommendations:  I recommend continuing extended therapy with IV heparin.  Check lower extremity Dopplers-if extensive proximal lower extremity DVT, to consider insertion of IVC filter  Admit to PCU for close hemodynamic monitoring  In view of her advanced age and hemodynamic stability at this time, I do not recommend catheter directed thrombolysis as risks likely outweigh benefits  Likely will need lifelong anticoagulation due to her sedentary status  I discussed plan with  patient and her family at bedside in the ER as well as with [facility] and ER staff        CODE STATUS  Prior      Nadira Quach MD  11/27/2021

## 2021-11-27 NOTE — ASSESSMENT & PLAN NOTE
2/2 Acute PE with hypoxia and hypotention  With parkinson  Consult neurologist(Dr. Payan)  Stable now    BP at low side  Ordering IV Bonus  Monitoring closely

## 2021-11-27 NOTE — ED ATTESTATION NOTE
11/27/202112:01 PM  I have personally seen and examined the patient.  I reviewed and agree with the PA/NP/Resident's assessment and plan of care.    My examination, assessment, and plan of care of Mariya Porras is as follows:  The patient presents with a syncopal episode after transferring from wheelchair to bed.  Patient was discharged from her rehab to a skilled nursing facility and had just been there less than an hour.  She was being transferred from a wheelchair to bed and had a sudden episode of unresponsiveness.  Patient had nausea was not responding for several seconds and then came around gradually.  Patient is amnestic to the event.  There is no fall or trauma.  She denies any preceding chest pain or shortness of breath.  Patient has been predominantly in bed and debilitated recently      Exam: Patient is awake alert and oriented x3.  She is chronically ill-appearing.  Heart is regular.  She is tachycardic.  Lungs are clear and equal bilaterally.  She does have tachypnea as well.  Abdomen is soft and nontender.  Extremities have 1+ edema.  Skin is warm and dry with no rashes or lesions.  Neuro exam is nonfocal.      Impression/Plan: Patient presents today with an acute syncopal event and has a saddle embolus with right heart strain and with a DVT in the right lower extremity.  We spoke with pulmonary who recommended admitting to the PCU and IV heparin.  Patient's vital signs are stable the patient will be heparinized and admitted to the PCU for definitive care and treatment.    Critical Care  Performed by: Maciej Milan DO  Authorized by: Maciej Milan DO     Critical care provider statement:     Critical care time (minutes):  60    Critical care was necessary to treat or prevent imminent or life-threatening deterioration of the following conditions: PE with right heart strain and syncope.    Critical care was time spent personally by me on the following activities:  Ordering and performing  treatments and interventions, ordering and review of laboratory studies, ordering and review of radiographic studies, pulse oximetry, re-evaluation of patient's condition, examination of patient, evaluation of patient's response to treatment and discussions with consultants         I was physically present for the key/critical portions of the following procedures: None     Maciej Milan,   11/27/21 0172

## 2021-11-28 PROBLEM — D72.829 LEUKOCYTOSIS: Status: ACTIVE | Noted: 2021-11-28

## 2021-11-28 PROBLEM — I82.411: Status: ACTIVE | Noted: 2021-11-27

## 2021-11-28 LAB
APTT PPP: 120 SEC (ref 23–35)
APTT PPP: 130 SEC (ref 23–35)
APTT PPP: 154 SEC (ref 23–35)
APTT PPP: 95 SEC (ref 23–35)
ATRIAL RATE: 101
ATRIAL RATE: 93
BACTERIA UR CULT: NORMAL
ERYTHROCYTE [DISTWIDTH] IN BLOOD BY AUTOMATED COUNT: 14.3 % (ref 11.7–14.4)
ERYTHROCYTE [DISTWIDTH] IN BLOOD BY AUTOMATED COUNT: 14.6 % (ref 11.7–14.4)
HCT VFR BLDCO AUTO: 38 % (ref 35–45)
HCT VFR BLDCO AUTO: 41.8 % (ref 35–45)
HGB BLD-MCNC: 12 G/DL (ref 11.8–15.7)
HGB BLD-MCNC: 13.1 G/DL (ref 11.8–15.7)
MCH RBC QN AUTO: 29.4 PG (ref 28–33.2)
MCH RBC QN AUTO: 29.4 PG (ref 28–33.2)
MCHC RBC AUTO-ENTMCNC: 31.3 G/DL (ref 32.2–35.5)
MCHC RBC AUTO-ENTMCNC: 31.6 G/DL (ref 32.2–35.5)
MCV RBC AUTO: 93.1 FL (ref 83–98)
MCV RBC AUTO: 93.9 FL (ref 83–98)
P AXIS: 10
P AXIS: 16
PDW BLD AUTO: 10.2 FL (ref 9.4–12.3)
PDW BLD AUTO: 10.8 FL (ref 9.4–12.3)
PLATELET # BLD AUTO: 138 K/UL (ref 150–369)
PLATELET # BLD AUTO: 148 K/UL (ref 150–369)
PR INTERVAL: 160
PR INTERVAL: 168
QRS DURATION: 74
QRS DURATION: 84
QT INTERVAL: 316
QT INTERVAL: 364
QTC CALCULATION(BAZETT): 409
QTC CALCULATION(BAZETT): 452
R AXIS: -14
R AXIS: 14
RBC # BLD AUTO: 4.08 M/UL (ref 3.93–5.22)
RBC # BLD AUTO: 4.45 M/UL (ref 3.93–5.22)
T WAVE AXIS: 29
T WAVE AXIS: 64
TROPONIN I SERPL-MCNC: 1.03 NG/ML
VENTRICULAR RATE: 101
VENTRICULAR RATE: 93
WBC # BLD AUTO: 13 K/UL (ref 3.8–10.5)
WBC # BLD AUTO: 8.13 K/UL (ref 3.8–10.5)

## 2021-11-28 PROCEDURE — 85027 COMPLETE CBC AUTOMATED: CPT | Performed by: HOSPITALIST

## 2021-11-28 PROCEDURE — 20600000 HC ROOM AND CARE INTERMEDIATE/TELEMETRY

## 2021-11-28 PROCEDURE — 63600000 HC DRUGS/DETAIL CODE: Performed by: HOSPITALIST

## 2021-11-28 PROCEDURE — 87040 BLOOD CULTURE FOR BACTERIA: CPT | Performed by: HOSPITALIST

## 2021-11-28 PROCEDURE — 99233 SBSQ HOSP IP/OBS HIGH 50: CPT | Performed by: HOSPITALIST

## 2021-11-28 PROCEDURE — 63700000 HC SELF-ADMINISTRABLE DRUG: Performed by: HOSPITALIST

## 2021-11-28 PROCEDURE — 36415 COLL VENOUS BLD VENIPUNCTURE: CPT | Performed by: HOSPITALIST

## 2021-11-28 PROCEDURE — 25800000 HC PHARMACY IV SOLUTIONS: Performed by: HOSPITALIST

## 2021-11-28 PROCEDURE — 85730 THROMBOPLASTIN TIME PARTIAL: CPT | Performed by: HOSPITALIST

## 2021-11-28 PROCEDURE — 99223 1ST HOSP IP/OBS HIGH 75: CPT | Performed by: STUDENT IN AN ORGANIZED HEALTH CARE EDUCATION/TRAINING PROGRAM

## 2021-11-28 PROCEDURE — 63700000 HC SELF-ADMINISTRABLE DRUG: Performed by: STUDENT IN AN ORGANIZED HEALTH CARE EDUCATION/TRAINING PROGRAM

## 2021-11-28 RX ORDER — ACETAMINOPHEN 325 MG/1
650 TABLET ORAL EVERY 4 HOURS PRN
Status: DISCONTINUED | OUTPATIENT
Start: 2021-11-28 | End: 2021-12-01 | Stop reason: HOSPADM

## 2021-11-28 RX ORDER — SODIUM CHLORIDE, SODIUM LACTATE, POTASSIUM CHLORIDE, CALCIUM CHLORIDE 600; 310; 30; 20 MG/100ML; MG/100ML; MG/100ML; MG/100ML
INJECTION, SOLUTION INTRAVENOUS CONTINUOUS
Status: ACTIVE | OUTPATIENT
Start: 2021-11-28 | End: 2021-11-29

## 2021-11-28 RX ADMIN — CARBIDOPA AND LEVODOPA 1 TABLET: 25; 100 TABLET ORAL at 17:12

## 2021-11-28 RX ADMIN — CARBIDOPA AND LEVODOPA 1 TABLET: 25; 100 TABLET ORAL at 13:39

## 2021-11-28 RX ADMIN — CARBIDOPA AND LEVODOPA 1 TABLET: 25; 100 TABLET ORAL at 08:18

## 2021-11-28 RX ADMIN — GABAPENTIN 100 MG: 100 CAPSULE ORAL at 08:19

## 2021-11-28 RX ADMIN — CARBIDOPA AND LEVODOPA 1 TABLET: 25; 100 TABLET ORAL at 20:23

## 2021-11-28 RX ADMIN — GABAPENTIN 100 MG: 100 CAPSULE ORAL at 20:23

## 2021-11-28 RX ADMIN — ACETAMINOPHEN 650 MG: 325 TABLET, FILM COATED ORAL at 11:58

## 2021-11-28 RX ADMIN — SODIUM CHLORIDE 250 ML: 9 INJECTION, SOLUTION INTRAVENOUS at 19:00

## 2021-11-28 RX ADMIN — LIDOCAINE 1 PATCH: 4 PATCH TOPICAL at 08:19

## 2021-11-28 RX ADMIN — SERTRALINE HYDROCHLORIDE 50 MG: 50 TABLET ORAL at 08:19

## 2021-11-28 RX ADMIN — MIRTAZAPINE 30 MG: 30 TABLET, FILM COATED ORAL at 21:23

## 2021-11-28 RX ADMIN — Medication 3 MG: at 21:23

## 2021-11-28 RX ADMIN — HEPARIN SODIUM 1100 UNITS/HR: 10000 INJECTION, SOLUTION INTRAVENOUS at 08:26

## 2021-11-28 RX ADMIN — SODIUM CHLORIDE, POTASSIUM CHLORIDE, SODIUM LACTATE AND CALCIUM CHLORIDE: 600; 310; 30; 20 INJECTION, SOLUTION INTRAVENOUS at 15:29

## 2021-11-28 RX ADMIN — GABAPENTIN 100 MG: 100 CAPSULE ORAL at 13:39

## 2021-11-28 RX ADMIN — MAGNESIUM OXIDE TAB 400 MG (241.3 MG ELEMENTAL MG) 400 MG: 400 (241.3 MG) TAB at 08:19

## 2021-11-28 RX ADMIN — LATANOPROST 1 DROP: 50 SOLUTION OPHTHALMIC at 21:23

## 2021-11-28 RX ADMIN — SERTRALINE HYDROCHLORIDE 50 MG: 50 TABLET ORAL at 20:23

## 2021-11-28 RX ADMIN — CALCIUM CARBONATE (ANTACID) CHEW TAB 500 MG 500 MG: 500 CHEW TAB at 17:44

## 2021-11-28 RX ADMIN — SENNOSIDES AND DOCUSATE SODIUM 2 TABLET: 50; 8.6 TABLET ORAL at 21:23

## 2021-11-28 RX ADMIN — SODIUM CHLORIDE 500 ML: 9 INJECTION, SOLUTION INTRAVENOUS at 14:11

## 2021-11-28 NOTE — PROGRESS NOTES
Pulmonary Progress Note     SUBJECTIVE  Interval History:     submassive PE_ on IV heparin  R prox fem DVT- had IVC filter placed last night    Troponin  Peaked at 1.5    VSS /55 sat 95% 3l    Currently feels well, denies shortness of breath or pain    Allergies: Codeine, Diazepam, Sulfa (sulfonamide antibiotics), and Sulfur    CURRENT MEDS  •  calcium carbonate, 500 mg, oral, 2x daily PRN  •  carbidopa-levodopa, 1 tablet, oral, QID  •  glucose, 16-32 g of dextrose, oral, PRN **OR** dextrose, 15-30 g of dextrose, oral, PRN **OR** glucagon, 1 mg, intramuscular, PRN **OR** dextrose in water, 25 mL, intravenous, PRN  •  gabapentin, 100 mg, oral, TID  •  heparin (porcine), 40-80 Units/kg, intravenous, q6h PRN  •  heparin infusion - MAR calculator by PTT, 100-4,000 Units/hr, intravenous, Titrated  •  latanoprost, 1 drop, Both Eyes, Nightly  •  lidocaine, 1 patch, Topical, Daily  •  magnesium oxide, 400 mg, oral, q AM  •  melatonin, 3 mg, oral, Nightly  •  mirtazapine, 30 mg, oral, Nightly  •  nitroglycerin, 0.4 mg, sublingual, q5 min PRN  •  polyethylene glycol, 17 g, oral, q PM  •  sennosides-docusate sodium, 2 tablet, oral, Nightly  •  sertraline, 50 mg, oral, BID    VITAL SIGNS  Vitals:    11/28/21 0400 11/28/21 0500 11/28/21 0600 11/28/21 0735   BP:  (!) 105/55 (!) 104/55    BP Location:  Left upper arm Left upper arm    Patient Position:  Lying Lying    Pulse: 89 90 87 85   Resp:  18 18    Temp:  36.6 °C (97.9 °F)     TempSrc:  Oral     SpO2: 93% 92% 93% 95%   Weight:       Height:           Oxygen:  Oxygen Therapy: Supplemental oxygen  O2 Delivery Method: Nasal cannula     O2 Flow Rate (L/min): 4 L/min     INTAKE/OUTPUT    Intake/Output Summary (Last 24 hours) at 11/28/2021 0806  Last data filed at 11/28/2021 0600  Gross per 24 hour   Intake 1847.38 ml   Output 700 ml   Net 1147.38 ml     PHYSICAL EXAM  Physical Exam  Constitutional:       General: She is not in acute distress.     Appearance: She is  well-developed.   HENT:      Head: Normocephalic and atraumatic.   Eyes:      Conjunctiva/sclera: Conjunctivae normal.      Pupils: Pupils are equal, round, and reactive to light.   Cardiovascular:      Rate and Rhythm: Normal rate and regular rhythm.      Heart sounds: Normal heart sounds.   Pulmonary:      Effort: Pulmonary effort is normal.      Breath sounds: Normal breath sounds.   Abdominal:      General: Bowel sounds are normal.      Palpations: Abdomen is soft.   Musculoskeletal:         General: Normal range of motion.      Cervical back: Normal range of motion and neck supple.      Comments: Mild swelling right lower extremity   Skin:     General: Skin is warm and dry.   Neurological:      Mental Status: She is alert and oriented to person, place, and time.         LAB RESULTS  ABG  Results from last 7 days   Lab Units 11/27/21  1221   SOURCE OF OXYGEN  2l     CBC  Results from last 7 days   Lab Units 11/28/21  0235 11/27/21  1136   WBC K/uL 13.00* 12.00*   RBC M/uL 4.45 4.92   HEMOGLOBIN g/dL 13.1 14.5   HEMATOCRIT % 41.8 45.8*   MCV fL 93.9 93.1   MCH pg 29.4 29.5   MCHC g/dL 31.3* 31.7*   PLATELETS K/uL 148* 197   RDW % 14.3 14.2   MPV fL 10.2 9.9     BMP  Results from last 7 days   Lab Units 11/27/21  1136   SODIUM mEQ/L 137   POTASSIUM mEQ/L 4.4   CHLORIDE mEQ/L 104   CO2 mEQ/L 20*   BUN mg/dL 19   CREATININE mg/dL 0.9   CALCIUM mg/dL 10.3   ALBUMIN g/dL 3.7   BILIRUBIN TOTAL mg/dL 0.7   ALK PHOS IU/L 129*   ALT IU/L 20   AST IU/L 25   GLUCOSE mg/dL 140*     Coag  Results from last 7 days   Lab Units 11/28/21  0235 11/27/21  1539   PROTIME sec  --  14.5   INR   --  1.2   PTT sec 120* 28       Micro  Microbiology Results     Procedure Component Value Units Date/Time    SARS-CoV-2 (COVID-19), PCR Nasopharynx [[id number]]  (Normal) Collected: 11/27/21 1207    Specimen: Nasopharyngeal Swab from Nasopharynx Updated: 11/27/21 1300    Narrative:      The following orders were created for panel order  SARS-CoV-2 (COVID-19), PCR Nasopharynx.  Procedure                               Abnormality         Status                     ---------                               -----------         ------                     SARS-COV-2 (COVID-19)/ F...[[id number]]  Normal              Final result                 Please view results for these tests on the individual orders.    SARS-COV-2 (COVID-19)/ FLU A/B, AND RSV, PCR Nasopharynx [[id number]]  (Normal) Collected: 11/27/21 1207    Specimen: Nasopharyngeal Swab from Nasopharynx Updated: 11/27/21 1300     SARS-CoV-2 (COVID-19) Negative     Influenza A Negative     Influenza B Negative     Respiratory Syncytial Virus Negative            IMAGING    All imaging reviewed by me  CT ANGIOGRAPHY CHEST WITH AND WITHOUT IV CONTRAST    Result Date: 11/27/2021  IMPRESSION: Extensive saddle pulmonary embolus with extension into all lobar branches bilaterally. Flattening of the interventricular septum and reflux of contrast into the IVC consistent with right heart strain. RV:LV ratio 2:1. Mosaic attenuation may be on the basis of altered perfusion in the setting of significant burden of emboli. Gallbladder wall appears mildly thickened, correlate for right upper quadrant tenderness. If clinically indicated this can be further evaluated with ultrasound. Finding:    Acute pulmonary embolism   Acuity: Critical  Status:  CLOSED Critical read back was performed and results were read back by  Amy Marcelo DO   on 11/27/2021 at 3:07 PM.     CT ANGIOGRAPHY ABDOMEN PELVIS WITH AND WITHOUT IV CONTRAST    Result Date: 11/27/2021  IMPRESSION: Extensive saddle pulmonary embolus with extension into all lobar branches bilaterally. Flattening of the interventricular septum and reflux of contrast into the IVC consistent with right heart strain. RV:LV ratio 2:1. Mosaic attenuation may be on the basis of altered perfusion in the setting of significant burden of emboli. Gallbladder wall appears mildly  thickened, correlate for right upper quadrant tenderness. If clinically indicated this can be further evaluated with ultrasound. Finding:    Acute pulmonary embolism   Acuity: Critical  Status:  CLOSED Critical read back was performed and results were read back by  Amy Marcelo DO   on 11/27/2021 at 3:07 PM.     ULTRASOUND GUIDED VASCULAR ACCESS    Result Date: 11/27/2021  IMPRESSION:   Successful placement of a retrievable infrarenal IVC Filter. Device: Argon Option Elite IVC Filter COMMENT: PROCEDURE: 1. Ultrasound-guided access of the right common femoral vein. 2. IVC cavagram 3. IVC filter placement RADIOLOGIST:  Danie Shell MD ANESTHESIA:  Lidocaine 1% CONTRAST:  30 cc, Omnipaque 300 FLUORO TIME:  0.7 min REFERENCE AIR KERMA: 42 mGy MEDICATIONS:  none DESCRIPTION OF PROCEDURE:  Consent was obtained following explanation of risks and benefits of the procedure. The right groin was prepped and draped in the usual sterile fashion. The right common femoral vein was noted to be compressible and patent on gray scale ultrasound. Local anesthesia was provided. The vein was then accessed with a  21-gauge needle under ultrasound guidance, and an .018 wire was advanced centrally. An ultrasound-guided access image was saved to PACS. Exchange was made for a microsheath, .035 wire, and multi-sidehole sheath, which was positioned at the iliocaval junction. IVC cavagram was performed. Renal vein insertion sites were localized. An Argon Option Elite IVC Filter was deployed infrarenally without complication. Wires and catheters were removed and hemostasis was achieved. The patient remained stable throughout the procedure. The number of guidewires used, and their integrity, was confirmed at the end of the procedure.     IR FILTER PLACEMENT    Result Date: 11/27/2021  IMPRESSION:   Successful placement of a retrievable infrarenal IVC Filter. Device: Argon Option Elite IVC Filter COMMENT: PROCEDURE: 1. Ultrasound-guided  access of the right common femoral vein. 2. IVC cavagram 3. IVC filter placement RADIOLOGIST:  Danie Shell MD ANESTHESIA:  Lidocaine 1% CONTRAST:  30 cc, Omnipaque 300 FLUORO TIME:  0.7 min REFERENCE AIR KERMA: 42 mGy MEDICATIONS:  none DESCRIPTION OF PROCEDURE:  Consent was obtained following explanation of risks and benefits of the procedure. The right groin was prepped and draped in the usual sterile fashion. The right common femoral vein was noted to be compressible and patent on gray scale ultrasound. Local anesthesia was provided. The vein was then accessed with a  21-gauge needle under ultrasound guidance, and an .018 wire was advanced centrally. An ultrasound-guided access image was saved to PACS. Exchange was made for a microsheath, .035 wire, and multi-sidehole sheath, which was positioned at the iliocaval junction. IVC cavagram was performed. Renal vein insertion sites were localized. An Argon Option Elite IVC Filter was deployed infrarenally without complication. Wires and catheters were removed and hemostasis was achieved. The patient remained stable throughout the procedure. The number of guidewires used, and their integrity, was confirmed at the end of the procedure.     X-RAY CHEST 1 VIEW    Result Date: 11/27/2021  IMPRESSION: No acute pulmonary abnormality.     US venous leg bilateral    Result Date: 11/27/2021  IMPRESSION: There is acute right proximal femoral vein deep venous thrombosis. Finding:    Other   Acuity: Critical  Status:  CLOSED Critical read back was performed and results were read back by Herminia Flood RN, on 11/27/2021 5:33 PM       ASSESSMENT & PLAN      86-year-old woman, bedbound with presentation of syncope due to extensive bilateral pulmonary embolism-submassive in nature.  Initial mild hemodynamic instability but has improved with fluids and IV heparin initiation.  Minimal oxygen requirements  High risk PE  Elevated troponin-secondary to right heart strain less  likely  Proximal right DVT-IVC filter was placed     Recommendations:  I recommend continuing  therapy with IV heparin.   Now is status post insertion of IVC filter for a proximal DVT  Continue in PCU for close hemodynamic monitoring  In view of her advanced age and hemodynamic stability at this time, I do not recommend catheter directed thrombolysis as risks likely outweigh benefits  Likely will need lifelong anticoagulation due to her sedentary status  Echocardiogram in a.m.      Nadira Quach MD

## 2021-11-28 NOTE — NURSING NOTE
Pt sitting in chair, BP 70/42, pt denies feeling lightheaded or dizzy, but sleepy and slow to respond, assisted to bed. MD notified BP 75/46.  bolus started.    1530-bolus completed BP 95/51.    1800 BP 82/46. MD aware. NSS 500ml bolus started, BCx2, CBC drawn and sent. No void, bladder scanned for greater fung 590ml. Straight cathed for 750ml per order.     1900-/57

## 2021-11-28 NOTE — POST-PROCEDURE NOTE
Interventional Radiology Brief Postprocedure Note    Mariya Porras     Attending: Danie Shell MD     Assistant:      Diagnosis: Submassive PE, large right femoral DVT    Description of procedure: IVC filter placement    Contrast:  30 cc, Omni 300     Anesthesia:  Local (Lidocaine)    Volume of Lidocaine Utilized (ml): 5     Medications:       Complications: None      Estimated Blood Loss: Estimated Blood Loss: 0-10 ml    Anticoagulation:      Specimens:      Findings: Successful infrarenal IVC Filter placement. R CFV access.    11/27/2021 7:19 PM

## 2021-11-28 NOTE — CONSULTS
REASON FOR CONSULT: Acute saddle pulmonary embolism, large right femoral DVT, elevated troponin    CONSULT FROM: Glynn Ahumada MD    ------------------------------------------------------------------------------------------------------------------------------------------  HISTORY OF PRESENTING ILLNESS  ------------------------------------------------------------------------------------------------------------------------------------------  Mariya Porras is a 86 y.o. female, who never saw a cardiologist, who is admitted with syncopal episode after transferring from wheelchair to bed.  She is usually wheelchair-bound due to significant Parkinson disease, peripheral neuropathy, lumbosacral disc disease.  In the ER she was found to have extensive bilateral pulmonary emboli's, submassive in nature with initial mild hemodynamic instability.  CAT scan suggest RV strain.  She was found to have elevated troponin of 1.50 hence cardiology consult.  On further questioning patient denies any previous cardiac history, has never seen a cardiologist in the past.    # cardiac risk factors: Age, hypertension.  # no history of CAD/CHF      ------------------------------------------------------------------------------------------------------------------------------------------  PAST MEDICAL HISTORY  ------------------------------------------------------------------------------------------------------------------------------------------  Past Medical History:   Diagnosis Date   • Anxiety    • Arthritis    • Basal cell carcinoma     forehead   • Glaucoma    • Hypertension    • Low back pain    • Lumbosacral disc disease    • Parkinson's disease (CMS/HCC)    • Peripheral neuropathy      Past Surgical History:   Procedure Laterality Date   • BLADDER SUSPENSION  2009    Prolift M   • CATARACT EXTRACTION, BILATERAL     • COLONOSCOPY     • HYSTERECTOMY     • JOINT REPLACEMENT     • KNEE ARTHROPLASTY  2015    left        ------------------------------------------------------------------------------------------------------------------------------------------  MEDICATIONS  ------------------------------------------------------------------------------------------------------------------------------------------  Home medications    •  acetaminophen, Take 650 mg by mouth every 4 (four) hours as needed for mild pain.  •  gabapentin, Take 100 mg by mouth 3 (three) times a day.  •  hydrALAZINE, Take 75 mg by mouth 3 (three) times a day.  •  lidocaine, Apply 1 patch topically daily as needed.  •  lisinopriL, Take 20 mg by mouth daily.  •  oxyCODONE, Take 5 mg by mouth every 8 (eight) hours as needed for moderate pain or severe pain.  •  sennosides-docusate sodium, Take 2 tablets by mouth nightly.  •  bimatoprost, Administer 1 drop into both eyes nightly.  •  carbidopa-levodopa, Take 1 tablet by mouth 4 (four) times a day. 0900, 1300, 1700, 2100   •  magnesium oxide, Take 400 mg by mouth every morning.  •  melatonin, Take 3 mg by mouth nightly.  •  mirtazapine, Take 30 mg by mouth nightly.  •  polyethylene glycol, Take 17 g by mouth daily.    •  sertraline, Take 50 mg by mouth 2 (two) times a day. 0900, 1700     Inpatient Medications    •  calcium carbonate, 500 mg, oral, 2x daily PRN  •  carbidopa-levodopa, 1 tablet, oral, QID  •  glucose, 16-32 g of dextrose, oral, PRN **OR** dextrose, 15-30 g of dextrose, oral, PRN **OR** glucagon, 1 mg, intramuscular, PRN **OR** dextrose in water, 25 mL, intravenous, PRN  •  gabapentin, 100 mg, oral, TID  •  heparin (porcine), 40-80 Units/kg, intravenous, q6h PRN  •  heparin infusion - MAR calculator by PTT, 100-4,000 Units/hr, intravenous, Titrated  •  latanoprost, 1 drop, Both Eyes, Nightly  •  lidocaine, 1 patch, Topical, Daily  •  magnesium oxide, 400 mg, oral, q AM  •  melatonin, 3 mg, oral, Nightly  •  mirtazapine, 30 mg, oral, Nightly  •  nitroglycerin, 0.4 mg, sublingual, q5 min PRN  •   polyethylene glycol, 17 g, oral, q PM  •  sennosides-docusate sodium, 2 tablet, oral, Nightly  •  sertraline, 50 mg, oral, BID    ------------------------------------------------------------------------------------------------------------------------------------------  ALLERGIES  ------------------------------------------------------------------------------------------------------------------------------------------  Codeine, Diazepam, Sulfa (sulfonamide antibiotics), and Sulfur    ------------------------------------------------------------------------------------------------------------------------------------------  SOCIAL HISTORY  ------------------------------------------------------------------------------------------------------------------------------------------  No smoking or alcohol    ------------------------------------------------------------------------------------------------------------------------------------------  FAMILY HISTORY  ------------------------------------------------------------------------------------------------------------------------------------------  No premature CAD    ------------------------------------------------------------------------------------------------------------------------------------------  REVIEW OF SYSTEMS  ------------------------------------------------------------------------------------------------------------------------------------------  Constitutional: - fever, - chills, - weakness, - weight loss  HEENT: - blurred vision, - sore throat, - hoarseness  Respiratory: - dyspnea, - cough, - hemoptysis  Cardiovascular: - chest pain, - dyspnea, - orthopnea, - PND, - edema, - palpitations, - syncope  Gastrointestinal: - nausea, - vomiting, - diarrhea, - hematemesis, - melena  Genitourinary: - dysuria, - frequency  Integument: - rash, - itching  Hematologic/lymphatic:  - bruising, - petechiae  Musculoskeletal: - arthalgias, - myalgias  Neurological: - vertigo, -  tremors, - headache, - speech deficit, - focal weakness, +syncope, +dizziness, + muscle weakness, + neuropathy  Behavioral/Psych: - anxiety, - depression  Endocrine: - cold intolerance, - heat intolerance, - weight change    ------------------------------------------------------------------------------------------------------------------------------------------  PHYSICAL EXAM  ------------------------------------------------------------------------------------------------------------------------------------------  VITAL SIGNS:  Temp:  [36.4 °C (97.5 °F)-36.7 °C (98.1 °F)] 36.6 °C (97.8 °F)  Heart Rate:  [] 87  Resp:  [] 118  BP: ()/(52-97) 130/55  SaO2: 96%    Intake/Output Summary (Last 24 hours) at 11/28/2021 0832  Last data filed at 11/28/2021 0800  Gross per 24 hour   Intake 2131.38 ml   Output 700 ml   Net 1431.38 ml       PHYSICAL EXAM:  General appearance: alert and cooperative, currently lying comfortably in bed  Head: NCAT, no nuchal rigidity  Eyes: PERRLA, extraocular movements intact  Neck: No JVD, carotid bruits, thyromegaly  Lungs: Distant breath sounds with poor inspiratory effort   heart: regular rate and rhythm, S1-S2 normal, no murmurs, clicks, rubs or gallops  Abdomen: soft, non-tender, bowel sounds normal  Extremities: no edema, peripheral pulses present  Skin: Skin color, texture, turgor normal. No rashes or lesions  Neurologic: Alert and oriented X 3, no focal deficits    ------------------------------------------------------------------------------------------------------------------------------------------  LABS / IMAGING / EKG / TELEMETRY  ------------------------------------------------------------------------------------------------------------------------------------------  LABS:  Results from last 7 days   Lab Units 11/27/21  8210 11/27/21  1753 11/27/21  1245 11/27/21  1136   SODIUM mEQ/L  --   --   --  137   POTASSIUM mEQ/L  --   --   --  4.4   MAGNESIUM mg/dL  --   --   2.1  --    CHLORIDE mEQ/L  --   --   --  104   CO2 mEQ/L  --   --   --  20*   BUN mg/dL  --   --   --  19   CREATININE mg/dL  --   --   --  0.9   AST IU/L  --   --   --  25   ALT IU/L  --   --   --  20   TROPONIN I ng/mL 1.03* 1.50*  --  0.05*     Results from last 7 days   Lab Units 11/28/21  0235 11/27/21  1539 11/27/21  1136   WBC K/uL 13.00*  --  12.00*   HEMOGLOBIN g/dL 13.1  --  14.5   HEMATOCRIT % 41.8  --  45.8*   PLATELETS K/uL 148*  --  197   INR   --  1.2  --      Lab Results   Component Value Date    HGBA1C 5.3 09/08/2021    TSH 2.72 11/27/2021     Lab Results   Component Value Date    CHOL 251 (H) 09/08/2021    LDLCALC 183 (H) 09/08/2021    HDL 49 (L) 09/08/2021    TRIG 96 09/08/2021     Lab Results   Component Value Date    BNP <5 11/27/2021       IMAGING:  I have independently reviewed the pertinent imaging from the last 24 hrs.    ECG:   Normal sinus rhythm, cannot rule out anterior infarction, age undetermined    TELEMETRY:  Sinus rhythm, no significant arrhythmia    ------------------------------------------------------------------------------------------------------------------------------------------  ASSESSMENT AND PLAN  ------------------------------------------------------------------------------------------------------------------------------------------  1.  Saddle pulmonary embolism, Right femoral DVT-patient is on anticoagulation, successful placement of infrarenal IVC on 11/27/2021.  Currently patient denies any significant shortness of breath or pleurisy.  Her blood pressure pulse is acceptable.  There is no  evidence of atrial fibrillation or ventricular arrhythmia.  Start oral anticoagulation when cleared by pulmonary team.  Echocardiogram was ordered, will be done tomorrow.  2.  Elevated troponin peaked at 1.5-most definitely related to acute pulmonary embolism and right ventricular strain.  There is no suggestion of acute coronary syndrome or myocardial ischemia.  Continue  monitoring.  Eventually outpatient pharmacologic stress test can be done unless there is an indication of acute ischemia in the hospital.  3.  Hypertension-blood pressure currently is controlled.  Continue monitoring.  At home patient is on lisinopril and hydralazine.  4.  Parkinson disease-monitor for orthostatic hypotension.    We will monitor patient with you during this hospitalization.      Gunnar Gaytan MD  11/28/2021    Primary Care Doctor: Esther Hurt MD

## 2021-11-28 NOTE — NURSING NOTE
Pt received from IR s/p IVC placement.  Pt to keep right leg straight until 2115.  [facility] notified of trop 1.5.  To restart heparin at 1150u at 2030 per Dr. Herman.

## 2021-11-28 NOTE — CONSULTS
Inpatient Neurology Consultation - [facility]    Patient Name: Mariya Porras  Patient : 1935  Patient MRN: [id number]    Date of service: 21  Requesting provider: Glynn Ahumada MD [[id number]]    HPI: Patient is an 86-year-old woman with a history of Parkinson's disease diagnosed approximately 1 year ago currently on Sinemet 1 tab 4 times a day, followed by Dr. Payan.  She presents following a syncopal episode.  She was discovered to have a large PE.  She is currently on heparin and had an IVC filter placed last evening.  Patient denies orthostatic hypotension.    • carbidopa-levodopa  1 tablet oral QID   • gabapentin  100 mg oral TID   • latanoprost  1 drop Both Eyes Nightly   • lidocaine  1 patch Topical Daily   • magnesium oxide  400 mg oral q AM   • melatonin  3 mg oral Nightly   • mirtazapine  30 mg oral Nightly   • polyethylene glycol  17 g oral q PM   • sennosides-docusate sodium  2 tablet oral Nightly   • sertraline  50 mg oral BID     Past Medical History:   Diagnosis Date   • Anxiety    • Arthritis    • Basal cell carcinoma     forehead   • Glaucoma    • Hypertension    • Low back pain    • Lumbosacral disc disease    • Parkinson's disease (CMS/HCC)    • Peripheral neuropathy      family history includes No Known Problems in her biological father and biological mother.     Social History     Tobacco Use   • Smoking status: Former Smoker   • Smokeless tobacco: Never Used   • Tobacco comment: as a young teenager   Substance Use Topics   • Alcohol use: Yes     Comment: wine   • Drug use: No     ROS: A 10 point review of systems was performed and negative unless noted above.    Examination:   Wide awake and alert, sitting up in a chair.  Pleasant and conversant.  Hypophonic.  Flexor ocular movements.  Face appears symmetric.  Moving all extremities.    Assessment & Recommendations:   Patient is an 86-year-old woman with a history of Parkinson's disease managed  with Sinemet admitted following a syncopal episode, discovered to have submassive pulmonary embolus.  I do not believe her Parkinson's disease played a role in her syncope and would not recommend any additional neurologic work-up or changes to her medication regimen.    Molina Howard M.D.  Neurology

## 2021-11-28 NOTE — PROGRESS NOTES
Hospital Medicine Service -  Daily Progress Note       SUBJECTIVE     Interval History: No acute events overnight. BP dropped to 70s. Poor po fluid intake according to RN  Denied SOB/CP or cough.  Afebrile.       OBJECTIVE        Vital signs in last 24 hours:  Temp:  [36.4 °C (97.5 °F)-36.7 °C (98.1 °F)] 36.7 °C (98 °F)  Heart Rate:  [80-96] 80  Resp:  [18] 18  BP: ()/(52-97) 95/52  I/O last 3 completed shifts:  In: 1847.4 [P.O.:480; I.V.:117.4; IV Piggyback:1250]  Out: 700 [Urine:700]    PHYSICAL EXAMINATION        GEN: well-developed and well-nourished; not in acute distress  HEENT: normocephalic; atraumatic  NECK: no JVD; no bruits  CARDIO: regular rate and rhythm; no murmurs or rubs  RESP: clear to auscultation bilaterally; no rales, rhonchi, or wheezes  ABD: soft, non-distended, non-tender, normal bowel sounds  EXT: no cyanosis, clubbing, or edema  SKIN: clean, dry, warm, and intact  MUSCULOSKELETAL: no injury or deformity  NEURO: alert and oriented x 3; nonfocal  BEHAVIOR/EMOTIONAL: appropriate; cooperative     LABS / IMAGING / TELE        Labs  Lab Results   Component Value Date    WBC 13.00 (H) 11/28/2021    HGB 13.1 11/28/2021    HCT 41.8 11/28/2021    MCV 93.9 11/28/2021     (L) 11/28/2021     Lab Results   Component Value Date    GLUCOSE 140 (H) 11/27/2021    CALCIUM 10.3 11/27/2021     11/27/2021    K 4.4 11/27/2021    CO2 20 (L) 11/27/2021     11/27/2021    BUN 19 11/27/2021    CREATININE 0.9 11/27/2021     Lab Results   Component Value Date    INR 1.2 11/27/2021       Imaging  CT ANGIOGRAPHY CHEST WITH AND WITHOUT IV CONTRAST    Result Date: 11/27/2021  IMPRESSION: Extensive saddle pulmonary embolus with extension into all lobar branches bilaterally. Flattening of the interventricular septum and reflux of contrast into the IVC consistent with right heart strain. RV:LV ratio 2:1. Mosaic attenuation may be on the basis of altered perfusion in the setting of significant burden  of emboli. Gallbladder wall appears mildly thickened, correlate for right upper quadrant tenderness. If clinically indicated this can be further evaluated with ultrasound. Finding:    Acute pulmonary embolism   Acuity: Critical  Status:  CLOSED Critical read back was performed and results were read back by  Amy Marcelo DO   on 11/27/2021 at 3:07 PM.     CT ANGIOGRAPHY ABDOMEN PELVIS WITH AND WITHOUT IV CONTRAST    Result Date: 11/27/2021  IMPRESSION: Extensive saddle pulmonary embolus with extension into all lobar branches bilaterally. Flattening of the interventricular septum and reflux of contrast into the IVC consistent with right heart strain. RV:LV ratio 2:1. Mosaic attenuation may be on the basis of altered perfusion in the setting of significant burden of emboli. Gallbladder wall appears mildly thickened, correlate for right upper quadrant tenderness. If clinically indicated this can be further evaluated with ultrasound. Finding:    Acute pulmonary embolism   Acuity: Critical  Status:  CLOSED Critical read back was performed and results were read back by  Amy Marcelo DO   on 11/27/2021 at 3:07 PM.     ULTRASOUND GUIDED VASCULAR ACCESS    Result Date: 11/27/2021  IMPRESSION:   Successful placement of a retrievable infrarenal IVC Filter. Device: Argon Option Elite IVC Filter COMMENT: PROCEDURE: 1. Ultrasound-guided access of the right common femoral vein. 2. IVC cavagram 3. IVC filter placement RADIOLOGIST:  Danie Shell MD ANESTHESIA:  Lidocaine 1% CONTRAST:  30 cc, Omnipaque 300 FLUORO TIME:  0.7 min REFERENCE AIR KERMA: 42 mGy MEDICATIONS:  none DESCRIPTION OF PROCEDURE:  Consent was obtained following explanation of risks and benefits of the procedure. The right groin was prepped and draped in the usual sterile fashion. The right common femoral vein was noted to be compressible and patent on gray scale ultrasound. Local anesthesia was provided. The vein was then accessed with a  21-gauge  needle under ultrasound guidance, and an .018 wire was advanced centrally. An ultrasound-guided access image was saved to PACS. Exchange was made for a microsheath, .035 wire, and multi-sidehole sheath, which was positioned at the iliocaval junction. IVC cavagram was performed. Renal vein insertion sites were localized. An Argon Option Elite IVC Filter was deployed infrarenally without complication. Wires and catheters were removed and hemostasis was achieved. The patient remained stable throughout the procedure. The number of guidewires used, and their integrity, was confirmed at the end of the procedure.     IR FILTER PLACEMENT    Result Date: 11/27/2021  IMPRESSION:   Successful placement of a retrievable infrarenal IVC Filter. Device: Argon Option Elite IVC Filter COMMENT: PROCEDURE: 1. Ultrasound-guided access of the right common femoral vein. 2. IVC cavagram 3. IVC filter placement RADIOLOGIST:  Danie Shell MD ANESTHESIA:  Lidocaine 1% CONTRAST:  30 cc, Omnipaque 300 FLUORO TIME:  0.7 min REFERENCE AIR KERMA: 42 mGy MEDICATIONS:  none DESCRIPTION OF PROCEDURE:  Consent was obtained following explanation of risks and benefits of the procedure. The right groin was prepped and draped in the usual sterile fashion. The right common femoral vein was noted to be compressible and patent on gray scale ultrasound. Local anesthesia was provided. The vein was then accessed with a  21-gauge needle under ultrasound guidance, and an .018 wire was advanced centrally. An ultrasound-guided access image was saved to PACS. Exchange was made for a microsheath, .035 wire, and multi-sidehole sheath, which was positioned at the iliocaval junction. IVC cavagram was performed. Renal vein insertion sites were localized. An Argon Option Elite IVC Filter was deployed infrarenally without complication. Wires and catheters were removed and hemostasis was achieved. The patient remained stable throughout the procedure. The number of  guidewires used, and their integrity, was confirmed at the end of the procedure.     X-RAY CHEST 1 VIEW    Result Date: 11/27/2021  IMPRESSION: No acute pulmonary abnormality.     US venous leg bilateral    Result Date: 11/27/2021  IMPRESSION: There is acute right proximal femoral vein deep venous thrombosis. Finding:    Other   Acuity: Critical  Status:  CLOSED Critical read back was performed and results were read back by Herminia Flood RN, on 11/27/2021 5:33 PM       ECG/Telemetry  I have independently reviewed the telemetry. No events for the last 24 hours.    ASSESSMENT AND PLAN      Leukocytosis  Assessment & Plan  Reactive  monitoring    ACP (advance care planning)  Assessment & Plan  D/w patient and her Son,  bedside today, Full Code    Elevated troponin  Assessment & Plan  1.5 to 1.03  No EKG change  With PE  Demand ischemia  Consult CV   TTE  Trend Troponin    Deep venous thrombosis of right profunda femoris vein (CMS/HCC)  Assessment & Plan  IVC Filter placed  Heparin Drip     Anxiety  Assessment & Plan  Tx prn    Syncope  Assessment & Plan  2/2 Acute PE with hypoxia and hypotention  With parkinson  Consult neurologist(Dr. Payan)  Stable now    BP at low side  Ordering IV Bonus  Monitoring closely    Parkinson disease (CMS/[facility])  Assessment & Plan  HM meds  Consult Dr. Payan    * Acute saddle pulmonary embolism without acute cor pulmonale (CMS/[facility])  Assessment & Plan  Starting Heparin drip  DVT study  Consult pulmonary Service  Per Chest CT study  Recent Dx of Back pain with L1 Fx.  Admit to PCU  Echo    11/28  With positive leg DVT, IVC Filter was placed by IR on 11/27  On Heparin drip  CBC in am      11/28  Positive DVT  IVC Filter placed yesterday  D/w Dr. Quach  C/w  Heparin drip                       VTE Assessment: [place]    Code Status: Full Code  Estimated discharge date: 11/30/2021     Glynn Ahumada MD  11/28/2021  2:47 PM

## 2021-11-29 ENCOUNTER — APPOINTMENT (INPATIENT)
Dept: CARDIOLOGY | Facility: HOSPITAL | Age: 86
DRG: 175 | End: 2021-11-29
Attending: HOSPITALIST
Payer: MEDICARE

## 2021-11-29 LAB
ANION GAP SERPL CALC-SCNC: 8 MEQ/L (ref 3–15)
AORTIC ROOT ANNULUS: 3.5 CM
AORTIC VALVE MEAN VELOCITY: 0.8 M/S
AORTIC VALVE VELOCITY TIME INTEGRAL: 23 CM
APTT PPP: 62 SEC (ref 23–35)
APTT PPP: 84 SEC (ref 23–35)
APTT PPP: 93 SEC (ref 23–35)
ASCENDING AORTA: 3.1 CM
AV MEAN GRADIENT: 3 MMHG
AV PEAK GRADIENT: 5 MMHG
AV PEAK VELOCITY-S: 1.14 M/S
AV VALVE AREA: 2.63 CM2
AV VALVE AREA: 2.81 CM2
BASOPHILS # BLD: 0.03 K/UL (ref 0.01–0.1)
BASOPHILS NFR BLD: 0.4 %
BSA FOR ECHO PROCEDURE: 1.78 M2
BUN SERPL-MCNC: 26 MG/DL (ref 8–20)
CALCIUM SERPL-MCNC: 8.1 MG/DL (ref 8.9–10.3)
CHLORIDE SERPL-SCNC: 109 MEQ/L (ref 98–109)
CO2 SERPL-SCNC: 17 MEQ/L (ref 22–32)
CREAT SERPL-MCNC: 0.8 MG/DL (ref 0.6–1.1)
DIFFERENTIAL METHOD BLD: ABNORMAL
DOP CALC LVOT STROKE VOLUME: 60.58 ML
E WAVE DECELERATION TIME: 148 MS
E/A RATIO: 0.9
EOSINOPHIL # BLD: 0.05 K/UL (ref 0.04–0.36)
EOSINOPHIL NFR BLD: 0.6 %
ERYTHROCYTE [DISTWIDTH] IN BLOOD BY AUTOMATED COUNT: 14.5 % (ref 11.7–14.4)
EST RIGHT VENT SYSTOLIC PRESSURE BY TRICUSPID REGURGITATION JET: 38 MMHG
FRACTIONAL SHORTENING: 44.3 %
GFR SERPL CREATININE-BSD FRML MDRD: >60 ML/MIN/1.73M*2
GLUCOSE SERPL-MCNC: 144 MG/DL (ref 70–99)
HCT VFR BLDCO AUTO: 33 % (ref 35–45)
HGB BLD-MCNC: 10.3 G/DL (ref 11.8–15.7)
IMM GRANULOCYTES # BLD AUTO: 0.04 K/UL (ref 0–0.08)
IMM GRANULOCYTES NFR BLD AUTO: 0.5 %
INTERVENTRICULAR SEPTUM: 0.71 CM
LA ESV (BP): 24.6 CM3
LA ESV INDEX (A2C): 12.64 CM3/M2
LA ESV INDEX (BP): 13.82 CM3/M2
LA/AORTA RATIO: 0.89
LAAS-AP2: 11 CM2
LAAS-AP4: 12.4 CM2
LAD 2D: 3.1 CM
LALD A4C: 4.44 CM
LALD A4C: 4.69 CM
LAV-S: 22.5 CM3
LEFT ATRIUM VOLUME INDEX: 14.44 CM3/M2
LEFT ATRIUM VOLUME: 25.7 CM3
LEFT INTERNAL DIMENSION IN SYSTOLE: 2.24 CM (ref 2.46–3.72)
LEFT VENTRICULAR INTERNAL DIMENSION IN DIASTOLE: 4.02 CM (ref 4.15–5.77)
LEFT VENTRICULAR POSTERIOR WALL IN END DIASTOLE: 0.76 CM (ref 0.54–1.01)
LVOT 2D: 2.1 CM
LVOT A: 3.46 CM2
LVOT MG: 2 MMHG
LVOT MV: 0.58 M/S
LVOT PEAK VELOCITY: 0.93 M/S
LVOT VTI: 17.5 CM
LYMPHOCYTES # BLD: 0.87 K/UL (ref 1.2–3.5)
LYMPHOCYTES NFR BLD: 10.2 %
MCH RBC QN AUTO: 29.6 PG (ref 28–33.2)
MCHC RBC AUTO-ENTMCNC: 31.2 G/DL (ref 32.2–35.5)
MCV RBC AUTO: 94.8 FL (ref 83–98)
MONOCYTES # BLD: 0.56 K/UL (ref 0.28–0.8)
MONOCYTES NFR BLD: 6.6 %
MV PEAK A VEL: 0.84 M/S
MV PEAK E VEL: 0.76 M/S
NEUTROPHILS # BLD: 6.97 K/UL (ref 1.7–7)
NEUTS SEG NFR BLD: 81.7 %
NRBC BLD-RTO: 0 %
PDW BLD AUTO: 10.7 FL (ref 9.4–12.3)
PLATELET # BLD AUTO: 114 K/UL (ref 150–369)
POSTERIOR WALL: 0.76 CM
POTASSIUM SERPL-SCNC: 3.4 MEQ/L (ref 3.6–5.1)
RBC # BLD AUTO: 3.48 M/UL (ref 3.93–5.22)
SODIUM SERPL-SCNC: 134 MEQ/L (ref 136–144)
TAPSE: 2.04 CM
TR MAX PG: 30 MMHG
TRICUSPID VALVE PEAK REGURGITATION VELOCITY: 2.74 M/S
WBC # BLD AUTO: 8.52 K/UL (ref 3.8–10.5)
Z-SCORE OF LEFT VENTRICULAR DIMENSION IN END DIASTOLE: -1.97
Z-SCORE OF LEFT VENTRICULAR DIMENSION IN END SYSTOLE: -2.38
Z-SCORE OF LEFT VENTRICULAR POSTERIOR WALL IN END DIASTOLE: 0.13

## 2021-11-29 PROCEDURE — 63700000 HC SELF-ADMINISTRABLE DRUG: Performed by: HOSPITALIST

## 2021-11-29 PROCEDURE — 20600000 HC ROOM AND CARE INTERMEDIATE/TELEMETRY

## 2021-11-29 PROCEDURE — 85730 THROMBOPLASTIN TIME PARTIAL: CPT | Performed by: HOSPITALIST

## 2021-11-29 PROCEDURE — 25000000 HC PHARMACY GENERAL: Performed by: HOSPITALIST

## 2021-11-29 PROCEDURE — 80048 BASIC METABOLIC PNL TOTAL CA: CPT | Performed by: HOSPITALIST

## 2021-11-29 PROCEDURE — 63700000 HC SELF-ADMINISTRABLE DRUG: Performed by: STUDENT IN AN ORGANIZED HEALTH CARE EDUCATION/TRAINING PROGRAM

## 2021-11-29 PROCEDURE — 63700000 HC SELF-ADMINISTRABLE DRUG: Performed by: NURSE PRACTITIONER

## 2021-11-29 PROCEDURE — 36415 COLL VENOUS BLD VENIPUNCTURE: CPT | Performed by: HOSPITALIST

## 2021-11-29 PROCEDURE — 25500000 HC DRUGS/INCIDENT RAD: Performed by: HOSPITALIST

## 2021-11-29 PROCEDURE — 85025 COMPLETE CBC W/AUTO DIFF WBC: CPT | Performed by: HOSPITALIST

## 2021-11-29 PROCEDURE — 99233 SBSQ HOSP IP/OBS HIGH 50: CPT | Performed by: HOSPITALIST

## 2021-11-29 PROCEDURE — 63600000 HC DRUGS/DETAIL CODE: Performed by: HOSPITALIST

## 2021-11-29 PROCEDURE — C8929 TTE W OR WO FOL WCON,DOPPLER: HCPCS

## 2021-11-29 RX ORDER — POTASSIUM CHLORIDE 14.9 MG/ML
20 INJECTION INTRAVENOUS AS NEEDED
Status: DISCONTINUED | OUTPATIENT
Start: 2021-11-29 | End: 2021-12-01 | Stop reason: HOSPADM

## 2021-11-29 RX ORDER — POTASSIUM CHLORIDE 750 MG/1
40 TABLET, FILM COATED, EXTENDED RELEASE ORAL ONCE
Status: COMPLETED | OUTPATIENT
Start: 2021-11-29 | End: 2021-11-29

## 2021-11-29 RX ORDER — POTASSIUM CHLORIDE 750 MG/1
40 TABLET, FILM COATED, EXTENDED RELEASE ORAL AS NEEDED
Status: DISCONTINUED | OUTPATIENT
Start: 2021-11-29 | End: 2021-12-01 | Stop reason: HOSPADM

## 2021-11-29 RX ORDER — POTASSIUM CHLORIDE 750 MG/1
20 TABLET, FILM COATED, EXTENDED RELEASE ORAL AS NEEDED
Status: DISCONTINUED | OUTPATIENT
Start: 2021-11-29 | End: 2021-12-01 | Stop reason: HOSPADM

## 2021-11-29 RX ADMIN — ACETAMINOPHEN 650 MG: 325 TABLET, FILM COATED ORAL at 00:28

## 2021-11-29 RX ADMIN — MIRTAZAPINE 30 MG: 30 TABLET, FILM COATED ORAL at 22:41

## 2021-11-29 RX ADMIN — LIDOCAINE 1 PATCH: 4 PATCH TOPICAL at 09:59

## 2021-11-29 RX ADMIN — LATANOPROST 1 DROP: 50 SOLUTION OPHTHALMIC at 23:10

## 2021-11-29 RX ADMIN — CARBIDOPA AND LEVODOPA 1 TABLET: 25; 100 TABLET ORAL at 09:59

## 2021-11-29 RX ADMIN — SERTRALINE HYDROCHLORIDE 50 MG: 50 TABLET ORAL at 20:11

## 2021-11-29 RX ADMIN — SENNOSIDES AND DOCUSATE SODIUM 2 TABLET: 50; 8.6 TABLET ORAL at 22:41

## 2021-11-29 RX ADMIN — CARBIDOPA AND LEVODOPA 1 TABLET: 25; 100 TABLET ORAL at 13:11

## 2021-11-29 RX ADMIN — GABAPENTIN 100 MG: 100 CAPSULE ORAL at 09:59

## 2021-11-29 RX ADMIN — Medication 3 MG: at 22:41

## 2021-11-29 RX ADMIN — ACETAMINOPHEN 650 MG: 325 TABLET, FILM COATED ORAL at 22:41

## 2021-11-29 RX ADMIN — CALCIUM CARBONATE (ANTACID) CHEW TAB 500 MG 500 MG: 500 CHEW TAB at 11:21

## 2021-11-29 RX ADMIN — CARBIDOPA AND LEVODOPA 1 TABLET: 25; 100 TABLET ORAL at 20:11

## 2021-11-29 RX ADMIN — GABAPENTIN 100 MG: 100 CAPSULE ORAL at 20:12

## 2021-11-29 RX ADMIN — HEPARIN SODIUM 900 UNITS/HR: 10000 INJECTION, SOLUTION INTRAVENOUS at 16:13

## 2021-11-29 RX ADMIN — CARBIDOPA AND LEVODOPA 1 TABLET: 25; 100 TABLET ORAL at 17:08

## 2021-11-29 RX ADMIN — MAGNESIUM OXIDE TAB 400 MG (241.3 MG ELEMENTAL MG) 400 MG: 400 (241.3 MG) TAB at 09:59

## 2021-11-29 RX ADMIN — GABAPENTIN 100 MG: 100 CAPSULE ORAL at 13:11

## 2021-11-29 RX ADMIN — SERTRALINE HYDROCHLORIDE 50 MG: 50 TABLET ORAL at 09:59

## 2021-11-29 RX ADMIN — POTASSIUM CHLORIDE 40 MEQ: 750 TABLET, EXTENDED RELEASE ORAL at 04:18

## 2021-11-29 RX ADMIN — SODIUM CHLORIDE: 9 INJECTION INTRAMUSCULAR; INTRAVENOUS; SUBCUTANEOUS at 08:10

## 2021-11-29 RX ADMIN — CALCIUM CARBONATE (ANTACID) CHEW TAB 500 MG 500 MG: 500 CHEW TAB at 22:45

## 2021-11-29 RX ADMIN — ACETAMINOPHEN 650 MG: 325 TABLET, FILM COATED ORAL at 15:23

## 2021-11-29 NOTE — PROGRESS NOTES
"   Pulmonary Progress Note     SUBJECTIVE  Interval History:     submassive PE_ on IV heparin  R prox fem DVT- had IVC filter placed 11/27    Troponin  Peaked at 1.5    VSS /81sat 97% 3l    Currently feels well, denies shortness of breath or pain    Allergies: Codeine, Diazepam, Sulfa (sulfonamide antibiotics), and Sulfur    CURRENT MEDS  •  acetaminophen, 650 mg, oral, q4h PRN  •  calcium carbonate, 500 mg, oral, 2x daily PRN  •  carbidopa-levodopa, 1 tablet, oral, QID  •  glucose, 16-32 g of dextrose, oral, PRN **OR** dextrose, 15-30 g of dextrose, oral, PRN **OR** glucagon, 1 mg, intramuscular, PRN **OR** dextrose in water, 25 mL, intravenous, PRN  •  gabapentin, 100 mg, oral, TID  •  heparin (porcine), 40-80 Units/kg, intravenous, q6h PRN  •  heparin infusion - MAR calculator by PTT, 100-4,000 Units/hr, intravenous, Titrated  •  latanoprost, 1 drop, Both Eyes, Nightly  •  lidocaine, 1 patch, Topical, Daily  •  magnesium oxide, 400 mg, oral, q AM  •  melatonin, 3 mg, oral, Nightly  •  mirtazapine, 30 mg, oral, Nightly  •  nitroglycerin, 0.4 mg, sublingual, q5 min PRN  •  polyethylene glycol, 17 g, oral, q PM  •  sennosides-docusate sodium, 2 tablet, oral, Nightly  •  sertraline, 50 mg, oral, BID    VITAL SIGNS  Vitals:    11/29/21 0200 11/29/21 0400 11/29/21 0600 11/29/21 0809   BP: (!) 116/57 (!) 122/57 (!) 108/56 (!) 105/56   BP Location: Left upper arm Left upper arm Left upper arm    Patient Position: Lying Lying Lying    Pulse: 80 77 72    Resp:  16 16    Temp:   36.6 °C (97.8 °F)    TempSrc:   Oral    SpO2: 98% 98% 97%    Weight:    68 kg (150 lb)   Height:    1.676 m (5' 6\")       Oxygen:  Oxygen Therapy: Supplemental oxygen  O2 Delivery Method: Nasal cannula     O2 Flow Rate (L/min): 3 L/min     INTAKE/OUTPUT    Intake/Output Summary (Last 24 hours) at 11/29/2021 0821  Last data filed at 11/29/2021 0417  Gross per 24 hour   Intake 2529.15 ml   Output 850 ml   Net 1679.15 ml     PHYSICAL " EXAM  Physical Exam  Constitutional:       General: She is not in acute distress.     Appearance: She is well-developed.   HENT:      Head: Normocephalic and atraumatic.   Eyes:      Conjunctiva/sclera: Conjunctivae normal.      Pupils: Pupils are equal, round, and reactive to light.   Cardiovascular:      Rate and Rhythm: Normal rate and regular rhythm.      Heart sounds: Normal heart sounds.   Pulmonary:      Effort: Pulmonary effort is normal.      Breath sounds: Normal breath sounds.   Abdominal:      General: Bowel sounds are normal.      Palpations: Abdomen is soft.   Musculoskeletal:         General: Normal range of motion.      Cervical back: Normal range of motion and neck supple.      Comments: Mild swelling right lower extremity   Skin:     General: Skin is warm and dry.   Neurological:      Mental Status: She is alert and oriented to person, place, and time.         LAB RESULTS  ABG  Results from last 7 days   Lab Units 11/27/21  1221   SOURCE OF OXYGEN  2l     CBC  Results from last 7 days   Lab Units 11/29/21  0142 11/28/21 1843 11/28/21  0235   WBC K/uL 8.52 8.13 13.00*   RBC M/uL 3.48* 4.08 4.45   HEMOGLOBIN g/dL 10.3* 12.0 13.1   HEMATOCRIT % 33.0* 38.0 41.8   MCV fL 94.8 93.1 93.9   MCH pg 29.6 29.4 29.4   MCHC g/dL 31.2* 31.6* 31.3*   PLATELETS K/uL 114* 138* 148*   RDW % 14.5* 14.6* 14.3   MPV fL 10.7 10.8 10.2     BMP  Results from last 7 days   Lab Units 11/29/21  0142 11/27/21  1136   SODIUM mEQ/L 134* 137   POTASSIUM mEQ/L 3.4* 4.4   CHLORIDE mEQ/L 109 104   CO2 mEQ/L 17* 20*   BUN mg/dL 26* 19   CREATININE mg/dL 0.8 0.9   CALCIUM mg/dL 8.1* 10.3   ALBUMIN g/dL  --  3.7   BILIRUBIN TOTAL mg/dL  --  0.7   ALK PHOS IU/L  --  129*   ALT IU/L  --  20   AST IU/L  --  25   GLUCOSE mg/dL 144* 140*     Coag  Results from last 7 days   Lab Units 11/29/21  0142 11/28/21  1843 11/28/21  1149 11/28/21  0235 11/27/21  1539   PROTIME sec  --   --   --   --  14.5   INR   --   --   --   --  1.2   PTT sec  93* 130* 95*   < > 28    < > = values in this interval not displayed.       Micro  Microbiology Results     Procedure Component Value Units Date/Time    SARS-CoV-2 (COVID-19), PCR Nasopharynx [[id number]]  (Normal) Collected: 11/27/21 1207    Specimen: Nasopharyngeal Swab from Nasopharynx Updated: 11/27/21 1300    Narrative:      The following orders were created for panel order SARS-CoV-2 (COVID-19), PCR Nasopharynx.  Procedure                               Abnormality         Status                     ---------                               -----------         ------                     SARS-COV-2 (COVID-19)/ F...[[id number]]  Normal              Final result                 Please view results for these tests on the individual orders.    SARS-COV-2 (COVID-19)/ FLU A/B, AND RSV, PCR Nasopharynx [[id number]]  (Normal) Collected: 11/27/21 1207    Specimen: Nasopharyngeal Swab from Nasopharynx Updated: 11/27/21 1300     SARS-CoV-2 (COVID-19) Negative     Influenza A Negative     Influenza B Negative     Respiratory Syncytial Virus Negative            IMAGING    All imaging reviewed by me  CT ANGIOGRAPHY CHEST WITH AND WITHOUT IV CONTRAST    Result Date: 11/27/2021  IMPRESSION: Extensive saddle pulmonary embolus with extension into all lobar branches bilaterally. Flattening of the interventricular septum and reflux of contrast into the IVC consistent with right heart strain. RV:LV ratio 2:1. Mosaic attenuation may be on the basis of altered perfusion in the setting of significant burden of emboli. Gallbladder wall appears mildly thickened, correlate for right upper quadrant tenderness. If clinically indicated this can be further evaluated with ultrasound. Finding:    Acute pulmonary embolism   Acuity: Critical  Status:  CLOSED Critical read back was performed and results were read back by  Amy Marcelo DO   on 11/27/2021 at 3:07 PM.     CT ANGIOGRAPHY ABDOMEN PELVIS WITH AND WITHOUT IV CONTRAST    Result Date:  11/27/2021  IMPRESSION: Extensive saddle pulmonary embolus with extension into all lobar branches bilaterally. Flattening of the interventricular septum and reflux of contrast into the IVC consistent with right heart strain. RV:LV ratio 2:1. Mosaic attenuation may be on the basis of altered perfusion in the setting of significant burden of emboli. Gallbladder wall appears mildly thickened, correlate for right upper quadrant tenderness. If clinically indicated this can be further evaluated with ultrasound. Finding:    Acute pulmonary embolism   Acuity: Critical  Status:  CLOSED Critical read back was performed and results were read back by  Amy Marcelo DO   on 11/27/2021 at 3:07 PM.     ULTRASOUND GUIDED VASCULAR ACCESS    Result Date: 11/27/2021  IMPRESSION:   Successful placement of a retrievable infrarenal IVC Filter. Device: Argon Option Elite IVC Filter COMMENT: PROCEDURE: 1. Ultrasound-guided access of the right common femoral vein. 2. IVC cavagram 3. IVC filter placement RADIOLOGIST:  Danie Shell MD ANESTHESIA:  Lidocaine 1% CONTRAST:  30 cc, Omnipaque 300 FLUORO TIME:  0.7 min REFERENCE AIR KERMA: 42 mGy MEDICATIONS:  none DESCRIPTION OF PROCEDURE:  Consent was obtained following explanation of risks and benefits of the procedure. The right groin was prepped and draped in the usual sterile fashion. The right common femoral vein was noted to be compressible and patent on gray scale ultrasound. Local anesthesia was provided. The vein was then accessed with a  21-gauge needle under ultrasound guidance, and an .018 wire was advanced centrally. An ultrasound-guided access image was saved to PACS. Exchange was made for a microsheath, .035 wire, and multi-sidehole sheath, which was positioned at the iliocaval junction. IVC cavagram was performed. Renal vein insertion sites were localized. An Argon Option Elite IVC Filter was deployed infrarenally without complication. Wires and catheters were removed and  hemostasis was achieved. The patient remained stable throughout the procedure. The number of guidewires used, and their integrity, was confirmed at the end of the procedure.     IR FILTER PLACEMENT    Result Date: 11/27/2021  IMPRESSION:   Successful placement of a retrievable infrarenal IVC Filter. Device: Argon Option Elite IVC Filter COMMENT: PROCEDURE: 1. Ultrasound-guided access of the right common femoral vein. 2. IVC cavagram 3. IVC filter placement RADIOLOGIST:  Danie Shell MD ANESTHESIA:  Lidocaine 1% CONTRAST:  30 cc, Omnipaque 300 FLUORO TIME:  0.7 min REFERENCE AIR KERMA: 42 mGy MEDICATIONS:  none DESCRIPTION OF PROCEDURE:  Consent was obtained following explanation of risks and benefits of the procedure. The right groin was prepped and draped in the usual sterile fashion. The right common femoral vein was noted to be compressible and patent on gray scale ultrasound. Local anesthesia was provided. The vein was then accessed with a  21-gauge needle under ultrasound guidance, and an .018 wire was advanced centrally. An ultrasound-guided access image was saved to PACS. Exchange was made for a microsheath, .035 wire, and multi-sidehole sheath, which was positioned at the iliocaval junction. IVC cavagram was performed. Renal vein insertion sites were localized. An Argon Option Elite IVC Filter was deployed infrarenally without complication. Wires and catheters were removed and hemostasis was achieved. The patient remained stable throughout the procedure. The number of guidewires used, and their integrity, was confirmed at the end of the procedure.     X-RAY CHEST 1 VIEW    Result Date: 11/27/2021  IMPRESSION: No acute pulmonary abnormality.     US venous leg bilateral    Result Date: 11/27/2021  IMPRESSION: There is acute right proximal femoral vein deep venous thrombosis. Finding:    Other   Acuity: Critical  Status:  CLOSED Critical read back was performed and results were read back by Herminia Flood RN,  on 11/27/2021 5:33 PM       ASSESSMENT & PLAN      86-year-old woman, bedbound with presentation of syncope due to extensive bilateral pulmonary embolism-submassive in nature.  Initial mild hemodynamic instability but has improved with fluids and IV heparin initiation.  Minimal oxygen requirements  High risk PE  Elevated troponin-secondary to right heart strain less likely  Proximal right DVT-IVC filter was placed  Hb drifting down     Recommendations:  I recommend continuing  therapy with IV heparin. May transition today to a DOAC  Now is status post insertion of IVC filter for a proximal DVT  May tranfer out PCU since maintained hemodynamic stability  In view of her advanced age and hemodynamic stability at this time, I do not recommend catheter directed thrombolysis as risks likely outweigh benefits  Likely will need lifelong anticoagulation due to her sedentary status  Echocardiogram today      Nadira Quach MD

## 2021-11-29 NOTE — PROGRESS NOTES
Hospital Medicine Service -  Daily Progress Note       SUBJECTIVE   Interval History:   Mariya Porras was seen and examined  no acute overnight event reported  patient reported no chest pain, N/V/D, overt bleeding.    Blood pressure better, transfer out of PCU to telemetry  Echocardiogram today  Continue heparin drip  Patient still complains short of breath     OBJECTIVE      Vital signs in last 24 hours:  Vitals:    11/29/21 1400   BP: 105/61   Pulse: 82   Resp: 18   Temp:    SpO2: 98%       Intake/Output Summary (Last 24 hours) at 11/29/2021 1408  Last data filed at 11/29/2021 1000  Gross per 24 hour   Intake 2293.83 ml   Output 1000 ml   Net 1293.83 ml     PHYSICAL EXAMINATION      General Appearance:  Awake, Alert, no distress     Head:    Normocephalic, without obvious abnormality, atraumatic   Neck: Supple      Lungs:   Bilateral equal expansion  No labored breathing     Heart:  S1 and S2 normal  no rub or gallop     Abdomen:   Soft  no masses  no organomegaly     Vascular: Bilateral radial pulse equally palpated      Extremities: no calf tenderness      Behavior/Emotional: Mood stable      Skin:                                     no rash     Neuro:                                 Spontaneous move bilateral upper and lower extremity                                                No focal deficits   LINES, CATHETERS, DRAINS, AIRWAYS, AND WOUNDS   Lines, Drains, Airways, Wounds:  Peripheral IV (Adult) 11/27/21 Right Antecubital (Active)   Number of days: 2       Peripheral IV (Adult) 11/28/21 Left Antecubital (Active)   Number of days: 1       External Urinary Catheter Female (one size) (Active)   Number of days: 2       Wound Right Buttock (Active)   Number of days: 1       Comments:      LABS / IMAGING / TELE      Labs  CMP Results       11/29/21 11/27/21 10/19/21     0142 1136 0447     137 139    K 3.4 4.4 4.0    Cl 109 104 105    CO2 17 20 23    Glucose 144 140 156    BUN 26 19 18     Creatinine 0.8 0.9 0.6    Calcium 8.1 10.3 10.5    Anion Gap 8 13 11    AST -- 25 19    ALT -- 20 15    Albumin -- 3.7 3.8    EGFR >60.0 59.5 >60.0         Comment for K at 1136 on 11/27/21: Results obtained on plasma. Plasma Potassium values may be up to 0.4 mEQ/L less than serum values. The differences may be greater for patients with high platelet or white cell counts.  SLIGHT HEMOLYSIS, RESULT MAY BE INCREASED.    Comment for AST at 1136 on 11/27/21: SLIGHT HEMOLYSIS, RESULT MAY BE INCREASED.    Comment for ALT at 1136 on 11/27/21: SLIGHT HEMOLYSIS, RESULT MAY BE INCREASED.        CBC Results       11/29/21 11/28/21 11/28/21     0142 1843 0235    WBC 8.52 8.13 13.00    RBC 3.48 4.08 4.45    HGB 10.3 12.0 13.1    HCT 33.0 38.0 41.8    MCV 94.8 93.1 93.9    MCH 29.6 29.4 29.4    MCHC 31.2 31.6 31.3     138 148         Comment for PLT at 0142 on 11/29/21: RESULTS OBTAINED AFTER VORTEXING TO ELIMINATE PLT CLUMPS    Comment for PLT at 1843 on 11/28/21: CONSISTENT WITH PREVIOUS RESULTS. CONFIRMED WITH SMEAR ESTIMATE. SPECIMEN QUALITY CHECKED        Troponin I Results       11/27/21 11/27/21 11/27/21     2351 1753 1136    Troponin I 1.03 1.50 0.05        PT/PTT Results       11/29/21 11/29/21 11/28/21     0841 0142 1843    PTT 62 93 130         Comment for PTT at 0841 on 11/29/21: The Standard Therapeutic Range for Heparin is 68 to 101 seconds.    Comment for PTT at 0142 on 11/29/21: The Standard Therapeutic Range for Heparin is 68 to 101 seconds.    Comment for PTT at 1843 on 11/28/21: The Standard Therapeutic Range for Heparin is 68 to 101 seconds.        Lab Results   Component Value Date    BNP <5 11/27/2021    CHOL 251 (H) 09/08/2021    TRIG 96 09/08/2021    HDL 49 (L) 09/08/2021    LDLCALC 183 (H) 09/08/2021    TSH 2.72 11/27/2021    HGBA1C 5.3 09/08/2021     Imaging  CT ANGIOGRAPHY CHEST WITH AND WITHOUT IV CONTRAST    Result Date: 11/27/2021  IMPRESSION: Extensive saddle pulmonary embolus with extension  into all lobar branches bilaterally. Flattening of the interventricular septum and reflux of contrast into the IVC consistent with right heart strain. RV:LV ratio 2:1. Mosaic attenuation may be on the basis of altered perfusion in the setting of significant burden of emboli. Gallbladder wall appears mildly thickened, correlate for right upper quadrant tenderness. If clinically indicated this can be further evaluated with ultrasound. Finding:    Acute pulmonary embolism   Acuity: Critical  Status:  CLOSED Critical read back was performed and results were read back by  Amy Marcelo DO   on 11/27/2021 at 3:07 PM.     CT ANGIOGRAPHY ABDOMEN PELVIS WITH AND WITHOUT IV CONTRAST    Result Date: 11/27/2021  IMPRESSION: Extensive saddle pulmonary embolus with extension into all lobar branches bilaterally. Flattening of the interventricular septum and reflux of contrast into the IVC consistent with right heart strain. RV:LV ratio 2:1. Mosaic attenuation may be on the basis of altered perfusion in the setting of significant burden of emboli. Gallbladder wall appears mildly thickened, correlate for right upper quadrant tenderness. If clinically indicated this can be further evaluated with ultrasound. Finding:    Acute pulmonary embolism   Acuity: Critical  Status:  CLOSED Critical read back was performed and results were read back by  Amy Marcelo DO   on 11/27/2021 at 3:07 PM.     ULTRASOUND GUIDED VASCULAR ACCESS    Result Date: 11/27/2021  IMPRESSION:   Successful placement of a retrievable infrarenal IVC Filter. Device: Argon Option Elite IVC Filter COMMENT: PROCEDURE: 1. Ultrasound-guided access of the right common femoral vein. 2. IVC cavagram 3. IVC filter placement RADIOLOGIST:  Danie Shell MD ANESTHESIA:  Lidocaine 1% CONTRAST:  30 cc, Omnipaque 300 FLUORO TIME:  0.7 min REFERENCE AIR KERMA: 42 mGy MEDICATIONS:  none DESCRIPTION OF PROCEDURE:  Consent was obtained following explanation of risks and  benefits of the procedure. The right groin was prepped and draped in the usual sterile fashion. The right common femoral vein was noted to be compressible and patent on gray scale ultrasound. Local anesthesia was provided. The vein was then accessed with a  21-gauge needle under ultrasound guidance, and an .018 wire was advanced centrally. An ultrasound-guided access image was saved to PACS. Exchange was made for a microsheath, .035 wire, and multi-sidehole sheath, which was positioned at the iliocaval junction. IVC cavagram was performed. Renal vein insertion sites were localized. An Argon Option Elite IVC Filter was deployed infrarenally without complication. Wires and catheters were removed and hemostasis was achieved. The patient remained stable throughout the procedure. The number of guidewires used, and their integrity, was confirmed at the end of the procedure.     IR FILTER PLACEMENT    Result Date: 11/27/2021  IMPRESSION:   Successful placement of a retrievable infrarenal IVC Filter. Device: Argon Option Elite IVC Filter COMMENT: PROCEDURE: 1. Ultrasound-guided access of the right common femoral vein. 2. IVC cavagram 3. IVC filter placement RADIOLOGIST:  Danie Shell MD ANESTHESIA:  Lidocaine 1% CONTRAST:  30 cc, Omnipaque 300 FLUORO TIME:  0.7 min REFERENCE AIR KERMA: 42 mGy MEDICATIONS:  none DESCRIPTION OF PROCEDURE:  Consent was obtained following explanation of risks and benefits of the procedure. The right groin was prepped and draped in the usual sterile fashion. The right common femoral vein was noted to be compressible and patent on gray scale ultrasound. Local anesthesia was provided. The vein was then accessed with a  21-gauge needle under ultrasound guidance, and an .018 wire was advanced centrally. An ultrasound-guided access image was saved to PACS. Exchange was made for a microsheath, .035 wire, and multi-sidehole sheath, which was positioned at the iliocaval junction. IVC cavagram was  performed. Renal vein insertion sites were localized. An Argon Option Elite IVC Filter was deployed infrarenally without complication. Wires and catheters were removed and hemostasis was achieved. The patient remained stable throughout the procedure. The number of guidewires used, and their integrity, was confirmed at the end of the procedure.     X-RAY CHEST 1 VIEW    Result Date: 11/27/2021  IMPRESSION: No acute pulmonary abnormality.     US venous leg bilateral    Result Date: 11/27/2021  IMPRESSION: There is acute right proximal femoral vein deep venous thrombosis. Finding:    Other   Acuity: Critical  Status:  CLOSED Critical read back was performed and results were read back by Herminia Flood RN, on 11/27/2021 5:33 PM     ECG/Telemetry  ASSESSMENT AND PLAN        Leukocytosis  Assessment & Plan  Reactive  monitoring    ACP (advance care planning)  Assessment & Plan  D/w patient and her Son,  bedside today, Full Code    Elevated troponin  Assessment & Plan  1.5 to 1.03  No EKG change  With PE  Demand ischemia  Consult CV   TTE  Trend Troponin    Deep venous thrombosis of right profunda femoris vein (CMS/HCC)  Assessment & Plan  IVC Filter placed  Heparin Drip     Anxiety  Assessment & Plan  Tx prn    Syncope  Assessment & Plan  2/2 Acute PE with hypoxia and hypotention  With parkinson  Consult neurologist(Dr. Payan)  Stable now    BP at low side  Ordering IV Bonus  Monitoring closely    Parkinson disease (CMS/[facility])  Assessment & Plan   meds  Consult Dr. Payan    * Acute saddle pulmonary embolism without acute cor pulmonale (CMS/[facility])  Assessment & Plan  Starting Heparin drip  DVT study  Consult pulmonary Service  Per Chest CT study  Recent Dx of Back pain with L1 Fx.  Admit to PCU  Echo    11/28  With positive leg DVT, IVC Filter was placed by IR on 11/27  On Heparin drip  CBC in am      11/28  Positive DVT  IVC Filter placed yesterday  D/w Dr. Quach  C/w  Heparin drip    11/29  Echo show   1.  Normal LV  size and function with ejection fraction of 55 to 60%.  2.  No regional wall motion abnormalities identified.  Definity contrast was used to enhance endocardial definition.  3.  Moderate RV enlargement with moderate RV dysfunction with preserved contractility of the right apex.  4.  Mild estimated pulmonary hypertension   5.  Dilated IVC.  6.  Grade I diastolic dysfunction.    Cont Hep gtt                       Disposition Planning: Pending progression     VTE Assessment: [place]    VTE Prophylaxis Plan: Continue current DVT Prophylaxis   Code Status: Full Code  Estimated Discharge Date: 12/1/2021    This patient note has been dictated using speech recognition software. Inadvertent speech recognition errors should be disregarded. Please do not hesitate to call [facility] [place] office for clarifications.     Jered Ponce MD  11/29/2021

## 2021-11-29 NOTE — PROGRESS NOTES
Cardiology Progress Note       Subjective   Resting comfortably in bed, offers no cardiac complaints.  Not tachypneic    Objective     Vital signs in last 24 hours    Temp:  [36.6 °C (97.8 °F)-36.7 °C (98 °F)] 36.6 °C (97.8 °F)  Heart Rate:  [72-87] 72  Resp:  [16-18] 16  BP: ()/(46-72) 108/56  No intake/output data recorded.      Intake/Output Summary (Last 24 hours) at 11/29/2021 0700  Last data filed at 11/29/2021 0417  Gross per 24 hour   Intake 2813.15 ml   Output 850 ml   Net 1963.15 ml         Physical Exam     Constitutional:  Well-developed and well-nourished. NAD  Head: atraumatic, normocephalic   Neck: No JVD present.   Cardiovascular: Regular rate and rhythm.  no murmur, rubs, gallops.  Pulmonary/Chest: Clear to auscultation bilaterally.  Abdomen: soft,   Extremities: No clubbing/cyanosis/edema.   Neurological: Patient is sleeping in bed, formal neurologic exam not performed  Skin: warm, dry    Labs  Lab Results   Component Value Date    GLUCOSE 144 (H) 11/29/2021    CALCIUM 8.1 (L) 11/29/2021     (L) 11/29/2021    K 3.4 (L) 11/29/2021    CO2 17 (L) 11/29/2021     11/29/2021    BUN 26 (H) 11/29/2021    CREATININE 0.8 11/29/2021    MG 2.1 11/27/2021     Lab Results   Component Value Date    TROPONINI 1.03 (HH) 11/27/2021    TROPONINI 1.50 (HH) 11/27/2021    TROPONINI 0.05 (H) 11/27/2021     Lab Results   Component Value Date    WBC 8.52 11/29/2021    HGB 10.3 (L) 11/29/2021    HCT 33.0 (L) 11/29/2021    MCV 94.8 11/29/2021     (L) 11/29/2021    INR 1.2 11/27/2021           Current Meds  • carbidopa-levodopa  1 tablet oral QID   • gabapentin  100 mg oral TID   • latanoprost  1 drop Both Eyes Nightly   • lidocaine  1 patch Topical Daily   • magnesium oxide  400 mg oral q AM   • melatonin  3 mg oral Nightly   • mirtazapine  30 mg oral Nightly   • polyethylene glycol  17 g oral q PM   • sennosides-docusate sodium  2 tablet oral Nightly   • sertraline  50 mg oral BID       Recent  Cardiac Studies            Telemetry  Normal sinus rhythm, no atrial fibrillation      Assessment & Plan  Saddle embolism with cor pulmonale-hemodynamically patient remains remarkably compensated.  Awaiting results of echocardiogram to further assess RV function, pulmonary artery pressure, LV function.  Patient underwent Ogema filter  placement.  Anticoagulation recommendations will defer to pulmonary team.  Elevated troponin-see my consult, this clearly related to acute pulmonary embolism and RV strain.  No evidence of acute coronary syndrome.  Hypertension-blood pressure is controlled.  Was low yesterday down to 81/51.    Will review echocardiogram.  Will make further recommendation pending echo result.    Gunnar Gaytan MD  11/29/2021  Pager #[phone]

## 2021-11-29 NOTE — PLAN OF CARE
Problem: Adult Inpatient Plan of Care  Goal: Readiness for Transition of Care  Intervention: Mutually Develop Transition Plan  Flowsheets (Taken 11/29/2021 4434)  Anticipated Discharge Disposition: skilled nursing facility  Equipment Needed After Discharge: none  Assistive Device/Animal Currently Used at Home:   wheelchair   grab bar   commode, 3-in-1   walker, front-wheeled  Anticipated Changes Related to Illness: inability to care for self  Outpatient/Agency/Support Group Needs: skilled nursing facility  Current Discharge Risk: physical impairment  Readmission Within the Last 30 Days: no previous admission in last 30 days  Patient/Family Anticipated Services at Transition: skilled nursing  Patient/Family Anticipates Transition to: other (see comments)  Transportation Anticipated: health plan transportation  Concerns to be Addressed:   adjustment to diagnosis/illness   care coordination/care conferences   discharge planning  Patient's Choice of Community Agency(s): return to [facility]  Offered/Gave Vendor List: yes  Current Outpatient/Agency/Support Group: skilled nursing facility  Type of Skilled Nursing Care Services:   OT   PT   nursing     Sw met with patient & her son, spouse at bedside. Sw went over role of care coordination & d/c planning. Patient was living at [facility] with assistance & then went to [facility] & patient was discharged to [facility] for long term care. Patient was admitted from [facility] where she was less than an hour. Patient admitted with pe. Patient is mostly w/c bound & sw confirmed pcp. Per spouse patient  be living long term care at [facility]. Sw confirmed with [facility] plan on patient. Patient is currently on heparin drip & s/p IVC filter. Sw asked for pt/ot evals from [facility] when able. Sw made referral to [facility] & will need to follow for return when medically stable. Thalia strauss, msw [phone]

## 2021-11-29 NOTE — ASSESSMENT & PLAN NOTE
1.5 to 1.03  No EKG change  With PE  Demand ischemia  Consult CV   TTE  Trend Troponin    Per Cardio   Elevated troponin : Troponin peaked at 1.5. Related to acute PE and Right ventricular strain

## 2021-11-29 NOTE — CONSULTS
Wound Ostomy Continence Note    Subjective    HPI Patient is a 86 y.o. female who was admitted on 11/27/2021 with a diagnosis of Elevated troponin [R77.8]  Acute saddle pulmonary embolism, unspecified whether acute cor pulmonale present (CMS/HCC) [I26.92].    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 vein (CMS/HCC)   • Elevated troponin   • ACP (advance care planning)   • Leukocytosis      PMH/PSH Medical History:   Past Medical History:   Diagnosis Date   • Anxiety    • Arthritis    • Basal cell carcinoma     forehead   • Glaucoma    • Hypertension    • Low back pain    • Lumbosacral disc disease    • Parkinson's disease (CMS/HCC)    • Peripheral neuropathy      Surgical History:   Past Surgical History:   Procedure Laterality Date   • BLADDER SUSPENSION  2009    Prolift M   • CATARACT EXTRACTION, BILATERAL     • COLONOSCOPY     • HYSTERECTOMY     • JOINT REPLACEMENT     • KNEE ARTHROPLASTY  2015    left      Assessment and Recommendation      Consult by nursing for skin assessment. Patient noted to have purple tissue on right buttock, irregularly shaped, unknown etiology-indeterminate for ecchymosis (likely). Rod score at time of last bedside nursing assessment=14, incontinent of bladder, external catheter in place. Would recommend: Maintain PI PREVENT bundle, keep HOB as low as safely possible, B/L EHOB waffle boots while in bed, waffle cushion if OOB to chair, utilize pillows or wedge for turning patient q2hrs/PRN-offload right buttock as able,  utilize zinc barrier ointment to perineum PRN, optimize nutrition. Wound care to sign off. Please re consult if needed.       Date: 11/29/21  Signature: Mariam Alcantar RN

## 2021-11-29 NOTE — ASSESSMENT & PLAN NOTE
Starting Heparin drip  DVT study  Consult pulmonary Service  Per Chest CT study  Recent Dx of Back pain with L1 Fx.  Admit to PCU  Echo    11/28  With positive leg DVT, IVC Filter was placed by IR on 11/27  On Heparin drip  CBC in am      11/28  Positive DVT  IVC Filter placed yesterday  D/w Dr. Quach  C/w  Heparin drip    11/29  Echo show   1.  Normal LV size and function with ejection fraction of 55 to 60%.  2.  No regional wall motion abnormalities identified.  Definity contrast was used to enhance endocardial definition.  3.  Moderate RV enlargement with moderate RV dysfunction with preserved contractility of the right apex.  4.  Mild estimated pulmonary hypertension   5.  Dilated IVC.  6.  Grade I diastolic dysfunction.    11/30   Hep gtt change to Eliquis   No bleeding   Consult PT for DC planning

## 2021-11-29 NOTE — PATIENT CARE CONFERENCE
Care Progression Rounds Note  Date: 11/29/2021  Time: 11:47 AM     Patient Name: Mariya Porras     Medical Record Number: [id number]   YOB: 1935  Sex: Female      Room/Bed: 3225    Admitting Diagnosis: Elevated troponin [R77.8]  Acute saddle pulmonary embolism, unspecified whether acute cor pulmonale present (CMS/HCC) [I26.92]   Admit Date/Time: 11/27/2021 11:19 AM    Primary Diagnosis: Acute saddle pulmonary embolism without acute cor pulmonale (CMS/HCC)  Principal Problem: Acute saddle pulmonary embolism without acute cor pulmonale (CMS/HCC)    GMLOS: pending  Anticipated Discharge Date: 11/30/2021    AM-PAC:  Mobility Score:      Discharge Planning:  Anticipated Discharge Disposition: skilled nursing facility,home with home health    Barriers to Discharge:  Medical issues not resolved    Comments:       Participants:  advanced practice provider,nursing,social work/services

## 2021-11-30 PROBLEM — G89.29 ACUTE EXACERBATION OF CHRONIC LOW BACK PAIN: Status: RESOLVED | Noted: 2021-10-18 | Resolved: 2021-11-30

## 2021-11-30 PROBLEM — R82.71 BACTERIURIA: Status: RESOLVED | Noted: 2018-08-22 | Resolved: 2021-11-30

## 2021-11-30 PROBLEM — R10.2 PELVIC PAIN: Status: RESOLVED | Noted: 2018-09-11 | Resolved: 2021-11-30

## 2021-11-30 PROBLEM — K64.9 HEMORRHOIDS: Chronic | Status: RESOLVED | Noted: 2021-10-24 | Resolved: 2021-11-30

## 2021-11-30 PROBLEM — A49.9 ESBL (EXTENDED SPECTRUM BETA-LACTAMASE) PRODUCING BACTERIA INFECTION: Status: RESOLVED | Noted: 2018-09-24 | Resolved: 2021-11-30

## 2021-11-30 PROBLEM — M54.50 ACUTE EXACERBATION OF CHRONIC LOW BACK PAIN: Status: RESOLVED | Noted: 2021-10-18 | Resolved: 2021-11-30

## 2021-11-30 PROBLEM — R35.0 FREQUENCY OF MICTURITION: Status: RESOLVED | Noted: 2018-07-16 | Resolved: 2021-11-30

## 2021-11-30 PROBLEM — Z16.12 ESBL (EXTENDED SPECTRUM BETA-LACTAMASE) PRODUCING BACTERIA INFECTION: Status: RESOLVED | Noted: 2018-09-24 | Resolved: 2021-11-30

## 2021-11-30 PROBLEM — Z87.42 HISTORY OF UTERINE PROLAPSE: Status: RESOLVED | Noted: 2018-07-16 | Resolved: 2021-11-30

## 2021-11-30 PROBLEM — N30.90 CYSTITIS: Status: RESOLVED | Noted: 2018-07-16 | Resolved: 2021-11-30

## 2021-11-30 LAB
ANION GAP SERPL CALC-SCNC: 7 MEQ/L (ref 3–15)
APTT PPP: 60 SEC (ref 23–35)
APTT PPP: 80 SEC (ref 23–35)
BASOPHILS # BLD: 0.03 K/UL (ref 0.01–0.1)
BASOPHILS NFR BLD: 0.4 %
BUN SERPL-MCNC: 16 MG/DL (ref 8–20)
CALCIUM SERPL-MCNC: 9.5 MG/DL (ref 8.9–10.3)
CHLORIDE SERPL-SCNC: 106 MEQ/L (ref 98–109)
CO2 SERPL-SCNC: 24 MEQ/L (ref 22–32)
CREAT SERPL-MCNC: 0.6 MG/DL (ref 0.6–1.1)
DIFFERENTIAL METHOD BLD: ABNORMAL
EOSINOPHIL # BLD: 0.13 K/UL (ref 0.04–0.36)
EOSINOPHIL NFR BLD: 1.8 %
ERYTHROCYTE [DISTWIDTH] IN BLOOD BY AUTOMATED COUNT: 14.6 % (ref 11.7–14.4)
GFR SERPL CREATININE-BSD FRML MDRD: >60 ML/MIN/1.73M*2
GLUCOSE SERPL-MCNC: 107 MG/DL (ref 70–99)
HCT VFR BLDCO AUTO: 36.5 % (ref 35–45)
HGB BLD-MCNC: 11.7 G/DL (ref 11.8–15.7)
IMM GRANULOCYTES # BLD AUTO: 0.04 K/UL (ref 0–0.08)
IMM GRANULOCYTES NFR BLD AUTO: 0.6 %
LYMPHOCYTES # BLD: 1.11 K/UL (ref 1.2–3.5)
LYMPHOCYTES NFR BLD: 15.7 %
MAGNESIUM SERPL-MCNC: 1.9 MG/DL (ref 1.8–2.5)
MCH RBC QN AUTO: 29.8 PG (ref 28–33.2)
MCHC RBC AUTO-ENTMCNC: 32.1 G/DL (ref 32.2–35.5)
MCV RBC AUTO: 93.1 FL (ref 83–98)
MONOCYTES # BLD: 0.44 K/UL (ref 0.28–0.8)
MONOCYTES NFR BLD: 6.2 %
NEUTROPHILS # BLD: 5.33 K/UL (ref 1.7–7)
NEUTS SEG NFR BLD: 75.3 %
NRBC BLD-RTO: 0 %
PDW BLD AUTO: 11 FL (ref 9.4–12.3)
PHOSPHATE SERPL-MCNC: 2.3 MG/DL (ref 2.4–4.7)
PLATELET # BLD AUTO: 147 K/UL (ref 150–369)
POTASSIUM SERPL-SCNC: 4.5 MEQ/L (ref 3.6–5.1)
RBC # BLD AUTO: 3.92 M/UL (ref 3.93–5.22)
SODIUM SERPL-SCNC: 137 MEQ/L (ref 136–144)
WBC # BLD AUTO: 7.08 K/UL (ref 3.8–10.5)

## 2021-11-30 PROCEDURE — 97166 OT EVAL MOD COMPLEX 45 MIN: CPT | Mod: GO

## 2021-11-30 PROCEDURE — 63700000 HC SELF-ADMINISTRABLE DRUG: Performed by: HOSPITALIST

## 2021-11-30 PROCEDURE — 85730 THROMBOPLASTIN TIME PARTIAL: CPT

## 2021-11-30 PROCEDURE — 85025 COMPLETE CBC W/AUTO DIFF WBC: CPT | Performed by: HOSPITALIST

## 2021-11-30 PROCEDURE — 97162 PT EVAL MOD COMPLEX 30 MIN: CPT | Mod: GP

## 2021-11-30 PROCEDURE — 36415 COLL VENOUS BLD VENIPUNCTURE: CPT | Performed by: HOSPITALIST

## 2021-11-30 PROCEDURE — 20600000 HC ROOM AND CARE INTERMEDIATE/TELEMETRY

## 2021-11-30 PROCEDURE — 63700000 HC SELF-ADMINISTRABLE DRUG: Performed by: STUDENT IN AN ORGANIZED HEALTH CARE EDUCATION/TRAINING PROGRAM

## 2021-11-30 PROCEDURE — 84100 ASSAY OF PHOSPHORUS: CPT | Performed by: HOSPITALIST

## 2021-11-30 PROCEDURE — 80048 BASIC METABOLIC PNL TOTAL CA: CPT | Performed by: HOSPITALIST

## 2021-11-30 PROCEDURE — 99232 SBSQ HOSP IP/OBS MODERATE 35: CPT | Performed by: HOSPITALIST

## 2021-11-30 PROCEDURE — 83735 ASSAY OF MAGNESIUM: CPT | Performed by: HOSPITALIST

## 2021-11-30 PROCEDURE — 63700000 HC SELF-ADMINISTRABLE DRUG: Performed by: PHYSICIAN ASSISTANT

## 2021-11-30 RX ORDER — OXYCODONE HYDROCHLORIDE 5 MG/1
5 TABLET ORAL EVERY 6 HOURS PRN
Status: DISCONTINUED | OUTPATIENT
Start: 2021-11-30 | End: 2021-12-01 | Stop reason: HOSPADM

## 2021-11-30 RX ORDER — OXYCODONE HYDROCHLORIDE 5 MG/1
5 TABLET ORAL EVERY 6 HOURS PRN
Status: DISCONTINUED | OUTPATIENT
Start: 2021-11-30 | End: 2021-11-30

## 2021-11-30 RX ORDER — LISINOPRIL 20 MG/1
20 TABLET ORAL DAILY
Status: DISCONTINUED | OUTPATIENT
Start: 2021-11-30 | End: 2021-12-01 | Stop reason: HOSPADM

## 2021-11-30 RX ADMIN — GABAPENTIN 100 MG: 100 CAPSULE ORAL at 14:20

## 2021-11-30 RX ADMIN — ACETAMINOPHEN 650 MG: 325 TABLET, FILM COATED ORAL at 14:20

## 2021-11-30 RX ADMIN — MAGNESIUM OXIDE TAB 400 MG (241.3 MG ELEMENTAL MG) 400 MG: 400 (241.3 MG) TAB at 09:57

## 2021-11-30 RX ADMIN — Medication 3 MG: at 20:31

## 2021-11-30 RX ADMIN — MIRTAZAPINE 30 MG: 30 TABLET, FILM COATED ORAL at 20:30

## 2021-11-30 RX ADMIN — CARBIDOPA AND LEVODOPA 1 TABLET: 25; 100 TABLET ORAL at 09:57

## 2021-11-30 RX ADMIN — OXYCODONE HYDROCHLORIDE 5 MG: 5 TABLET ORAL at 17:59

## 2021-11-30 RX ADMIN — SENNOSIDES AND DOCUSATE SODIUM 2 TABLET: 50; 8.6 TABLET ORAL at 20:30

## 2021-11-30 RX ADMIN — APIXABAN 10 MG: 5 TABLET, FILM COATED ORAL at 20:30

## 2021-11-30 RX ADMIN — SERTRALINE HYDROCHLORIDE 50 MG: 50 TABLET ORAL at 09:57

## 2021-11-30 RX ADMIN — SERTRALINE HYDROCHLORIDE 50 MG: 50 TABLET ORAL at 20:30

## 2021-11-30 RX ADMIN — APIXABAN 10 MG: 5 TABLET, FILM COATED ORAL at 09:57

## 2021-11-30 RX ADMIN — LATANOPROST 1 DROP: 50 SOLUTION OPHTHALMIC at 21:45

## 2021-11-30 RX ADMIN — LIDOCAINE 1 PATCH: 4 PATCH TOPICAL at 09:56

## 2021-11-30 RX ADMIN — CARBIDOPA AND LEVODOPA 1 TABLET: 25; 100 TABLET ORAL at 17:22

## 2021-11-30 RX ADMIN — GABAPENTIN 100 MG: 100 CAPSULE ORAL at 20:30

## 2021-11-30 RX ADMIN — GABAPENTIN 100 MG: 100 CAPSULE ORAL at 09:57

## 2021-11-30 RX ADMIN — LISINOPRIL 20 MG: 20 TABLET ORAL at 12:04

## 2021-11-30 RX ADMIN — POLYETHYLENE GLYCOL 3350 17 G: 17 POWDER, FOR SOLUTION ORAL at 17:22

## 2021-11-30 RX ADMIN — CARBIDOPA AND LEVODOPA 1 TABLET: 25; 100 TABLET ORAL at 20:30

## 2021-11-30 RX ADMIN — CARBIDOPA AND LEVODOPA 1 TABLET: 25; 100 TABLET ORAL at 12:04

## 2021-11-30 ASSESSMENT — COGNITIVE AND FUNCTIONAL STATUS - GENERAL
AFFECT: FLAT/BLUNTED AFFECT
AFFECT: FLAT/BLUNTED AFFECT;CONFUSED
WALKING IN HOSPITAL ROOM: 1 - TOTAL
DRESSING REGULAR UPPER BODY CLOTHING: 2 - A LOT
TOILETING: 1 - TOTAL
DRESSING REGULAR LOWER BODY CLOTHING: 1 - TOTAL
HELP NEEDED FOR BATHING: 2 - A LOT
EATING MEALS: 3 - A LITTLE
STANDING UP FROM CHAIR USING ARMS: 2 - A LOT
CLIMB 3 TO 5 STEPS WITH RAILING: 1 - TOTAL
HELP NEEDED FOR PERSONAL GROOMING: 3 - A LITTLE
MOVING TO AND FROM BED TO CHAIR: 1 - TOTAL

## 2021-11-30 NOTE — PROGRESS NOTES
Hospital Medicine Service -  Daily Progress Note       SUBJECTIVE   Interval History:   Mariya Porras was seen and examined  no acute overnight event reported  patient reported no chest pain, N/V/D, overt bleeding.    Patient reported tired, mild short of breath, denied dysuria  Switch heparin drip to Eliquis  I called patient's son and  by phone, provide medical update, answer questions, they agree current plan  I consult PT OT, discussed  start DC planning anticipate discharge in next 24 to 48 hours if patient tolerates therapy well     OBJECTIVE      Vital signs in last 24 hours:  Vitals:    11/30/21 1203   BP: 138/67   Pulse: 84   Resp: 18   Temp: 36.4 °C (97.5 °F)   SpO2: 93%       Intake/Output Summary (Last 24 hours) at 11/30/2021 1258  Last data filed at 11/29/2021 1600  Gross per 24 hour   Intake 54 ml   Output 450 ml   Net -396 ml     PHYSICAL EXAMINATION      General Appearance:  Awake, Alert, no distress     Head:    Normocephalic, without obvious abnormality, atraumatic   Neck: Supple      Lungs:   Bilateral equal expansion  No labored breathing     Heart:  S1 and S2 normal  no rub or gallop     Abdomen:   Soft  no masses  no organomegaly     Vascular: Bilateral radial pulse equally palpated      Extremities: no calf tenderness      Behavior/Emotional: Mood stable      Skin:                                     no rash     Neuro:                                 Spontaneous move bilateral upper and lower extremity                                                No focal deficits   LINES, CATHETERS, DRAINS, AIRWAYS, AND WOUNDS   Lines, Drains, Airways, Wounds:  Peripheral IV (Adult) 11/27/21 Right Antecubital (Active)   Number of days: 3       Peripheral IV (Adult) 11/28/21 Left Antecubital (Active)   Number of days: 2       External Urinary Catheter Female (one size) (Active)   Number of days: 3       Wound Right Buttock (Active)   Number of days: 2       Surgical Incision Groin  Right (Active)   Number of days:        Comments:      LABS / IMAGING / TELE      Labs  CMP Results       11/30/21 11/29/21 11/27/21     0605 0142 1136     134 137    K 4.5 3.4 4.4    Cl 106 109 104    CO2 24 17 20    Glucose 107 144 140    BUN 16 26 19    Creatinine 0.6 0.8 0.9    Calcium 9.5 8.1 10.3    Anion Gap 7 8 13    AST -- -- 25    ALT -- -- 20    Albumin -- -- 3.7    EGFR >60.0 >60.0 59.5         Comment for K at 1136 on 11/27/21: Results obtained on plasma. Plasma Potassium values may be up to 0.4 mEQ/L less than serum values. The differences may be greater for patients with high platelet or white cell counts.  SLIGHT HEMOLYSIS, RESULT MAY BE INCREASED.    Comment for AST at 1136 on 11/27/21: SLIGHT HEMOLYSIS, RESULT MAY BE INCREASED.    Comment for ALT at 1136 on 11/27/21: SLIGHT HEMOLYSIS, RESULT MAY BE INCREASED.        CBC Results       11/30/21 11/29/21 11/28/21     0605 0142 1843    WBC 7.08 8.52 8.13    RBC 3.92 3.48 4.08    HGB 11.7 10.3 12.0    HCT 36.5 33.0 38.0    MCV 93.1 94.8 93.1    MCH 29.8 29.6 29.4    MCHC 32.1 31.2 31.6     114 138         Comment for PLT at 0142 on 11/29/21: RESULTS OBTAINED AFTER VORTEXING TO ELIMINATE PLT CLUMPS    Comment for PLT at 1843 on 11/28/21: CONSISTENT WITH PREVIOUS RESULTS. CONFIRMED WITH SMEAR ESTIMATE. SPECIMEN QUALITY CHECKED        Troponin I Results       11/27/21 11/27/21 11/27/21     2351 1753 1136    Troponin I 1.03 1.50 0.05        PT/PTT Results       11/30/21 11/29/21 11/29/21     0605 2332 1530    PTT 60 80 84         Comment for PTT at 0605 on 11/30/21: The Standard Therapeutic Range for Heparin is 68 to 101 seconds.    Comment for PTT at 2332 on 11/29/21: The Standard Therapeutic Range for Heparin is 68 to 101 seconds.    Comment for PTT at 1530 on 11/29/21: The Standard Therapeutic Range for Heparin is 68 to 101 seconds.        Lab Results   Component Value Date    BNP <5 11/27/2021    CHOL 251 (H) 09/08/2021    TRIG 96  09/08/2021    HDL 49 (L) 09/08/2021    LDLCALC 183 (H) 09/08/2021    TSH 2.72 11/27/2021    HGBA1C 5.3 09/08/2021     Imaging  CT ANGIOGRAPHY CHEST WITH AND WITHOUT IV CONTRAST    Result Date: 11/27/2021  IMPRESSION: Extensive saddle pulmonary embolus with extension into all lobar branches bilaterally. Flattening of the interventricular septum and reflux of contrast into the IVC consistent with right heart strain. RV:LV ratio 2:1. Mosaic attenuation may be on the basis of altered perfusion in the setting of significant burden of emboli. Gallbladder wall appears mildly thickened, correlate for right upper quadrant tenderness. If clinically indicated this can be further evaluated with ultrasound. Finding:    Acute pulmonary embolism   Acuity: Critical  Status:  CLOSED Critical read back was performed and results were read back by  Amy Marcelo DO   on 11/27/2021 at 3:07 PM.     CT ANGIOGRAPHY ABDOMEN PELVIS WITH AND WITHOUT IV CONTRAST    Result Date: 11/27/2021  IMPRESSION: Extensive saddle pulmonary embolus with extension into all lobar branches bilaterally. Flattening of the interventricular septum and reflux of contrast into the IVC consistent with right heart strain. RV:LV ratio 2:1. Mosaic attenuation may be on the basis of altered perfusion in the setting of significant burden of emboli. Gallbladder wall appears mildly thickened, correlate for right upper quadrant tenderness. If clinically indicated this can be further evaluated with ultrasound. Finding:    Acute pulmonary embolism   Acuity: Critical  Status:  CLOSED Critical read back was performed and results were read back by  Amy Marcelo DO   on 11/27/2021 at 3:07 PM.     ULTRASOUND GUIDED VASCULAR ACCESS    Result Date: 11/27/2021  IMPRESSION:   Successful placement of a retrievable infrarenal IVC Filter. Device: Argon Option Elite IVC Filter COMMENT: PROCEDURE: 1. Ultrasound-guided access of the right common femoral vein. 2. IVC cavagram 3. IVC  filter placement RADIOLOGIST:  Danie Shell MD ANESTHESIA:  Lidocaine 1% CONTRAST:  30 cc, Omnipaque 300 FLUORO TIME:  0.7 min REFERENCE AIR KERMA: 42 mGy MEDICATIONS:  none DESCRIPTION OF PROCEDURE:  Consent was obtained following explanation of risks and benefits of the procedure. The right groin was prepped and draped in the usual sterile fashion. The right common femoral vein was noted to be compressible and patent on gray scale ultrasound. Local anesthesia was provided. The vein was then accessed with a  21-gauge needle under ultrasound guidance, and an .018 wire was advanced centrally. An ultrasound-guided access image was saved to PACS. Exchange was made for a microsheath, .035 wire, and multi-sidehole sheath, which was positioned at the iliocaval junction. IVC cavagram was performed. Renal vein insertion sites were localized. An Argon Option Elite IVC Filter was deployed infrarenally without complication. Wires and catheters were removed and hemostasis was achieved. The patient remained stable throughout the procedure. The number of guidewires used, and their integrity, was confirmed at the end of the procedure.     IR FILTER PLACEMENT    Result Date: 11/27/2021  IMPRESSION:   Successful placement of a retrievable infrarenal IVC Filter. Device: Argon Option Elite IVC Filter COMMENT: PROCEDURE: 1. Ultrasound-guided access of the right common femoral vein. 2. IVC cavagram 3. IVC filter placement RADIOLOGIST:  Danie Shell MD ANESTHESIA:  Lidocaine 1% CONTRAST:  30 cc, Omnipaque 300 FLUORO TIME:  0.7 min REFERENCE AIR KERMA: 42 mGy MEDICATIONS:  none DESCRIPTION OF PROCEDURE:  Consent was obtained following explanation of risks and benefits of the procedure. The right groin was prepped and draped in the usual sterile fashion. The right common femoral vein was noted to be compressible and patent on gray scale ultrasound. Local anesthesia was provided. The vein was then accessed with a  21-gauge needle  under ultrasound guidance, and an .018 wire was advanced centrally. An ultrasound-guided access image was saved to PACS. Exchange was made for a microsheath, .035 wire, and multi-sidehole sheath, which was positioned at the iliocaval junction. IVC cavagram was performed. Renal vein insertion sites were localized. An Argon Option Elite IVC Filter was deployed infrarenally without complication. Wires and catheters were removed and hemostasis was achieved. The patient remained stable throughout the procedure. The number of guidewires used, and their integrity, was confirmed at the end of the procedure.     X-RAY CHEST 1 VIEW    Result Date: 11/27/2021  IMPRESSION: No acute pulmonary abnormality.     US venous leg bilateral    Result Date: 11/27/2021  IMPRESSION: There is acute right proximal femoral vein deep venous thrombosis. Finding:    Other   Acuity: Critical  Status:  CLOSED Critical read back was performed and results were read back by Herminia Flood RN, on 11/27/2021 5:33 PM     ECG/Telemetry  ASSESSMENT AND PLAN        Deep venous thrombosis of right profunda femoris vein (CMS/HCC)  Assessment & Plan  IVC Filter placed  Heparin Drip change to Eliquis     Syncope  Assessment & Plan  2/2 Acute PE with hypoxia and hypotention  With parkinson  Consult neurologist(Dr. Payan)  Stable now    BP at low side  Ordering IV Bonus  Monitoring closely    Parkinson disease (CMS/[facility])  Assessment & Plan   meds  Consult Dr. Payan    Leukocytosis  Assessment & Plan  Reactive  monitoring    ACP (advance care planning)  Assessment & Plan  D/w patient and her Son,  bedside today, Full Code    Elevated troponin  Assessment & Plan  1.5 to 1.03  No EKG change  With PE  Demand ischemia  Consult CV   TTE  Trend Troponin    Per Cardio   Elevated troponin : Troponin peaked at 1.5. Related to acute PE and Right ventricular strain    Anxiety  Assessment & Plan  Tx prn    * Acute saddle pulmonary embolism without acute cor  pulmonale (CMS/HCC)  Assessment & Plan  Starting Heparin drip  DVT study  Consult pulmonary Service  Per Chest CT study  Recent Dx of Back pain with L1 Fx.  Admit to PCU  Echo    11/28  With positive leg DVT, IVC Filter was placed by IR on 11/27  On Heparin drip  CBC in am      11/28  Positive DVT  IVC Filter placed yesterday  D/w Dr. Quach  C/w  Heparin drip    11/29  Echo show   1.  Normal LV size and function with ejection fraction of 55 to 60%.  2.  No regional wall motion abnormalities identified.  Definity contrast was used to enhance endocardial definition.  3.  Moderate RV enlargement with moderate RV dysfunction with preserved contractility of the right apex.  4.  Mild estimated pulmonary hypertension   5.  Dilated IVC.  6.  Grade I diastolic dysfunction.    11/30   Hep gtt change to Eliquis   No bleeding   Consult PT for DC planning                       Disposition Planning: Pending progression     VTE Assessment: Padua    VTE Prophylaxis Plan: Continue current DVT Prophylaxis   Code Status: Full Code  Estimated Discharge Date: 12/3/2021    This patient note has been dictated using speech recognition software. Inadvertent speech recognition errors should be disregarded. Please do not hesitate to call [facility] office for clarifications.     Jered Ponce MD  11/30/2021

## 2021-11-30 NOTE — HOSPITAL COURSE
Vicki is a 86 y.o. female admitted on 11/27/2021 with Elevated troponin [R77.8]  Acute saddle pulmonary embolism, unspecified whether acute cor pulmonale present (CMS/HCC) [I26.92]. Principal problem is Acute saddle pulmonary embolism without acute cor pulmonale (CMS/HCC).    Past Medical History  Vicki has a past medical history of Anxiety, Arthritis, Basal cell carcinoma, Glaucoma, Hypertension, Low back pain, Lumbosacral disc disease, Parkinson's disease (CMS/HCC), and Peripheral neuropathy.    History of Present Illness   Per H&P:  86 y.o. female with with a history of Parkinson's disease followed by Dr. Payan, low back pain with L1 compression fracture, wheelchair bounded who was sent here for syncope episode today.  Patient was just finished SNF program in [facility] and passed out in her chair while she was [place] transfer from wheelchair to bed.  According to the son patient lost consciousness for about 2.5 minutes.  No seizure type activity or incontinence.  She did complain nauseous and me the chest discomfort after regaining consciousness.  Then she complaint shortness of breath with mild activity.

## 2021-11-30 NOTE — PLAN OF CARE
Problem: Adult Inpatient Plan of Care  Goal: Plan of Care Review  Outcome: Progressing  Flowsheets (Taken 11/30/2021 4295)  Progress: improving  Plan of Care Reviewed With: patient  Outcome Summary: modA x1 bed mobility, modA x2 STS & attempted sidestepping w/ RW

## 2021-11-30 NOTE — PLAN OF CARE
Problem: Adult Inpatient Plan of Care  Goal: Plan of Care Review  Outcome: Progressing  Flowsheets (Taken 11/30/2021 7754)  Progress: improving  Plan of Care Reviewed With: patient  Outcome Summary: PALMA torres complete.

## 2021-11-30 NOTE — PROGRESS NOTES
Patient: Mariya Porras  Location:  [facility] 4A 4021  MRN:  [id number]  Today's date:  11/30/2021     Patient left in bed w/ nasal cannula donned, call bell w/in reach & bed alarm activated. RN notified of patient's status.    Vicki is a 86 y.o. female admitted on 11/27/2021 with Elevated troponin [R77.8]  Acute saddle pulmonary embolism, unspecified whether acute cor pulmonale present (CMS/HCC) [I26.92]. Principal problem is Acute saddle pulmonary embolism without acute cor pulmonale (CMS/HCC).    Past Medical History  Vicki has a past medical history of Anxiety, Arthritis, Basal cell carcinoma, Glaucoma, Hypertension, Low back pain, Lumbosacral disc disease, Parkinson's disease (CMS/HCC), and Peripheral neuropathy.    History of Present Illness   Per H&P:  86 y.o. female with with a history of Parkinson's disease followed by Dr. Payan, low back pain with L1 compression fracture, wheelchair bounded who was sent here for syncope episode today.  Patient was just finished SNF program in [facility] and passed out in her chair while she was [facility] transfer from wheelchair to bed.  According to the son patient lost consciousness for about 2.5 minutes.  No seizure type activity or incontinence.  She did complain nauseous and me the chest discomfort after regaining consciousness.  Then she complaint shortness of breath with mild activity.       PT Vitals    Date/Time Pulse SpO2 Pt Activity O2 Therapy O2 Del Method O2 Flow Rate BP BP Location BP Method Pt Position Observations [facility]   11/30/21 1049 90 93 % At rest Supplemental oxygen Nasal cannula 3 L/min 171/79 Left upper arm Automatic Lying PT/OT EEC   11/30/21 1059 91 92 % At rest Supplemental oxygen Nasal cannula 3 L/min 173/81 Left upper arm Automatic Lying PT/OT EEC      PT Pain    Date/Time Pain Type Side/Orientation Location Rating: Rest Rating: Activity Interventions [facility]   11/30/21 1049 Pain Assessment left shoulder + neck 2 - mild pain 2 - mild pain  position adjusted;care clustered EEC          Prior Living Environment      Most Recent Value   Living Environment Comment Pt unclear historian. Per EMR had just recently transitioned to LTC at [facility]        Prior Level of Function      Most Recent Value   Dominant Hand right   Prior Level of Function Comment Pt unclear historian, per EMR patient w/ recent hospitalization, D/C to SNF and then transition to LTC and is mostly wheelchair bound. Pt reports her fxnl status has declined recently & she has required assist.   Assistive Device Currently Used at Home wheelchair           PT Evaluation and Treatment - 11/30/21 1048        PT Time Calculation    Start Time 1048     Stop Time 1102     Time Calculation (min) 14 min        Session Details    Document Type initial evaluation     Mode of Treatment physical therapy        General Information    Patient Profile Reviewed yes     General Observations of Patient RN cleared pt for session. Pt received in bed w/ nasal cannula donned, agreeable to therapy     Existing Precautions/Restrictions fall;contact   ESBL    Limitations/Impairments safety/cognitive        Cognition/Psychosocial    Affect/Mental Status (Cognition) flat/blunted affect     Orientation Status (Cognition) oriented x 3   looked to board for date    Follows Commands (Cognition) follows one-step commands     Comment, Cognition Cooperative/receptive, req'd cues for safety at times. See OT note for full cog assessment        Sensory Assessment (Somatosensory)    Sensory Assessment (Somatosensory) LE sensation intact   grossly to light touch       Range of Motion (ROM)    Comment: Range of Motion BLE ROM WFL except ankle DF PROM to neutral. B/L ankles resting in plantarflexed/inverted position L>R. Discussed recommendation for multi podus boots w/ RN after session.        Strength Comprehensive (MMT)    Comment Assessed functionally BLEs at least 3/5 except R ankle DF 3-/5 and L ankle DF 1-2+/5        Bed  Mobility    [place], Supine to Sit moderate assist (50-74% patient effort);1 person assist;verbal cues     Verbal Cues (Supine to Sit) technique     [place], Sit to Supine moderate assist (50-74% patient effort);1 person assist;verbal cues     Verbal Cues (Sit to Supine) safety     Assistive Device head of bed elevated     Comment (Bed Mobility) OOB to the left        Bed to Chair Transfer    [place], Bed to Chair not tested;safety considerations     Comment Deferred d/t safety concerns i.e. standing balance deficits        Sit to Stand Transfer    [place], Sit to Stand Transfer moderate assist (50-74% patient effort);2 person assist;verbal cues     Verbal Cues safety;maintaining center of gravity over base of support     Assistive Device walker, front-wheeled     Comment from bed. Significant posterior lean upon standing req vcs & tactile cues to correct        Stand to Sit Transfer    [place], Stand to Sit Transfer moderate assist (50-74% patient effort);2 person assist;verbal cues     Verbal Cues safety     Assistive Device walker, front-wheeled     Comment to bed        Gait Training    [place], Gait moderate assist (50-74% patient effort);2 person assist     Assistive Device walker, front-wheeled     Distance in Feet 1 feet     Pattern (Gait) step-to     Deviations/Abnormal Patterns (Gait) step length decreased;stride length decreased;gait speed decreased   dec LE clearance    Comment (Gait/Stairs) Attempted sidestepping to the left along EOB. Pt unsteady, unable to advance LLE.        Safety Issues, Functional Mobility    Impairments Affecting Function (Mobility) balance;endurance/activity tolerance;cognition;pain;range of motion (ROM);strength;postural/trunk control        Balance    Balance Assessment sitting static balance;sit to stand dynamic balance;standing static balance;standing dynamic balance     Static Sitting Balance WFL;sitting, edge of bed     Sit to Stand Dynamic  Balance moderate impairment     Static Standing Balance moderate impairment;supported     Dynamic Standing Balance severe impairment;supported     Comment, Balance support from RW; significant posterior lean upon standing        AM-PAC (TM) - Mobility (Current Function)    Turning from your back to your side while in a flat bed without using bedrails? 3 - A Little     Moving from lying on your back to sitting on the side of a flat bed without using bedrails? 2 - A Lot     Moving to and from a bed to a chair? 1 - Total     Standing up from a chair using your arms? 2 - A Lot     To walk in a hospital room? 1 - Total     Climbing 3-5 steps with a railing? 1 - Total     AM-PAC (TM) Mobility Score 10        Assessment/Plan (PT)    Daily Outcome Statement [facility] 10. Patient req modA x1 for bed mobility & modA x2 for STS & attempted sidestepping w/ RW d/t pain as well as balance, strength, endurance & ROM deficits. She req cont'd skilled PT to address noted deficits in order to max fxnl capacity & dec fall risk. Rec SNF at d/c however will req clarification of PLOF.     Rehab Potential good, to achieve stated therapy goals     Therapy Frequency 3-5 times/wk     Planned Therapy Interventions balance training;bed mobility training;gait training;patient/family education;ROM (range of motion);strengthening;transfer training               PT Assessment/Plan      Most Recent Value   PT Recommended Discharge Disposition skilled nursing facility at 11/30/2021 1048   Anticipated Equipment Needs at Discharge (PT) other (see comments)  [TBD at next level of care] at 11/30/2021 1048   Patient/Family Therapy Goals Statement feel better at 11/30/2021 1048                    Education Documentation  Joint Mobility/Strength, taught by Sharee Mora, PT at 11/30/2021  1:50 PM.  Learner: Patient  Readiness: Acceptance  Method: Explanation  Response: Verbalizes Understanding  Comment: Role of PT, PT POC, safety during mobility          PT  Goals      Most Recent Value   Bed Mobility Goal 1    Activity/Assistive Device bed mobility activities, all at 11/30/2021 1048   [place] supervision required at 11/30/2021 1048   Time Frame by discharge at 11/30/2021 1048   Progress/Outcome goal ongoing at 11/30/2021 1048   Transfer Goal 1    Activity/Assistive Device all transfers, walker, front-wheeled at 11/30/2021 1048   [place] moderate assist (50-74% patient effort) at 11/30/2021 1048   Time Frame by discharge at 11/30/2021 1048   Progress/Outcome goal ongoing at 11/30/2021 1048   Gait Training Goal 1    Activity/Assistive Device gait (walking locomotion), walker, front-wheeled at 11/30/2021 1048   [place] moderate assist (50-74% patient effort) at 11/30/2021 1048   Distance 5 at 11/30/2021 1048   Time Frame by discharge at 11/30/2021 1048   Progress/Outcome goal ongoing at 11/30/2021 1048

## 2021-11-30 NOTE — PROGRESS NOTES
CARDIOLOGY PROGRESS NOTE      Subjective     Ms. Porras is resting comfortably in bed. Uneventful night. Appears tired. Complains of Left shoulder pain, reproducible with palpation. Lidocaine patch in place. Denies chest pain, SOB, palpitations, lightheadedness/dizziness.       Objective     Vital signs in last 24 hours  Temp:  [36.4 °C (97.5 °F)-36.8 °C (98.2 °F)] 36.4 °C (97.5 °F)  Heart Rate:  [75-91] 91  Resp:  [18] 18  BP: (104-158)/(52-83) 158/82      Intake/Output Summary (Last 24 hours) at 11/30/2021 0951  Last data filed at 11/29/2021 1600  Gross per 24 hour   Intake 70.38 ml   Output 700 ml   Net -629.62 ml       Admission weight: Weight: 63.5 kg (140 lb)    Today's weight: [unfilled]      Physical Exam  General appearance: alert, appears stated age and cooperative  Head: without obvious abnormality  Eyes: PERRLA, EOM's intact  Lungs: clear to auscultation bilaterally, no rales or wheezing  Heart: S1, S2 normal, no murmur, click, rub or gallop, regular rate and rhythm, no JVD  Abdomen: soft, non-tender; bowel sounds normal; no masses  Extremities: peripheral pulses intact, no edema  Skin: Skin color, texture, turgor normal. No rashes or lesions  Neurologic: Alert and oriented X 3, no focal deficits    Labs  Lab Results   Component Value Date    GLUCOSE 107 (H) 11/30/2021    CALCIUM 9.5 11/30/2021     11/30/2021    K 4.5 11/30/2021    CO2 24 11/30/2021     11/30/2021    BUN 16 11/30/2021    CREATININE 0.6 11/30/2021    MG 1.9 11/30/2021     Lab Results   Component Value Date    TROPONINI 1.03 (HH) 11/27/2021    TROPONINI 1.50 (HH) 11/27/2021    TROPONINI 0.05 (H) 11/27/2021     Lab Results   Component Value Date    WBC 7.08 11/30/2021    HGB 11.7 (L) 11/30/2021    HCT 36.5 11/30/2021    MCV 93.1 11/30/2021     (L) 11/30/2021    INR 1.2 11/27/2021         Lab Results   Component Value Date    BNP <5 11/27/2021       Current Meds   apixaban  10 mg oral BID    Followed by    [START ON  12/7/2021] apixaban  5 mg oral BID    carbidopa-levodopa  1 tablet oral QID    gabapentin  100 mg oral TID    latanoprost  1 drop Both Eyes Nightly    lidocaine  1 patch Topical Daily    magnesium oxide  400 mg oral q AM    melatonin  3 mg oral Nightly    mirtazapine  30 mg oral Nightly    polyethylene glycol  17 g oral q PM    sennosides-docusate sodium  2 tablet oral Nightly    sertraline  50 mg oral BID       Imaging  TTE 11/29/21 : Normal LV size and function with ejection fraction of 55 to 60%. No regional wall motion abnormalities identified.  Definity contrast was used to enhance endocardial definition. Moderate RV enlargement with moderate RV dysfunction with preserved contractility of the right apex. Mild estimated pulmonary hypertension. Dilated IVC. Grade I diastolic dysfunction.    ECG   11/27/21 22:53 NSR, rate 93, NSST    Telemetry  Normal sinus rhythm. No atrial fibrillation.     Assessment & Plan    Saddle PE, Right femoral DVT : Blood pressure improved this AM. Echo results as stated above. Patient underwent vanessa filter placement. Currently anticoagulated with Eliquis per pulmonary team.     Elevated troponin : Troponin peaked at 1.5. Related to acute PE and Right ventricular strain.     Hypertension : Blood pressure on the lower side yesterday. Mildly elevated this AM (158/82). Maintained on lisinopril and hydralazine at home.    Parkinson disease : Continue monitoring orthostatic hypotension.       Lory Cornelius PA-C

## 2021-11-30 NOTE — PROGRESS NOTES
Patient:  Mariya Porras  Location:  [facility] 4A 4021  MRN:  [id number]  Today's date:  11/30/2021    Pt resting in bed at end of session with bed alarm on, incontinence pad in place and call bell in reach. RN notified      Vicki is a 86 y.o. female admitted on 11/27/2021 with Elevated troponin [R77.8]  Acute saddle pulmonary embolism, unspecified whether acute cor pulmonale present (CMS/HCC) [I26.92]. Principal problem is Acute saddle pulmonary embolism without acute cor pulmonale (CMS/HCC).    Past Medical History  Vicki has a past medical history of Anxiety, Arthritis, Basal cell carcinoma, Glaucoma, Hypertension, Low back pain, Lumbosacral disc disease, Parkinson's disease (CMS/HCC), and Peripheral neuropathy.    History of Present Illness   Per H&P:  86 y.o. female with with a history of Parkinson's disease followed by Dr. Payan, low back pain with L1 compression fracture, wheelchair bounded who was sent here for syncope episode today.  Patient was just finished SNF program in [facility] [place] and passed out in her chair while she was [facility] transfer from wheelchair to bed.  According to the son patient lost consciousness for about 2.5 minutes.  No seizure type activity or incontinence.  She did complain nauseous and me the chest discomfort after regaining consciousness.  Then she complaint shortness of breath with mild activity.       OT Vitals    Date/Time Pulse SpO2 Pt Activity O2 Therapy O2 Del Method O2 Flow Rate BP BP Location BP Method Pt Position Observations [facility]   11/30/21 1049 90 93 % At rest Supplemental oxygen Nasal cannula 3 L/min 171/79 Left upper arm Automatic Lying PT/OT EEC   11/30/21 1059 91 92 % At rest Supplemental oxygen Nasal cannula 3 L/min 173/81 Left upper arm Automatic Lying PT/OT EEC      OT Pain    Date/Time Pain Type Side/Orientation Location Rating: Rest Rating: Activity Interventions [facility]   11/30/21 1049 Pain Assessment left shoulder + neck 2 - mild pain 2 - mild pain  position adjusted;care clustered EEC          Prior Living Environment      Most Recent Value   Living Environment Comment Pt unclear historian. Per EMR had just recently transitioned to LTC at [place]        Prior Level of Function      Most Recent Value   Dominant Hand right   Prior Level of Function Comment Pt unclear historian, per EMR patient w/ recent hospitalization, D/C to SNF and then transition to LTC and is mostly wheelchair bound. Pt reports her fxnl status has declined recently & she has required assist.   Assistive Device Currently Used at Home wheelchair        Occupational Profile      Most Recent Value   Reason for Services/Referral PE's, deconditioning   Occupational History/Life Experiences h/o Parkinson's, per EMR at [facility]   Performance Patterns long term care resident per EMR   Patient Goals none stated           OT Evaluation and Treatment - 11/30/21 1047        OT Time Calculation    Start Time 1047     Stop Time 1101     Time Calculation (min) 14 min        Session Details    Document Type initial evaluation     Mode of Treatment occupational therapy        General Information    Patient Profile Reviewed yes     Onset of Illness/Injury or Date of Surgery 11/27/21     Referring Physician Kris     Patient/Family/Caregiver Comments/Observations RN cleared for OT/PT     General Observations of Patient Pt resting in bed     Existing Precautions/Restrictions fall;contact   EBSL    Limitations/Impairments safety/cognitive        Cognition/Psychosocial    Affect/Mental Status (Cognition) flat/blunted affect;confused     Orientation Status (Cognition) oriented to;person;place;verbal cues/prompts needed for orientation;time     Follows Commands (Cognition) follows one-step commands;75-90% accuracy;increased processing time needed;delayed response/completion     Cognitive Function executive function deficit;safety deficit     Executive Function Deficit (Cognition) moderate deficit;information  processing;insight/awareness of deficits     Comment, Executive Function increased processing time and repetition for one step cues     Safety Deficit (Cognition) moderate deficit;awareness of need for assistance;insight into deficits/self-awareness;safety precautions awareness     Comment, Cognition coopeative AOx3 w/ cues however confused in conversation     Cognitive Interventions/Strategies occupation/activity based interventions        Hearing Assessment    Hearing Status WFL        Vision Assessment/Intervention    Visual Impairment/Limitations corrective lenses for reading        Sensory Assessment (Somatosensory)    Sensory Assessment (Somatosensory) UE sensation intact        Range of Motion (ROM)    Left Upper Extremity (ROM) left UE ROM is WFL except;shoulder     Shoulder, Left (ROM) ~30 deg     Right Upper Extremity (ROM) right UE ROM is WFL except;shoulder     Shoulder, Right (ROM) ~40 deg        Strength (Manual Muscle Testing)    Strength (Manual Muscle Testing) strength is WFL;bilateral upper extremities        Bed Mobility    [place], Supine to Sit moderate assist (50-74% patient effort);1 person assist;verbal cues     Verbal Cues (Supine to Sit) hand placement;technique     [place], Sit to Supine moderate assist (50-74% patient effort);1 person assist;verbal cues     Assistive Device head of bed elevated        Bed to Chair Transfer    [place], Bed to Chair not tested;safety considerations     Comment see sit > stand        Sit to Stand Transfer    [place], Sit to Stand Transfer moderate assist (50-74% patient effort);2 person assist;verbal cues     Verbal Cues safety;maintaining precautions     Assistive Device walker, front-wheeled     Comment from bed w/ posterior lean unable to correct        Stand to Sit Transfer    [place], Stand to Sit Transfer moderate assist (50-74% patient effort);2 person assist     Verbal Cues safety     Assistive Device walker, front-wheeled      Comment to bed        Safety Issues, Functional Mobility    Safety Issues Affecting Function (Mobility) insight into deficits/self-awareness;safety precaution awareness;sequencing abilities     Impairments Affecting Function (Mobility) balance;cognition;endurance/activity tolerance;range of motion (ROM);strength;pain     Cognitive Impairments, Mobility Safety/Performance awareness, need for assistance;insight into deficits/self-awareness        Balance    Static Sitting Balance WFL;sitting, edge of bed     Dynamic Sitting Balance mild impairment;sitting, edge of bed     Sit to Stand Dynamic Balance moderate impairment;supported     Static Standing Balance moderate impairment;supported     Dynamic Standing Balance severe impairment;supported     Comment, Balance w/ rw posterior lean MOD A x2 unable to advance to step        Motor Skills    Motor Skills functional endurance     Functional Endurance poor        Lower Body Dressing    Tasks socks     [place] dependent (less than 25% patient effort)     Position supine        Toileting    [place] dependent (less than 25% patient effort)     Position supine     Setup Assistance change pad/brief     Comment incontinent + purwick and DEP for hygiene        BADL Safety/Performance    Impairments, BADL Safety/Performance endurance/activity tolerance;pain;range of motion;strength;cognition;balance     Cognitive Impairments, BADL Safety/Performance awareness, need for assistance;insight into deficits/self-awareness     Skilled BADL Treatment/Intervention BADL process/adaptation training     Progress in BADL Status level of supervision required;cueing required        ADL Interventions    Energy Conservation Techniques correct posture facilitated;correct body mechanics utilized;equipment and device use facilitated     Self-Care (BADL) Promotion activity adapted to sitting;activity pacing encouraged;set-up provided;cues for sequencing provided        Coping     Observed Emotional State cooperative;calm     Trust Relationship/Rapport care explained        AM-PAC (TM) - ADL (Current Function)    Putting on and taking off regular lower body clothing? 1 - Total     Bathing? 2 - A Lot     Toileting? 1 - Total     Putting on/taking off regular upper body clothing? 2 - A Lot     How much help for taking care of personal grooming? 3 - A Little     Eating meals? 3 - A Little     AM-PAC (TM) ADL Score 12        Assessment/Plan (OT)    Daily Outcome Statement OT eval complete. [facility] 12. Pt unclear on PLOF, per EMR requires assist for transfer and ADL resident of long term care. Pt unable to progress past standing this date w/ MOD A x2 for sit <> stand and posterior lean unable to correct. Pt MOD A x1 bed mobility. Pt is DEP for LBD task and toilet hygiene. Pt reports transferring Ax1 at facility. Rec continued OT to increase IND w/ transfer     Rehab Potential fair, will monitor progress closely     Therapy Frequency 3 times/wk     Planned Therapy Interventions occupation/activity based interventions;transfer/mobility retraining               OT Assessment/Plan      Most Recent Value   OT Recommended Discharge Disposition skilled nursing facility at 11/30/2021 1047   Anticipated Equipment Needs At Discharge (OT) --  [TBD next LOC] at 11/30/2021 1047   Patient/Family Therapy Goal Statement none stated at 11/30/2021 1047                    Education Documentation  Treatment Plan, taught by Sharee Sommers, OT at 11/30/2021  2:15 PM.  Learner: Patient  Readiness: Acceptance  Method: Explanation  Response: Verbalizes Understanding, Needs Reinforcement  Comment: OT POC, bed mob, sit / stand technique          OT Goals      Most Recent Value   Bed Mobility Goal 1    Activity/Assistive Device bed mobility activities, all at 11/30/2021 1047   [place] minimum assist (75% or more patient effort) at 11/30/2021 1047   Time Frame by discharge at 11/30/2021 1047   Progress/Outcome goal ongoing  at 11/30/2021 1047   Transfer Goal 1    Activity/Assistive Device sit-to-stand/stand-to-sit, bed-to-chair/chair-to-bed, toilet at 11/30/2021 1047   [place] minimum assist (75% or more patient effort) at 11/30/2021 1047   Time Frame by discharge at 11/30/2021 1047   Progress/Outcome goal ongoing at 11/30/2021 1047   Grooming Goal 1    Activity/Assistive Device grooming skills, all at 11/30/2021 1047   [place] supervision required, set-up required at 11/30/2021 1047   Time Frame by discharge at 11/30/2021 1047   Progress/Outcome goal ongoing at 11/30/2021 1047

## 2021-11-30 NOTE — PATIENT CARE CONFERENCE
Care Progression Rounds Note  Date: 11/30/2021  Time: 8:38 AM     Patient Name: Mariya Porras     Medical Record Number: [id number]   YOB: 1935  Sex: Female      Room/Bed: 4021    Admitting Diagnosis: Elevated troponin [R77.8]  Acute saddle pulmonary embolism, unspecified whether acute cor pulmonale present (CMS/HCC) [I26.92]   Admit Date/Time: 11/27/2021 11:19 AM    Primary Diagnosis: Acute saddle pulmonary embolism without acute cor pulmonale (CMS/HCC)  Principal Problem: Acute saddle pulmonary embolism without acute cor pulmonale (CMS/HCC)    GMLOS: pending  Anticipated Discharge Date: 12/3/2021    AM-PAC:  Mobility Score:      Discharge Planning:  Concerns to be Addressed: adjustment to diagnosis/illness,care coordination/care conferences,discharge planning  Anticipated Discharge Disposition: home with home health,family member's home with services,assisted living facility  Type of Skilled Nursing Care Services: OT,PT,nursing    Barriers to Discharge:  Medical issues not resolved    Comments:  PE eloqumyriam today WC bound at baseline     Participants:  social work/services,nursing,

## 2021-12-01 VITALS
HEART RATE: 89 BPM | BODY MASS INDEX: 24.11 KG/M2 | RESPIRATION RATE: 20 BRPM | SYSTOLIC BLOOD PRESSURE: 115 MMHG | HEIGHT: 66 IN | OXYGEN SATURATION: 94 % | WEIGHT: 150 LBS | TEMPERATURE: 97 F | DIASTOLIC BLOOD PRESSURE: 67 MMHG

## 2021-12-01 PROBLEM — J96.01 ACUTE RESPIRATORY FAILURE WITH HYPOXIA (CMS/HCC): Status: ACTIVE | Noted: 2021-12-01

## 2021-12-01 LAB
ANION GAP SERPL CALC-SCNC: 7 MEQ/L (ref 3–15)
BASOPHILS # BLD: 0.04 K/UL (ref 0.01–0.1)
BASOPHILS NFR BLD: 0.5 %
BUN SERPL-MCNC: 16 MG/DL (ref 8–20)
CALCIUM SERPL-MCNC: 9.7 MG/DL (ref 8.9–10.3)
CHLORIDE SERPL-SCNC: 105 MEQ/L (ref 98–109)
CO2 SERPL-SCNC: 26 MEQ/L (ref 22–32)
CREAT SERPL-MCNC: 0.6 MG/DL (ref 0.6–1.1)
DIFFERENTIAL METHOD BLD: ABNORMAL
EOSINOPHIL # BLD: 0.2 K/UL (ref 0.04–0.36)
EOSINOPHIL NFR BLD: 2.6 %
ERYTHROCYTE [DISTWIDTH] IN BLOOD BY AUTOMATED COUNT: 14.5 % (ref 11.7–14.4)
GFR SERPL CREATININE-BSD FRML MDRD: >60 ML/MIN/1.73M*2
GLUCOSE SERPL-MCNC: 108 MG/DL (ref 70–99)
HCT VFR BLDCO AUTO: 36.7 % (ref 35–45)
HGB BLD-MCNC: 11.9 G/DL (ref 11.8–15.7)
IMM GRANULOCYTES # BLD AUTO: 0.03 K/UL (ref 0–0.08)
IMM GRANULOCYTES NFR BLD AUTO: 0.4 %
LYMPHOCYTES # BLD: 1.34 K/UL (ref 1.2–3.5)
LYMPHOCYTES NFR BLD: 17.5 %
MAGNESIUM SERPL-MCNC: 1.9 MG/DL (ref 1.8–2.5)
MCH RBC QN AUTO: 30 PG (ref 28–33.2)
MCHC RBC AUTO-ENTMCNC: 32.4 G/DL (ref 32.2–35.5)
MCV RBC AUTO: 92.4 FL (ref 83–98)
MONOCYTES # BLD: 0.36 K/UL (ref 0.28–0.8)
MONOCYTES NFR BLD: 4.7 %
NEUTROPHILS # BLD: 5.68 K/UL (ref 1.7–7)
NEUTS SEG NFR BLD: 74.3 %
NRBC BLD-RTO: 0 %
PDW BLD AUTO: 10.6 FL (ref 9.4–12.3)
PHOSPHATE SERPL-MCNC: 3.3 MG/DL (ref 2.4–4.7)
PLATELET # BLD AUTO: 168 K/UL (ref 150–369)
POTASSIUM SERPL-SCNC: 4.6 MEQ/L (ref 3.6–5.1)
RBC # BLD AUTO: 3.97 M/UL (ref 3.93–5.22)
SODIUM SERPL-SCNC: 138 MEQ/L (ref 136–144)
WBC # BLD AUTO: 7.65 K/UL (ref 3.8–10.5)

## 2021-12-01 PROCEDURE — 80048 BASIC METABOLIC PNL TOTAL CA: CPT | Performed by: HOSPITALIST

## 2021-12-01 PROCEDURE — 84100 ASSAY OF PHOSPHORUS: CPT | Performed by: HOSPITALIST

## 2021-12-01 PROCEDURE — 63700000 HC SELF-ADMINISTRABLE DRUG: Performed by: HOSPITALIST

## 2021-12-01 PROCEDURE — 99239 HOSP IP/OBS DSCHRG MGMT >30: CPT | Performed by: HOSPITALIST

## 2021-12-01 PROCEDURE — 63700000 HC SELF-ADMINISTRABLE DRUG: Performed by: PHYSICIAN ASSISTANT

## 2021-12-01 PROCEDURE — 36415 COLL VENOUS BLD VENIPUNCTURE: CPT | Performed by: HOSPITALIST

## 2021-12-01 PROCEDURE — 83735 ASSAY OF MAGNESIUM: CPT | Performed by: HOSPITALIST

## 2021-12-01 PROCEDURE — 85025 COMPLETE CBC W/AUTO DIFF WBC: CPT | Performed by: HOSPITALIST

## 2021-12-01 RX ADMIN — SERTRALINE HYDROCHLORIDE 50 MG: 50 TABLET ORAL at 08:59

## 2021-12-01 RX ADMIN — APIXABAN 10 MG: 5 TABLET, FILM COATED ORAL at 08:58

## 2021-12-01 RX ADMIN — LISINOPRIL 20 MG: 20 TABLET ORAL at 08:59

## 2021-12-01 RX ADMIN — CARBIDOPA AND LEVODOPA 1 TABLET: 25; 100 TABLET ORAL at 13:16

## 2021-12-01 RX ADMIN — MAGNESIUM OXIDE TAB 400 MG (241.3 MG ELEMENTAL MG) 400 MG: 400 (241.3 MG) TAB at 08:59

## 2021-12-01 RX ADMIN — CARBIDOPA AND LEVODOPA 1 TABLET: 25; 100 TABLET ORAL at 08:59

## 2021-12-01 RX ADMIN — GABAPENTIN 100 MG: 100 CAPSULE ORAL at 08:59

## 2021-12-01 RX ADMIN — GABAPENTIN 100 MG: 100 CAPSULE ORAL at 13:16

## 2021-12-01 NOTE — PROGRESS NOTES
1129- Message sent to [facility] to see if they are able to accept pt back today, awaiting response.  Message sent to [facility] for tentative transport if [facility] is able to accept back today.  Neyda Giordano RN     1320- Spoke with Иван, are now sure that they are able to accept pt back today.  Pt set up for 1500 BLS via [facility] to [facility].  Spoke with pt and pt's  to notify of transport time, in agreement. Neyda Giordano RN

## 2021-12-01 NOTE — DISCHARGE SUMMARY
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 Payan)  Stable now    BP at low side  Ordering IV Bonus  Monitoring closely    Parkinson disease (CMS/[facility])  Assessment & Plan   meds  Consult Dr. Payan    Acute respiratory failure with hypoxia (CMS/[facility])  Assessment & Plan  Secondary to acute PE with right heart strain    Continue oxygen wean down if able    Leukocytosis  Assessment & Plan  Reactive  monitoring    ACP (advance care planning)  Assessment & Plan  D/w patient and her Son,  bedside today, Full Code    Elevated troponin  Assessment & Plan  1.5 to 1.03  No EKG change  With PE  Demand ischemia  Consult CV   TTE  Trend Troponin    Per Cardio   Elevated  troponin : Troponin peaked at 1.5. Related to acute PE and Right ventricular strain    Anxiety  Assessment & Plan  Tx prn    * Acute saddle pulmonary embolism without acute cor pulmonale (CMS/HCC)  Assessment & Plan  Starting Heparin drip  DVT study  Consult pulmonary Service  Per Chest CT study  Recent Dx of Back pain with L1 Fx.  Admit to PCU  Echo    11/28  With positive leg DVT, IVC Filter was placed by IR on 11/27  On Heparin drip  CBC in am      11/28  Positive DVT  IVC Filter placed yesterday  D/w Dr. Quach  C/w  Heparin drip    11/29  Echo show   1.  Normal LV size and function with ejection fraction of 55 to 60%.  2.  No regional wall motion abnormalities identified.  Definity contrast was used to enhance endocardial definition.  3.  Moderate RV enlargement with moderate RV dysfunction with preserved contractility of the right apex.  4.  Mild estimated pulmonary hypertension   5.  Dilated IVC.  6.  Grade I diastolic dysfunction.    11/30   Hep gtt change to Eliquis   No bleeding   Consult PT for DC planning                   SUMMARY OF HOSPITALIZATION      Hospital Course  Very delightful 86 years old female presented with syncope episode, diagnosed acute PE and DVT status post IVC filter placement, start heparin drip, patient tolerated well, anticoagulation switch to Eliquis, tolerated well, no any signs of bleeding, patient feels better feels stronger, patient physical therapy consult recommend SNF, discussed with , proceed discharge when SNF bed is ready    Interval History:   Mariya Porras was seen and examined, no acute overnight event reported, patient reported no chest pain, sob, N/V/D, overt bleeding.     I personally reviewed patient chart, imaging study, lab study, I personally discussed case with patient's nursing staff, , case management, I personally organize patient discharge instruction and medication reconciliation.      Patient vital signs stable, labs  unremarkable, patient medically ready and stable for discharge.    Vitals:    12/01/21 0328 12/01/21 0435 12/01/21 0736 12/01/21 0809   BP:  (!) 174/81  (!) 179/86   BP Location:  Left upper arm  Right upper arm   Patient Position:  Lying  Lying   Pulse: 79 87 82 83   Resp:  20  20   Temp:  36.3 °C (97.3 °F)  36.1 °C (97 °F)   TempSrc:  Oral  Oral   SpO2:  93%  93%   Weight:       Height:           Exam on Day of Discharge  General Appearance:  Awake, Alert, no distress     Head:  Normocephalic, without obvious abnormality, atraumatic   Neck: Supple      Lungs:   Clear to auscultation bilaterally  respirations unlabored  no rales  no wheezing     Heart:  Regular rhythm  S1 and S2 normal  no murmur, rub or gallop     Abdomen:   Soft, non-tender, no masses, no organomegaly     Vascular: Pulses 2+ and symmetric all extremities     Extremities: no calf tenderness      Behavior/Emotional: Appropriate, cooperative     Consults During Admission  IP CONSULT TO PULMONOLOGY/SLEEP MEDICINE  IP CONSULT TO NEUROLOGY  IP CONSULT TO CARDIOLOGY  IP CONSULT TO WOUND OSTOMY CONTINENCE    DISCHARGE MEDICATIONS        Medication List      START taking these medications    * apixaban 5 mg tablet  Commonly known as: ELIQUIS  Take 2 tablets (10 mg total) by mouth 2 (two) times a day for 11 doses Indications: blood clot in a deep vein of the extremities, a clot in the lung.  Dose: 10 mg     * apixaban 5 mg tablet  Commonly known as: ELIQUIS  Start taking on: December 7, 2021  Take 1 tablet (5 mg total) by mouth 2 (two) times a day Indications: blood clot in a deep vein of the extremities, a clot in the lung.  Dose: 5 mg         * This list has 2 medication(s) that are the same as other medications prescribed for you. Read the directions carefully, and ask your doctor or other care provider to review them with you.            CONTINUE taking these medications    acetaminophen 325 mg tablet  Commonly known as: TYLENOL  Take 650 mg by mouth  every 4 (four) hours as needed for mild pain.  Dose: 650 mg     carbidopa-levodopa  mg per tablet  Commonly known as: SINEMET  Take 1 tablet by mouth 4 (four) times a day. 0900, 1300, 1700, 2100  Dose: 1 tablet     gabapentin 100 mg capsule  Commonly known as: NEURONTIN  Take 100 mg by mouth 3 (three) times a day.  Dose: 100 mg     hydrALAZINE 25 mg tablet  Commonly known as: APRESOLINE  Take 75 mg by mouth 3 (three) times a day.  Dose: 75 mg     lidocaine 4 % adhesive patch,medicated topical patch  Commonly known as: ASPERCREME  Apply 1 patch topically daily as needed.  Dose: 1 patch     lisinopriL 20 mg tablet  Commonly known as: PRINIVIL  Take 20 mg by mouth daily.  Dose: 20 mg     LUMIGAN 0.01 % ophthalmic drops  Administer 1 drop into both eyes nightly.  Dose: 1 drop  Generic drug: bimatoprost     magnesium oxide 400 mg (241.3 mg magnesium) tablet  Commonly known as: MAG-OX  Take 400 mg by mouth every morning.  Dose: 400 mg     melatonin 3 mg tablet  Take 3 mg by mouth nightly.  Dose: 3 mg     mirtazapine 30 mg tablet  Commonly known as: REMERON  Take 30 mg by mouth nightly.  Dose: 30 mg     oxyCODONE 5 mg immediate release tablet  Commonly known as: ROXICODONE  Take 5 mg by mouth every 8 (eight) hours as needed for moderate pain or severe pain.  Dose: 5 mg     polyethylene glycol 17 gram packet  Commonly known as: MIRALAX  Take 17 g by mouth daily.  Dose: 17 g     sennosides-docusate sodium 8.6-50 mg  Commonly known as: SENOKOT-S  Take 2 tablets by mouth nightly.  Dose: 2 tablet     sertraline 50 mg tablet  Commonly known as: ZOLOFT  Take 50 mg by mouth 2 (two) times a day. 0900, 1700  Dose: 50 mg                 PROCEDURES / LABS / IMAGING      Operative Procedures  [unfilled]    Pertinent Labs  CMP Results       12/01/21 11/30/21 11/29/21     0439 0605 0142     137 134    K 4.6 4.5 3.4    Cl 105 106 109    CO2 26 24 17    Glucose 108 107 144    BUN 16 16 26    Creatinine 0.6 0.6 0.8    Calcium  9.7 9.5 8.1    Anion Gap 7 7 8    EGFR >60.0 >60.0 >60.0        CBC Results       12/01/21 11/30/21 11/29/21     0439 0605 0142    WBC 7.65 7.08 8.52    RBC 3.97 3.92 3.48    HGB 11.9 11.7 10.3    HCT 36.7 36.5 33.0    MCV 92.4 93.1 94.8    MCH 30.0 29.8 29.6    MCHC 32.4 32.1 31.2     147 114         Comment for PLT at 0142 on 11/29/21: RESULTS OBTAINED AFTER VORTEXING TO ELIMINATE PLT CLUMPS        Troponin I Results       11/27/21 11/27/21 11/27/21     2351 1753 1136    Troponin I 1.03 1.50 0.05        PT/PTT Results       11/30/21 11/29/21 11/29/21     0605 2332 1530    PTT 60 80 84         Comment for PTT at 0605 on 11/30/21: The Standard Therapeutic Range for Heparin is 68 to 101 seconds.    Comment for PTT at 2332 on 11/29/21: The Standard Therapeutic Range for Heparin is 68 to 101 seconds.    Comment for PTT at 1530 on 11/29/21: The Standard Therapeutic Range for Heparin is 68 to 101 seconds.        Lab Results   Component Value Date    BNP <5 11/27/2021    CHOL 251 (H) 09/08/2021    TRIG 96 09/08/2021    HDL 49 (L) 09/08/2021    LDLCALC 183 (H) 09/08/2021    TSH 2.72 11/27/2021    HGBA1C 5.3 09/08/2021       Pertinent Imaging  CT ANGIOGRAPHY CHEST WITH AND WITHOUT IV CONTRAST    Result Date: 11/27/2021  IMPRESSION: Extensive saddle pulmonary embolus with extension into all lobar branches bilaterally. Flattening of the interventricular septum and reflux of contrast into the IVC consistent with right heart strain. RV:LV ratio 2:1. Mosaic attenuation may be on the basis of altered perfusion in the setting of significant burden of emboli. Gallbladder wall appears mildly thickened, correlate for right upper quadrant tenderness. If clinically indicated this can be further evaluated with ultrasound. Finding:    Acute pulmonary embolism   Acuity: Critical  Status:  CLOSED Critical read back was performed and results were read back by  Amy Marcelo DO   on 11/27/2021 at 3:07 PM.     CT ANGIOGRAPHY  ABDOMEN PELVIS WITH AND WITHOUT IV CONTRAST    Result Date: 11/27/2021  IMPRESSION: Extensive saddle pulmonary embolus with extension into all lobar branches bilaterally. Flattening of the interventricular septum and reflux of contrast into the IVC consistent with right heart strain. RV:LV ratio 2:1. Mosaic attenuation may be on the basis of altered perfusion in the setting of significant burden of emboli. Gallbladder wall appears mildly thickened, correlate for right upper quadrant tenderness. If clinically indicated this can be further evaluated with ultrasound. Finding:    Acute pulmonary embolism   Acuity: Critical  Status:  CLOSED Critical read back was performed and results were read back by  Amy Marcelo DO   on 11/27/2021 at 3:07 PM.     ULTRASOUND GUIDED VASCULAR ACCESS    Result Date: 11/27/2021  IMPRESSION:   Successful placement of a retrievable infrarenal IVC Filter. Device: Argon Option Elite IVC Filter COMMENT: PROCEDURE: 1. Ultrasound-guided access of the right common femoral vein. 2. IVC cavagram 3. IVC filter placement RADIOLOGIST:  Danie Shell MD ANESTHESIA:  Lidocaine 1% CONTRAST:  30 cc, Omnipaque 300 FLUORO TIME:  0.7 min REFERENCE AIR KERMA: 42 mGy MEDICATIONS:  none DESCRIPTION OF PROCEDURE:  Consent was obtained following explanation of risks and benefits of the procedure. The right groin was prepped and draped in the usual sterile fashion. The right common femoral vein was noted to be compressible and patent on gray scale ultrasound. Local anesthesia was provided. The vein was then accessed with a  21-gauge needle under ultrasound guidance, and an .018 wire was advanced centrally. An ultrasound-guided access image was saved to PACS. Exchange was made for a microsheath, .035 wire, and multi-sidehole sheath, which was positioned at the iliocaval junction. IVC cavagram was performed. Renal vein insertion sites were localized. An Argon Option Elite IVC Filter was deployed infrarenally  without complication. Wires and catheters were removed and hemostasis was achieved. The patient remained stable throughout the procedure. The number of guidewires used, and their integrity, was confirmed at the end of the procedure.     IR FILTER PLACEMENT    Result Date: 11/27/2021  IMPRESSION:   Successful placement of a retrievable infrarenal IVC Filter. Device: Argon Option Elite IVC Filter COMMENT: PROCEDURE: 1. Ultrasound-guided access of the right common femoral vein. 2. IVC cavagram 3. IVC filter placement RADIOLOGIST:  Danie Shell MD ANESTHESIA:  Lidocaine 1% CONTRAST:  30 cc, Omnipaque 300 FLUORO TIME:  0.7 min REFERENCE AIR KERMA: 42 mGy MEDICATIONS:  none DESCRIPTION OF PROCEDURE:  Consent was obtained following explanation of risks and benefits of the procedure. The right groin was prepped and draped in the usual sterile fashion. The right common femoral vein was noted to be compressible and patent on gray scale ultrasound. Local anesthesia was provided. The vein was then accessed with a  21-gauge needle under ultrasound guidance, and an .018 wire was advanced centrally. An ultrasound-guided access image was saved to PACS. Exchange was made for a microsheath, .035 wire, and multi-sidehole sheath, which was positioned at the iliocaval junction. IVC cavagram was performed. Renal vein insertion sites were localized. An Argon Option Elite IVC Filter was deployed infrarenally without complication. Wires and catheters were removed and hemostasis was achieved. The patient remained stable throughout the procedure. The number of guidewires used, and their integrity, was confirmed at the end of the procedure.     X-RAY CHEST 1 VIEW    Result Date: 11/27/2021  IMPRESSION: No acute pulmonary abnormality.     US venous leg bilateral    Result Date: 11/27/2021  IMPRESSION: There is acute right proximal femoral vein deep venous thrombosis. Finding:    Other   Acuity: Critical  Status:  CLOSED Critical read back  was performed and results were read back by Herminia Flood RN, on 11/27/2021 5:33 PM       OUTPATIENT  FOLLOW-UP / REFERRALS / PENDING TESTS      Important Issues to Address in Follow-Up  Please follow up with PCP in One week       DISCHARGE DISPOSITION      Disposition:      This patient note has been dictated using speech recognition software. Inadvertent speech recognition errors should be disregarded. Please do not hesitate to call [facility] office for clarifications.

## 2021-12-01 NOTE — PATIENT CARE CONFERENCE
Care Progression Rounds Note  Date: 12/1/2021  Time: 8:52 AM     Patient Name: Mariya Porras     Medical Record Number: [id number]   YOB: 1935  Sex: Female      Room/Bed: 4021    Admitting Diagnosis: Elevated troponin [R77.8]  Acute saddle pulmonary embolism, unspecified whether acute cor pulmonale present (CMS/HCC) [I26.92]   Admit Date/Time: 11/27/2021 11:19 AM    Primary Diagnosis: Acute saddle pulmonary embolism without acute cor pulmonale (CMS/HCC)  Principal Problem: Acute saddle pulmonary embolism without acute cor pulmonale (CMS/HCC)    GMLOS: pending  Anticipated Discharge Date: 12/1/2021    AM-PAC:  Mobility Score: 10    Discharge Planning:  Concerns to be Addressed: adjustment to diagnosis/illness,care coordination/care conferences,discharge planning  Anticipated Discharge Disposition: skilled nursing facility,assisted living facility  Type of Skilled Nursing Care Services: OT,PT,nursing    Barriers to Discharge:  None    Comments:  Иван murillo on 28th, 02    Participants:  social work/services,nursing,

## 2021-12-01 NOTE — NURSING NOTE
RN called and gave report to RN at [facility]. All questions answered. RN gathered all pt belongings & bag of belongings with pt. Two IVs removed. Tele box sent back to monitor room. [facility] here for pt & RN gave report to [facility] staff. Pt slid onto stretcher. Pt left with [facility] staff.

## 2021-12-01 NOTE — DISCHARGE INSTRUCTIONS
Please follow-up with your primary care physician and pulmonology team 1 week after discharge from hospital

## 2021-12-03 LAB
BACTERIA BLD CULT: NORMAL

## 2022-06-06 ENCOUNTER — HOSPITAL ENCOUNTER (OUTPATIENT)
Facility: HOSPITAL | Age: 87
Setting detail: OBSERVATION
LOS: 1 days | Discharge: HOME | End: 2022-06-08
Attending: EMERGENCY MEDICINE | Admitting: STUDENT IN AN ORGANIZED HEALTH CARE EDUCATION/TRAINING PROGRAM
Payer: MEDICARE

## 2022-06-06 ENCOUNTER — APPOINTMENT (EMERGENCY)
Dept: RADIOLOGY | Facility: HOSPITAL | Age: 87
End: 2022-06-06
Payer: MEDICARE

## 2022-06-06 DIAGNOSIS — R55 SYNCOPE, UNSPECIFIED SYNCOPE TYPE: Primary | ICD-10-CM

## 2022-06-06 LAB
ALBUMIN SERPL-MCNC: 3.9 G/DL (ref 3.4–5)
ALP SERPL-CCNC: 39 IU/L (ref 35–126)
ALT SERPL-CCNC: 6 IU/L (ref 11–54)
ANION GAP SERPL CALC-SCNC: 5 MEQ/L (ref 3–15)
AST SERPL-CCNC: 15 IU/L (ref 15–41)
BASOPHILS # BLD: 0.04 K/UL (ref 0.01–0.1)
BASOPHILS NFR BLD: 0.5 %
BILIRUB SERPL-MCNC: 0.4 MG/DL (ref 0.3–1.2)
BUN SERPL-MCNC: 18 MG/DL (ref 8–20)
CALCIUM SERPL-MCNC: 10.1 MG/DL (ref 8.9–10.3)
CHLORIDE SERPL-SCNC: 103 MEQ/L (ref 98–109)
CO2 SERPL-SCNC: 25 MEQ/L (ref 22–32)
CREAT SERPL-MCNC: 0.8 MG/DL (ref 0.6–1.1)
DIFFERENTIAL METHOD BLD: NORMAL
EOSINOPHIL # BLD: 0.1 K/UL (ref 0.04–0.36)
EOSINOPHIL NFR BLD: 1.3 %
ERYTHROCYTE [DISTWIDTH] IN BLOOD BY AUTOMATED COUNT: 13.5 % (ref 11.7–14.4)
GFR SERPL CREATININE-BSD FRML MDRD: >60 ML/MIN/1.73M*2
GLUCOSE SERPL-MCNC: 114 MG/DL (ref 70–99)
HCT VFR BLDCO AUTO: 43.2 % (ref 35–45)
HGB BLD-MCNC: 13.9 G/DL (ref 11.8–15.7)
IMM GRANULOCYTES # BLD AUTO: 0.04 K/UL (ref 0–0.08)
IMM GRANULOCYTES NFR BLD AUTO: 0.5 %
LYMPHOCYTES # BLD: 1.27 K/UL (ref 1.2–3.5)
LYMPHOCYTES NFR BLD: 16.5 %
MAGNESIUM SERPL-MCNC: 2.1 MG/DL (ref 1.8–2.5)
MCH RBC QN AUTO: 31.2 PG (ref 28–33.2)
MCHC RBC AUTO-ENTMCNC: 32.2 G/DL (ref 32.2–35.5)
MCV RBC AUTO: 96.9 FL (ref 83–98)
MONOCYTES # BLD: 0.4 K/UL (ref 0.28–0.8)
MONOCYTES NFR BLD: 5.2 %
NEUTROPHILS # BLD: 5.84 K/UL (ref 1.7–7)
NEUTS SEG NFR BLD: 76 %
NRBC BLD-RTO: 0 %
PDW BLD AUTO: 9.4 FL (ref 9.4–12.3)
PLATELET # BLD AUTO: 210 K/UL (ref 150–369)
POTASSIUM SERPL-SCNC: 4.3 MEQ/L (ref 3.6–5.1)
PROT SERPL-MCNC: 6.7 G/DL (ref 6–8.2)
RBC # BLD AUTO: 4.46 M/UL (ref 3.93–5.22)
SARS-COV-2 RNA RESP QL NAA+PROBE: NEGATIVE
SODIUM SERPL-SCNC: 133 MEQ/L (ref 136–144)
TROPONIN I SERPL HS-MCNC: 4.9 PG/ML
TROPONIN I SERPL HS-MCNC: 6 PG/ML
TSH SERPL DL<=0.05 MIU/L-ACNC: 1.88 MIU/L (ref 0.34–5.6)
WBC # BLD AUTO: 7.69 K/UL (ref 3.8–10.5)

## 2022-06-06 PROCEDURE — 96360 HYDRATION IV INFUSION INIT: CPT

## 2022-06-06 PROCEDURE — 99284 EMERGENCY DEPT VISIT MOD MDM: CPT | Mod: 25

## 2022-06-06 PROCEDURE — 93005 ELECTROCARDIOGRAM TRACING: CPT

## 2022-06-06 PROCEDURE — 84484 ASSAY OF TROPONIN QUANT: CPT | Mod: 91 | Performed by: EMERGENCY MEDICINE

## 2022-06-06 PROCEDURE — 80053 COMPREHEN METABOLIC PANEL: CPT

## 2022-06-06 PROCEDURE — 80053 COMPREHEN METABOLIC PANEL: CPT | Performed by: EMERGENCY MEDICINE

## 2022-06-06 PROCEDURE — 84484 ASSAY OF TROPONIN QUANT: CPT

## 2022-06-06 PROCEDURE — 85025 COMPLETE CBC W/AUTO DIFF WBC: CPT

## 2022-06-06 PROCEDURE — G1004 CDSM NDSC: HCPCS

## 2022-06-06 PROCEDURE — 84443 ASSAY THYROID STIM HORMONE: CPT | Performed by: PHYSICIAN ASSISTANT

## 2022-06-06 PROCEDURE — U0002 COVID-19 LAB TEST NON-CDC: HCPCS | Performed by: PHYSICIAN ASSISTANT

## 2022-06-06 PROCEDURE — 25800000 HC PHARMACY IV SOLUTIONS: Performed by: PHYSICIAN ASSISTANT

## 2022-06-06 PROCEDURE — 83735 ASSAY OF MAGNESIUM: CPT | Performed by: PHYSICIAN ASSISTANT

## 2022-06-06 PROCEDURE — 93005 ELECTROCARDIOGRAM TRACING: CPT | Performed by: EMERGENCY MEDICINE

## 2022-06-06 PROCEDURE — 71045 X-RAY EXAM CHEST 1 VIEW: CPT

## 2022-06-06 PROCEDURE — 85025 COMPLETE CBC W/AUTO DIFF WBC: CPT | Performed by: EMERGENCY MEDICINE

## 2022-06-06 PROCEDURE — 70450 CT HEAD/BRAIN W/O DYE: CPT | Mod: ME

## 2022-06-06 PROCEDURE — 36415 COLL VENOUS BLD VENIPUNCTURE: CPT

## 2022-06-06 PROCEDURE — 12000000 HC ROOM AND CARE MED/SURG

## 2022-06-06 PROCEDURE — 84484 ASSAY OF TROPONIN QUANT: CPT | Performed by: EMERGENCY MEDICINE

## 2022-06-06 RX ORDER — DOCUSATE SODIUM 100 MG/1
100 CAPSULE, LIQUID FILLED ORAL DAILY
COMMUNITY
End: 2024-01-01

## 2022-06-06 RX ORDER — FAMOTIDINE 20 MG/1
20 TABLET, FILM COATED ORAL DAILY
COMMUNITY
Start: 2022-05-20

## 2022-06-06 RX ADMIN — SODIUM CHLORIDE 500 ML: 9 INJECTION, SOLUTION INTRAVENOUS at 16:42

## 2022-06-06 ASSESSMENT — ENCOUNTER SYMPTOMS
FACIAL SWELLING: 0
ACTIVITY CHANGE: 0
SHORTNESS OF BREATH: 0
BACK PAIN: 0
NAUSEA: 0
VOMITING: 0
NECK PAIN: 0
COLOR CHANGE: 0
DIFFICULTY URINATING: 0
DIAPHORESIS: 0
HEADACHES: 0
SPEECH DIFFICULTY: 0
ABDOMINAL PAIN: 0
HEMATURIA: 0
SEIZURES: 0
SORE THROAT: 0
COUGH: 0
AGITATION: 0
WEAKNESS: 0
FEVER: 0
DIARRHEA: 0
WHEEZING: 0

## 2022-06-06 NOTE — ED PROVIDER NOTES
Emergency Medicine Note  HPI   HISTORY OF PRESENT ILLNESS     HPI     This is an 86-year-old female with history of Parkinson's disease, hypertension, acute saddle pulmonary embolus without cor pulmonale and IVC filter, history of elevated troponin, history of acute respiratory failure with hypoxia, and history of syncope presents for evaluation of a syncopal event that occurred just prior to arrival.  The patient notes that she was eating lunch and thinks she ate lunch too quickly.  She felt like the food was caught in her throat and drink water to get the food to go down.  The patient son was with her and notes that she passed out with her eyes open and it looked like she was not breathing.  He attempted to rouse her, but she had no response for 2 to 3 minutes.  Patient's son notes that she was seated during this incident.  Patient did not hit her head or lose consciousness and is acting per her baseline presently.    Patient does have an appointment tomorrow morning to have her IVC filter removed at 9 AM.  She denies any fever/chills, loss of control of her bowel or bladder, tongue biting, chest pain, shortness of breath, abdominal pain, palpitations, any other symptoms.  She has no history of seizures.      Patient History   PAST HISTORY     Reviewed from Nursing Triage:         Past Medical History:   Diagnosis Date   • Anxiety    • Arthritis    • Basal cell carcinoma     forehead   • Glaucoma    • Hypertension    • Low back pain    • Lumbosacral disc disease    • Parkinson's disease (CMS/HCC)    • Peripheral neuropathy        Past Surgical History:   Procedure Laterality Date   • BLADDER SUSPENSION  2009    Prolift M   • CATARACT EXTRACTION, BILATERAL     • COLONOSCOPY     • HYSTERECTOMY     • JOINT REPLACEMENT     • KNEE ARTHROPLASTY  2015    left       Family History   Problem Relation Age of Onset   • No Known Problems Biological Mother    • No Known Problems Biological Father        Social History      Tobacco Use   • Smoking status: Former Smoker   • Smokeless tobacco: Never Used   • Tobacco comment: as a young teenager   Substance Use Topics   • Alcohol use: Yes     Comment: wine   • Drug use: No         Review of Systems   REVIEW OF SYSTEMS     Review of Systems   Constitutional: Negative for activity change, diaphoresis and fever.   HENT: Negative for facial swelling and sore throat.    Eyes: Negative for visual disturbance.   Respiratory: Negative for cough, shortness of breath and wheezing.    Cardiovascular: Negative for chest pain and leg swelling.   Gastrointestinal: Negative for abdominal pain, diarrhea, nausea and vomiting.   Genitourinary: Negative for difficulty urinating and hematuria.   Musculoskeletal: Negative for back pain and neck pain.   Skin: Negative for color change and pallor.   Neurological: Positive for syncope. Negative for seizures, speech difficulty, weakness and headaches.   Psychiatric/Behavioral: Negative for agitation and behavioral problems.   All other systems reviewed and are negative.        VITALS     ED Vitals    Date/Time Temp Pulse Resp BP SpO2 [facility]   06/06/22 1444 36.4 °C (97.5 °F) 80 17 127/72 95 % LTE        Pulse Ox %: 99 % (06/06/22 1722)  Pulse Ox Interpretation: Normal (06/06/22 1722)  Heart Rate: 88 (06/06/22 1722)  Rhythm Strip Interpretation: Normal Sinus Rhythm (06/06/22 1722)     Physical Exam   PHYSICAL EXAM     Physical Exam  Vitals and nursing note reviewed.   Constitutional:       General: She is not in acute distress.     Appearance: She is well-developed.   HENT:      Head: Normocephalic and atraumatic.   Eyes:      Extraocular Movements: Extraocular movements intact.      Conjunctiva/sclera: Conjunctivae normal.      Pupils: Pupils are equal, round, and reactive to light.   Cardiovascular:      Rate and Rhythm: Normal rate and regular rhythm.      Heart sounds: No murmur heard.  Pulmonary:      Effort: Pulmonary effort is normal. No respiratory  distress.      Breath sounds: Normal breath sounds.   Abdominal:      Palpations: Abdomen is soft.      Tenderness: There is no abdominal tenderness.   Musculoskeletal:      Cervical back: Neck supple.   Skin:     General: Skin is warm and dry.   Neurological:      General: No focal deficit present.      Mental Status: She is alert and oriented to person, place, and time. Mental status is at baseline.      Comments: Mental status: A/Ox3  CN II-XII tested and intact.  Sensation intact to sharp/dull differentiation in all extremities.  Motor: Normal tone and bulk.  Resting tremor. No pronator drift. Strength tested and 5/5 in bilateral wrist flexion/extension, elbow flexion/extension, shoulder abduction, straight leg raise, knee flexion/extension, ankle dorsiflexion/plantarflexion.   Coordination: Finger to nose and heel to shin testing intact bilaterally.           PROCEDURES     Procedures     DATA     Results     Procedure Component Value Units Date/Time    SARS-CoV-2 (COVID-19), PCR Nasopharynx [[id number]]  (Normal) Collected: 06/06/22 1640    Specimen: Nasopharyngeal Swab from Nasopharynx Updated: 06/06/22 1724    Narrative:      The following orders were created for panel order SARS-CoV-2 (COVID-19), PCR Nasopharynx.  Procedure                               Abnormality         Status                     ---------                               -----------         ------                     SARS-CoV-2 (COVID-19), P...[[id number]]  Normal              Final result                 Please view results for these tests on the individual orders.    SARS-CoV-2 (COVID-19), PCR Nasopharynx [[id number]]  (Normal) Collected: 06/06/22 1640    Specimen: Nasopharyngeal Swab from Nasopharynx Updated: 06/06/22 1724     SARS-CoV-2 (COVID-19) Negative    Narrative:      Nursing instructions: Obtain nasopharyngeal swab ONLY.  Send swab in viral transport media. If RSV/FLU swab is also ordered, both Covid and RSV/FLU can be performed  on single swab.    Magnesium [[id number]]  (Normal) Collected: 06/06/22 1501    Specimen: Blood, Venous Updated: 06/06/22 1723     Magnesium 2.1 mg/dL     TSH w reflex FT4 [[id number]]  (Normal) Collected: 06/06/22 1501    Specimen: Blood, Venous Updated: 06/06/22 1717     TSH 1.88 mIU/L     Comprehensive metabolic panel [[id number]]  (Abnormal) Collected: 06/06/22 1501    Specimen: Blood, Venous Updated: 06/06/22 1536     Sodium 133 mEQ/L      Potassium 4.3 mEQ/L      Comment: Results obtained on plasma. Plasma Potassium values may be up to 0.4 mEQ/L less than serum values. The differences may be greater for patients with high platelet or white cell counts.        Chloride 103 mEQ/L      CO2 25 mEQ/L      BUN 18 mg/dL      Creatinine 0.8 mg/dL      Glucose 114 mg/dL      Calcium 10.1 mg/dL      AST (SGOT) 15 IU/L      ALT (SGPT) 6 IU/L      Alkaline Phosphatase 39 IU/L      Total Protein 6.7 g/dL      Comment: Test performed on plasma which typically contains approximately 0.4 g/dL more protein than serum.        Albumin 3.9 g/dL      Bilirubin, Total 0.4 mg/dL      eGFR >60.0 mL/min/1.73m*2      Anion Gap 5 mEQ/L     HS Troponin I (with 2 hour reflex) [[id number]]  (Normal) Collected: 06/06/22 1501    Specimen: Blood, Venous Updated: 06/06/22 1536     High Sens Troponin I 6.0 pg/mL     CBC and differential [[id number]] Collected: 06/06/22 1501    Specimen: Blood, Venous Updated: 06/06/22 1514     WBC 7.69 K/uL      RBC 4.46 M/uL      Hemoglobin 13.9 g/dL      Hematocrit 43.2 %      MCV 96.9 fL      MCH 31.2 pg      MCHC 32.2 g/dL      RDW 13.5 %      Platelets 210 K/uL      MPV 9.4 fL      Differential Type Auto     nRBC 0.0 %      Immature Granulocytes 0.5 %      Neutrophils 76.0 %      Lymphocytes 16.5 %      Monocytes 5.2 %      Eosinophils 1.3 %      Basophils 0.5 %      Immature Granulocytes, Absolute 0.04 K/uL      Neutrophils, Absolute 5.84 K/uL      Lymphocytes, Absolute 1.27 K/uL      Monocytes, Absolute 0.40  K/uL      Eosinophils, Absolute 0.10 K/uL      Basophils, Absolute 0.04 K/uL     RAINBOW RED [[id number]] Collected: 06/06/22 1501    Specimen: Blood, Venous Updated: 06/06/22 1506    RAINBOW LT BLUE [[id number]] Collected: 06/06/22 1501    Specimen: Blood, Venous Updated: 06/06/22 1506    [place] Draw Panel [[id number]] Collected: 06/06/22 1501    Specimen: Blood, Venous Updated: 06/06/22 1506    Narrative:      The following orders were created for panel order [place] Draw Panel.  Procedure                               Abnormality         Status                     ---------                               -----------         ------                     RAINBOW RED[[id number]]                                      In process                 RAINBOW LT BLUE[[id number]]                                  In process                 RAINBOW GOLD[[id number]]                                     In process                   Please view results for these tests on the individual orders.    RAINBOW GOLD [[id number]] Collected: 06/06/22 1501    Specimen: Blood, Venous Updated: 06/06/22 1506          Imaging Results          CT HEAD WITHOUT IV CONTRAST (Final result)  Result time 06/06/22 16:27:31    Final result                 Impression:    IMPRESSION:  1. No CT evidence for an acute large vascular territorial infarct, acute  intracranial hemorrhage or mass effect.  2. Chronic microangiopathic ischemic disease and age-related involutional  changes.             Narrative:      CLINICAL HISTORY:  Syncope.    COMMENT:  An unenhanced CT scan of the brain was performed from the foramen magnum to the  vertex. Coronal and sagittal reconstructions were obtained.    CT DOSE:  One or more dose reduction techniques (e.g. automated exposure  control, adjustment of the mA and/or kV according to patient size, use of  iterative reconstruction technique) utilized for this examination.    Comparison: 6/26/2019      Findings:  The ventricles, sulci and  cisternal spaces are prominent consistent with  involutional changes. Patchy areas of periventricular and subcortical white  matter hypoattenuation are present which are nonspecific but likely represent  chronic microvascular ischemic changes. There is no gross loss of the gray-white  matter discrimination to suggest an acute large vascular territory infarction.  Atherosclerotic intracranial vascular calcifications are noted. No acute  intracranial hemorrhage, intra-axial mass effect or extra-axial fluid collection  is identified.    There is no evidence of a depressed calvarial fracture.  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  The  visualized portions of the paranasal sinuses and mastoid air cells appear  patent.  Bilateral ocular lens implants are present.                                 X-RAY CHEST 1 VIEW (Final result)  Result time 06/06/22 16:12:33    Final result                 Impression:    IMPRESSION:  1.  No evidence of acute disease in the chest.                   Narrative:    CLINICAL HISTORY:  Syncope    COMMENT:   Single portable AP view of the chest is compared to 11/27/2021 and  other prior studies.    The lungs appear clear.  There is no large pleural effusion.  There is no pneumothorax.  The cardiomediastinal silhouette appears stable, noting aortic tortuosity and  calcification and normal heart size.  No acute osseous abnormality is seen.                                ECG 12 lead   ED Interpretation   Reviewed with attending. RH    Rhythm: [NSR]  Rate: 80  P waves: [normal interval]  QRS: [normal QRS]  Axis: [normal]  ST Segments: [no obvious ST elevation or ischemia]      Impression: Normal sinus rhythm; normal ECG          Scoring tools                                 ED Course & MDM   MDM / ED COURSE / CLINICAL IMPRESSIONS / DISPO     [facility]    ED Course as of 06/07/22 1533   Mon  Jun 06, 2022   1508 Impression: Syncope, unresponsive for 2 to 3 minutes and witnessed; no head injury.  NIH 0; patient back to baseline and no focal neurological deficits  Plan: Labs, Trop, mag, TSH, COVID, ECG, chest x-ray, noncontrast head CT, ECG, cardiac monitoring, reeval [RH]   1655 CT HEAD WITHOUT IV CONTRAST  IMPRESSION:  1. No CT evidence for an acute large vascular territorial infarct, acute  intracranial hemorrhage or mass effect.  2. Chronic microangiopathic ischemic disease and age-related involutional  changes. [RH]   1655 X-RAY CHEST 1 VIEW  IMPRESSION:  1.  No evidence of acute disease in the chest. [RH]   1750 High Sens Troponin I: 4.9 [RH]   1750 SARS-CoV-2 (COVID-19): Negative [RH]   1823 Paged HMS [RH]      ED Course User Index  [RH] Sarah Gamble PA C         Clinical Impressions as of 06/07/22 1533   Syncope, unspecified syncope type              Sarah Gamble PA C  06/06/22 1827       Sarah Gamble PA C  06/07/22 2120

## 2022-06-07 ENCOUNTER — APPOINTMENT (OUTPATIENT)
Dept: CARDIOLOGY | Facility: HOSPITAL | Age: 87
Setting detail: OBSERVATION
End: 2022-06-07
Attending: INTERNAL MEDICINE
Payer: MEDICARE

## 2022-06-07 ENCOUNTER — APPOINTMENT (OUTPATIENT)
Dept: RADIOLOGY | Facility: HOSPITAL | Age: 87
Setting detail: OBSERVATION
End: 2022-06-07
Payer: MEDICARE

## 2022-06-07 LAB
ANION GAP SERPL CALC-SCNC: 6 MEQ/L (ref 3–15)
AORTIC ROOT ANNULUS: 3.3 CM
AORTIC VALVE MEAN VELOCITY: 1.11 M/S
AORTIC VALVE VELOCITY TIME INTEGRAL: 36.1 CM
ASCENDING AORTA: 3 CM
ATRIAL RATE: 80
AV MEAN GRADIENT: 6 MMHG
AV PEAK GRADIENT: 9 MMHG
AV PEAK VELOCITY-S: 1.48 M/S
AV VALVE AREA: 2.62 CM2
AV VALVE AREA: 2.88 CM2
BACTERIA URNS QL MICRO: ABNORMAL /HPF
BILIRUB UR QL STRIP.AUTO: NEGATIVE MG/DL
BSA FOR ECHO PROCEDURE: 1.75 M2
BUN SERPL-MCNC: 15 MG/DL (ref 8–20)
CALCIUM SERPL-MCNC: 10.1 MG/DL (ref 8.9–10.3)
CHLORIDE SERPL-SCNC: 104 MEQ/L (ref 98–109)
CLARITY UR REFRACT.AUTO: CLEAR
CO2 SERPL-SCNC: 26 MEQ/L (ref 22–32)
COLOR UR AUTO: YELLOW
CREAT SERPL-MCNC: 0.6 MG/DL (ref 0.6–1.1)
DOP CALC LVOT STROKE VOLUME: 94.51 ML
E WAVE DECELERATION TIME: 232 MS
E/A RATIO: 1
E/E' RATIO: 13
E/LAT E' RATIO: 10.3
ERYTHROCYTE [DISTWIDTH] IN BLOOD BY AUTOMATED COUNT: 13.4 % (ref 11.7–14.4)
FRACTIONAL SHORTENING: 43 %
GFR SERPL CREATININE-BSD FRML MDRD: >60 ML/MIN/1.73M*2
GLUCOSE SERPL-MCNC: 90 MG/DL (ref 70–99)
GLUCOSE UR STRIP.AUTO-MCNC: NEGATIVE MG/DL
HCT VFR BLDCO AUTO: 40.3 % (ref 35–45)
HGB BLD-MCNC: 13.4 G/DL (ref 11.8–15.7)
HGB UR QL STRIP.AUTO: NEGATIVE
HYALINE CASTS #/AREA URNS LPF: ABNORMAL /LPF
INTERVENTRICULAR SEPTUM: 1.02 CM
KETONES UR STRIP.AUTO-MCNC: NEGATIVE MG/DL
LA ESV (BP): 34.7 CM3
LA ESV INDEX (A2C): 20.74 CM3/M2
LA ESV INDEX (BP): 19.83 CM3/M2
LA/AORTA RATIO: 0.73
LAAS-AP2: 15.8 CM2
LAAS-AP4: 14.9 CM2
LAD 2D: 2.4 CM
LALD A4C: 5.27 CM
LALD A4C: 5.55 CM
LAV-S: 36.3 CM3
LEFT ATRIUM VOLUME INDEX: 18.69 CM3/M2
LEFT ATRIUM VOLUME: 32.7 CM3
LEFT INTERNAL DIMENSION IN SYSTOLE: 2.39 CM (ref 2.42–3.67)
LEFT VENTRICULAR INTERNAL DIMENSION IN DIASTOLE: 4.19 CM (ref 4.09–5.68)
LEFT VENTRICULAR POSTERIOR WALL IN END DIASTOLE: 0.97 CM (ref 0.54–1)
LEUKOCYTE ESTERASE UR QL STRIP.AUTO: ABNORMAL
LVOT 2D: 2.1 CM
LVOT A: 3.46 CM2
LVOT MG: 4 MMHG
LVOT MV: 0.89 M/S
LVOT PEAK VELOCITY: 1.23 M/S
LVOT VTI: 27.3 CM
MAGNESIUM SERPL-MCNC: 2 MG/DL (ref 1.8–2.5)
MCH RBC QN AUTO: 31.4 PG (ref 28–33.2)
MCHC RBC AUTO-ENTMCNC: 33.3 G/DL (ref 32.2–35.5)
MCV RBC AUTO: 94.4 FL (ref 83–98)
MUCOUS THREADS URNS QL MICRO: ABNORMAL /LPF
MV E'TISSUE VEL-LAT: 0.1 M/S
MV E'TISSUE VEL-MED: 0.08 M/S
MV PEAK A VEL: 0.97 M/S
MV PEAK E VEL: 1 M/S
NITRITE UR QL STRIP.AUTO: NEGATIVE
P AXIS: 48
PDW BLD AUTO: 9.7 FL (ref 9.4–12.3)
PH UR STRIP.AUTO: 6.5 [PH]
PLATELET # BLD AUTO: 204 K/UL (ref 150–369)
POSTERIOR WALL: 0.97 CM
POTASSIUM SERPL-SCNC: 4.1 MEQ/L (ref 3.6–5.1)
PR INTERVAL: 178
PROT UR QL STRIP.AUTO: NEGATIVE
QRS DURATION: 80
QT INTERVAL: 360
QTC CALCULATION(BAZETT): 415
R AXIS: -20
RBC # BLD AUTO: 4.27 M/UL (ref 3.93–5.22)
RBC #/AREA URNS HPF: ABNORMAL /HPF
SODIUM SERPL-SCNC: 136 MEQ/L (ref 136–144)
SP GR UR REFRACT.AUTO: 1.01
SQUAMOUS URNS QL MICRO: ABNORMAL /HPF
T WAVE AXIS: 2
TAPSE: 1.75 CM
TR MAX PG: 25 MMHG
TRICUSPID VALVE PEAK REGURGITATION VELOCITY: 2.49 M/S
UROBILINOGEN UR STRIP-ACNC: 0.2 EU/DL
VENTRICULAR RATE: 80
WBC # BLD AUTO: 6.25 K/UL (ref 3.8–10.5)
WBC #/AREA URNS HPF: ABNORMAL /HPF
Z-SCORE OF LEFT VENTRICULAR DIMENSION IN END DIASTOLE: -1.39
Z-SCORE OF LEFT VENTRICULAR DIMENSION IN END SYSTOLE: -1.75
Z-SCORE OF LEFT VENTRICULAR POSTERIOR WALL IN END DIASTOLE: 1.49

## 2022-06-07 PROCEDURE — 80048 BASIC METABOLIC PNL TOTAL CA: CPT

## 2022-06-07 PROCEDURE — 63700000 HC SELF-ADMINISTRABLE DRUG

## 2022-06-07 PROCEDURE — 36415 COLL VENOUS BLD VENIPUNCTURE: CPT

## 2022-06-07 PROCEDURE — 63600000 HC DRUGS/DETAIL CODE

## 2022-06-07 PROCEDURE — 25800000 HC PHARMACY IV SOLUTIONS

## 2022-06-07 PROCEDURE — 63700000 HC SELF-ADMINISTRABLE DRUG: Performed by: STUDENT IN AN ORGANIZED HEALTH CARE EDUCATION/TRAINING PROGRAM

## 2022-06-07 PROCEDURE — 63700000 HC SELF-ADMINISTRABLE DRUG: Performed by: INTERNAL MEDICINE

## 2022-06-07 PROCEDURE — 93010 ELECTROCARDIOGRAM REPORT: CPT | Performed by: INTERNAL MEDICINE

## 2022-06-07 PROCEDURE — 93306 TTE W/DOPPLER COMPLETE: CPT

## 2022-06-07 PROCEDURE — 81001 URINALYSIS AUTO W/SCOPE: CPT

## 2022-06-07 PROCEDURE — 85027 COMPLETE CBC AUTOMATED: CPT

## 2022-06-07 PROCEDURE — G0378 HOSPITAL OBSERVATION PER HR: HCPCS

## 2022-06-07 PROCEDURE — 83735 ASSAY OF MAGNESIUM: CPT

## 2022-06-07 PROCEDURE — 99219 PR INITIAL OBSERVATION CARE/DAY 50 MINUTES: CPT | Performed by: STUDENT IN AN ORGANIZED HEALTH CARE EDUCATION/TRAINING PROGRAM

## 2022-06-07 RX ORDER — LISINOPRIL 20 MG/1
20 TABLET ORAL DAILY
Status: DISCONTINUED | OUTPATIENT
Start: 2022-06-07 | End: 2022-06-08 | Stop reason: HOSPADM

## 2022-06-07 RX ORDER — NITROGLYCERIN 0.4 MG/1
0.4 TABLET SUBLINGUAL EVERY 5 MIN PRN
Status: DISCONTINUED | OUTPATIENT
Start: 2022-06-07 | End: 2022-06-08 | Stop reason: HOSPADM

## 2022-06-07 RX ORDER — MIRTAZAPINE 15 MG/1
15 TABLET, FILM COATED ORAL NIGHTLY
Status: DISCONTINUED | OUTPATIENT
Start: 2022-06-07 | End: 2022-06-07

## 2022-06-07 RX ORDER — MELOXICAM 7.5 MG/1
7.5 TABLET ORAL
COMMUNITY
End: 2022-06-13 | Stop reason: SDUPTHER

## 2022-06-07 RX ORDER — POTASSIUM CHLORIDE 750 MG/1
40 TABLET, EXTENDED RELEASE ORAL AS NEEDED
Status: DISCONTINUED | OUTPATIENT
Start: 2022-06-07 | End: 2022-06-08 | Stop reason: HOSPADM

## 2022-06-07 RX ORDER — OXYCODONE HYDROCHLORIDE 10 MG/1
10 TABLET ORAL DAILY
COMMUNITY
End: 2022-08-16

## 2022-06-07 RX ORDER — ONDANSETRON HYDROCHLORIDE 2 MG/ML
4 INJECTION, SOLUTION INTRAVENOUS EVERY 8 HOURS PRN
Status: DISCONTINUED | OUTPATIENT
Start: 2022-06-07 | End: 2022-06-08 | Stop reason: HOSPADM

## 2022-06-07 RX ORDER — IBUPROFEN 200 MG
16-32 TABLET ORAL AS NEEDED
Status: DISCONTINUED | OUTPATIENT
Start: 2022-06-07 | End: 2022-06-08 | Stop reason: HOSPADM

## 2022-06-07 RX ORDER — IBUPROFEN/PSEUDOEPHEDRINE HCL 200MG-30MG
3 TABLET ORAL NIGHTLY PRN
Status: DISCONTINUED | OUTPATIENT
Start: 2022-06-07 | End: 2022-06-08 | Stop reason: HOSPADM

## 2022-06-07 RX ORDER — BISACODYL 10 MG/1
10 SUPPOSITORY RECTAL DAILY PRN
Status: DISCONTINUED | OUTPATIENT
Start: 2022-06-07 | End: 2022-06-08 | Stop reason: HOSPADM

## 2022-06-07 RX ORDER — HYDRALAZINE HYDROCHLORIDE 50 MG/1
50 TABLET, FILM COATED ORAL 3 TIMES DAILY
Status: DISCONTINUED | OUTPATIENT
Start: 2022-06-07 | End: 2022-06-08 | Stop reason: HOSPADM

## 2022-06-07 RX ORDER — MIRTAZAPINE 15 MG/1
30 TABLET, FILM COATED ORAL NIGHTLY
Status: DISCONTINUED | OUTPATIENT
Start: 2022-06-07 | End: 2022-06-08 | Stop reason: HOSPADM

## 2022-06-07 RX ORDER — DEXTROSE 40 %
15-30 GEL (GRAM) ORAL AS NEEDED
Status: DISCONTINUED | OUTPATIENT
Start: 2022-06-07 | End: 2022-06-08 | Stop reason: HOSPADM

## 2022-06-07 RX ORDER — DEXTROSE 50 % IN WATER (D50W) INTRAVENOUS SYRINGE
25 AS NEEDED
Status: DISCONTINUED | OUTPATIENT
Start: 2022-06-07 | End: 2022-06-08 | Stop reason: HOSPADM

## 2022-06-07 RX ORDER — OXYCODONE HYDROCHLORIDE 5 MG/1
5 TABLET ORAL EVERY 6 HOURS PRN
Status: DISCONTINUED | OUTPATIENT
Start: 2022-06-07 | End: 2022-06-08 | Stop reason: HOSPADM

## 2022-06-07 RX ORDER — POTASSIUM CHLORIDE 750 MG/1
20 TABLET, EXTENDED RELEASE ORAL AS NEEDED
Status: DISCONTINUED | OUTPATIENT
Start: 2022-06-07 | End: 2022-06-08 | Stop reason: HOSPADM

## 2022-06-07 RX ORDER — OXYCODONE HYDROCHLORIDE 5 MG/1
5 TABLET ORAL EVERY 6 HOURS PRN
Status: DISCONTINUED | OUTPATIENT
Start: 2022-06-07 | End: 2022-06-07

## 2022-06-07 RX ORDER — SERTRALINE HYDROCHLORIDE 50 MG/1
50 TABLET, FILM COATED ORAL 2 TIMES DAILY
Status: DISCONTINUED | OUTPATIENT
Start: 2022-06-07 | End: 2022-06-08 | Stop reason: HOSPADM

## 2022-06-07 RX ORDER — DOCUSATE SODIUM 100 MG/1
100 CAPSULE, LIQUID FILLED ORAL 2 TIMES DAILY
Status: DISCONTINUED | OUTPATIENT
Start: 2022-06-07 | End: 2022-06-08 | Stop reason: HOSPADM

## 2022-06-07 RX ORDER — NITROFURANTOIN 25; 75 MG/1; MG/1
100 CAPSULE ORAL 2 TIMES DAILY
COMMUNITY
End: 2022-08-16

## 2022-06-07 RX ORDER — LATANOPROST 50 UG/ML
1 SOLUTION/ DROPS OPHTHALMIC NIGHTLY
Status: DISCONTINUED | OUTPATIENT
Start: 2022-06-07 | End: 2022-06-08 | Stop reason: HOSPADM

## 2022-06-07 RX ORDER — SERTRALINE HYDROCHLORIDE 50 MG/1
50 TABLET, FILM COATED ORAL 2 TIMES DAILY
Status: DISCONTINUED | OUTPATIENT
Start: 2022-06-07 | End: 2022-06-07

## 2022-06-07 RX ORDER — GABAPENTIN 300 MG/1
300 CAPSULE ORAL 3 TIMES DAILY
Status: DISCONTINUED | OUTPATIENT
Start: 2022-06-07 | End: 2022-06-08 | Stop reason: HOSPADM

## 2022-06-07 RX ORDER — ACETAMINOPHEN 325 MG/1
650 TABLET ORAL EVERY 4 HOURS PRN
Status: DISCONTINUED | OUTPATIENT
Start: 2022-06-07 | End: 2022-06-08 | Stop reason: HOSPADM

## 2022-06-07 RX ORDER — CARBIDOPA AND LEVODOPA 25; 100 MG/1; MG/1
1 TABLET ORAL 4 TIMES DAILY
Status: DISCONTINUED | OUTPATIENT
Start: 2022-06-07 | End: 2022-06-08 | Stop reason: HOSPADM

## 2022-06-07 RX ORDER — BRIMONIDINE TARTRATE 2 MG/ML
1 SOLUTION/ DROPS OPHTHALMIC NIGHTLY
Status: DISCONTINUED | OUTPATIENT
Start: 2022-06-07 | End: 2022-06-07

## 2022-06-07 RX ADMIN — CARBIDOPA AND LEVODOPA 1 TABLET: 25; 100 TABLET ORAL at 14:15

## 2022-06-07 RX ADMIN — HYDRALAZINE HYDROCHLORIDE 50 MG: 50 TABLET, FILM COATED ORAL at 22:10

## 2022-06-07 RX ADMIN — CARBIDOPA AND LEVODOPA 1 TABLET: 25; 100 TABLET ORAL at 22:11

## 2022-06-07 RX ADMIN — ACETAMINOPHEN 650 MG: 325 TABLET ORAL at 22:10

## 2022-06-07 RX ADMIN — DOCUSATE SODIUM 100 MG: 100 CAPSULE, LIQUID FILLED ORAL at 09:20

## 2022-06-07 RX ADMIN — HYDRALAZINE HYDROCHLORIDE 50 MG: 50 TABLET, FILM COATED ORAL at 14:55

## 2022-06-07 RX ADMIN — GABAPENTIN 300 MG: 300 CAPSULE ORAL at 09:19

## 2022-06-07 RX ADMIN — GABAPENTIN 300 MG: 300 CAPSULE ORAL at 14:15

## 2022-06-07 RX ADMIN — ACETAMINOPHEN 650 MG: 325 TABLET ORAL at 16:37

## 2022-06-07 RX ADMIN — ACETAMINOPHEN 650 MG: 325 TABLET ORAL at 11:19

## 2022-06-07 RX ADMIN — CARBIDOPA AND LEVODOPA 1 TABLET: 25; 100 TABLET ORAL at 09:21

## 2022-06-07 RX ADMIN — HYDRALAZINE HYDROCHLORIDE 50 MG: 50 TABLET, FILM COATED ORAL at 09:21

## 2022-06-07 RX ADMIN — APIXABAN 5 MG: 5 TABLET, FILM COATED ORAL at 09:20

## 2022-06-07 RX ADMIN — APIXABAN 5 MG: 5 TABLET, FILM COATED ORAL at 22:09

## 2022-06-07 RX ADMIN — LATANOPROST 1 DROP: 50 SOLUTION OPHTHALMIC at 22:35

## 2022-06-07 RX ADMIN — MIRTAZAPINE 30 MG: 15 TABLET, FILM COATED ORAL at 22:11

## 2022-06-07 RX ADMIN — SERTRALINE HYDROCHLORIDE 50 MG: 50 TABLET ORAL at 22:10

## 2022-06-07 RX ADMIN — SODIUM CHLORIDE 1 G: 900 INJECTION INTRAVENOUS at 06:32

## 2022-06-07 RX ADMIN — SERTRALINE HYDROCHLORIDE 50 MG: 50 TABLET ORAL at 09:19

## 2022-06-07 RX ADMIN — CARBIDOPA AND LEVODOPA 1 TABLET: 25; 100 TABLET ORAL at 16:37

## 2022-06-07 RX ADMIN — LISINOPRIL 20 MG: 20 TABLET ORAL at 09:20

## 2022-06-07 RX ADMIN — APIXABAN 5 MG: 5 TABLET, FILM COATED ORAL at 02:08

## 2022-06-07 RX ADMIN — GABAPENTIN 300 MG: 300 CAPSULE ORAL at 22:10

## 2022-06-07 RX ADMIN — ACETAMINOPHEN 650 MG: 325 TABLET ORAL at 04:58

## 2022-06-07 ASSESSMENT — PATIENT HEALTH QUESTIONNAIRE - PHQ9: SUM OF ALL RESPONSES TO PHQ9 QUESTIONS 1 & 2: 0

## 2022-06-07 NOTE — CONSULTS
REASON FOR CONSULT: Syncopal episode     CONSULT FROM: Anneliese Garcia MD;Maria    PRIMARY CARDIOLOGIST: Dr. Gaytan @     ------------------------------------------------------------------------------------------------------------------------------------------  HISTORY OF PRESENTING ILLNESS  ------------------------------------------------------------------------------------------------------------------------------------------  Mariya Porras is a 86 y.o. female, who I saw previously in [facility], who is now admitted with syncope.     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 She uses a wheelchair at baseline.     The patient presents to [facility] after an unresponsive episode at home. She was in her normal state of health yesterday morning. She had a fitting for a brace for her left foot. She had a session with physical therapy. She sat down and ate lunch. Afterwards she had some indigestion. She then sat in her recliner. Her son found her unresponsive for 2-3 minute period. She recalls sitting in her recliner and the next thing she remembers is EMS in front of her.     On admission CT of the head and chest x-ray negative for acute process. Sodium low at 133 otherwise labs unremarkable. EKG shows sinus rhythm without acute ischemic changes. She has been hemodynamically stable and afebrile. No evidence of arrhythmias or advanced heart block on telemetry monitoring.  ------------------------------------------------------------------------------------------------------------------------------------------  PAST MEDICAL  HISTORY  ------------------------------------------------------------------------------------------------------------------------------------------  Past Medical History:   Diagnosis Date   • Anxiety    • Arthritis    • Basal cell carcinoma     forehead   • Glaucoma    • Hypertension    • Low back pain    • Lumbosacral disc disease    • Parkinson's disease (CMS/HCC)    • Peripheral neuropathy      Past Surgical History:   Procedure Laterality Date   • BLADDER SUSPENSION  2009    Prolift M   • CATARACT EXTRACTION, BILATERAL     • COLONOSCOPY     • HYSTERECTOMY     • JOINT REPLACEMENT     • KNEE ARTHROPLASTY  2015    left       ------------------------------------------------------------------------------------------------------------------------------------------  MEDICATIONS  ------------------------------------------------------------------------------------------------------------------------------------------  Home medications    acetaminophen 325 mg tablet Take 975 mg by mouth every 6 (six) hours as needed for mild pain. Alexandra Birmingham MD Needs Review   bimatoprost (LUMIGAN) 0.01 % ophthalmic drops Administer 1 drop into both eyes nightly. Alexandra Birmingham MD Needs Review   docusate sodium (COLACE) 100 mg capsule Take 100 mg by mouth daily. Alexandra Birmingham MD Needs Review   famotidine (PEPCID) 20 mg tablet  Alexandra Birmingham MD Needs Review   lidocaine 4 % adhesive patch,medicated topical patch Apply 1 patch topically daily as needed. Alexandra Birmingham MD Needs Review   oxyCODONE 5 mg immediate release tablet Take 5 mg by mouth every 6 (six) hours as needed for moderate pain or severe pain. Alexandra Birmingham MD Needs Review   polyethylene glycol 17 gram packet Take 17 g by mouth daily.   Alexandra Birmingham MD Needs Review   sennosides-docusate sodium 8.6-50 mg Take 2 tablets by mouth nightly. Alexandra Birmingham MD Needs Review       Reviewed Prior to Admission  Medications    Medication Details Provider Last Reconciliation Status   apixaban 5 mg tablet Take 1 tablet (5 mg total) by mouth 2 (two) times a day Indications: blood clot in a deep vein of the extremities, a clot in the lung. Niya Baird CRNP Reordered   Ordered as:  apixaban (ELIQUIS) tablet 5 mg - 5 mg, oral, 2 times daily, First dose on Tue 6/7/22 at 0145 Indications: deep venous thrombosis, pulmonary thromboembolism   carbidopa-levodopa  mg per tablet Take 1 tablet by mouth 4 (four) times a day. 0900, 1300, 1700, 2100  Niya Baird CRNP Reordered   Ordered as:  carbidopa-levodopa (SINEMET)  mg per tablet 1 tablet - 1 tablet, oral, 4 times daily, First dose on Tue 6/7/22 at 0130   gabapentin 100 mg capsule Take 300 mg by mouth 3 (three) times a day. Niya Baird CRNP Reordered   Ordered as:  gabapentin (NEURONTIN) capsule 300 mg - 300 mg, oral, 3 times daily, First dose on Tue 6/7/22 at 0900   hydrALAZINE 25 mg tablet Take 50 mg by mouth 3 (three) times a day. Niya Baird CRNP Reordered   Ordered as:  hydrALAZINE (APRESOLINE) tablet 50 mg - 50 mg, oral, 3 times daily, First dose on Tue 6/7/22 at 0900   lisinopriL 20 mg tablet Take 20 mg by mouth. Niya Baird CRNP Reordered   Ordered as:  lisinopriL (PRINIVIL) tablet 20 mg - 20 mg, oral, Daily, First dose on Tue 6/7/22 at 0900   melatonin 3 mg tablet Take 3 mg by mouth nightly. Niya Baird CRNP Reordered   Ordered as:  melatonin ODT 3 mg - 3 mg, oral, Nightly PRN, sleep, Starting on Tue 6/7/22 at 0126   mirtazapine 30 mg tablet Take 15 mg by mouth. Niya Baird CRNP Reordered   Ordered as:  mirtazapine (REMERON) tablet 15 mg - 15 mg, oral, Nightly, First dose on Tue 6/7/22 at 2200   sertraline 50 mg tablet Take 50 mg by mouth 2 (two) times a day. 0900, 1700  Niya Baird CRNP Not Ordered   Ordered as:  sertraline (ZOLOFT) tablet 50 mg - 50 mg, oral, 2 times daily, First dose on Tue 6/7/22 at 0130  (Discontinued)       Inpatient Medications    •  acetaminophen, 650 mg, oral, q4h PRN  •  apixaban, 5 mg, oral, BID  •  bisacodyL, 10 mg, rectal, Daily PRN  •  carbidopa-levodopa, 1 tablet, oral, QID  •  cefTRIAXone, 1 g, intravenous, q24h INT  •  glucose, 16-32 g of dextrose, oral, PRN **OR** dextrose, 15-30 g of dextrose, oral, PRN **OR** glucagon, 1 mg, intramuscular, PRN **OR** dextrose 50 % in water (D50), 25 mL, intravenous, PRN  •  docusate sodium, 100 mg, oral, BID  •  gabapentin, 300 mg, oral, TID  •  hydrALAZINE, 50 mg, oral, TID  •  lisinopriL, 20 mg, oral, Daily  •  melatonin, 3 mg, oral, Nightly PRN  •  mirtazapine, 15 mg, oral, Nightly  •  nitroglycerin, 0.4 mg, sublingual, q5 min PRN  •  ondansetron, 4 mg, intravenous, q8h PRN  •  potassium chloride, 20 mEq, oral, PRN  •  potassium chloride, 40 mEq, oral, PRN  •  sertraline, 50 mg, oral, BID  •  acetaminophen  •  apixaban  •  bimatoprost  •  carbidopa-levodopa  •  docusate sodium  •  famotidine  •  gabapentin  •  hydrALAZINE  •  lidocaine  •  lisinopriL  •  melatonin  •  mirtazapine  •  oxyCODONE  •  polyethylene glycol  •  sennosides-docusate sodium  •  sertraline    ------------------------------------------------------------------------------------------------------------------------------------------  ALLERGIES  ------------------------------------------------------------------------------------------------------------------------------------------  Codeine, Diazepam, Sulfa (sulfonamide antibiotics), and Sulfur    ------------------------------------------------------------------------------------------------------------------------------------------  SOCIAL HISTORY  ------------------------------------------------------------------------------------------------------------------------------------------  No smoking or  alcohol    ------------------------------------------------------------------------------------------------------------------------------------------  FAMILY HISTORY  ------------------------------------------------------------------------------------------------------------------------------------------  No premature CAD    ------------------------------------------------------------------------------------------------------------------------------------------  REVIEW OF SYSTEMS  ------------------------------------------------------------------------------------------------------------------------------------------  Constitutional: - fever, - chills, - weakness, - weight loss  HEENT: - blurred vision, - sore throat, - hoarseness  Respiratory: - dyspnea, - cough, - hemoptysis  Cardiovascular: - chest pain, - dyspnea, - orthopnea, - PND, - edema, - palpitations, + syncope  Gastrointestinal: - nausea, - vomiting, - diarrhea, - hematemesis, - melena  Genitourinary: - dysuria, - frequency  Integument: - rash, - itching  Hematologic/lymphatic:  - bruising, - petechiae  Musculoskeletal: - arthalgias, - myalgias, + muscle weakness  Neurological: + Difficulty ambulating, patient is wheelchair-bound  Behavioral/Psych: - anxiety, - depression  Endocrine: - cold intolerance, - heat intolerance, - weight change    ------------------------------------------------------------------------------------------------------------------------------------------  PHYSICAL EXAM  ------------------------------------------------------------------------------------------------------------------------------------------  VITAL SIGNS:  Temp:  [36.1 °C (97 °F)-36.7 °C (98.1 °F)] 36.7 °C (98.1 °F)  Heart Rate:  [62-80] 62  Resp:  [10-18] 10  BP: (127-184)/(69-89) 170/79  SaO2: 96%    Intake/Output Summary (Last 24 hours) at 6/7/2022 0949  Last data filed at 6/7/2022 0702  Gross per 24 hour   Intake 100 ml   Output 1600 ml   Net -1500 ml        PHYSICAL EXAM:  General appearance: alert and cooperative, NAD  Head: without obvious abnormality  Eyes: PERRLA, extraocular movements intact  Neck: No JVD, carotid bruits, thyromegaly  Lungs: clear to auscultation bilaterally, no crackles or wheezing, room air  Heart: regular rate and rhythm, S1-S2 normal, no murmurs, clicks, rubs or gallops  Abdomen: soft, non-tender, bowel sounds normal  Extremities: no edema, peripheral pulses present, dorsiflexion noted b/l  Skin: Skin color, texture, turgor normal. No rashes or lesions  Neurologic: Alert and oriented X 3, no focal deficits    ------------------------------------------------------------------------------------------------------------------------------------------  LABS / IMAGING / EKG / TELEMETRY  ------------------------------------------------------------------------------------------------------------------------------------------  LABS:  Results from last 7 days   Lab Units 22  0435 22  1640 22  1501   SODIUM mEQ/L 136  --  133*   POTASSIUM mEQ/L 4.1  --  4.3   MAGNESIUM mg/dL 2.0  --  2.1   CHLORIDE mEQ/L 104  --  103   CO2 mEQ/L 26  --  25   BUN mg/dL 15  --  18   CREATININE mg/dL 0.6  --  0.8   AST IU/L  --   --  15   ALT IU/L  --   --  6*   HIGH SENSITIVE TROPONIN I pg/mL  --  4.9 6.0     Results from last 7 days   Lab Units 22  0435 22  1501   WBC K/uL 6.25 7.69   HEMOGLOBIN g/dL 13.4 13.9   HEMATOCRIT % 40.3 43.2   PLATELETS K/uL 204 210     Lab Results   Component Value Date    HGBA1C 5.3 2021    TSH 1.88 2022     Lab Results   Component Value Date    CHOL 251 (H) 2021    LDLCALC 183 (H) 2021    HDL 49 (L) 2021    TRIG 96 2021     Lab Results   Component Value Date    BNP <5 2021       IMAGIN2022 CXR:  CLINICAL HISTORY:  Syncope     COMMENT:   Single portable AP view of the chest is compared to 2021 and  other prior studies.     The lungs appear clear.  There  is no large pleural effusion.  There is no pneumothorax.  The cardiomediastinal silhouette appears stable, noting aortic tortuosity and  calcification and normal heart size.  No acute osseous abnormality is seen.     --  IMPRESSION:  1.  No evidence of acute disease in the chest.     2022 CT chest wo IV contrast:  CLINICAL HISTORY:  Syncope.     COMMENT:  An unenhanced CT scan of the brain was performed from the foramen magnum to the  vertex. Coronal and sagittal reconstructions were obtained.     CT DOSE:  One or more dose reduction techniques (e.g. automated exposure  control, adjustment of the mA and/or kV according to patient size, use of  iterative reconstruction technique) utilized for this examination.     Comparison: 2019        Findings:  The ventricles, sulci and cisternal spaces are prominent consistent with  involutional changes. Patchy areas of periventricular and subcortical white  matter hypoattenuation are present which are nonspecific but likely represent  chronic microvascular ischemic changes. There is no gross loss of the gray-white  matter discrimination to suggest an acute large vascular territory infarction.  Atherosclerotic intracranial vascular calcifications are noted. No acute  intracranial hemorrhage, intra-axial mass effect or extra-axial fluid collection  is identified.     There is no evidence of a depressed calvarial fracture.  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  The  visualized portions of the paranasal sinuses and mastoid air cells appear  patent.  Bilateral ocular lens implants are present.        --  IMPRESSION:  1. No CT evidence for an acute large vascular territorial infarct, acute  intracranial hemorrhage or mass effect.  2. Chronic microangiopathic ischemic disease and age-related involutional  changes.    EC2022 150 NSR 80, leftward axis,  no ST elevation or depression     TELEMETRY:  SR, no alarms    ------------------------------------------------------------------------------------------------------------------------------------------  ASSESSMENT AND PLAN  ------------------------------------------------------------------------------------------------------------------------------------------  Unresponsive episode  -Reported 2-3 minute unresponsive episode. Unclear etiology.  Could be primary neurologic related to her Parkinson, to small seizures, autonomic dysfunction even though patient did not get up from her chair, less likely ischemic or arrhythmic event EKG shows sinus rhythm without acute ischemic changes. High sensitivity troponin negative. She has not been hypotensive. No arrhythmias or advanced heart block on telemetry monitoring. Continue to monitor on telemetry. Will check echo today.  May consider home event recorder to further rule out significant tacky/bradycardia arrhythmias    Hx saddle PE/DVT  -In Nov 2021. She has been on Eliquis 5 mg twice daily. IVC filter in place. She denies active bleeding or falls. H&H stable.  Supposed to have her IVC filter removed this month    Hypertension  -Bps slightly elevated her. Resume home meds of lisinopril and hydralazine and continue to monitor.     Parkinson's disease  -She is wheelchair bound at baseline. She is on Sinemet.  Consider formal neurology consultation to further evaluate unresponsive episode.    I personally seen and evaluated the patient.  History and physical examination as well as comprehensive review of objective data including labs, radiologic studies, EKG, telemetry were performed and thoroughly reviewed by me personally.  Plan formulated and reviewed, transcribed above by my nurse practitioner, Rosario.    Gunnar Gaytan MD, [facility]      6/7/2022    Primary Care Doctor: Esther Hurt MD

## 2022-06-07 NOTE — ASSESSMENT & PLAN NOTE
States that she was being treated for urinary tract infection at home   UA on admission - trace leucocyte esterase, unremarkable for acute UTI  Acute UTI rules out  -discontinue IV ceftriaxone

## 2022-06-07 NOTE — ASSESSMENT & PLAN NOTE
Continue Eliquis  Has vanessa filter and had appt 6/7 to remove  patient would have to reschedule appointment outpatient

## 2022-06-07 NOTE — PLAN OF CARE
Patient's son and  discussed with - Cardio consult and ECHO results discussed with them    They are fine with patient being discharged, but would need help with transportation as patient is bed bound - consult placed to  to help with discharge home

## 2022-06-07 NOTE — H&P
Hospital Medicine Service -  History & Physical        CHIEF COMPLAINT     Chief Complaint   Patient presents with   • Syncope        HISTORY OF PRESENT ILLNESS      86 y.o. female with a past medical history of parkinsons, HTN, saddle PE, respiratory failure, peripheral neuropathy, and glaucoma who was sitting at her kitchen table eating lunch, felt some heartburn and then passed out for a two to three minute period with her eyes open in a sitting position. Her son was present when this happened and brought her to [facility] for further evaluation. Initial vital signs temp 97.5F, HR 80, RR 17, /72, SPO2 95% on room air. Labs significant for Na 133, Glucose 114. Chest x ray showed no pneumothorax, no pleural effusion. CT scan of head showing no evidence of acute large vascular territorial infarct, acute intracranial hemorrhage or mass effect. Pt received 500 ml fluid bolus in the emergency room.     On initial evaluation pt denies chest pain and palpitations. No shortness of breath. She has caregivers that come and stay overnight. She normally uses a wheelchair that someone helps her with. She is not able to stand independently. She denies abdominal pain, nausea, vomiting, constipation and diarrhea. Denies difficulties voiding.    Pt to be admitted to for syncope. Plan is to admit to telemetry, orthostatic pressures from lying and sitting, cardiology consult, PT, BERNADINE torres, she had an out-patient procedure with IR to have a vanessa removed 6/7 and IR consulted for this reason.    Lives at home with round the clock care. Pt is a FULL code.     PAST MEDICAL AND SURGICAL HISTORY      Past Medical History:   Diagnosis Date   • Anxiety    • Arthritis    • Basal cell carcinoma     forehead   • Glaucoma    • Hypertension    • Low back pain    • Lumbosacral disc disease    • Parkinson's disease (CMS/HCC)    • Peripheral neuropathy        Past Surgical History:   Procedure Laterality Date   • BLADDER SUSPENSION   2009    Prolift M   • CATARACT EXTRACTION, BILATERAL     • COLONOSCOPY     • HYSTERECTOMY     • JOINT REPLACEMENT     • KNEE ARTHROPLASTY  2015    left       MEDICATIONS      Prior to Admission medications    Medication Sig Start Date End Date Taking? Authorizing Provider   acetaminophen 325 mg tablet Take 975 mg by mouth every 6 (six) hours as needed for mild pain.   Yes Alexandra Birmingham MD   apixaban 5 mg tablet Take 1 tablet (5 mg total) by mouth 2 (two) times a day Indications: blood clot in a deep vein of the extremities, a clot in the lung. 12/7/21 6/6/22 Yes Jered Ponce MD   bimatoprost (LUMIGAN) 0.01 % ophthalmic drops Administer 1 drop into both eyes nightly.   Yes Alexandra Birmingham MD   carbidopa-levodopa  mg per tablet Take 1 tablet by mouth 4 (four) times a day. 0900, 1300, 1700, 2100    Yes Alexandra Birmingham MD   docusate sodium (COLACE) 100 mg capsule Take 100 mg by mouth daily.   Yes Alexandra Birmingham MD   famotidine (PEPCID) 20 mg tablet  5/20/22  Yes Alexandra Birmingham MD   gabapentin 100 mg capsule Take 300 mg by mouth 3 (three) times a day.   Yes Alexandra Birmingham MD   hydrALAZINE 25 mg tablet Take 50 mg by mouth 3 (three) times a day.   Yes Alexandra Birmingham MD   lidocaine 4 % adhesive patch,medicated topical patch Apply 1 patch topically daily as needed.   Yes Alexandra Birmingham MD   lisinopriL 20 mg tablet Take 20 mg by mouth.   Yes Alexandra Birmingham MD   melatonin 3 mg tablet Take 3 mg by mouth nightly.   Yes Alexandra Birmingham MD   mirtazapine 30 mg tablet Take 15 mg by mouth.   Yes Alexandra Birmingham MD   oxyCODONE 5 mg immediate release tablet Take 5 mg by mouth every 6 (six) hours as needed for moderate pain or severe pain.   Yes Alexandra Birmingham MD   polyethylene glycol 17 gram packet Take 17 g by mouth daily.     Yes Alexandra Birmingham MD   sennosides-docusate sodium 8.6-50 mg Take 2 tablets by mouth nightly.   Yes Valencia  MD Alexandra   sertraline 50 mg tablet Take 50 mg by mouth 2 (two) times a day. 0900, 1700    Yes Provider, MD Alexandra       ALLERGIES      Codeine, Diazepam, Sulfa (sulfonamide antibiotics), and Sulfur    FAMILY HISTORY      Family History   Problem Relation Age of Onset   • No Known Problems Biological Mother    • No Known Problems Biological Father        SOCIAL HISTORY      Social History     Socioeconomic History   • Marital status:      Spouse name: None   • Number of children: None   • Years of education: None   • Highest education level: None   Tobacco Use   • Smoking status: Former Smoker   • Smokeless tobacco: Never Used   • Tobacco comment: as a young teenager   Substance and Sexual Activity   • Alcohol use: Yes     Comment: wine   • Drug use: No   • Sexual activity: Not Currently     Partners: Male     Social Determinants of Health     Food Insecurity: No Food Insecurity   • Worried About Running Out of Food in the Last Year: Never true   • Ran Out of Food in the Last Year: Never true       REVIEW OF SYSTEMS        Review of Systems  Constitutional: Negative for fatigue and unexpected weight change.   Eyes: Negative for visual disturbance. Mouth no sore throat  Respiratory: Negative for cough and shortness of breath.    Cardiovascular: Negative for chest pain and palpitations.   Gastrointestinal: Negative for abdominal pain, constipation, diarrhea, nausea and vomiting.   Genitourinary: Negative for difficulty urinating. no hematuria no frequency no urgency no discharge  Musculoskeletal: Negative for arthralgias.   Skin: PVD b/l lower extremity  Neurological: Negative for dizziness and headaches.   Hematological: Does not bruise/bleed easily.   Psychiatric/Behavioral: Negative for confusion and dysphoric mood no suicidal ideations          PHYSICAL EXAMINATION      Temp:  [36.1 °C (97 °F)-36.6 °C (97.8 °F)] 36.6 °C (97.8 °F)  Heart Rate:  [76-80] 76  Resp:  [16-18] 16  BP: (127-184)/(72-89)  180/89  Body mass index is 23.4 kg/m².    Physical Exam  Constitutional:       General: She is not in acute distress.     Appearance: She is not toxic-appearing.   HENT:      Mouth/Throat:      Mouth: Mucous membranes are moist.   Eyes:      Pupils: Pupils are equal, round, and reactive to light.   Cardiovascular:      Rate and Rhythm: Normal rate and regular rhythm.      Heart sounds: Normal heart sounds.   Pulmonary:      Effort: Pulmonary effort is normal.      Breath sounds: Normal breath sounds.   Abdominal:      General: Abdomen is flat.      Palpations: Abdomen is soft.      Tenderness: There is no right CVA tenderness or left CVA tenderness.   Musculoskeletal:      Right lower leg: No edema.      Left lower leg: No edema.   Skin:     General: Skin is warm and dry.      Capillary Refill: Capillary refill takes less than 2 seconds.   Neurological:      Mental Status: She is alert and oriented to person, place, and time.   Psychiatric:         Mood and Affect: Mood normal.         LABS / IMAGING / EKG        Labs  CBC Results       06/06/22 12/01/21 11/30/21     1501 0439 0605    WBC 7.69 7.65 7.08    RBC 4.46 3.97 3.92    HGB 13.9 11.9 11.7    HCT 43.2 36.7 36.5    MCV 96.9 92.4 93.1    MCH 31.2 30.0 29.8    MCHC 32.2 32.4 32.1     168 147        CMP Results       06/06/22 12/01/21 11/30/21     1501 0439 0605     138 137    K 4.3 4.6 4.5    Cl 103 105 106    CO2 25 26 24    Glucose 114 108 107    BUN 18 16 16    Creatinine 0.8 0.6 0.6    Calcium 10.1 9.7 9.5    Anion Gap 5 7 7    AST 15 -- --    ALT 6 -- --    Albumin 3.9 -- --    EGFR >60.0 >60.0 >60.0         Comment for K at 1501 on 06/06/22: Results obtained on plasma. Plasma Potassium values may be up to 0.4 mEQ/L less than serum values. The differences may be greater for patients with high platelet or white cell counts.          Troponin I Results       06/06/22 06/06/22 11/27/21     1640 1501 2351    Troponin I -- -- 1.03    HS Troponin I  4.9 6.0 --        Microbiology Results     Procedure Component Value Units Date/Time    SARS-CoV-2 (COVID-19), PCR Nasopharynx [[id number]]  (Normal) Collected: 06/06/22 1640    Specimen: Nasopharyngeal Swab from Nasopharynx Updated: 06/06/22 1724    Narrative:      The following orders were created for panel order SARS-CoV-2 (COVID-19), PCR Nasopharynx.  Procedure                               Abnormality         Status                     ---------                               -----------         ------                     SARS-CoV-2 (COVID-19), P...[[id number]]  Normal              Final result                 Please view results for these tests on the individual orders.    SARS-CoV-2 (COVID-19), PCR Nasopharynx [[id number]]  (Normal) Collected: 06/06/22 1640    Specimen: Nasopharyngeal Swab from Nasopharynx Updated: 06/06/22 1724     SARS-CoV-2 (COVID-19) Negative    Narrative:      Nursing instructions: Obtain nasopharyngeal swab ONLY.  Send swab in viral transport media. If RSV/FLU swab is also ordered, both Covid and RSV/FLU can be performed on single swab.        UA Results       11/27/21     1204    Color Yellow    Clarity Clear    Glucose Negative    Bilirubin Negative    Ketones Negative    Sp Grav 1.009    Blood Negative    Ph 7.0    Protein Negative    Urobilinogen 0.2    Nitrite Positive    Leuk Est Negative    WBC 0 TO 3    RBC 0 TO 4    Bacteria None Seen         Comment for Blood at 1204 on 11/27/21: The sensitivity of the occult blood test is equivalent to approximately 4 intact RBC/HPF.    Comment for Leuk Est at 1204 on 11/27/21: Results can be falsely negative due to high specific gravity, some antibiotics, glucose >3 g/dl, or WBC other than neutrophils.          Imaging  CT HEAD WITHOUT IV CONTRAST   Final Result   IMPRESSION:   1. No CT evidence for an acute large vascular territorial infarct, acute   intracranial hemorrhage or mass effect.   2. Chronic microangiopathic ischemic disease and  age-related involutional   changes.      X-RAY CHEST 1 VIEW   Final Result   IMPRESSION:   1.  No evidence of acute disease in the chest.               ECG 12 lead   ED Interpretation   Reviewed with attending. RH    Rhythm: [NSR]  Rate: 80  P waves: [normal interval]  QRS: [normal QRS]  Axis: [normal]  ST Segments: [no obvious ST elevation or ischemia]      Impression: Normal sinus rhythm; normal ECG      IR CONSULT    (Results Pending)         ECG/Telemetry  reviewed by me    ASSESSMENT AND PLAN           * Syncope, unspecified syncope type  Assessment & Plan  Was sitting in wheelchair eating lunch at the kitchen table and felt heartburn, and passed out, eyes open, sitting up for 2-3 minutes. This was witnessed by the patients son. States no history of seizures, no loss of bowel or bladder. 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

## 2022-06-08 VITALS
TEMPERATURE: 97.6 F | OXYGEN SATURATION: 96 % | HEIGHT: 66 IN | DIASTOLIC BLOOD PRESSURE: 79 MMHG | SYSTOLIC BLOOD PRESSURE: 167 MMHG | WEIGHT: 141.7 LBS | RESPIRATION RATE: 18 BRPM | HEART RATE: 79 BPM | BODY MASS INDEX: 22.77 KG/M2

## 2022-06-08 PROCEDURE — 25800000 HC PHARMACY IV SOLUTIONS

## 2022-06-08 PROCEDURE — 63600000 HC DRUGS/DETAIL CODE

## 2022-06-08 PROCEDURE — 99217 PR OBSERVATION CARE DISCHARGE MANAGEMENT: CPT | Performed by: INTERNAL MEDICINE

## 2022-06-08 PROCEDURE — G0378 HOSPITAL OBSERVATION PER HR: HCPCS

## 2022-06-08 PROCEDURE — 63700000 HC SELF-ADMINISTRABLE DRUG

## 2022-06-08 PROCEDURE — 63700000 HC SELF-ADMINISTRABLE DRUG: Performed by: INTERNAL MEDICINE

## 2022-06-08 RX ADMIN — HYDRALAZINE HYDROCHLORIDE 50 MG: 50 TABLET, FILM COATED ORAL at 09:09

## 2022-06-08 RX ADMIN — SODIUM CHLORIDE 1 G: 900 INJECTION INTRAVENOUS at 06:25

## 2022-06-08 RX ADMIN — GABAPENTIN 300 MG: 300 CAPSULE ORAL at 09:09

## 2022-06-08 RX ADMIN — LISINOPRIL 20 MG: 20 TABLET ORAL at 09:09

## 2022-06-08 RX ADMIN — OXYCODONE HYDROCHLORIDE 5 MG: 5 TABLET ORAL at 09:19

## 2022-06-08 RX ADMIN — CARBIDOPA AND LEVODOPA 1 TABLET: 25; 100 TABLET ORAL at 09:09

## 2022-06-08 RX ADMIN — ACETAMINOPHEN 650 MG: 325 TABLET ORAL at 13:04

## 2022-06-08 RX ADMIN — SERTRALINE HYDROCHLORIDE 50 MG: 50 TABLET ORAL at 09:09

## 2022-06-08 RX ADMIN — HYDRALAZINE HYDROCHLORIDE 50 MG: 50 TABLET, FILM COATED ORAL at 13:04

## 2022-06-08 RX ADMIN — CARBIDOPA AND LEVODOPA 1 TABLET: 25; 100 TABLET ORAL at 13:04

## 2022-06-08 RX ADMIN — DOCUSATE SODIUM 100 MG: 100 CAPSULE, LIQUID FILLED ORAL at 09:09

## 2022-06-08 RX ADMIN — APIXABAN 5 MG: 5 TABLET, FILM COATED ORAL at 09:09

## 2022-06-08 RX ADMIN — ACETAMINOPHEN 650 MG: 325 TABLET ORAL at 06:34

## 2022-06-08 RX ADMIN — GABAPENTIN 300 MG: 300 CAPSULE ORAL at 13:04

## 2022-06-08 NOTE — [facility] PHYSICIAN REVIEW NOTE
Maintain observation status - pt is thought to be clinically ready to be discharged, is awaiting transportation.    Maci Love MD

## 2022-06-08 NOTE — PATIENT CARE CONFERENCE
Care Progression Rounds Note  Date: 6/8/2022  Time: 10:47 AM     Patient Name: Mariya Porras     Medical Record Number: [id number]   YOB: 1935  Sex: Female      Room/Bed: 3008    Admitting Diagnosis: Syncope, unspecified syncope type [R55]   Admit Date/Time: 6/6/2022  6:30 PM    Primary Diagnosis: Syncope, unspecified syncope type  Principal Problem: Syncope, unspecified syncope type    GMLOS: pending  Anticipated Discharge Date: 6/8/2022    AM-PAC:  Mobility Score:      Discharge Planning:  Anticipated Discharge Disposition: home with assistance, home with home health    Barriers to Discharge:  None    Comments:       Participants:  , nursing

## 2022-06-08 NOTE — HOSPITAL COURSE
iVcki is a 86 y.o. female admitted on 6/6/2022 with Syncope, unspecified syncope type [R55]. Principal problem is Syncope, unspecified syncope type.    Past Medical History  Vicki has a past medical history of Anxiety, Arthritis, Basal cell carcinoma, Glaucoma, Hypertension, Low back pain, Lumbosacral disc disease, Parkinson's disease (CMS/HCC), and Peripheral neuropathy.    History of Present Illness   Per H&P: 86 y.o. female with a past medical history of parkinsons, HTN, saddle PE, respiratory failure, peripheral neuropathy, and glaucoma who was sitting at her kitchen table eating lunch, felt some heartburn and then passed out for a two to three minute period with her eyes open in a sitting position. Her son was present when this happened and brought her to [facility] for further evaluation.

## 2022-06-08 NOTE — PROGRESS NOTES
Met with pt.  Lives with  and has 24 hour  who assists with all ADL's  Has [facility] follow her at home. Has all equipment needed.  Spoke with   Requires BLS home, set up for 2 Pm  Pt is non ambulatory.

## 2022-06-08 NOTE — DISCHARGE INSTRUCTIONS
ONCE DISCHARGED FROM HOSPITAL, PLEASE CALL [facility] IN OUTPATIENT INTERVENTIONAL RADIOLOGY [phone] TO SCHEDULE FOR IVC FILTER REMOVAL.

## 2022-06-08 NOTE — PLAN OF CARE
Problem: Adult Inpatient Plan of Care  Goal: Plan of Care Review  Outcome: Progressing  Flowsheets (Taken 6/7/2022 2047)  Progress: no change  Plan of Care Reviewed With: patient  Outcome Summary:   Patient received on 3a this evening   VSS.  Resting comfortably in bed.  Family at bedside.  Contact prec maintained.   Plan of Care Review  Plan of Care Reviewed With: patient  Progress: no change  Outcome Summary: Patient received on 3a this evening; VSS.  Resting comfortably in bed.  Family at bedside.  Contact prec maintained.

## 2022-06-08 NOTE — DISCHARGE SUMMARY
Hospital Medicine Service -  Inpatient Discharge Summary        BRIEF OVERVIEW   Admission Date: 6/6/2022  Discharge Date: 6/8/2022    Admitting Provider: Nik Herman DO  Attending Provider: Esau Otto MD Attending phys phone: [phone]    PCP: Esther Hurt [phone]    Consults During Admission:  IP CONSULT TO SOCIAL WORK  IP CONSULT TO CARDIOLOGY  IP CONSULT TO CASE MANAGEMENT     DISCHARGE DIAGNOSES      Primary Discharge Diagnosis  Syncope, unspecified syncope type    Secondary Discharge Diagnoses  Active Hospital Problems    Diagnosis Date Noted   • Syncope, unspecified syncope type 06/06/2022   • Saddle embolus of pulmonary artery without acute cor pulmonale (CMS/HCC) 11/27/2021   • Anxiety    • Parkinson's disease (CMS/[facility])    • Hypertension    • Cystitis 07/16/2018      Resolved Hospital Problems   No resolved problems to display.       Problem List on Day of Discharge  Anxiety  Assessment & Plan  Continue Remeron, zoloft    Saddle embolus of pulmonary artery without acute cor pulmonale (CMS/[facility])  Assessment & Plan  Continue Eliquis  Has vanessa filter and had appt 6/7 to remove  patient would have to reschedule appointment outpatient    Hypertension  Assessment & Plan  Continue hydralazine, lisinopril      Parkinson's disease (CMS/[facility])  Assessment & Plan  Continue carbidopa-levodopa    Cystitis  Assessment & Plan  States that she was being treated for urinary tract infection at home   UA on admission - trace leucocyte esterase, unremarkable for acute UTI  Acute UTI rules out  -discontinue IV ceftriaxone    * Syncope, unspecified syncope type  Assessment & Plan  Was sitting in wheelchair eating lunch at the kitchen table and felt heartburn, and passed out, eyes open, while sitting up for 2-3 minutes. This was witnessed by the patients son. States no history of seizures, no loss of bowel or bladder - Unclear etiology  EKG - Sinus rhythm without acute ischemic changes  HS trop  negative  ECHO - normal systolic function  Medically stable for discharge  To follow up with cardiology on discharge  Patient bedbound    SUMMARY OF HOSPITALIZATION        86 y.o. year-old female with past medical history of HTN, saddle PE, peripheral neuropathy, wheel chair bound, who presented to [facility] ER on 6/6/2022 with a syncopal episode, occurred while seated at the table, lost consciousness for 2-3 mins.    Please see history and physical for complete details on presentation. Upon arrival to the ER labs were notable for UA showing trace leucocyte esterase. EKG normal sinus rhythm Imaging CT head showed no acute intracranial infarct.     Hospital course  Patient was ultimately admitted to the telemetry floor,  to the medicine service for further evaluation and management. Upon admission, patient was monitored on telemetry, and had no events. She was seen by cardiology and would follow up outpatient.    ECHO showed normal EF, normal systolic function.    She is being discharged home today.    The remainder of patient's hospitalization was uneventful and no further intervention was required.       Exam on Day of Discharge  Physical Exam  Vitals and nursing note reviewed.   Constitutional:       General: She is not in acute distress.     Appearance: Normal appearance.   Cardiovascular:      Rate and Rhythm: Normal rate and regular rhythm.      Pulses: Normal pulses.      Heart sounds: No murmur heard.  Pulmonary:      Effort: Pulmonary effort is normal. No respiratory distress.      Breath sounds: Normal breath sounds. No wheezing, rhonchi or rales.   Chest:      Chest wall: No tenderness.   Abdominal:      General: Bowel sounds are normal. There is no distension.      Palpations: Abdomen is soft.      Tenderness: There is no abdominal tenderness. There is no right CVA tenderness, left CVA tenderness or guarding.   Musculoskeletal:         General: No swelling. Normal range of motion.      Comments:  Reduced range of motion - bilateral lower extremities   Skin:     General: Skin is warm and dry.      Capillary Refill: Capillary refill takes less than 2 seconds.      Coloration: Skin is not jaundiced.      Findings: No bruising.   Neurological:      Mental Status: She is alert and oriented to person, place, and time. Mental status is at baseline.   Psychiatric:         Mood and Affect: Mood normal.         Thought Content: Thought content normal.         DISCHARGE MEDICATIONS      Medication List      ASK your doctor about these medications    acetaminophen 500 mg tablet  Commonly known as: TYLENOL  Take 1,000 mg by mouth 3 (three) times a day.  Dose: 1,000 mg     apixaban 5 mg tablet  Commonly known as: ELIQUIS  Take 5 mg by mouth 2 (two) times a day.  Dose: 5 mg  Ask about: Which instructions should I use?     bimatoprost 0.01 % ophthalmic drops  Commonly known as: LUMIGAN  Administer 1 drop into both eyes nightly.  Dose: 1 drop     carbidopa-levodopa  mg per tablet  Commonly known as: SINEMET  Take 1 tablet by mouth 4 (four) times a day. 0900, 1300, 1700, 2100  Dose: 1 tablet     docusate sodium 100 mg capsule  Commonly known as: COLACE  Take 100 mg by mouth daily.  Dose: 100 mg     famotidine 20 mg tablet  Commonly known as: PEPCID  Take 20 mg by mouth daily.  Dose: 20 mg     gabapentin 300 mg capsule  Commonly known as: NEURONTIN  Take 300 mg by mouth 3 (three) times a day.  Dose: 300 mg     hydrALAZINE 25 mg tablet  Commonly known as: APRESOLINE  Take 50 mg by mouth 3 (three) times a day.  Dose: 50 mg     lisinopriL 20 mg tablet  Commonly known as: PRINIVIL  Take 20 mg by mouth daily.  Dose: 20 mg     meloxicam 7.5 mg tablet  Commonly known as: MOBIC  Take 7.5 mg by mouth daily with dinner.  Dose: 7.5 mg     mirtazapine 30 mg tablet  Commonly known as: REMERON  Take 30 mg by mouth nightly.  Dose: 30 mg     nitrofurantoin (macrocrystal-monohydrate) 100 mg capsule  Commonly known as: MACROBID  Take 100  mg by mouth 2 (two) times a day. X7 days starting 6/3/22  Dose: 100 mg     * oxyCODONE 5 mg immediate release tablet  Commonly known as: ROXICODONE  Take 5 mg by mouth every 6 (six) hours as needed for moderate pain or severe pain.  Dose: 5 mg     * oxyCODONE 10 mg immediate release tablet  Commonly known as: ROXICODONE  Take 10 mg by mouth daily.  Dose: 10 mg     sertraline 50 mg tablet  Commonly known as: ZOLOFT  Take 50 mg by mouth 2 (two) times a day. 0900, 1700  Dose: 50 mg         * This list has 2 medication(s) that are the same as other medications prescribed for you. Read the directions carefully, and ask your doctor or other care provider to review them with you.                 Medication List      ASK your doctor about these medications    acetaminophen 500 mg tablet  Commonly known as: TYLENOL  Take 1,000 mg by mouth 3 (three) times a day.  Dose: 1,000 mg     apixaban 5 mg tablet  Commonly known as: ELIQUIS  Take 5 mg by mouth 2 (two) times a day.  Dose: 5 mg  Ask about: Which instructions should I use?     bimatoprost 0.01 % ophthalmic drops  Commonly known as: LUMIGAN  Administer 1 drop into both eyes nightly.  Dose: 1 drop     carbidopa-levodopa  mg per tablet  Commonly known as: SINEMET  Take 1 tablet by mouth 4 (four) times a day. 0900, 1300, 1700, 2100  Dose: 1 tablet     docusate sodium 100 mg capsule  Commonly known as: COLACE  Take 100 mg by mouth daily.  Dose: 100 mg     famotidine 20 mg tablet  Commonly known as: PEPCID  Take 20 mg by mouth daily.  Dose: 20 mg     gabapentin 300 mg capsule  Commonly known as: NEURONTIN  Take 300 mg by mouth 3 (three) times a day.  Dose: 300 mg     hydrALAZINE 25 mg tablet  Commonly known as: APRESOLINE  Take 50 mg by mouth 3 (three) times a day.  Dose: 50 mg     lisinopriL 20 mg tablet  Commonly known as: PRINIVIL  Take 20 mg by mouth daily.  Dose: 20 mg     meloxicam 7.5 mg tablet  Commonly known as: MOBIC  Take 7.5 mg by mouth daily with  dinner.  Dose: 7.5 mg     mirtazapine 30 mg tablet  Commonly known as: REMERON  Take 30 mg by mouth nightly.  Dose: 30 mg     nitrofurantoin (macrocrystal-monohydrate) 100 mg capsule  Commonly known as: MACROBID  Take 100 mg by mouth 2 (two) times a day. X7 days starting 6/3/22  Dose: 100 mg     * oxyCODONE 5 mg immediate release tablet  Commonly known as: ROXICODONE  Take 5 mg by mouth every 6 (six) hours as needed for moderate pain or severe pain.  Dose: 5 mg     * oxyCODONE 10 mg immediate release tablet  Commonly known as: ROXICODONE  Take 10 mg by mouth daily.  Dose: 10 mg     sertraline 50 mg tablet  Commonly known as: ZOLOFT  Take 50 mg by mouth 2 (two) times a day. 0900, 1700  Dose: 50 mg         * This list has 2 medication(s) that are the same as other medications prescribed for you. Read the directions carefully, and ask your doctor or other care provider to review them with you.                   PROCEDURES / LABS / IMAGING      Operative Procedures  none    Other Procedures  none    Pertinent Labs  As above.    Pertinent Imaging  CT HEAD WITHOUT IV CONTRAST    Result Date: 6/6/2022  IMPRESSION: 1. No CT evidence for an acute large vascular territorial infarct, acute intracranial hemorrhage or mass effect. 2. Chronic microangiopathic ischemic disease and age-related involutional changes.    X-RAY CHEST 1 VIEW    Result Date: 6/6/2022  IMPRESSION: 1.  No evidence of acute disease in the chest.     IR IP CONSULT INITIAL    Result Date: 6/7/2022  IMPRESSION: 86-year-old female seen in inpatient interventional radiology consultation to discuss retrieval of an inferior vena cava filter placed on 11/27/2021 secondary to deep vein thrombosis with pulmonary embolism. The filter retrieval procedure was explained to the patient including all risks, benefits and expected outcomes. She understands that if a clot is seen within the filter, retrieval would not be an option at that time. If the position of the filter  has change since its initial placement, advance techniques may be necessary for retrieval. Once the patient has been discharged from the hospital she should contact interventional radiology to schedule a time for the procedure to remove her IVC filter. Should she have any further questions or concerns in the interim she may contact interventional radiology directly at [phone].  This service was performed by OSCAR Mckeon I certify that I have personally reviewed this examination and agree with Nydia Brown's report. Danie Shell M.D.      OUTPATIENT  FOLLOW-UP / REFERRALS / PENDING TESTS      Outpatient Follow-Up Appointments  Encounter Information    This patient does not currently have any appointments scheduled.               Test Results Pending at Discharge  Unresulted Labs (From admission, onward)            None          Important Issues to Address in Follow-Up  Follow up with PCP  Follow up with cardiology within 2-3 weeks    DISCHARGE DISPOSITION      Disposition:      Code Status At Discharge: Full Code    Physician Order for Life-Sustaining Treatment Document Status      No documents found

## 2022-06-13 NOTE — TELEPHONE ENCOUNTER
[facility] requesting 90 days for 1 year supply    Requested Prescriptions     Pending Prescriptions Disp Refills   • meloxicam (MOBIC) 7.5 mg tablet 90 tablet 3     Sig: Take 1 tablet (7.5 mg total) by mouth daily.         [facility]/pharmacy #[id number] - [place] - [address]  [address]  [place] [address]  Phone: [phone] Fax: [phone]

## 2022-06-14 RX ORDER — MELOXICAM 7.5 MG/1
7.5 TABLET ORAL DAILY
Qty: 90 TABLET | Refills: 3 | Status: SHIPPED | OUTPATIENT
Start: 2022-06-14 | End: 2022-08-16

## 2022-06-14 NOTE — ED ATTESTATION NOTE
I have personally seen and examined the patient.  I reviewed and agree with physician assistant / nurse practitioner’s assessment and plan of care, with the following exceptions: None  My examination, assessment, and plan of care of Mariya Porras is as follows:        86-year-old female with history of Parkinson's, hypertension, acute saddle PE and IVC filter, elevated troponin this presents with syncopal event that occurred just prior to arrival.  Patient felt food gets stuck in her throat.  She attempted to drink water to get the food to pass and she passed out.  Patient did not respond for 2 to 3 minutes.  Patient was seated during the episode and did not hit her head or lose consciousness.  She is currently acting at her baseline mental status.  On exam patient is awake alert in no acute distress.  Her head is atraumatic.  She has no respiratory distress.  Her heart is normal rate and regular rhythm.  She is oriented x3.  Her skin is dry normal in color.  Lab work was obtained and is unremarkable.  Patient was hospitalized for observation for syncope.     Anneliese Garcia MD  06/14/22 0759

## 2022-06-24 ENCOUNTER — TELEPHONE (OUTPATIENT)
Dept: RADIOLOGY | Facility: HOSPITAL | Age: 87
End: 2022-06-24
Payer: MEDICARE

## 2022-06-27 ENCOUNTER — HOSPITAL ENCOUNTER (OUTPATIENT)
Dept: RADIOLOGY | Facility: HOSPITAL | Age: 87
Discharge: HOME | End: 2022-06-27
Attending: NURSE PRACTITIONER | Admitting: RADIOLOGY
Payer: MEDICARE

## 2022-06-27 VITALS
TEMPERATURE: 97.6 F | WEIGHT: 150 LBS | OXYGEN SATURATION: 96 % | HEART RATE: 75 BPM | RESPIRATION RATE: 16 BRPM | DIASTOLIC BLOOD PRESSURE: 88 MMHG | BODY MASS INDEX: 24.11 KG/M2 | SYSTOLIC BLOOD PRESSURE: 194 MMHG | HEIGHT: 66 IN

## 2022-06-27 DIAGNOSIS — I80.201: ICD-10-CM

## 2022-06-27 PROCEDURE — B543ZZA ULTRASONOGRAPHY OF RIGHT JUGULAR VEINS, GUIDANCE: ICD-10-PCS | Performed by: INTERNAL MEDICINE

## 2022-06-27 PROCEDURE — 76937 US GUIDE VASCULAR ACCESS: CPT

## 2022-06-27 PROCEDURE — 71000001 HC PACU PHASE 1 INITIAL 30MIN

## 2022-06-27 PROCEDURE — 63600000 HC DRUGS/DETAIL CODE: Performed by: INTERNAL MEDICINE

## 2022-06-27 PROCEDURE — 63600105 HC IODINE BASED CONTRAST: Mod: JW | Performed by: NURSE PRACTITIONER

## 2022-06-27 PROCEDURE — 36100360 IR IVC FILTER REMOVAL

## 2022-06-27 PROCEDURE — 27200000 HC STERILE SUPPLY

## 2022-06-27 PROCEDURE — C1769 GUIDE WIRE: HCPCS

## 2022-06-27 PROCEDURE — C1894 INTRO/SHEATH, NON-LASER: HCPCS

## 2022-06-27 PROCEDURE — 25000000 HC PHARMACY GENERAL: Performed by: INTERNAL MEDICINE

## 2022-06-27 PROCEDURE — 71000011 HC PACU PHASE 1 EA ADDL MIN

## 2022-06-27 PROCEDURE — 06PY3DZ REMOVAL OF INTRALUMINAL DEVICE FROM LOWER VEIN, PERCUTANEOUS APPROACH: ICD-10-PCS | Performed by: INTERNAL MEDICINE

## 2022-06-27 RX ORDER — FENTANYL CITRATE 50 UG/ML
INJECTION, SOLUTION INTRAMUSCULAR; INTRAVENOUS
Status: COMPLETED | OUTPATIENT
Start: 2022-06-27 | End: 2022-06-27

## 2022-06-27 RX ORDER — SODIUM CHLORIDE 9 MG/ML
40 INJECTION, SOLUTION INTRAVENOUS CONTINUOUS
Status: DISCONTINUED | OUTPATIENT
Start: 2022-06-27 | End: 2022-06-27 | Stop reason: HOSPADM

## 2022-06-27 RX ORDER — IODIXANOL 320 MG/ML
100 INJECTION, SOLUTION INTRAVASCULAR ONCE
Status: COMPLETED | OUTPATIENT
Start: 2022-06-27 | End: 2022-06-27

## 2022-06-27 RX ORDER — LIDOCAINE HYDROCHLORIDE 10 MG/ML
INJECTION, SOLUTION INFILTRATION; PERINEURAL
Status: COMPLETED | OUTPATIENT
Start: 2022-06-27 | End: 2022-06-27

## 2022-06-27 RX ADMIN — LIDOCAINE HYDROCHLORIDE 10 ML: 10 INJECTION, SOLUTION INFILTRATION; PERINEURAL at 13:29

## 2022-06-27 RX ADMIN — IODIXANOL 20 ML: 320 INJECTION, SOLUTION INTRAVASCULAR at 13:52

## 2022-06-27 RX ADMIN — FENTANYL CITRATE 50 MCG: 50 INJECTION INTRAMUSCULAR; INTRAVENOUS at 13:29

## 2022-06-27 RX ADMIN — FENTANYL CITRATE 25 MCG: 50 INJECTION INTRAMUSCULAR; INTRAVENOUS at 13:42

## 2022-06-27 RX ADMIN — FENTANYL CITRATE 25 MCG: 50 INJECTION INTRAMUSCULAR; INTRAVENOUS at 13:36

## 2022-06-27 NOTE — DISCHARGE INSTRUCTIONS
Interventional Radiology    Post-Procedure Instructions for IVC Filter Removal    1. If you received conscious/moderate sedation or analgesia with fentanyl for this procedure, you are not permitted to drive/operate machinery for      24 hours. Abstain from alcohol for the remainder of the day. Do not undertake in business matters.         2. Remain in semi-upright position until bedtime. This will help reduce      swelling. Some bruising is to be expected at site.    3. Apply an ice pack to the procedure site for 20 minutes on, 20 minutes off      until bedtime.    4.. Remove dressing in 48 hours. Keep wound clean and dry. A liquid glue,       Dermabond, is on the incision site. Do not try to remove or pick at this, it will       peel off over the next 1-2 weeks as the wound heals.    5.  No tub baths or submerging site under water until wound heals.      You may shower- protect site with clear plastic wrap (Saran).If the dressing        becomes wet, change dressing immediately.    6. Do not lift objects over 10 pounds for the next 72 hours.      Call your Physician or Interventional Radiology if you experience any of the following:  Prolonged tenderness, redness, or warmth at the procedure site  Discharge from incision site  Fever greater than 100.5  At any time you feel it is necessary to receive immediate attention, do not hesitate to go to the Emergency Room.      Physician Contact Information   If you have a problem that you believe requires immediate attention, you should go to the Emergency Room, either at [facility] or the closest hospital. If you believe that your problem can safely wait until you speak to a physician, you should contact your doctor or Interventional Radiologist.   It is usually easier to contact your own physician by calling the office, or after hours through the answering service. If you do not know the number, the hospital  can connect you by calling [phone].   If you  have concerns that need to be answered by the Interventional Radiologist, you can reach him/ her as follows:   During regular weekday hours (8AM to 5PM), call [phone] and ask the technologist to connect you with the Interventional Radiologist.   During off hours for emergencies call [phone] and ask the hospital  to contact the Interventional Radiologist on-call.          [facility] strongly recommends that you visit a Primary Care Provider (PCP) regularly. Your PCP can help you implement the recommendations we gave you today, coordinate care among your specialists, as well as make sure you are up to date with wellness exams,immunizations and preventive screenings. Your PCP can also help when you are feeling sick, potentially avoiding the need for urgent care or emergency department visits. For these reasons, it is important that you follow up with your PCP at least annually or more often based upon your medical conditions. If you do not have a PCP, please [phone] ([phone]) or go to [email or web address] help with finding one.

## 2022-06-27 NOTE — POST-PROCEDURE NOTE
Interventional Radiology Brief Postprocedure Note    Mariya Porras     Attending: Tess Shah MD         Diagnosis: IVC filter, no on AC    Description of procedure: Right IJ access was used to snare patient's IVC filter    Contrast: 10ml     Anesthesia:  Local (Lidocaine)    Volume of Lidocaine Utilized (ml): 10     Medications: 100 mcg fentanyl     Complications: None      Estimated Blood Loss: Estimated Blood Loss: 0-10 ml    Anticoagulation: okay to resume    Specimens: none    Findings: Successful IVC filter retrieval    6/27/2022 1:55 PM

## 2022-06-27 NOTE — H&P
Inpatient History & Physical     Admitting Diagnosis: Deep vein thrombophlebitis of leg, right (CMS/HCC) [I80.201]    HPI  Patient is a 86 y.o. female with a history of DVT and IVC filter placement 11/2021. Patient presents today for filter retrieval. She denies any other complaints    Medical History:   Past Medical History:   Diagnosis Date   • Anxiety    • Arthritis    • Basal cell carcinoma     forehead   • Glaucoma    • Hypertension    • Low back pain    • Lumbosacral disc disease    • Parkinson's disease (CMS/HCC)    • Peripheral neuropathy        Surgical History:   Past Surgical History:   Procedure Laterality Date   • BLADDER SUSPENSION  2009    [facility] M   • CATARACT EXTRACTION, BILATERAL     • COLONOSCOPY     • HYSTERECTOMY     • JOINT REPLACEMENT     • KNEE ARTHROPLASTY  2015    left       Allergies: Codeine, Diazepam, Sulfa (sulfonamide antibiotics), and Sulfur    Current Inpatient Medications   Medication Dose Route Frequency Provider Last Rate Last Admin   • fentaNYL (PF) (SUBLIMAZE) injection   intravenous Once via [facility] medication administration Tess Shah MD   25 mcg at 06/27/22 1342   • lidocaine (XYLOCAINE) 10 mg/mL (1 %) injection   injection Once via [facility] medication administration Tess Shah MD   10 mL at 06/27/22 1329   • sodium chloride 0.9 % infusion  40 mL/hr intravenous Continuous Nydia Angel CRNP           Social History:   Social History     Socioeconomic History   • Marital status:    Tobacco Use   • Smoking status: Former Smoker   • Smokeless tobacco: Never Used   • Tobacco comment: as a young teenager   Substance and Sexual Activity   • Alcohol use: Yes     Comment: wine   • Drug use: No   • Sexual activity: Not Currently     Partners: Male     Social Determinants of Health     Food Insecurity: No Food Insecurity   • Worried About Running Out of Food in the Last Year: Never true   • Ran Out of Food in the Last Year: Never true        Family History:   Family History   Problem Relation Age of Onset   • No Known Problems Biological Mother    • No Known Problems Biological Father        Review of Systems  All other systems reviewed and negative except as noted in the HPI.    Objective     Vital Signs for the last 24 hours:  Temp:  [36.4 °C (97.6 °F)] 36.4 °C (97.6 °F)  Heart Rate:  [69-78] 75  Resp:  [16-20] 16  BP: (169-198)/(80-92) 169/80      General appearance: alert, appears stated age and cooperative  Chest wall: no tenderness  Extremities: extremities normal, warm and well-perfused; no cyanosis, clubbing, or edema        Assessment/Plan     Plan for IVC filter retrieval    Code Status: Full Code  Estimated discharge date: 6/27/2022

## 2022-08-16 ENCOUNTER — HOSPITAL ENCOUNTER (EMERGENCY)
Facility: HOSPITAL | Age: 87
Discharge: HOME | End: 2022-08-16
Attending: EMERGENCY MEDICINE
Payer: MEDICARE

## 2022-08-16 ENCOUNTER — APPOINTMENT (EMERGENCY)
Dept: RADIOLOGY | Facility: HOSPITAL | Age: 87
End: 2022-08-16
Attending: EMERGENCY MEDICINE
Payer: MEDICARE

## 2022-08-16 VITALS
SYSTOLIC BLOOD PRESSURE: 159 MMHG | TEMPERATURE: 97.6 F | HEART RATE: 76 BPM | WEIGHT: 148.6 LBS | RESPIRATION RATE: 14 BRPM | OXYGEN SATURATION: 96 % | DIASTOLIC BLOOD PRESSURE: 78 MMHG | HEIGHT: 66 IN | BODY MASS INDEX: 23.88 KG/M2

## 2022-08-16 DIAGNOSIS — R55 SYNCOPE, UNSPECIFIED SYNCOPE TYPE: Primary | ICD-10-CM

## 2022-08-16 LAB
ALBUMIN SERPL-MCNC: 4 G/DL (ref 3.4–5)
ALP SERPL-CCNC: 35 IU/L (ref 35–126)
ALT SERPL-CCNC: 7 IU/L (ref 11–54)
ANION GAP SERPL CALC-SCNC: 5 MEQ/L (ref 3–15)
AST SERPL-CCNC: 14 IU/L (ref 15–41)
BASOPHILS # BLD: 0.06 K/UL (ref 0.01–0.1)
BASOPHILS NFR BLD: 0.8 %
BILIRUB SERPL-MCNC: 0.8 MG/DL (ref 0.3–1.2)
BUN SERPL-MCNC: 25 MG/DL (ref 8–20)
CALCIUM SERPL-MCNC: 10.3 MG/DL (ref 8.9–10.3)
CHLORIDE SERPL-SCNC: 108 MEQ/L (ref 98–109)
CO2 SERPL-SCNC: 25 MEQ/L (ref 22–32)
CREAT SERPL-MCNC: 0.9 MG/DL (ref 0.6–1.1)
DIFFERENTIAL METHOD BLD: NORMAL
EOSINOPHIL # BLD: 0.11 K/UL (ref 0.04–0.36)
EOSINOPHIL NFR BLD: 1.5 %
ERYTHROCYTE [DISTWIDTH] IN BLOOD BY AUTOMATED COUNT: 13.2 % (ref 11.7–14.4)
GFR SERPL CREATININE-BSD FRML MDRD: 59.4 ML/MIN/1.73M*2
GLUCOSE SERPL-MCNC: 114 MG/DL (ref 70–99)
HCT VFR BLDCO AUTO: 44.8 % (ref 35–45)
HGB BLD-MCNC: 14.7 G/DL (ref 11.8–15.7)
IMM GRANULOCYTES # BLD AUTO: 0.03 K/UL (ref 0–0.08)
IMM GRANULOCYTES NFR BLD AUTO: 0.4 %
LYMPHOCYTES # BLD: 1.62 K/UL (ref 1.2–3.5)
LYMPHOCYTES NFR BLD: 22.4 %
MCH RBC QN AUTO: 31.7 PG (ref 28–33.2)
MCHC RBC AUTO-ENTMCNC: 32.8 G/DL (ref 32.2–35.5)
MCV RBC AUTO: 96.8 FL (ref 83–98)
MONOCYTES # BLD: 0.32 K/UL (ref 0.28–0.8)
MONOCYTES NFR BLD: 4.4 %
NEUTROPHILS # BLD: 5.09 K/UL (ref 1.7–7)
NEUTS SEG NFR BLD: 70.5 %
NRBC BLD-RTO: 0 %
PDW BLD AUTO: 9.6 FL (ref 9.4–12.3)
PLATELET # BLD AUTO: 211 K/UL (ref 150–369)
POTASSIUM SERPL-SCNC: 4 MEQ/L (ref 3.6–5.1)
PROT SERPL-MCNC: 6.7 G/DL (ref 6–8.2)
RBC # BLD AUTO: 4.63 M/UL (ref 3.93–5.22)
SODIUM SERPL-SCNC: 138 MEQ/L (ref 136–144)
TROPONIN I SERPL HS-MCNC: 4.6 PG/ML
WBC # BLD AUTO: 7.23 K/UL (ref 3.8–10.5)

## 2022-08-16 PROCEDURE — 85025 COMPLETE CBC W/AUTO DIFF WBC: CPT | Performed by: EMERGENCY MEDICINE

## 2022-08-16 PROCEDURE — 84484 ASSAY OF TROPONIN QUANT: CPT | Performed by: EMERGENCY MEDICINE

## 2022-08-16 PROCEDURE — 36415 COLL VENOUS BLD VENIPUNCTURE: CPT

## 2022-08-16 PROCEDURE — 63700000 HC SELF-ADMINISTRABLE DRUG: Performed by: STUDENT IN AN ORGANIZED HEALTH CARE EDUCATION/TRAINING PROGRAM

## 2022-08-16 PROCEDURE — 99283 EMERGENCY DEPT VISIT LOW MDM: CPT | Mod: 25

## 2022-08-16 PROCEDURE — 93005 ELECTROCARDIOGRAM TRACING: CPT

## 2022-08-16 PROCEDURE — 71045 X-RAY EXAM CHEST 1 VIEW: CPT

## 2022-08-16 PROCEDURE — 93005 ELECTROCARDIOGRAM TRACING: CPT | Performed by: EMERGENCY MEDICINE

## 2022-08-16 PROCEDURE — 80053 COMPREHEN METABOLIC PANEL: CPT | Performed by: EMERGENCY MEDICINE

## 2022-08-16 RX ORDER — TOPIRAMATE 25 MG/1
25 TABLET ORAL DAILY
COMMUNITY
End: 2024-01-01 | Stop reason: HOSPADM

## 2022-08-16 RX ORDER — ACETAMINOPHEN 325 MG/1
650 TABLET ORAL ONCE
Status: COMPLETED | OUTPATIENT
Start: 2022-08-16 | End: 2022-08-16

## 2022-08-16 RX ADMIN — ACETAMINOPHEN 650 MG: 325 TABLET ORAL at 11:42

## 2022-08-16 ASSESSMENT — ENCOUNTER SYMPTOMS: SYNCOPE: 1

## 2022-08-16 NOTE — DISCHARGE INSTRUCTIONS
You were seen in the ER for syncope. Your blood work was negative for acute signs of infection, anemia, or electrolyte imbalances. Your troponin which is a cardiac biomarker for distress was negative. Your chest xray was negative for acute disease within the chest. Your EKG showed no signs of ischemia.     It is recommended that you follow up with cardiology for further evaluation and management. Please call to make an appointment to be seen as soon as possible.     You may take Motrin (500 mg) every 6 hours for pain. You may also take Tylenol (500 mg) every 6 hours for pain. You may alternate the two medications every 3 hours for complete pain control.     Please rest and get adequate sleep, approximately 7-8 hours night. Please drink plenty of fluids and stay hydrated.     Please return to the ER for any worsening chest pain, trouble breathing, palpitations, leg pain, leg swelling, back pain, dizziness, lightheadedness, nausea, vomiting, abdominal pain, or any other new or concerning symptoms

## 2022-08-16 NOTE — ED ATTESTATION NOTE
Procedures  Physical Exam  Review of Systems    8/16/202211:53 AM  I have personally seen and examined the patient. I have reviewed and agree with the PA/NP/Resident's assessment and ED plan of care. My examination, assessment and plan of care of Mariya Porras is as follows:    Patient is a 87-year-old female who presents after a period of unresponsiveness, patient was eating breakfast this morning when she was witnessed to have a 15-32nd period of staring off, patient's eyes were open but she was not responding to anyone, patient does not recall this event, there was no seizure-like activity, patient was breathing through the event, did have a recent similar episode at which time she did have a work-up which did not reveal any cause, patient is at her baseline now and return to that baseline fairly quickly after the episode of unresponsiveness revolved    Exam:   Vital signs have been reviewed, pulse ox is 97% on room air, normal  Heart: RRR, normal S1/S2  Lungs: CTA bilaterally  Abdo: soft, non-tender    Plan: Patient is a 87-year-old female who presents after a period of unresponsiveness, checking labs and imaging, reevaluate afterwards          Godshall, Duane K, MD  08/16/22 3288

## 2022-08-17 LAB
ATRIAL RATE: 80
P AXIS: 39
PR INTERVAL: 188
QRS DURATION: 86
QT INTERVAL: 338
QTC CALCULATION(BAZETT): 389
R AXIS: -21
T WAVE AXIS: 10
VENTRICULAR RATE: 80

## 2022-08-17 ASSESSMENT — ENCOUNTER SYMPTOMS
SORE THROAT: 0
SPEECH DIFFICULTY: 0
CHILLS: 0
NUMBNESS: 0
BACK PAIN: 0
DYSURIA: 0
ABDOMINAL PAIN: 0
FEVER: 0
MYALGIAS: 0
DIFFICULTY URINATING: 0
DIZZINESS: 0
LIGHT-HEADEDNESS: 0
SHORTNESS OF BREATH: 0
WEAKNESS: 0
HEADACHES: 0
NAUSEA: 0
VOMITING: 0

## 2022-12-03 ENCOUNTER — TRANSCRIBE ORDERS (OUTPATIENT)
Dept: SCHEDULING | Age: 87
End: 2022-12-03

## 2022-12-03 DIAGNOSIS — M79.89 OTHER SPECIFIED SOFT TISSUE DISORDERS: Primary | ICD-10-CM

## 2022-12-12 ENCOUNTER — TRANSCRIBE ORDERS (OUTPATIENT)
Dept: RADIOLOGY | Facility: CLINIC | Age: 87
End: 2022-12-12

## 2022-12-12 ENCOUNTER — HOSPITAL ENCOUNTER (OUTPATIENT)
Dept: RADIOLOGY | Facility: CLINIC | Age: 87
Discharge: HOME | End: 2022-12-12
Attending: NURSE PRACTITIONER
Payer: MEDICARE

## 2022-12-12 DIAGNOSIS — M79.89 OTHER SPECIFIED SOFT TISSUE DISORDERS: ICD-10-CM

## 2022-12-12 DIAGNOSIS — I82.409 ACUTE EMBOLISM AND THROMBOSIS OF UNSPECIFIED DEEP VEINS OF UNSPECIFIED LOWER EXTREMITY (CMS/HCC): Primary | ICD-10-CM

## 2022-12-12 PROCEDURE — 93971 EXTREMITY STUDY: CPT | Mod: RT

## 2023-01-01 ENCOUNTER — APPOINTMENT (EMERGENCY)
Dept: RADIOLOGY | Facility: HOSPITAL | Age: 88
End: 2023-01-01
Payer: MEDICARE

## 2023-01-01 ENCOUNTER — HOME VISIT (OUTPATIENT)
Dept: PALLIATIVE CARE | Facility: CLINIC | Age: 88
End: 2023-01-01
Payer: MEDICARE

## 2023-01-01 ENCOUNTER — HOSPITAL ENCOUNTER (EMERGENCY)
Facility: HOSPITAL | Age: 88
Discharge: HOME | End: 2023-11-27
Attending: EMERGENCY MEDICINE
Payer: MEDICARE

## 2023-01-01 VITALS
HEIGHT: 65 IN | WEIGHT: 151.01 LBS | RESPIRATION RATE: 20 BRPM | DIASTOLIC BLOOD PRESSURE: 102 MMHG | OXYGEN SATURATION: 94 % | BODY MASS INDEX: 25.16 KG/M2 | SYSTOLIC BLOOD PRESSURE: 201 MMHG | HEART RATE: 94 BPM | TEMPERATURE: 98.1 F

## 2023-01-01 VITALS
SYSTOLIC BLOOD PRESSURE: 135 MMHG | HEART RATE: 88 BPM | TEMPERATURE: 97.5 F | RESPIRATION RATE: 18 BRPM | OXYGEN SATURATION: 95 % | DIASTOLIC BLOOD PRESSURE: 74 MMHG

## 2023-01-01 DIAGNOSIS — R13.10 DYSPHAGIA, UNSPECIFIED TYPE: ICD-10-CM

## 2023-01-01 DIAGNOSIS — Z86.718 HISTORY OF VENOUS THROMBOEMBOLISM: ICD-10-CM

## 2023-01-01 DIAGNOSIS — R53.81 DEBILITY: ICD-10-CM

## 2023-01-01 DIAGNOSIS — N39.0 ACUTE UTI: ICD-10-CM

## 2023-01-01 DIAGNOSIS — F02.B11 MODERATE DEMENTIA DUE TO PARKINSON'S DISEASE, WITH AGITATION (CMS/HCC): ICD-10-CM

## 2023-01-01 DIAGNOSIS — R53.83 FATIGUE, UNSPECIFIED TYPE: Primary | ICD-10-CM

## 2023-01-01 DIAGNOSIS — G89.29 CHRONIC LOW BACK PAIN WITH SCIATICA, SCIATICA LATERALITY UNSPECIFIED, UNSPECIFIED BACK PAIN LATERALITY: ICD-10-CM

## 2023-01-01 DIAGNOSIS — Z51.5 ENCOUNTER FOR PALLIATIVE CARE IN HOME, NON-HOSPICE: Primary | ICD-10-CM

## 2023-01-01 DIAGNOSIS — G20.B2 PARKINSON'S DISEASE WITH DYSKINESIA AND FLUCTUATING MANIFESTATIONS (CMS/HCC): ICD-10-CM

## 2023-01-01 DIAGNOSIS — I10 PRIMARY HYPERTENSION: ICD-10-CM

## 2023-01-01 DIAGNOSIS — G20.A1 MODERATE DEMENTIA DUE TO PARKINSON'S DISEASE, WITH AGITATION (CMS/HCC): ICD-10-CM

## 2023-01-01 DIAGNOSIS — Z87.440 HISTORY OF UTI: ICD-10-CM

## 2023-01-01 DIAGNOSIS — M54.40 CHRONIC LOW BACK PAIN WITH SCIATICA, SCIATICA LATERALITY UNSPECIFIED, UNSPECIFIED BACK PAIN LATERALITY: ICD-10-CM

## 2023-01-01 LAB
ALBUMIN SERPL-MCNC: 3.6 G/DL (ref 3.5–5.7)
ALP SERPL-CCNC: 40 IU/L (ref 34–125)
ALT SERPL-CCNC: <3 IU/L (ref 7–52)
ANION GAP SERPL CALC-SCNC: 6 MEQ/L (ref 3–15)
AST SERPL-CCNC: 12 IU/L (ref 13–39)
ATRIAL RATE: 91
BACTERIA UR CULT: ABNORMAL
BACTERIA UR CULT: ABNORMAL
BACTERIA URNS QL MICRO: ABNORMAL /HPF
BASOPHILS # BLD: 0.06 K/UL (ref 0.01–0.1)
BASOPHILS NFR BLD: 0.8 %
BILIRUB SERPL-MCNC: 0.5 MG/DL (ref 0.3–1.2)
BILIRUB UR QL STRIP.AUTO: NEGATIVE MG/DL
BUN SERPL-MCNC: 14 MG/DL (ref 7–25)
CALCIUM SERPL-MCNC: 9.8 MG/DL (ref 8.6–10.3)
CHLORIDE SERPL-SCNC: 107 MEQ/L (ref 98–107)
CLARITY UR REFRACT.AUTO: ABNORMAL
CO2 SERPL-SCNC: 26 MEQ/L (ref 21–31)
COLOR UR AUTO: YELLOW
CREAT SERPL-MCNC: 0.7 MG/DL (ref 0.6–1.2)
DIFFERENTIAL METHOD BLD: ABNORMAL
EOSINOPHIL # BLD: 0.1 K/UL (ref 0.04–0.36)
EOSINOPHIL NFR BLD: 1.3 %
ERYTHROCYTE [DISTWIDTH] IN BLOOD BY AUTOMATED COUNT: 13.2 % (ref 11.7–14.4)
FLUAV RNA SPEC QL NAA+PROBE: NEGATIVE
FLUBV RNA SPEC QL NAA+PROBE: NEGATIVE
GFR SERPL CREATININE-BSD FRML MDRD: >60 ML/MIN/1.73M*2
GLUCOSE SERPL-MCNC: 144 MG/DL (ref 70–99)
GLUCOSE UR STRIP.AUTO-MCNC: NEGATIVE MG/DL
HCT VFR BLDCO AUTO: 39.9 % (ref 35–45)
HGB BLD-MCNC: 12.7 G/DL (ref 11.8–15.7)
HGB UR QL STRIP.AUTO: NEGATIVE
HYALINE CASTS #/AREA URNS LPF: ABNORMAL /LPF
IMM GRANULOCYTES # BLD AUTO: 0.04 K/UL (ref 0–0.08)
IMM GRANULOCYTES NFR BLD AUTO: 0.5 %
KETONES UR STRIP.AUTO-MCNC: NEGATIVE MG/DL
LEUKOCYTE ESTERASE UR QL STRIP.AUTO: 3
LYMPHOCYTES # BLD: 1.5 K/UL (ref 1.2–3.5)
LYMPHOCYTES NFR BLD: 19.3 %
MAGNESIUM SERPL-MCNC: 1.9 MG/DL (ref 1.8–2.5)
MCH RBC QN AUTO: 32.2 PG (ref 28–33.2)
MCHC RBC AUTO-ENTMCNC: 31.8 G/DL (ref 32.2–35.5)
MCV RBC AUTO: 101 FL (ref 83–98)
MONOCYTES # BLD: 0.46 K/UL (ref 0.28–0.8)
MONOCYTES NFR BLD: 5.9 %
MUCOUS THREADS URNS QL MICRO: ABNORMAL /LPF
NEUTROPHILS # BLD: 5.63 K/UL (ref 1.7–7)
NEUTS SEG NFR BLD: 72.2 %
NITRITE UR QL STRIP.AUTO: POSITIVE
NRBC BLD-RTO: 0 %
P AXIS: 23
PDW BLD AUTO: 9.2 FL (ref 9.4–12.3)
PH UR STRIP.AUTO: 7 [PH]
PLATELET # BLD AUTO: 232 K/UL (ref 150–369)
POTASSIUM SERPL-SCNC: 4.2 MEQ/L (ref 3.5–5.1)
PR INTERVAL: 196
PROT SERPL-MCNC: 6.1 G/DL (ref 6–8.2)
PROT UR QL STRIP.AUTO: NEGATIVE
QRS DURATION: 74
QT INTERVAL: 324
QTC CALCULATION(BAZETT): 398
R AXIS: -21
RBC # BLD AUTO: 3.95 M/UL (ref 3.93–5.22)
RBC #/AREA URNS HPF: ABNORMAL /HPF
RSV RNA SPEC QL NAA+PROBE: NEGATIVE
SARS-COV-2 RNA RESP QL NAA+PROBE: NEGATIVE
SODIUM SERPL-SCNC: 139 MEQ/L (ref 136–145)
SP GR UR REFRACT.AUTO: 1.01
SQUAMOUS URNS QL MICRO: ABNORMAL /HPF
T WAVE AXIS: -22
TROPONIN I SERPL HS-MCNC: 8.5 PG/ML
TROPONIN I SERPL HS-MCNC: 9.4 PG/ML
UROBILINOGEN UR STRIP-ACNC: 0.2 EU/DL
VENTRICULAR RATE: 91
WBC # BLD AUTO: 7.79 K/UL (ref 3.8–10.5)
WBC #/AREA URNS HPF: ABNORMAL /HPF

## 2023-01-01 PROCEDURE — 93005 ELECTROCARDIOGRAM TRACING: CPT

## 2023-01-01 PROCEDURE — 87077 CULTURE AEROBIC IDENTIFY: CPT | Performed by: EMERGENCY MEDICINE

## 2023-01-01 PROCEDURE — 85025 COMPLETE CBC W/AUTO DIFF WBC: CPT | Performed by: EMERGENCY MEDICINE

## 2023-01-01 PROCEDURE — 99350 HOME/RES VST EST HIGH MDM 60: CPT | Performed by: NURSE PRACTITIONER

## 2023-01-01 PROCEDURE — 84484 ASSAY OF TROPONIN QUANT: CPT | Performed by: EMERGENCY MEDICINE

## 2023-01-01 PROCEDURE — 85025 COMPLETE CBC W/AUTO DIFF WBC: CPT

## 2023-01-01 PROCEDURE — 87637 SARSCOV2&INF A&B&RSV AMP PRB: CPT | Performed by: PHYSICIAN ASSISTANT

## 2023-01-01 PROCEDURE — 63700000 HC SELF-ADMINISTRABLE DRUG: Performed by: PHYSICIAN ASSISTANT

## 2023-01-01 PROCEDURE — 80053 COMPREHEN METABOLIC PANEL: CPT | Performed by: EMERGENCY MEDICINE

## 2023-01-01 PROCEDURE — 84484 ASSAY OF TROPONIN QUANT: CPT | Mod: 91 | Performed by: EMERGENCY MEDICINE

## 2023-01-01 PROCEDURE — 3E0337Z INTRODUCTION OF ELECTROLYTIC AND WATER BALANCE SUBSTANCE INTO PERIPHERAL VEIN, PERCUTANEOUS APPROACH: ICD-10-PCS | Performed by: EMERGENCY MEDICINE

## 2023-01-01 PROCEDURE — 83735 ASSAY OF MAGNESIUM: CPT | Performed by: PHYSICIAN ASSISTANT

## 2023-01-01 PROCEDURE — 36415 COLL VENOUS BLD VENIPUNCTURE: CPT

## 2023-01-01 PROCEDURE — 96360 HYDRATION IV INFUSION INIT: CPT

## 2023-01-01 PROCEDURE — 93005 ELECTROCARDIOGRAM TRACING: CPT | Performed by: EMERGENCY MEDICINE

## 2023-01-01 PROCEDURE — 81003 URINALYSIS AUTO W/O SCOPE: CPT | Performed by: EMERGENCY MEDICINE

## 2023-01-01 PROCEDURE — 99284 EMERGENCY DEPT VISIT MOD MDM: CPT | Mod: 25

## 2023-01-01 PROCEDURE — 25800000 HC PHARMACY IV SOLUTIONS: Performed by: PHYSICIAN ASSISTANT

## 2023-01-01 PROCEDURE — 71045 X-RAY EXAM CHEST 1 VIEW: CPT

## 2023-01-01 RX ORDER — CEFUROXIME AXETIL 250 MG/1
500 TABLET ORAL ONCE
Status: COMPLETED | OUTPATIENT
Start: 2023-01-01 | End: 2023-01-01

## 2023-01-01 RX ORDER — CEFUROXIME AXETIL 500 MG/1
500 TABLET ORAL 2 TIMES DAILY
Qty: 14 TABLET | Refills: 0 | Status: SHIPPED | OUTPATIENT
Start: 2023-01-01 | End: 2023-01-01

## 2023-01-01 RX ORDER — AMOXICILLIN AND CLAVULANATE POTASSIUM 875; 125 MG/1; MG/1
1 TABLET, FILM COATED ORAL
Qty: 14 TABLET | Refills: 0 | Status: SHIPPED | OUTPATIENT
Start: 2023-01-01 | End: 2023-01-01

## 2023-01-01 RX ADMIN — SODIUM CHLORIDE 500 ML: 9 INJECTION, SOLUTION INTRAVENOUS at 16:10

## 2023-01-01 RX ADMIN — CEFUROXIME AXETIL 500 MG: 250 TABLET ORAL at 18:40

## 2023-01-01 ASSESSMENT — ENCOUNTER SYMPTOMS
FEVER: 0
HEADACHES: 0
COUGH: 0
NUMBNESS: 1
NECK STIFFNESS: 0
SPEECH DIFFICULTY: 0
SEIZURES: 0
BACK PAIN: 1
ARTHRALGIAS: 1
FREQUENCY: 0
NECK PAIN: 0
ANAL BLEEDING: 0
LIGHT-HEADEDNESS: 0
WEAKNESS: 0
DYSURIA: 0
LIGHT-HEADEDNESS: 0
RESPIRATORY NEGATIVE: 1
CHILLS: 0
CONSTIPATION: 0
FATIGUE: 1
ABDOMINAL PAIN: 0
TREMORS: 0
MUSCULOSKELETAL NEGATIVE: 1
NUMBNESS: 0
PALPITATIONS: 0
BACK PAIN: 0
FLANK PAIN: 0
CARDIOVASCULAR NEGATIVE: 1
FATIGUE: 1
FACIAL ASYMMETRY: 0
BLOOD IN STOOL: 0
FREQUENCY: 0
DIZZINESS: 0
UNEXPECTED WEIGHT CHANGE: 0
WEAKNESS: 1
CHEST TIGHTNESS: 0
SLEEP DISTURBANCE: 0
WHEEZING: 0
COUGH: 0
CONFUSION: 1
PALPITATIONS: 0
ACTIVITY CHANGE: 1
DYSPHORIC MOOD: 0
SHORTNESS OF BREATH: 0
DYSURIA: 0
APPETITE CHANGE: 0
VOMITING: 0
SHORTNESS OF BREATH: 0
WOUND: 0
WOUND: 0
GASTROINTESTINAL NEGATIVE: 1
PSYCHIATRIC NEGATIVE: 1
CONSTIPATION: 0
NEUROLOGICAL NEGATIVE: 1
NERVOUS/ANXIOUS: 0
DIARRHEA: 0
TROUBLE SWALLOWING: 0
HEMATURIA: 0
TROUBLE SWALLOWING: 1
NAUSEA: 0
FACIAL SWELLING: 0
DIZZINESS: 0
VOMITING: 0
DIFFICULTY URINATING: 0
NAUSEA: 0
EYES NEGATIVE: 1
ABDOMINAL PAIN: 1

## 2023-01-01 ASSESSMENT — PAIN SCALES - GENERAL: PAINLEVEL: 8

## 2023-01-04 NOTE — ASSESSMENT & PLAN NOTE
How Severe Is Your Acne?: severe Was sitting in wheelchair eating lunch at the kitchen table and felt heartburn, and passed out, eyes open, while sitting up for 2-3 minutes. This was witnessed by the patients son. States no history of seizures, no loss of bowel or bladder - Unclear etiology  EKG - Sinus rhythm without acute ischemic changes  HS trop negative  ECHO - normal systolic function  Medically stable for discharge  To follow up with cardiology on discharge  Patient bedbound   Is This A New Presentation, Or A Follow-Up?: Acne What Type Of Note Output Would You Prefer (Optional)?: Standard Output

## 2023-01-12 ENCOUNTER — TRANSCRIBE ORDERS (OUTPATIENT)
Dept: SCHEDULING | Age: 88
End: 2023-01-12

## 2023-01-12 DIAGNOSIS — F03.90 UNSPECIFIED DEMENTIA, UNSPECIFIED SEVERITY, WITHOUT BEHAVIORAL DISTURBANCE, PSYCHOTIC DISTURBANCE, MOOD DISTURBANCE, AND ANXIETY (CMS/HCC): ICD-10-CM

## 2023-01-12 DIAGNOSIS — R13.10 DYSPHAGIA, UNSPECIFIED: Primary | ICD-10-CM

## 2023-01-25 NOTE — ASSESSMENT & PLAN NOTE
Patient complains of chronic low back pain  She is scheduled for an epidural in November States the pain has been worsening and she feels unstable on her feet  X-ray does not show acute abnormality  PT/OT  Pain control    MRI LS spine -.severe compression fracture of L1 with retropulsion of the fracture fragment which is stable but severe right subarticular and neural foraminal narrowing, progression of the neural foraminal narrowing compared to the prior examination.   Neurosurgery reviewed the MRI scan and advised scoliosis Xray -showed stable alignment  She is stable for discharge  Family and patient requesting epidural injection before discharge- unavailable in inpatient setting, discussed this with patient and family  Spoke with pain management , Dr. Bal's office will contact family Monday to arrange close outpatient f/u, recommending adding gabapentin 100mg TID and RTC Tylenol, will start  DC today to snf   Pt. with hyponatremia in the setting of fluid overload. SNa low/improved to 133 today. Alfie is at 47, U Osm is low at 194, serum cortisol is elevated at 21. Recommend to continue with diuretic therapy, as above. Restrict fluid intake <1L per day. Monitor SNa daily. Xelnilsaz Pregnancy And Lactation Text: This medication is Pregnancy Category D and is not considered safe during pregnancy.  The risk during breast feeding is also uncertain.

## 2023-02-01 ENCOUNTER — HOSPITAL ENCOUNTER (OUTPATIENT)
Dept: RADIOLOGY | Facility: HOSPITAL | Age: 88
Discharge: HOME | End: 2023-02-01
Attending: FAMILY MEDICINE
Payer: MEDICARE

## 2023-02-01 DIAGNOSIS — F03.90 UNSPECIFIED DEMENTIA, UNSPECIFIED SEVERITY, WITHOUT BEHAVIORAL DISTURBANCE, PSYCHOTIC DISTURBANCE, MOOD DISTURBANCE, AND ANXIETY (CMS/HCC): ICD-10-CM

## 2023-02-01 DIAGNOSIS — R13.10 DYSPHAGIA, UNSPECIFIED: ICD-10-CM

## 2023-02-01 PROCEDURE — 74230 X-RAY XM SWLNG FUNCJ C+: CPT

## 2023-02-01 PROCEDURE — 92611 MOTION FLUOROSCOPY/SWALLOW: CPT | Mod: GN

## 2023-02-01 NOTE — PROCEDURES
02/01/23 Video swallow study was completed. Please refer to the imaging section for full report and recommendations.        Results for orders placed during the hospital encounter of 02/01/23    FLUOROSCOPY VIDEO SWALLOW WITH SPEECH (Preliminary)  This result has not been signed. Information might be incomplete.    Impression  Oral stage with mild deficits characterized by inconsistent posterior bolus loss  with both thin and mildly thick liquids. Slow but functional oral transfer, with  piecemeal transfer and lingual pumping observed at times. Timely and complete  mastication. No significant oral retention. Mild to moderate pharyngeal stage  deficits characterized by decreased pharyngeal sensation resulting in delayed  swallowing onset. This, combined with posterior bolus loss in the oral stage,  led to penetration without aspiration with both mildly thick and thin liquids.  Majority of penetrated material cleared from the laryngeal vestibule; however,  there was trace material retained in the laryngeal vestibule after the swallow  inconsistently as outlined above. Cued cough and re-swallow to clear penetrated  material from the laryngeal vestibule. Adequate pharyngeal contraction, with no  significant pharyngeal retention observed. Esophageal screening with both  liquids and solids revealed a tortuous esophagus with retained material  throughout, with bolus backflow and tertiary contractions observed. Liquids were  inconsistently observed to regurgitate from the esophagus into the pyriform  sinus; this was generally mild and cleared with a dry swallow. Full esophageal  clearance was not achieved by the completion of the swallowing study. This  appears consistent with presbyesophagus.    Eduin Souza M.D. and Liz Cisneros MS, CCC-SLP were present for this examination.    The undersigned radiologist agrees only with the radiographic findings.  Specific swallowing recommendations are solely the purview of the  [facility].  Eduin Souza M.D.

## 2023-03-09 ENCOUNTER — TRANSCRIBE ORDERS (OUTPATIENT)
Dept: SCHEDULING | Age: 88
End: 2023-03-09

## 2023-03-09 DIAGNOSIS — N39.0 URINARY TRACT INFECTION, SITE NOT SPECIFIED: Primary | ICD-10-CM

## 2023-03-20 ENCOUNTER — HOSPITAL ENCOUNTER (OUTPATIENT)
Dept: RADIOLOGY | Facility: HOSPITAL | Age: 88
Discharge: HOME | End: 2023-03-20
Attending: NURSE PRACTITIONER
Payer: MEDICARE

## 2023-03-20 DIAGNOSIS — N39.0 URINARY TRACT INFECTION, SITE NOT SPECIFIED: ICD-10-CM

## 2023-03-20 PROCEDURE — 76770 US EXAM ABDO BACK WALL COMP: CPT

## 2023-04-18 ENCOUNTER — TELEPHONE (OUTPATIENT)
Dept: NEUROSURGERY | Facility: CLINIC | Age: 88
End: 2023-04-18
Payer: MEDICARE

## 2023-04-18 DIAGNOSIS — M85.88 OSTEOPENIA OF SPINE: ICD-10-CM

## 2023-04-18 DIAGNOSIS — S32.010K COMPRESSION FRACTURE OF L1 VERTEBRA WITH NONUNION, SUBSEQUENT ENCOUNTER: Primary | ICD-10-CM

## 2023-04-18 NOTE — TELEPHONE ENCOUNTER
Spoke with [facility] .  She has persistent low back pain radiating into the left leg which is largely unchanged from previous.  We last saw her in October of 2021 and recommended surgery pending results of PRISCILA and DEXA scan but patient never followed through with surgery.  Her pain is largely the same but given the elapsed time we should re-examine the L1 fracture.  Have ordered MRI Lumbar and DEXA scan to evaluate.  Our office will help schedule and then will bring in for appt after imaging completed.

## 2023-05-03 ENCOUNTER — HOSPITAL ENCOUNTER (OUTPATIENT)
Dept: RADIOLOGY | Facility: HOSPITAL | Age: 88
Discharge: HOME | End: 2023-05-03
Attending: PHYSICIAN ASSISTANT
Payer: MEDICARE

## 2023-05-03 DIAGNOSIS — S32.010K COMPRESSION FRACTURE OF L1 VERTEBRA WITH NONUNION, SUBSEQUENT ENCOUNTER: ICD-10-CM

## 2023-05-03 DIAGNOSIS — M85.88 OSTEOPENIA OF SPINE: ICD-10-CM

## 2023-05-03 PROCEDURE — 77080 DXA BONE DENSITY AXIAL: CPT

## 2023-05-08 ENCOUNTER — OFFICE VISIT (OUTPATIENT)
Dept: NEUROSURGERY | Facility: CLINIC | Age: 88
End: 2023-05-08
Payer: MEDICARE

## 2023-05-08 VITALS
HEIGHT: 66 IN | WEIGHT: 150 LBS | OXYGEN SATURATION: 98 % | DIASTOLIC BLOOD PRESSURE: 70 MMHG | RESPIRATION RATE: 16 BRPM | SYSTOLIC BLOOD PRESSURE: 114 MMHG | HEART RATE: 88 BPM | TEMPERATURE: 97.1 F | BODY MASS INDEX: 24.11 KG/M2

## 2023-05-08 DIAGNOSIS — G89.29 CHRONIC RIGHT-SIDED LOW BACK PAIN WITH RIGHT-SIDED SCIATICA: ICD-10-CM

## 2023-05-08 DIAGNOSIS — M54.41 CHRONIC RIGHT-SIDED LOW BACK PAIN WITH RIGHT-SIDED SCIATICA: ICD-10-CM

## 2023-05-08 DIAGNOSIS — S32.010K COMPRESSION FRACTURE OF L1 VERTEBRA WITH NONUNION, SUBSEQUENT ENCOUNTER: Primary | ICD-10-CM

## 2023-05-08 PROCEDURE — 99214 OFFICE O/P EST MOD 30 MIN: CPT | Performed by: NEUROLOGICAL SURGERY

## 2023-05-08 RX ORDER — TALC
9 POWDER (GRAM) TOPICAL NIGHTLY
COMMUNITY

## 2023-05-08 NOTE — PROGRESS NOTES
[facility]  Medical Office [address], [address]  [place] [address]  Phone: [phone]  Fax: [phone]      Patient ID: Mariya Porras                              : 1935    Visit Date: 2023    Referring Provider: No ref. provider found    Chief Complaint / History of Present Illness:   Mariya Porras is a pleasant 87 y.o. female history of Afib on eliquis, parkinson's disease, L1 fracture seen today in follow up for L1 fracture.  Mrs. Porras suffered a fall in 2020 and suffered a severe L1 compression fracture.  This left her with severe back pain and she was seen in our office in December with MRI.  We discussed surgical intervention with patient and family but she would require corpectomy and fusion so family elected to proceed with conservative management.  She was treated with a brace but dealt with persistent back pain and RLE pain.  We discussed her care via telemedicine appts in 2021 and at that time family definitively decided they did not want to pursue surgical intervention.    In April of this year Mrs. Porras called our office regarding her back and RLE radicular pain.  She felt things had worsened and was tired of largely living her life wheelchair bound.  We ordered follow up MRI and she presents today to discuss.      Today she reports her pain is essentially stable over the past year or so.  She has pain predominately at the lumbosacral junction over the right SI joint.  This pain radiates into the right buttock, posterior lateral thigh to the lateral aspect of the knee with some numbness in the lateral calf and foot.    MRI completed prior to her appt shows worsening vertebra planum deformity with increased mass effect on the distal cord.  She has stable numbness in the feet, predating her fracture and presumed peripheral neuropathy.  No new weakness.  She is only able to ambulate for short periods  with a walker due to pain.  She is largely confined to a wheelchair.  No new bowel/bladder dysfunction or saddle anesthesia.    Allergies:  Allergies   Allergen Reactions   • Codeine Hives      n/v   • Diazepam Other (see comments)     Blurred vision   • Sulfa (Sulfonamide Antibiotics) Hives   • Sulfur Hives       Medications:     Current Outpatient Medications:   •  acetaminophen (TYLENOL) 500 mg tablet, Take 1,000 mg by mouth 3 (three) times a day., Disp: , Rfl:   •  apixaban (ELIQUIS) 5 mg tablet, Take 2.5 mg by mouth 2 (two) times a day., Disp: , Rfl:   •  bimatoprost (LUMIGAN) 0.01 % ophthalmic drops, Administer 1 drop into both eyes nightly., Disp: , Rfl:   •  carbidopa-levodopa  mg per tablet, Take 1 tablet by mouth 4 (four) times a day. 0900, 1300, 1700, 2100 , Disp: , Rfl:   •  docusate sodium (COLACE) 100 mg capsule, Take 100 mg by mouth daily., Disp: , Rfl:   •  famotidine (PEPCID) 20 mg tablet, Take 20 mg by mouth daily., Disp: , Rfl:   •  gabapentin (NEURONTIN) 300 mg capsule, Take 300 mg by mouth 3 (three) times a day., Disp: , Rfl:   •  hydrALAZINE 25 mg tablet, Take 50 mg by mouth 3 (three) times a day., Disp: , Rfl:   •  lisinopriL 20 mg tablet, Take 20 mg by mouth daily., Disp: , Rfl:   •  MELATONIN ORAL, Take by mouth., Disp: , Rfl:   •  METHENAMINE MANDELATE ORAL, Take by mouth., Disp: , Rfl:   •  mirtazapine 30 mg tablet, Take 15 mg by mouth nightly., Disp: , Rfl:   •  sertraline 50 mg tablet, Take 50 mg by mouth 2 (two) times a day. 0900, 1700 , Disp: , Rfl:   •  topiramate (TOPAMAX) 25 mg tablet, Take 25 mg by mouth 2 (two) times a day., Disp: , Rfl:   •  vitamin E acetate (VITAMIN E ORAL), Take by mouth., Disp: , Rfl:      Past Medical History:   Past Medical History:   Diagnosis Date   • Anxiety    • Arthritis    • Basal cell carcinoma     forehead   • Glaucoma    • Hypertension    • Low back pain    • Lumbosacral disc disease    • Parkinson's disease (CMS/HCC)    • Peripheral  neuropathy        Past Surgical History:   Past Surgical History:   Procedure Laterality Date   • BLADDER SUSPENSION  2009    Prolift M   • CATARACT EXTRACTION, BILATERAL     • COLONOSCOPY     • HYSTERECTOMY     • JOINT REPLACEMENT     • KNEE ARTHROPLASTY  2015    left       Family History:  Family History   Problem Relation Age of Onset   • No Known Problems Biological Mother    • No Known Problems Biological Father        Social History:  Social History     Socioeconomic History   • Marital status:      Spouse name: Not on file   • Number of children: Not on file   • Years of education: Not on file   • Highest education level: Not on file   Occupational History   • Not on file   Tobacco Use   • Smoking status: Former   • Smokeless tobacco: Never   • Tobacco comments:     as a young teenager   Vaping Use   • Vaping status: Not on file   Substance and Sexual Activity   • Alcohol use: Yes     Comment: wine   • Drug use: No   • Sexual activity: Not Currently     Partners: Male   Other Topics Concern   • Not on file   Social History Narrative   • Not on file     Social Determinants of Health     Financial Resource Strain: Not on file   Food Insecurity: No Food Insecurity (8/16/2022)    Hunger Vital Sign    • Worried About Running Out of Food in the Last Year: Never true    • Ran Out of Food in the Last Year: Never true   Transportation Needs: Not on file   Physical Activity: Not on file   Stress: Not on file   Social Connections: Not on file   Intimate Partner Violence: Not on file   Housing Stability: Not on file       Vitals:   Vitals:    05/08/23 1129   BP: 114/70   Pulse: 88   Resp: 16   Temp: 36.2 °C (97.1 °F)   SpO2: 98%       Review of Systems:  A complete 14 point review of systems was conducted and was deemed negative except what was documented in the HPI.    Physical Examination:    Vitals reviewed.    Neurological Examination:  Neurologic Exam     Mental Status   Oriented to person, place, and time.    Attention: normal.   Speech: speech is normal   Level of consciousness: alert    Cranial Nerves     Pupils round, equal and reactive to light  EOMIs intact, visual fields are full  Face symmetric to smile  Facial sensation intact  Tongue protrudes in the midline    Sensation  Numbness/tingling bilateral feet to the ankle, chronic    Motor:     Deltoid Biceps Triceps Wrist ext Finger ext Hand Intrinsics Hip flexion Knee ext Dorsi-  flexion EHL Plantar Flexion   R 5 5 5 5 5 5 5- 5 5 5 5   L 5 5 5 5 5 5 5- 5 5 5 5     There is no pronator drift. Muscle bulk and tone are normal.     Gait, Coordination, and Reflexes     Reflexes   Reflexes 1+ except as noted.   Right ankle clonus: absent  Left ankle clonus: absent    Sitting straight leg raise is negative bilaterally  Slump test is negative bilaterally  Hip scour is negative bilaterally  SI compression test is mildly positive  Positive Dunia sign on the right  She has some tenderness to palpation along the region of the greater trochanter on the right        Data Review:    Lab Results:   Lab Results   Component Value Date    WBC 7.23 08/16/2022    HGB 14.7 08/16/2022    HCT 44.8 08/16/2022    MCV 96.8 08/16/2022     08/16/2022    RDW 13.2 08/16/2022      Lab Results   Component Value Date    GLUCOSE 114 (H) 08/16/2022    BUN 25 (H) 08/16/2022     08/16/2022    K 4.0 08/16/2022     08/16/2022    CO2 25 08/16/2022    ANIONGAP 5 08/16/2022        Imaging:   Independent review of all imaging was done by myself as well as review of the radiologists readings and comparison to prior films.     MRI Lumbar:  IMPRESSION:  1.  Chronic severe compression fracture of L1 with vertebral plana morphology  and severe posterior bony retropulsion of the posterior L1 vertebral body.  This  has demonstrated some worsening compared to 2021 with increased compression of  distal cord possibly with some slight distal cord edema.  2.  Additional multilevel  discogenic/degenerative disease as detailed below     DEXA: AP lumbar spine T-score       L1-L4                         0.8  Corresponding bone mineral density:       1.291 g/cm2.  There has been no significant change in the lumbar spine since the previous  study.     AP left hip lowest T-score:    Femoral neck      -2.2  Corresponding bone mineral density:       0.738 g/cm2.  There has been 14.0% worsening in the total hip since the previous study.     AP right hip lowest T-score:  Femoral neck      -2.3  Corresponding bone mineral density:       0.712 g/cm2.  There has been 8.8% worsening in the total hip since the previous study.    Assessment / Plan: In summary, Mariya Porras is a 87 y.o. female seen today in follow up for severe L1 fracture with vertebra planum deformity.  It is approximately 2 and half years since she has developed the fracture which is shown only minimal change over the last 20 months or so.  He does not seem to have any new neurological deficits despite some increasing pressure on the conus region.  Importantly her pain seems quite distance from the L1 fracture as it is localized really adjacent to approximately S2 at the SI joint.  Putting aside the L1 fracture she does have some mild multilevel degenerative changes in the lumbar spine including significant loss of disc height at L3-L4 and L5-S1 but there is relatively little root compression below the L1 level.    I spoke to the patient as well as her  and her daughter regarding the radiographic findings and the clinical significance.  While there certainly is some root compression at the level of the conus from the L1 compression fracture as previously stated, not entirely convinced that this fracture which is now more than 2 and half years old is a source of her abiding pain which much more focally localized to the SI joint.  She has tried a host of different treatments in the past with minimal benefit.  I do not have all of  Dr. Enriquez's records but have suggested to the family they discussed possible SI diagnostic/therapeutic injection.  As for the role of surgical intervention I explained that if she were to develop any new neurological deficits associated with the L1 compression fracture that would certainly be an indication for surgical intervention.  It is not clear to me though if a corpectomy and a thoracolumbar fusion will significantly improve her situation given that the specific origin the nature of pain is still somewhat unclear but appears to be more localized to the lumbar sacral region.  Certainly her positive sagittal balance may be contributing to pain at the lumbosacral junction but given the very focal nature of pain I do not think this is the entire issue.  Again I would endorse investigative injections into the right SI joint.  Other options could include aqua therapy although patient may have issues with transportation.  Finally we discussed spinal cord stimulator.  The underlying principles and effectiveness of the stimulator were discussed.  They will discuss with Dr. Braswell whether a spinal cord stimulator trial is appropriate.  They can follow-up with me for permanent placement if they ultimately opt to move forward with the stimulator and has been demonstrated to be effective.          Quang Zamora MD     I spent 30 minutes on this date of service performing the following activities: obtaining history, performing examination, documenting, preparing for visit, obtaining / reviewing records, providing counseling and education, independently reviewing study/studies and communicating results.

## 2023-09-22 ENCOUNTER — HOSPITAL ENCOUNTER (INPATIENT)
Facility: HOSPITAL | Age: 88
LOS: 4 days | Discharge: NURSING, REHAB OR LONG-TERM CARE | DRG: 312 | End: 2023-09-27
Attending: EMERGENCY MEDICINE | Admitting: INTERNAL MEDICINE
Payer: MEDICARE

## 2023-09-22 ENCOUNTER — APPOINTMENT (EMERGENCY)
Dept: RADIOLOGY | Facility: HOSPITAL | Age: 88
DRG: 312 | End: 2023-09-22
Payer: MEDICARE

## 2023-09-22 DIAGNOSIS — R40.4 UNRESPONSIVE EPISODE: ICD-10-CM

## 2023-09-22 DIAGNOSIS — S32.011A CLOSED STABLE BURST FRACTURE OF FIRST LUMBAR VERTEBRA, INITIAL ENCOUNTER (CMS/HCC): ICD-10-CM

## 2023-09-22 DIAGNOSIS — R55 SYNCOPE, UNSPECIFIED SYNCOPE TYPE: Primary | ICD-10-CM

## 2023-09-22 DIAGNOSIS — R55 SYNCOPE WITH NORMAL NEUROLOGIC EXAMINATION: ICD-10-CM

## 2023-09-22 DIAGNOSIS — M25.551 PAIN OF RIGHT HIP: ICD-10-CM

## 2023-09-22 PROBLEM — K21.9 GASTROESOPHAGEAL REFLUX DISEASE WITHOUT ESOPHAGITIS: Status: ACTIVE | Noted: 2023-09-22

## 2023-09-22 PROBLEM — Z86.718 HISTORY OF VENOUS THROMBOEMBOLISM: Status: ACTIVE | Noted: 2023-09-22

## 2023-09-22 PROBLEM — Z87.440 HISTORY OF UTI: Status: ACTIVE | Noted: 2023-09-22

## 2023-09-22 PROBLEM — F32.A DEPRESSION: Status: ACTIVE | Noted: 2023-09-22

## 2023-09-22 PROBLEM — G40.909 SEIZURE DISORDER (CMS/HCC): Status: ACTIVE | Noted: 2023-09-22

## 2023-09-22 LAB
ALBUMIN SERPL-MCNC: 4.3 G/DL (ref 3.5–5.7)
ALP SERPL-CCNC: 34 IU/L (ref 34–125)
ALT SERPL-CCNC: 3 IU/L (ref 7–52)
ANION GAP SERPL CALC-SCNC: 5 MEQ/L (ref 3–15)
AST SERPL-CCNC: 10 IU/L (ref 13–39)
BASOPHILS # BLD: 0.05 K/UL (ref 0.01–0.1)
BASOPHILS NFR BLD: 0.7 %
BILIRUB SERPL-MCNC: 0.6 MG/DL (ref 0.3–1.2)
BUN SERPL-MCNC: 22 MG/DL (ref 7–25)
CALCIUM SERPL-MCNC: 10.5 MG/DL (ref 8.6–10.3)
CHLORIDE SERPL-SCNC: 104 MEQ/L (ref 98–107)
CO2 SERPL-SCNC: 25 MEQ/L (ref 21–31)
CREAT SERPL-MCNC: 0.7 MG/DL (ref 0.6–1.2)
DIFFERENTIAL METHOD BLD: ABNORMAL
EOSINOPHIL # BLD: 0.11 K/UL (ref 0.04–0.36)
EOSINOPHIL NFR BLD: 1.5 %
ERYTHROCYTE [DISTWIDTH] IN BLOOD BY AUTOMATED COUNT: 14.2 % (ref 11.7–14.4)
GFR SERPL CREATININE-BSD FRML MDRD: >60 ML/MIN/1.73M*2
GLUCOSE BLD-MCNC: 103 MG/DL (ref 70–99)
GLUCOSE SERPL-MCNC: 107 MG/DL (ref 70–99)
HCT VFR BLDCO AUTO: 45.2 % (ref 35–45)
HGB BLD-MCNC: 14.7 G/DL (ref 11.8–15.7)
IMM GRANULOCYTES # BLD AUTO: 0.02 K/UL (ref 0–0.08)
IMM GRANULOCYTES NFR BLD AUTO: 0.3 %
LACTATE SERPL-SCNC: 1.4 MMOL/L (ref 0.4–2)
LYMPHOCYTES # BLD: 1.68 K/UL (ref 1.2–3.5)
LYMPHOCYTES NFR BLD: 23 %
MAGNESIUM SERPL-MCNC: 2.1 MG/DL (ref 1.8–2.5)
MCH RBC QN AUTO: 32 PG (ref 28–33.2)
MCHC RBC AUTO-ENTMCNC: 32.5 G/DL (ref 32.2–35.5)
MCV RBC AUTO: 98.3 FL (ref 83–98)
MONOCYTES # BLD: 0.36 K/UL (ref 0.28–0.8)
MONOCYTES NFR BLD: 4.9 %
NEUTROPHILS # BLD: 5.1 K/UL (ref 1.7–7)
NEUTS SEG NFR BLD: 69.6 %
NRBC BLD-RTO: 0 %
PDW BLD AUTO: 9 FL (ref 9.4–12.3)
PLATELET # BLD AUTO: 219 K/UL (ref 150–369)
POCT TEST: ABNORMAL
POTASSIUM SERPL-SCNC: 4.3 MEQ/L (ref 3.5–5.1)
PROT SERPL-MCNC: 7.1 G/DL (ref 6–8.2)
RBC # BLD AUTO: 4.6 M/UL (ref 3.93–5.22)
SODIUM SERPL-SCNC: 134 MEQ/L (ref 136–145)
TROPONIN I SERPL HS-MCNC: 6.9 PG/ML
TROPONIN I SERPL HS-MCNC: 7.4 PG/ML
WBC # BLD AUTO: 7.32 K/UL (ref 3.8–10.5)

## 2023-09-22 PROCEDURE — 36415 COLL VENOUS BLD VENIPUNCTURE: CPT | Performed by: PHYSICIAN ASSISTANT

## 2023-09-22 PROCEDURE — 84484 ASSAY OF TROPONIN QUANT: CPT | Performed by: PHYSICIAN ASSISTANT

## 2023-09-22 PROCEDURE — G1004 CDSM NDSC: HCPCS

## 2023-09-22 PROCEDURE — 83605 ASSAY OF LACTIC ACID: CPT | Performed by: PHYSICIAN ASSISTANT

## 2023-09-22 PROCEDURE — G0378 HOSPITAL OBSERVATION PER HR: HCPCS

## 2023-09-22 PROCEDURE — 71045 X-RAY EXAM CHEST 1 VIEW: CPT

## 2023-09-22 PROCEDURE — 80201 ASSAY OF TOPIRAMATE: CPT | Performed by: PHYSICIAN ASSISTANT

## 2023-09-22 PROCEDURE — 93005 ELECTROCARDIOGRAM TRACING: CPT | Performed by: EMERGENCY MEDICINE

## 2023-09-22 PROCEDURE — 83735 ASSAY OF MAGNESIUM: CPT | Performed by: PHYSICIAN ASSISTANT

## 2023-09-22 PROCEDURE — 85025 COMPLETE CBC W/AUTO DIFF WBC: CPT | Performed by: PHYSICIAN ASSISTANT

## 2023-09-22 PROCEDURE — 84484 ASSAY OF TROPONIN QUANT: CPT | Mod: 91 | Performed by: PHYSICIAN ASSISTANT

## 2023-09-22 PROCEDURE — 99285 EMERGENCY DEPT VISIT HI MDM: CPT | Mod: 25

## 2023-09-22 PROCEDURE — 63700000 HC SELF-ADMINISTRABLE DRUG: Performed by: INTERNAL MEDICINE

## 2023-09-22 PROCEDURE — 99222 1ST HOSP IP/OBS MODERATE 55: CPT | Performed by: INTERNAL MEDICINE

## 2023-09-22 PROCEDURE — 80053 COMPREHEN METABOLIC PANEL: CPT | Performed by: PHYSICIAN ASSISTANT

## 2023-09-22 RX ORDER — LISINOPRIL 20 MG/1
20 TABLET ORAL DAILY
Status: DISCONTINUED | OUTPATIENT
Start: 2023-09-23 | End: 2023-09-24

## 2023-09-22 RX ORDER — CYANOCOBALAMIN (VITAMIN B-12) 500 MCG
3 TABLET ORAL NIGHTLY
Status: DISCONTINUED | OUTPATIENT
Start: 2023-09-22 | End: 2023-09-27 | Stop reason: HOSPADM

## 2023-09-22 RX ORDER — DOCUSATE SODIUM 100 MG/1
100 CAPSULE, LIQUID FILLED ORAL DAILY
Status: DISCONTINUED | OUTPATIENT
Start: 2023-09-22 | End: 2023-09-27 | Stop reason: HOSPADM

## 2023-09-22 RX ORDER — POLYETHYLENE GLYCOL 3350 17 G/17G
8.5 POWDER, FOR SOLUTION ORAL DAILY PRN
COMMUNITY
End: 2023-09-27 | Stop reason: HOSPADM

## 2023-09-22 RX ORDER — CARBIDOPA AND LEVODOPA 25; 100 MG/1; MG/1
1 TABLET ORAL 4 TIMES DAILY
Status: DISCONTINUED | OUTPATIENT
Start: 2023-09-22 | End: 2023-09-27 | Stop reason: HOSPADM

## 2023-09-22 RX ORDER — FAMOTIDINE 20 MG/1
20 TABLET, FILM COATED ORAL DAILY
Status: DISCONTINUED | OUTPATIENT
Start: 2023-09-22 | End: 2023-09-27 | Stop reason: HOSPADM

## 2023-09-22 RX ORDER — NITROFURANTOIN 25; 75 MG/1; MG/1
100 CAPSULE ORAL
COMMUNITY

## 2023-09-22 RX ORDER — SENNOSIDES 8.6 MG/1
2 TABLET ORAL
COMMUNITY

## 2023-09-22 RX ORDER — NITROFURANTOIN 25; 75 MG/1; MG/1
100 CAPSULE ORAL
Status: DISCONTINUED | OUTPATIENT
Start: 2023-09-22 | End: 2023-09-27 | Stop reason: HOSPADM

## 2023-09-22 RX ORDER — HYDRALAZINE HYDROCHLORIDE 50 MG/1
50 TABLET, FILM COATED ORAL 3 TIMES DAILY
Status: DISCONTINUED | OUTPATIENT
Start: 2023-09-22 | End: 2023-09-24

## 2023-09-22 RX ORDER — ACETAMINOPHEN 325 MG/1
650 TABLET ORAL EVERY 4 HOURS PRN
Status: DISCONTINUED | OUTPATIENT
Start: 2023-09-22 | End: 2023-09-22 | Stop reason: SDUPTHER

## 2023-09-22 RX ORDER — SENNOSIDES 8.6 MG/1
2 TABLET ORAL
Status: DISCONTINUED | OUTPATIENT
Start: 2023-09-22 | End: 2023-09-27 | Stop reason: HOSPADM

## 2023-09-22 RX ORDER — ESTRADIOL 0.1 MG/G
CREAM VAGINAL 2 TIMES WEEKLY
COMMUNITY
End: 2023-11-24 | Stop reason: ALTCHOICE

## 2023-09-22 RX ORDER — MIRTAZAPINE 15 MG/1
30 TABLET, FILM COATED ORAL NIGHTLY
Status: DISCONTINUED | OUTPATIENT
Start: 2023-09-22 | End: 2023-09-27 | Stop reason: HOSPADM

## 2023-09-22 RX ORDER — GABAPENTIN 300 MG/1
300 CAPSULE ORAL 3 TIMES DAILY
Status: DISCONTINUED | OUTPATIENT
Start: 2023-09-22 | End: 2023-09-24

## 2023-09-22 RX ORDER — SERTRALINE HYDROCHLORIDE 50 MG/1
50 TABLET, FILM COATED ORAL 2 TIMES DAILY
Status: DISCONTINUED | OUTPATIENT
Start: 2023-09-22 | End: 2023-09-27 | Stop reason: HOSPADM

## 2023-09-22 RX ORDER — IBUPROFEN 200 MG
16-32 TABLET ORAL AS NEEDED
Status: DISCONTINUED | OUTPATIENT
Start: 2023-09-22 | End: 2023-09-27 | Stop reason: HOSPADM

## 2023-09-22 RX ORDER — ACETAMINOPHEN 650 MG/20.3ML
650 LIQUID ORAL EVERY 4 HOURS PRN
Status: DISCONTINUED | OUTPATIENT
Start: 2023-09-22 | End: 2023-09-22 | Stop reason: SDUPTHER

## 2023-09-22 RX ORDER — DEXTROSE 50 % IN WATER (D50W) INTRAVENOUS SYRINGE
25 AS NEEDED
Status: DISCONTINUED | OUTPATIENT
Start: 2023-09-22 | End: 2023-09-27 | Stop reason: HOSPADM

## 2023-09-22 RX ORDER — ACETAMINOPHEN 650 MG/1
650 SUPPOSITORY RECTAL EVERY 4 HOURS PRN
Status: DISCONTINUED | OUTPATIENT
Start: 2023-09-22 | End: 2023-09-22 | Stop reason: SDUPTHER

## 2023-09-22 RX ORDER — ACETAMINOPHEN 325 MG/1
975 TABLET ORAL 2 TIMES DAILY
Status: DISCONTINUED | OUTPATIENT
Start: 2023-09-22 | End: 2023-09-24

## 2023-09-22 RX ORDER — SERTRALINE HYDROCHLORIDE 50 MG/1
50 TABLET, FILM COATED ORAL DAILY
Status: DISCONTINUED | OUTPATIENT
Start: 2023-09-22 | End: 2023-09-22

## 2023-09-22 RX ORDER — TOPIRAMATE 25 MG/1
25 TABLET ORAL 2 TIMES DAILY
Status: DISCONTINUED | OUTPATIENT
Start: 2023-09-22 | End: 2023-09-27 | Stop reason: HOSPADM

## 2023-09-22 RX ORDER — DEXTROSE 40 %
15-30 GEL (GRAM) ORAL AS NEEDED
Status: DISCONTINUED | OUTPATIENT
Start: 2023-09-22 | End: 2023-09-27 | Stop reason: HOSPADM

## 2023-09-22 RX ORDER — ACETAMINOPHEN 500 MG
1000 TABLET ORAL 2 TIMES DAILY
Status: DISCONTINUED | OUTPATIENT
Start: 2023-09-22 | End: 2023-09-22

## 2023-09-22 RX ADMIN — SERTRALINE HYDROCHLORIDE 50 MG: 50 TABLET ORAL at 22:17

## 2023-09-22 RX ADMIN — ACETAMINOPHEN 975 MG: 325 TABLET ORAL at 19:07

## 2023-09-22 RX ADMIN — FAMOTIDINE 20 MG: 20 TABLET ORAL at 19:09

## 2023-09-22 RX ADMIN — NITROFURANTOIN (MONOHYDRATE/MACROCRYSTALS) 100 MG: 75; 25 CAPSULE ORAL at 19:08

## 2023-09-22 RX ADMIN — MIRTAZAPINE 30 MG: 15 TABLET, FILM COATED ORAL at 22:18

## 2023-09-22 RX ADMIN — TOPIRAMATE 25 MG: 25 TABLET, FILM COATED ORAL at 19:08

## 2023-09-22 RX ADMIN — HYDRALAZINE HYDROCHLORIDE 50 MG: 50 TABLET ORAL at 19:09

## 2023-09-22 RX ADMIN — DOCUSATE SODIUM 100 MG: 100 CAPSULE, LIQUID FILLED ORAL at 19:08

## 2023-09-22 RX ADMIN — SENNOSIDES 2 TABLET: 8.6 TABLET, FILM COATED ORAL at 19:08

## 2023-09-22 RX ADMIN — GABAPENTIN 300 MG: 300 CAPSULE ORAL at 19:08

## 2023-09-22 RX ADMIN — Medication 3 MG: at 22:17

## 2023-09-22 RX ADMIN — APIXABAN 2.5 MG: 2.5 TABLET, FILM COATED ORAL at 19:09

## 2023-09-22 RX ADMIN — CARBIDOPA AND LEVODOPA 1 TABLET: 25; 100 TABLET ORAL at 22:18

## 2023-09-22 RX ADMIN — CARBIDOPA AND LEVODOPA 1 TABLET: 25; 100 TABLET ORAL at 19:08

## 2023-09-22 ASSESSMENT — ENCOUNTER SYMPTOMS
DYSURIA: 0
RESPIRATORY NEGATIVE: 1
FACIAL ASYMMETRY: 0
NECK STIFFNESS: 0
NUMBNESS: 0
GASTROINTESTINAL NEGATIVE: 1
FEVER: 0
HEMATURIA: 0
MUSCULOSKELETAL NEGATIVE: 1
COUGH: 0
DIZZINESS: 0
FATIGUE: 0
PSYCHIATRIC NEGATIVE: 1
TROUBLE SWALLOWING: 0
CHEST TIGHTNESS: 0
CONSTITUTIONAL NEGATIVE: 1
HEADACHES: 0
SPEECH DIFFICULTY: 0
TREMORS: 0
BACK PAIN: 0
SEIZURES: 0
LIGHT-HEADEDNESS: 0
CHILLS: 0
WOUND: 0
SHORTNESS OF BREATH: 0
VOMITING: 0
ABDOMINAL PAIN: 0
CARDIOVASCULAR NEGATIVE: 1
FACIAL SWELLING: 0
EYES NEGATIVE: 1
NAUSEA: 0
WEAKNESS: 0
PALPITATIONS: 0
FLANK PAIN: 0
NECK PAIN: 0

## 2023-09-22 ASSESSMENT — COGNITIVE AND FUNCTIONAL STATUS - GENERAL
MOVING TO AND FROM BED TO CHAIR: 2 - A LOT
WALKING IN HOSPITAL ROOM: 1 - TOTAL
CLIMB 3 TO 5 STEPS WITH RAILING: 1 - TOTAL
STANDING UP FROM CHAIR USING ARMS: 2 - A LOT

## 2023-09-22 NOTE — ASSESSMENT & PLAN NOTE
Patient history of UTI with no active UTI related symptoms  Continue nitrofurantoin for prophylaxis

## 2023-09-22 NOTE — ED ATTESTATION NOTE
Procedures  Physical Exam  Review of Systems    9/22/20231:25 PM  I have personally seen and examined the patient.  I personally performed the key   components of the encounter and provided a substantive portion of the care and   medical decision making for this patient.     I have reviewed and agree with the PA/NP/Resident's assessment and ED plan of care, with any exceptions as documented below.  My examination, assessment and plan of care of Mariya Porras is as follows:    Patient is a 88-year-old female who presents after a syncopal event, patient was reportedly eating lunch with family when she slumped over and would not respond, no seizure activity was reported, patient does have a seizure history by report and is on Topamax, no injuries from the seizure as she was seated at the time and just slumped forward    Exam:   Vital signs have been reviewed, pulse ox is 96% on room air, normal  Heart: RRR, normal S1/S2  Lungs: CTA bilaterally  Abdo: soft, non-tender    Plan/Medical Decision Making: Patient is a 88-year-old female who presents after a syncopal event, checking labs and imaging, reevaluate afterwards      This document was created using Dragon Dictation software. There might be some typographical errors due to this technology.          Godshall, Duane K, MD  09/22/23 [id number]

## 2023-09-22 NOTE — ASSESSMENT & PLAN NOTE
Patient has history of depression.  No actively suicidal ideation thoughts or plans  Continue sertraline, mirtazapine

## 2023-09-22 NOTE — ASSESSMENT & PLAN NOTE
Chronic and stable.  Continue current home medications for blood pressure  Continue hydralazine, lisinopril      Patient orthostatic and blood pressure 90/60 sitting as per nursing.  Will decrease hydralazine to 50 mg p.o. twice daily.

## 2023-09-22 NOTE — H&P
Hospital Medicine Service -  History & Physical        CHIEF COMPLAINT     Loss of consciousness/ syncope     HISTORY OF PRESENT ILLNESS      88 y.o. female with a past medical history of Parkinson's disease, DVT and PE on Eliquis, history of syncope twice this year, was eating lunch with family and suddenly lost consciousness for almost 5 minutes.  Patient's son tried to wake up but was unable to wake her up.  There was no reported seizure activity, no bladder or bowel incontinence or tongue bite.  The patient did not complain of any focal neurological complaints.  No postictal confusion.  No chest pain or shortness of breath.  Did not feel dizzy or lightheaded.  911 was called and patient was brought to the ER.  Her CT head was negative for any acute stroke or bleeding.  Troponins were negative.  EKG did not show any acute ischemic changes. Electrolytes did not show any significant abnormalities.  There have been no changes in medications recently over the last 30 days and she has been taking all her medications . She also has a history of seizures and takes Topamax.  Her last seizure was 3 to 4 months ago but today's episode did not seem like a seizure to her.  She denies any headache or visual changes.   No aura.  No palpitations or diaphoresis.  No fever.  No urinary complaints.  No hematuria or black stools.  No epigastric pain.  No nausea vomiting or diarrhea or abdominal pain.  Currently she does not have any active symptoms but because this is her third episode this year, she is being admitted for further work-up        PAST MEDICAL AND SURGICAL HISTORY      Past Medical History:   Diagnosis Date    Anxiety     Arthritis     Basal cell carcinoma     forehead    Glaucoma     Hypertension     Low back pain     Lumbosacral disc disease     Parkinson's disease (CMS/HCC)     Peripheral neuropathy     Seizures (CMS/HCC)        Past Surgical History:   Procedure Laterality Date    BLADDER SUSPENSION   2009    Prolift M    CATARACT EXTRACTION, BILATERAL      COLONOSCOPY      HYSTERECTOMY      JOINT REPLACEMENT      KNEE ARTHROPLASTY  2015    left       MEDICATIONS      Prior to Admission medications    Medication Sig Start Date End Date Taking? Authorizing Provider   acetaminophen (TYLENOL) 500 mg tablet Take 1,000 mg by mouth 2 (two) times a day. 2 tablets. Lunch and dinner   Yes Alexandra Birmingham MD   apixaban (ELIQUIS) 2.5 mg tablet Take 2.5 mg by mouth 2 (two) times a day.   Yes Alexandra Birmingham MD   bimatoprost (LUMIGAN) 0.01 % ophthalmic drops Administer 1 drop into both eyes nightly.   Yes Alexandra Birmingham MD   carbidopa-levodopa  mg per tablet Take 1 tablet by mouth 4 (four) times a day. 0900, 1300, 1700, 2100    Yes Alexandra Birmingham MD   docusate sodium (COLACE) 100 mg capsule Take 100 mg by mouth daily.   Yes Alexandra Birmingham MD   estradioL (ESTRACE) 0.01 % (0.1 mg/gram) vaginal cream Insert into the vagina 2 (two) times a week (Tue, Fri).   Yes Glenna Birmingham MD   famotidine (PEPCID) 20 mg tablet Take 20 mg by mouth daily. 5/20/22  Yes Alexandra Birmingham MD   gabapentin (NEURONTIN) 300 mg capsule Take 300 mg by mouth 3 (three) times a day.   Yes Alexandra Birmingham MD   hydrALAZINE 25 mg tablet Take 50 mg by mouth 3 (three) times a day. 2 tablets   Yes Alexandra Birmingham MD   lisinopriL 20 mg tablet Take 20 mg by mouth daily.   Yes Alexandra Birmingham MD   melatonin 3 mg tablet Take 3 mg by mouth nightly.   Yes Glenna Birmingham MD   mirtazapine 30 mg tablet Take 30 mg by mouth nightly.   Yes Alexandra Birmingham MD   nitrofurantoin, macrocrystal-monohydrate, (MACROBID) 100 mg capsule Take 100 mg by mouth daily before dinner.   Yes Glenna Birmingham MD   polyethylene glycol (MIRALAX) 17 gram/dose powder Take 8.5 g by mouth daily as needed (constipation). 1/2 capful   Yes Glenna Birmingham MD   senna (SENOKOT) 8.6 mg tablet  Take 2 tablets by mouth daily before dinner.   Yes Glenna Birmingham MD   sertraline 50 mg tablet Take 50 mg by mouth 2 (two) times a day. 0900, 1700    Yes Alexandra Birmingham MD   topiramate (TOPAMAX) 25 mg tablet Take 25 mg by mouth 2 (two) times a day.   Yes Alexandra Birmingham MD   METHENAMINE MANDELATE ORAL Take by mouth.  9/22/23  Glenna Birmingham MD   vitamin E acetate (VITAMIN E ORAL) Take by mouth.  9/22/23  Glenna Birmingham MD       ALLERGIES      Codeine, Diazepam, Sulfa (sulfonamide antibiotics), and Sulfur    FAMILY HISTORY      Family History   Problem Relation Age of Onset    No Known Problems Biological Mother     No Known Problems Biological Father         Reviewed family history    SOCIAL HISTORY      Social History     Socioeconomic History    Marital status:      Spouse name: None    Number of children: None    Years of education: None    Highest education level: None   Tobacco Use    Smoking status: Former    Smokeless tobacco: Never    Tobacco comments:     as a young teenager   Substance and Sexual Activity    Alcohol use: Yes     Comment: wine    Drug use: No    Sexual activity: Not Currently     Partners: Male     Social Determinants of Health     Food Insecurity: No Food Insecurity (9/22/2023)    Hunger Vital Sign     Worried About Running Out of Food in the Last Year: Never true     Ran Out of Food in the Last Year: Never true       REVIEW OF SYSTEMS        Review of systems:    Constitutional: Negative for fatigue and unexpected weight change.   Eyes: Negative for visual disturbance. Mouth no sore throat  Respiratory: Negative for cough and shortness of breath.    Cardiovascular: Negative for chest pain and palpitations.   Gastrointestinal: Negative for abdominal pain, constipation, diarrhea, nausea and vomiting.   Genitourinary: Negative for difficulty urinating. no hematuria no frequency no urgency no discharge  Musculoskeletal:  Negative for arthralgias.   Skin: Negative for rash.   Neurological: Negative for dizziness and headaches.  Loss of consciousness and syncope as above  Hematological: Does not bruise/bleed easily.   Psychiatric/Behavioral: Negative for confusion and dysphoric mood no suicidal ideations    Other 10 point review of system was negative except for as stated above.      PHYSICAL EXAMINATION      Temp:  [36.3 °C (97.3 °F)] 36.3 °C (97.3 °F)  Heart Rate:  [76-89] 80  Resp:  [12-22] 22  BP: (119-172)/(63-85) 137/65  Body mass index is 22.98 kg/m².    Physical Exam:    Constitutional: Patient is in no acute distress.   HEENT: External ears appear normal.    Mouth/Throat: Oropharynx is clear   Eyes: Conjunctivae and EOM are normal. Right eye exhibits no discharge. Left eye exhibits no discharge.   Neck: Neck supple. No tracheal deviation present.   Cardiovascular: Normal rate, regular rhythm, +DONATO  Pulmonary/Chest: Effort normal and breath sounds normal. No stridor. No respiratory distress.  has no wheezes.  has no rales.  Abdominal: Soft. Bowel sounds are normal.  No abdominal distention. No tenderness. There is no rebound and no guarding.   Musculoskeletal: No acute significant joint swelling.   Extremity: No lower extremity edema  Neurological: Alert awake oriented x 3, no focal motor or sensory deficits seen.  Good and equal strength in bilateral upper and lower extremities.  No slurred speech.  Able to answer questions appropriately.  Cranial nerves II to XII intact  Skin: Skin is warm and dry      LABS / IMAGING / EKG        Labs : Reviewed  CBC Results       09/22/23 08/16/22 06/07/22     1309 1045 0435    WBC 7.32 7.23 6.25    RBC 4.60 4.63 4.27    HGB 14.7 14.7 13.4    HCT 45.2 44.8 40.3    MCV 98.3 96.8 94.4    MCH 32.0 31.7 31.4    MCHC 32.5 32.8 33.3     211 204        CMP Results       09/22/23 08/16/22 06/07/22     1309 1045 0435     138 136    K 4.3 4.0 4.1    Cl 104 108 104    CO2 25 25 26     Glucose 107 114 90    BUN 22 25 15    Creatinine 0.7 0.9 0.6    Calcium 10.5 10.3 10.1    Anion Gap 5 5 6    AST 10 14 --    ALT 3 7 --    Albumin 4.3 4.0 --    EGFR >60.0 59.4 >60.0         Comment for K at 1309 on 09/22/23: Results obtained on plasma. Plasma Potassium values may be up to 0.4 mEQ/L less than serum values. The differences may be greater for patients with high platelet or white cell counts.    Comment for K at 1045 on 08/16/22: Results obtained on plasma. Plasma Potassium values may be up to 0.4 mEQ/L less than serum values. The differences may be greater for patients with high platelet or white cell counts.    Comment for K at 0435 on 06/07/22: Results obtained on plasma. Plasma Potassium values may be up to 0.4 mEQ/L less than serum values. The differences may be greater for patients with high platelet or white cell counts.          Troponin I Results       09/22/23 09/22/23 08/16/22     1514 1309 1045    HS Troponin I 7.4 6.9 4.6        Microbiology Results     ** No results found for the last 720 hours. **        UA Results       06/07/22     0538    Color Yellow    Clarity Clear    Glucose Negative    Bilirubin Negative    Ketones Negative    Sp Grav 1.010    Blood Negative    Ph 6.5    Protein Negative    Urobilinogen 0.2    Nitrite Negative    Leuk Est Trace    WBC 0 TO 3    RBC 0 TO 4    Bacteria None Seen         Comment for Blood at 0538 on 06/07/22: The sensitivity of the occult blood test is equivalent to approximately 4 intact RBC/HPF.          Imaging  CT HEAD WITHOUT IV CONTRAST   Final Result   IMPRESSION:  No acute intracranial hemorrhage, acute infarct in a major vascular   territory or mass-effect.      COMMENT: Axial noncontrast CT images of the head were obtained. Sagittal and   coronal reconstructions were created.      CT DOSE:  One or more dose reduction techniques (e.g. automated exposure   control, adjustment of the mA and/or kV according to patient size, use of   iterative  reconstruction technique) utilized for this examination.      Comparison:  6/6/2022      Findings:  Sulci and ventricles are within normal limits for patient's age.   Bilateral periventricular white matter lucencies are nonspecific but compatible   with microangiopathic change. There is no mass effect, midline shift,   territorial infarction, acute hemorrhage or extra-axial fluid collection.   Visualized paranasal sinuses and mastoid air cells are clear.      X-RAY CHEST 1 VIEW   Final Result   IMPRESSION:   No acute disease in chest.      COMMENT:      Comparison: Chest x-ray 8/16/2022.      Technique: A single frontal AP portable upright projection of the chest was   obtained.      FINDINGS:      The lungs are hypoexpanded but grossly clear without focal airspace   consolidation, pleural effusion or pneumothorax. The cardiac silhouette is   normal. The aorta is again noted to be uncoiled. The upper abdomen is   unremarkable. There is dextrocurvature of the lumbar spine. There is no acute   osseous abnormality. A coarse calcification is again seen projecting over the   left breast.      Transthoracic Echo (TTE) Complete    (Results Pending)         ECG/Telemetry  Reviewed by me -normal sinus rhythm    ASSESSMENT AND PLAN           * Syncope with normal neurologic examination  Assessment & Plan  Syncope with loss of consciousness with no clear etiology.    Seizure is high on the differential especially with a history of seizures 3 to 4 months back.   Continue Topamax.  Check a topiramate level  Continue Neurontin at home dose  Neurology consult with a history of seizure.   Closely monitor for any recurrence of symptoms.  If patient has recurrent symptoms or active seizure, will consider Ativan as needed and EEG.   Telemetry  Cardiology consult to rule out arrhythmia related causes  Echocardiogram      History of UTI  Assessment & Plan  Patient history of UTI with no active UTI related symptoms  Continue  nitrofurantoin      Depression  Assessment & Plan  Patient has history of depression.  No actively suicidal ideation thoughts or plans  Continue sertraline, mirtazapine    Gastroesophageal reflux disease without esophagitis  Assessment & Plan  Chronic and stable.  Continue PPI    Seizure disorder (CMS/[facility])  Assessment & Plan  Patient has history of seizures  Last Seizure was 3 to 4 months back  Continue Topamax.  Check a topiramate level  Continue Neurontin at home dose  Neurology consult with a history of seizure.   Closely monitor for any recurrence of symptoms.  If patient has recurrent symptoms or active seizure, will consider Ativan as needed and EEG.     History of venous thromboembolism  Assessment & Plan  Patient has history of DVT and PE.  Continue Eliquis at home dose  Check an echocardiogram    Hypertension  Assessment & Plan  Chronic and stable.  Continue current home medications for blood pressure  Continue hydralazine, lisinopril      Parkinson's disease (CMS/[facility])  Assessment & Plan  Patient has history of Parkinson's disease  Continue carbidopa levodopa at home dose      VTE Assessment: Pad[place]    VTE Prophylaxis Plan: On therapeutic anticoagulation  Code Status: Prior patient wants to be full code discussed with patient and  at the bedside       Gabino Bishop MD  9/22/2023    This encounter was completed utilizing the Direct Speech Voice Recognition Software. Grammatical errors, random word insertions, pronoun errors, and incomplete sentences are occasional consequences of the system due to software limitation, ambient noise, and hardware issues. These may be missed by proof reading prior to affixing electronic signature. Any questions or concerns about the content, text, or information contained within the body of this dictation should be directly addressed to the physician for clarification. If you have any questions or concerns please do not hesitate to call me directly.

## 2023-09-22 NOTE — ASSESSMENT & PLAN NOTE
Patient has history of seizures  Last Seizure was 3 to 4 months back  Continue Topamax.  topiramate level pending  Continue Neurontin at home dose  Neurology consult with a history of seizure.   Closely monitor for any recurrence of symptoms.  If patient has recurrent symptoms or active seizure, will consider Ativan as needed and EEG.     MRI brain-IMPRESSION:     1.  No focal acute intracranial abnormality.   2. Other incidental findings noted under the comment.    EEG with no epileptiform activity

## 2023-09-22 NOTE — ED PROVIDER NOTES
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  Patient was eating lunch with family and suddenly her head fell forward per family and she was not responding, they called 911 immediately, they state that she was unconscious for 5 minutes,  states at first they \"could not tell if she was breathing\", she did not fall to the ground or off of the chair.  She began moving and then started talking shortly before EMS arrived.  She was reportedly awake and alert after coming out of the episode and there was no tremoring or seizure-like activity noted, no bowel or bladder incontinence, no tongue injury.  Patient states that she remembers being at lunch and that is the last thing she remembers.  She denies dizziness or lightheadedness, she denies chest pain, chest pressure, shortness of breath, palpitations, tachycardia, diaphoresis, headache, visual changes, abdominal pain, nausea or vomiting.  She is denying any recent bowel or bladder changes.  Denying any new medications.    Past Medical History:   Diagnosis Date    Anxiety     Arthritis     Basal cell carcinoma     forehead    Glaucoma     Hypertension     Low back pain     Lumbosacral disc disease     Parkinson's disease (CMS/HCC)     Peripheral neuropathy     Seizures (CMS/HCC)          Past Surgical History:   Procedure Laterality Date    BLADDER SUSPENSION  2009    Prolift M    CATARACT EXTRACTION, BILATERAL      COLONOSCOPY      HYSTERECTOMY      JOINT REPLACEMENT      KNEE ARTHROPLASTY  2015    left       Family History   Problem Relation Age of Onset    No Known Problems Biological Mother     No Known Problems Biological Father        Social History     Tobacco Use    Smoking " status: Former    Smokeless tobacco: Never    Tobacco comments:     as a young teenager   Substance Use Topics    Alcohol use: Yes     Comment: wine    Drug use: No       Systems Reviewed from Nursing Triage:               Review of Systems   Constitutional: Negative.  Negative for chills, fatigue and fever.   HENT: Negative.  Negative for facial swelling and trouble swallowing.    Eyes: Negative.  Negative for visual disturbance.   Respiratory: Negative.  Negative for cough, chest tightness and shortness of breath.    Cardiovascular: Negative.  Negative for chest pain and palpitations.   Gastrointestinal: Negative.  Negative for abdominal pain, nausea and vomiting.   Genitourinary: Negative.  Negative for dysuria, flank pain, hematuria and urgency.   Musculoskeletal: Negative.  Negative for back pain, neck pain and neck stiffness.   Skin: Negative.  Negative for rash and wound.   Neurological: Positive for syncope. Negative for dizziness, tremors, seizures, facial asymmetry, speech difficulty, weakness, light-headedness, numbness and headaches.   Psychiatric/Behavioral: Negative.  Negative for suicidal ideas.            ED Triage Vitals [09/22/23 1301]   Temp Heart Rate Resp BP SpO2   36.3 °C (97.3 °F) 89 16 (!) 159/85 96 %      Temp Source Heart Rate Source Patient Position BP Location FiO2 (%) (Set)   Temporal Monitor Sitting Right upper arm --       Vitals:    09/22/23 1346 09/22/23 1425 09/22/23 1506 09/22/23 1525   BP:  (!) 172/79 (!) 146/66 137/65   BP Location:       Patient Position:       Pulse: 82 80 76 80   Resp:  20 12 (!) 22   Temp:       TempSrc:       SpO2:  96% 92% 94%   Weight:       Height:                             Physical Exam  Constitutional:       General: She is not in acute distress.     Appearance: Normal appearance. She is not toxic-appearing.   HENT:      Head: Normocephalic and atraumatic.      Mouth/Throat:      Mouth: Mucous membranes are moist.      Pharynx: Oropharynx is  clear.   Eyes:      Extraocular Movements: Extraocular movements intact.      Conjunctiva/sclera: Conjunctivae normal.      Pupils: Pupils are equal, round, and reactive to light.   Cardiovascular:      Rate and Rhythm: Normal rate.      Pulses: Normal pulses.   Pulmonary:      Effort: Pulmonary effort is normal. No respiratory distress.      Breath sounds: Normal breath sounds.   Abdominal:      Palpations: Abdomen is soft.      Tenderness: There is no abdominal tenderness. There is no guarding.   Musculoskeletal:         General: No swelling or deformity.      Cervical back: Neck supple. No rigidity or tenderness.   Skin:     General: Skin is warm.      Capillary Refill: Capillary refill takes less than 2 seconds.   Neurological:      General: No focal deficit present.      Mental Status: She is alert and oriented to person, place, and time.      Comments: Equal motor strength testing of the bilateral upper and lower extremities, speech is not slow or slurred, answering questions appropriately   Psychiatric:         Behavior: Behavior normal.              CT HEAD WITHOUT IV CONTRAST   Final Result   IMPRESSION:  No acute intracranial hemorrhage, acute infarct in a major vascular   territory or mass-effect.      COMMENT: Axial noncontrast CT images of the head were obtained. Sagittal and   coronal reconstructions were created.      CT DOSE:  One or more dose reduction techniques (e.g. automated exposure   control, adjustment of the mA and/or kV according to patient size, use of   iterative reconstruction technique) utilized for this examination.      Comparison:  6/6/2022      Findings:  Sulci and ventricles are within normal limits for patient's age.   Bilateral periventricular white matter lucencies are nonspecific but compatible   with microangiopathic change. There is no mass effect, midline shift,   territorial infarction, acute hemorrhage or extra-axial fluid collection.   Visualized paranasal sinuses and  mastoid air cells are clear.      X-RAY CHEST 1 VIEW   Final Result   IMPRESSION:   No acute disease in chest.      COMMENT:      Comparison: Chest x-ray 8/16/2022.      Technique: A single frontal AP portable upright projection of the chest was   obtained.      FINDINGS:      The lungs are hypoexpanded but grossly clear without focal airspace   consolidation, pleural effusion or pneumothorax. The cardiac silhouette is   normal. The aorta is again noted to be uncoiled. The upper abdomen is   unremarkable. There is dextrocurvature of the lumbar spine. There is no acute   osseous abnormality. A coarse calcification is again seen projecting over the   left breast.          Labs Reviewed   CBC AND DIFF - Abnormal       Result Value    WBC 7.32      RBC 4.60      Hemoglobin 14.7      Hematocrit 45.2 (*)     MCV 98.3 (*)     MCH 32.0      MCHC 32.5      RDW 14.2      Platelets 219      MPV 9.0 (*)     Differential Type Auto      nRBC 0.0      Immature Granulocytes 0.3      Neutrophils 69.6      Lymphocytes 23.0      Monocytes 4.9      Eosinophils 1.5      Basophils 0.7      Immature Granulocytes, Absolute 0.02      Neutrophils, Absolute 5.10      Lymphocytes, Absolute 1.68      Monocytes, Absolute 0.36      Eosinophils, Absolute 0.11      Basophils, Absolute 0.05     COMPREHENSIVE METABOLIC PANEL - Abnormal    Sodium 134 (*)     Potassium 4.3      Chloride 104      CO2 25      BUN 22      Creatinine 0.7      Glucose 107 (*)     Calcium 10.5 (*)     AST (SGOT) 10 (*)     ALT (SGPT) 3 (*)     Alkaline Phosphatase 34      Total Protein 7.1      Albumin 4.3      Bilirubin, Total 0.6      eGFR >60.0      Anion Gap 5     LACTATE, VENOUS - Normal    Lactate 1.4     HIGH SENSITIVE TROPONIN I (BASELINE - REFLEX 2HR) - Normal    High Sens Troponin I 6.9     MAGNESIUM - Normal    Magnesium 2.1     HIGH SENSITIVE TROPONIN I (NO REFLEX) - Normal    High Sens Troponin I 7.4     TOPIRAMATE   RAINBOW DRAW PANEL    Narrative:     The  following orders were created for panel order [facility] Draw Panel.  Procedure                               Abnormality         Status                     ---------                               -----------         ------                     CRISTINE PARRISH[[id number]]                                  In process                 RAINBOW GOLD[[id number]]                                     In process                   Please view results for these tests on the individual orders.   RAINBOW LT BLUE   RAINBOW GOLD       CT HEAD WITHOUT IV CONTRAST   Final Result   IMPRESSION:  No acute intracranial hemorrhage, acute infarct in a major vascular   territory or mass-effect.      COMMENT: Axial noncontrast CT images of the head were obtained. Sagittal and   coronal reconstructions were created.      CT DOSE:  One or more dose reduction techniques (e.g. automated exposure   control, adjustment of the mA and/or kV according to patient size, use of   iterative reconstruction technique) utilized for this examination.      Comparison:  6/6/2022      Findings:  Sulci and ventricles are within normal limits for patient's age.   Bilateral periventricular white matter lucencies are nonspecific but compatible   with microangiopathic change. There is no mass effect, midline shift,   territorial infarction, acute hemorrhage or extra-axial fluid collection.   Visualized paranasal sinuses and mastoid air cells are clear.      X-RAY CHEST 1 VIEW   Final Result   IMPRESSION:   No acute disease in chest.      COMMENT:      Comparison: Chest x-ray 8/16/2022.      Technique: A single frontal AP portable upright projection of the chest was   obtained.      FINDINGS:      The lungs are hypoexpanded but grossly clear without focal airspace   consolidation, pleural effusion or pneumothorax. The cardiac silhouette is   normal. The aorta is again noted to be uncoiled. The upper abdomen is   unremarkable. There is dextrocurvature of the lumbar spine.  There is no acute   osseous abnormality. A coarse calcification is again seen projecting over the   left breast.            Procedures    Final diagnoses:   [R55] Syncope, unspecified syncope type   [R41.89] Unresponsive episode       ED Course & MDM     ED Course as of 23 1715   Fri Sep 22, 2023   1332 Lactate: 1.4  Lactic drawn immediately on arrival negative at 1.4. [ES]      ED Course User Index  [ES] Maricruz Magana PA C           Medical Decision Making  Amount and/or Complexity of Data Reviewed  Labs: ordered. Decision-making details documented in ED Course.  Radiology: ordered. Decision-making details documented in ED Course.  ECG/medicine tests:  Decision-making details documented in ED Course.  Discussion of management or test interpretation with external provider(s): Case discussed and managed with ER attending physician.      EKG performed at 1301, ventricular rate of 90, SD interval 194, QRS duration 86, QT/QTc 346/423, normal sinus rhythm with minimal LVH criteria, no acute ischemic changes seen, reviewed by attending physician.    5:15 PM      Impression/Medical Decision Makin-year-old female presenting for evaluation after brief episode of unresponsiveness while eating lunch with family seated at a table, no injury or fall to the ground    Plan: ECG, cardiac monitoring, chest x-ray, CT head, labs including troponin and magnesium -  Question of possible seizure although there was no seizure activity noticed, bowel or bladder incontinence or tongue injury.  We will send off lactic acid I feel this is more consistent with syncope, patient does not present postictal.  CVA versus syncope versus seizure  Troponin 6.9.  Repeat 7.4.  She is well-appearing and sitting up in bed in no distress.  She has reportedly had syncopal episodes prior to this, I had a discussion with patient and  regarding disposition plan,  is concerning given the length of time that she was unresponsive,  states he feels she is primarily at her baseline but has been repeating a few questions.  Given her medical history and age I do feel it is reasonable to admit, cardiology consult.  [place] [facility] [place]    Vital Signs Review: Vital signs have been reviewed. The oxygen saturation is  SpO2: 96 % which is within normal limits.      NASRIN Solano  9/22/2023      We are in a period of time with increased volumes and decreased capacity.     This document was created using dragon dictation software.  There might be some typographical errors due to this technology.     Maricruz Magana PA C  09/22/23 1714       Maricruz Magana PA C  09/22/23 1714

## 2023-09-22 NOTE — ASSESSMENT & PLAN NOTE
Syncope with loss of consciousness with no clear etiology.    Seizure is high on the differential especially with a history of seizures 3 to 4 months back.   Continue Topamax.  topiramate level ending  Continue Neurontin at home dose  Neurology consult noted  Closely monitor for any recurrence of symptoms.  If patient has recurrent symptoms or active seizure, will consider Ativan as needed    Telemetry  Cardiology consult noted.  No arrhythmias on monitor.  orthostatics positive-decreased hydralazine to twice daily  Echocardiogram  EEG    MRI brain noncontrast-no acute abnormality  Per cardiology, symptoms not cardiac in nature  PM&R recs is to return home with her 24 hour caregivers  Needs SNF

## 2023-09-23 ENCOUNTER — APPOINTMENT (OUTPATIENT)
Dept: RADIOLOGY | Facility: HOSPITAL | Age: 88
Setting detail: OBSERVATION
DRG: 312 | End: 2023-09-23
Attending: HOSPITALIST
Payer: MEDICARE

## 2023-09-23 PROBLEM — M25.551 PAIN OF RIGHT HIP: Status: ACTIVE | Noted: 2023-09-23

## 2023-09-23 LAB
ALBUMIN SERPL-MCNC: 4 G/DL (ref 3.5–5.7)
ALP SERPL-CCNC: 34 IU/L (ref 34–125)
ALT SERPL-CCNC: 3 IU/L (ref 7–52)
ANION GAP SERPL CALC-SCNC: 6 MEQ/L (ref 3–15)
AST SERPL-CCNC: 10 IU/L (ref 13–39)
BASOPHILS # BLD: 0.06 K/UL (ref 0.01–0.1)
BASOPHILS NFR BLD: 0.9 %
BILIRUB SERPL-MCNC: 0.8 MG/DL (ref 0.3–1.2)
BUN SERPL-MCNC: 19 MG/DL (ref 7–25)
CALCIUM SERPL-MCNC: 10.4 MG/DL (ref 8.6–10.3)
CHLORIDE SERPL-SCNC: 105 MEQ/L (ref 98–107)
CO2 SERPL-SCNC: 23 MEQ/L (ref 21–31)
CREAT SERPL-MCNC: 0.6 MG/DL (ref 0.6–1.2)
DIFFERENTIAL METHOD BLD: ABNORMAL
EOSINOPHIL # BLD: 0.14 K/UL (ref 0.04–0.36)
EOSINOPHIL NFR BLD: 2.2 %
ERYTHROCYTE [DISTWIDTH] IN BLOOD BY AUTOMATED COUNT: 14.2 % (ref 11.7–14.4)
GFR SERPL CREATININE-BSD FRML MDRD: >60 ML/MIN/1.73M*2
GLUCOSE BLD-MCNC: 103 MG/DL (ref 70–99)
GLUCOSE BLD-MCNC: 92 MG/DL (ref 70–99)
GLUCOSE BLD-MCNC: 97 MG/DL (ref 70–99)
GLUCOSE SERPL-MCNC: 88 MG/DL (ref 70–99)
HCT VFR BLDCO AUTO: 44.4 % (ref 35–45)
HGB BLD-MCNC: 14.2 G/DL (ref 11.8–15.7)
IMM GRANULOCYTES # BLD AUTO: 0.02 K/UL (ref 0–0.08)
IMM GRANULOCYTES NFR BLD AUTO: 0.3 %
LYMPHOCYTES # BLD: 1.98 K/UL (ref 1.2–3.5)
LYMPHOCYTES NFR BLD: 30.6 %
MCH RBC QN AUTO: 32.1 PG (ref 28–33.2)
MCHC RBC AUTO-ENTMCNC: 32 G/DL (ref 32.2–35.5)
MCV RBC AUTO: 100.5 FL (ref 83–98)
MONOCYTES # BLD: 0.39 K/UL (ref 0.28–0.8)
MONOCYTES NFR BLD: 6 %
NEUTROPHILS # BLD: 3.89 K/UL (ref 1.7–7)
NEUTS SEG NFR BLD: 60 %
NRBC BLD-RTO: 0 %
PDW BLD AUTO: 8.6 FL (ref 9.4–12.3)
PLATELET # BLD AUTO: 198 K/UL (ref 150–369)
POCT TEST: ABNORMAL
POCT TEST: NORMAL
POCT TEST: NORMAL
POTASSIUM SERPL-SCNC: 4.4 MEQ/L (ref 3.5–5.1)
PROT SERPL-MCNC: 6.2 G/DL (ref 6–8.2)
RBC # BLD AUTO: 4.42 M/UL (ref 3.93–5.22)
SODIUM SERPL-SCNC: 134 MEQ/L (ref 136–145)
WBC # BLD AUTO: 6.48 K/UL (ref 3.8–10.5)

## 2023-09-23 PROCEDURE — 63700000 HC SELF-ADMINISTRABLE DRUG: Performed by: HOSPITALIST

## 2023-09-23 PROCEDURE — 63700000 HC SELF-ADMINISTRABLE DRUG: Performed by: INTERNAL MEDICINE

## 2023-09-23 PROCEDURE — 85025 COMPLETE CBC W/AUTO DIFF WBC: CPT | Performed by: INTERNAL MEDICINE

## 2023-09-23 PROCEDURE — 99223 1ST HOSP IP/OBS HIGH 75: CPT | Performed by: INTERNAL MEDICINE

## 2023-09-23 PROCEDURE — G0378 HOSPITAL OBSERVATION PER HR: HCPCS

## 2023-09-23 PROCEDURE — 99232 SBSQ HOSP IP/OBS MODERATE 35: CPT | Performed by: HOSPITALIST

## 2023-09-23 PROCEDURE — 97162 PT EVAL MOD COMPLEX 30 MIN: CPT | Mod: GP,KX

## 2023-09-23 PROCEDURE — 80053 COMPREHEN METABOLIC PANEL: CPT | Performed by: INTERNAL MEDICINE

## 2023-09-23 PROCEDURE — 73502 X-RAY EXAM HIP UNI 2-3 VIEWS: CPT | Mod: RT

## 2023-09-23 PROCEDURE — 36415 COLL VENOUS BLD VENIPUNCTURE: CPT | Performed by: INTERNAL MEDICINE

## 2023-09-23 PROCEDURE — 20600000 HC ROOM AND CARE INTERMEDIATE/TELEMETRY

## 2023-09-23 RX ADMIN — ACETAMINOPHEN 975 MG: 325 TABLET ORAL at 18:05

## 2023-09-23 RX ADMIN — TOPIRAMATE 25 MG: 25 TABLET, FILM COATED ORAL at 20:47

## 2023-09-23 RX ADMIN — CARBIDOPA AND LEVODOPA 1 TABLET: 25; 100 TABLET ORAL at 08:42

## 2023-09-23 RX ADMIN — LISINOPRIL 20 MG: 20 TABLET ORAL at 08:41

## 2023-09-23 RX ADMIN — APIXABAN 2.5 MG: 2.5 TABLET, FILM COATED ORAL at 08:42

## 2023-09-23 RX ADMIN — ACETAMINOPHEN 975 MG: 325 TABLET ORAL at 08:38

## 2023-09-23 RX ADMIN — SENNOSIDES 2 TABLET: 8.6 TABLET, FILM COATED ORAL at 18:04

## 2023-09-23 RX ADMIN — CARBIDOPA AND LEVODOPA 1 TABLET: 25; 100 TABLET ORAL at 20:47

## 2023-09-23 RX ADMIN — HYDRALAZINE HYDROCHLORIDE 50 MG: 50 TABLET ORAL at 20:46

## 2023-09-23 RX ADMIN — DOCUSATE SODIUM 100 MG: 100 CAPSULE, LIQUID FILLED ORAL at 08:43

## 2023-09-23 RX ADMIN — NITROFURANTOIN (MONOHYDRATE/MACROCRYSTALS) 100 MG: 75; 25 CAPSULE ORAL at 18:04

## 2023-09-23 RX ADMIN — HYDRALAZINE HYDROCHLORIDE 50 MG: 50 TABLET ORAL at 14:53

## 2023-09-23 RX ADMIN — GABAPENTIN 300 MG: 300 CAPSULE ORAL at 13:35

## 2023-09-23 RX ADMIN — FAMOTIDINE 20 MG: 20 TABLET ORAL at 08:42

## 2023-09-23 RX ADMIN — SERTRALINE HYDROCHLORIDE 50 MG: 50 TABLET ORAL at 08:41

## 2023-09-23 RX ADMIN — CARBIDOPA AND LEVODOPA 1 TABLET: 25; 100 TABLET ORAL at 13:35

## 2023-09-23 RX ADMIN — CARBIDOPA AND LEVODOPA 1 TABLET: 25; 100 TABLET ORAL at 18:04

## 2023-09-23 RX ADMIN — SERTRALINE HYDROCHLORIDE 50 MG: 50 TABLET ORAL at 20:47

## 2023-09-23 RX ADMIN — GABAPENTIN 300 MG: 300 CAPSULE ORAL at 08:43

## 2023-09-23 RX ADMIN — APIXABAN 2.5 MG: 2.5 TABLET, FILM COATED ORAL at 20:47

## 2023-09-23 RX ADMIN — MIRTAZAPINE 30 MG: 15 TABLET, FILM COATED ORAL at 20:47

## 2023-09-23 RX ADMIN — HYDRALAZINE HYDROCHLORIDE 50 MG: 50 TABLET ORAL at 08:44

## 2023-09-23 RX ADMIN — GABAPENTIN 300 MG: 300 CAPSULE ORAL at 20:46

## 2023-09-23 RX ADMIN — Medication 3 MG: at 20:46

## 2023-09-23 RX ADMIN — TOPIRAMATE 25 MG: 25 TABLET, FILM COATED ORAL at 08:42

## 2023-09-23 ASSESSMENT — COGNITIVE AND FUNCTIONAL STATUS - GENERAL
CLIMB 3 TO 5 STEPS WITH RAILING: 1 - TOTAL
STANDING UP FROM CHAIR USING ARMS: 2 - A LOT
CLIMB 3 TO 5 STEPS WITH RAILING: 1 - TOTAL
MOVING TO AND FROM BED TO CHAIR: 2 - A LOT
STANDING UP FROM CHAIR USING ARMS: 2 - A LOT
WALKING IN HOSPITAL ROOM: 1 - TOTAL
MOVING TO AND FROM BED TO CHAIR: 2 - A LOT
MOVING TO AND FROM BED TO CHAIR: 2 - A LOT
AFFECT: FLAT/BLUNTED AFFECT
WALKING IN HOSPITAL ROOM: 1 - TOTAL
CLIMB 3 TO 5 STEPS WITH RAILING: 1 - TOTAL
STANDING UP FROM CHAIR USING ARMS: 2 - A LOT
WALKING IN HOSPITAL ROOM: 2 - A LOT

## 2023-09-23 NOTE — PROGRESS NOTES
Hospital Medicine Service  Daily Progress Note       SUBJECTIVE     Patient seen earlier today.  Denied any lightheadedness or dizziness.  Reports right hip pain when she stands up or bears weight on it and has been going on for at least a year.     OBJECTIVE        Vital Signs  Temp:  [36.4 °C (97.6 °F)-36.8 °C (98.2 °F)] 36.4 °C (97.6 °F)  Heart Rate:  [64-91] 80  Resp:  [18] 18  BP: ()/(59-97) 142/75     SpO2 Readings from Last 3 Encounters:   09/23/23 95%   05/08/23 98%   08/16/22 96%       I/O last 3 completed shifts:  In: -   Out: 700 [Urine:700]    PHYSICAL EXAMINATION        GEN:; not in acute distress  HEENT: normocephalic; atraumatic     CARDIO: regular rate and rhythm;   RESP: decreased at bases  ABD: soft, , non-tender, normal bowel sounds  EXT: no  edema  NEURO: alert and oriented x 3; nonfocal       LABS / IMAGING / TELE        Labs  Lab Results   Component Value Date    WBC 6.48 09/23/2023    HGB 14.2 09/23/2023    HCT 44.4 09/23/2023    .5 (H) 09/23/2023     09/23/2023     Lab Results   Component Value Date    GLUCOSE 88 09/23/2023    CALCIUM 10.4 (H) 09/23/2023     (L) 09/23/2023    K 4.4 09/23/2023    CO2 23 09/23/2023     09/23/2023    BUN 19 09/23/2023    CREATININE 0.6 09/23/2023     Lab Results   Component Value Date    INR 1.2 11/27/2021       Imaging  X-RAY HIP WITH OR WITHOUT PELVIS 2-3 VW RIGHT    Result Date: 9/23/2023  IMPRESSION: No acute osseous abnormalities. TECHNIQUE: AP view of the pelvis and two views of the right hip. COMMENT: Bilateral hip osteoarthritis. No acute fractures are identified. Normal alignment.     CT HEAD WITHOUT IV CONTRAST    Result Date: 9/22/2023  IMPRESSION:  No acute intracranial hemorrhage, acute infarct in a major vascular territory or mass-effect. COMMENT: Axial noncontrast CT images of the head were obtained. Sagittal and coronal reconstructions were created. CT DOSE:  One or more dose reduction techniques (e.g. automated  exposure control, adjustment of the mA and/or kV according to patient size, use of iterative reconstruction technique) utilized for this examination. Comparison:  6/6/2022 Findings:  Sulci and ventricles are within normal limits for patient's age. Bilateral periventricular white matter lucencies are nonspecific but compatible with microangiopathic change. There is no mass effect, midline shift, territorial infarction, acute hemorrhage or extra-axial fluid collection. Visualized paranasal sinuses and mastoid air cells are clear.    X-RAY CHEST 1 VIEW    Result Date: 9/22/2023  IMPRESSION: No acute disease in chest. COMMENT: Comparison: Chest x-ray 8/16/2022. Technique: A single frontal AP portable upright projection of the chest was obtained. FINDINGS: The lungs are hypoexpanded but grossly clear without focal airspace consolidation, pleural effusion or pneumothorax. The cardiac silhouette is normal. The aorta is again noted to be uncoiled. The upper abdomen is unremarkable. There is dextrocurvature of the lumbar spine. There is no acute osseous abnormality. A coarse calcification is again seen projecting over the left breast.          ASSESSMENT AND PLAN      * Syncope with normal neurologic examination  Assessment & Plan  Syncope with loss of consciousness with no clear etiology.    Seizure is high on the differential especially with a history of seizures 3 to 4 months back.   Continue Topamax.  topiramate level ending  Continue Neurontin at home dose  Neurology consult noted  Closely monitor for any recurrence of symptoms.  If patient has recurrent symptoms or active seizure, will consider Ativan as needed    Telemetry  Cardiology consult noted.  No arrhythmias on monitor.  Will check orthostatics  Echocardiogram  EEG, MRI brain noncontrast      Pain of right hip  Assessment & Plan  Patient with a lot of pain right hip when standing.  X-ray right hip    Orthopedics eval    Seizure disorder (CMS/HCC)  Assessment &  Plan  Patient has history of seizures  Last Seizure was 3 to 4 months back  Continue Topamax.  topiramate level pending  Continue Neurontin at home dose  Neurology consult with a history of seizure.   Closely monitor for any recurrence of symptoms.  If patient has recurrent symptoms or active seizure, will consider Ativan as needed and EEG.     Neurology recommended MRI brain and EEG.    Parkinson's disease (CMS/[facility])  Assessment & Plan  Patient has history of Parkinson's disease  Continue carbidopa levodopa at home dose    History of UTI  Assessment & Plan  Patient history of UTI with no active UTI related symptoms  Continue nitrofurantoin for prophylaxis      Depression  Assessment & Plan  Patient has history of depression.  No actively suicidal ideation thoughts or plans  Continue sertraline, mirtazapine    Gastroesophageal reflux disease without esophagitis  Assessment & Plan  Chronic and stable.  Continue PPI    History of venous thromboembolism  Assessment & Plan  Patient has history of DVT and PE.  Continue Eliquis at home dose  Check an echocardiogram    Hypertension  Assessment & Plan  Chronic and stable.  Continue current home medications for blood pressure  Continue hydralazine, lisinopril       EEG, echo, MRI brain noncontrast.  Discussed with patient's son Jose Roberto and updated him  VTE Assessment: Apixaban  Code Status: Full Code  Estimated discharge date: 9/25/2023     Tano Ybarra MD  9/23/2023  4:01 PM

## 2023-09-23 NOTE — PLAN OF CARE
Plan of Care Review  Plan of Care Reviewed With: patient  Progress: improving  Outcome Evaluation: pt. aaox4, forgetful at times. Pt. denies any lightheadedness or dizziness. Max assist x2 w/ walker and gait belt to stand and pivot to the commode - pt. with unsteady gait. C/o of chronic pain in right hip - getting scheduled tylenol.

## 2023-09-23 NOTE — PLAN OF CARE
Problem: Adult Inpatient Plan of Care  Goal: Plan of Care Review  Outcome: Progressing  Flowsheets (Taken 9/23/2023 1340)  Progress: improving  Outcome Evaluation: modA x1-2 w/ RW. Rec SNF  Plan of Care Reviewed With: patient

## 2023-09-23 NOTE — CONSULTS
Cardiology Consult Note  Primary Cardiologist: Dr. Gunnar Gaytan   Reason for consult: Syncope, recurrent  History of Present Illness: 88-year-old woman with past medical history of Parkinson's disease, seizure disorder, multiple prior episodes of syncope, prior DVT/PE on Eliquis who presented to the hospital after an episode of syncope.  Patient reports that yesterday morning, she was eating breakfast.  While she was eating breakfast, she reportedly slumped in her chair with her eyes open.  She was unresponsive for about 5 minutes.  Her family was there who called 911.  Reportedly, she had no focal neurologic complaints.  There was no witnessed shaking or obvious seizure activity.  There was no loss of bowel or bladder control.  She denied any concerning cardiac complaints including chest pain, palpitations or shortness of breath.  Work-up thus far notable for head CT which did not show any evidence of acute stroke or bleeding.  EKG showed sinus rhythm without ischemic concerns.  Telemetry has not shown any concerning abnormalities.  Patient was monitored for 1 week as an outpatient in September 2022 which did not reveal any significant arrhythmias.  She had a low burden of PACs.  Currently feels well and denies any concerning cardiac complaints.  She denies any recent orthostatic symptoms.    Outside records and EMR were reviewed.    PAST MEDICAL HISTORY:   Past Medical History:   Diagnosis Date    Anxiety     Arthritis     Basal cell carcinoma     forehead    Glaucoma     Hypertension     Low back pain     Lumbosacral disc disease     Parkinson's disease (CMS/HCC)     Peripheral neuropathy     Seizures (CMS/HCC)        PAST SURGICAL HISTORY: She has a past surgical history that includes Joint replacement; Cataract extraction, bilateral; Colonoscopy; Hysterectomy; Bladder suspension (2009); and Knee Arthroplasty (2015).    SOCIAL HISTORY:   Social History     Tobacco Use    Smoking status: Former     Smokeless tobacco: Never    Tobacco comments:     as a young teenager   Substance Use Topics    Alcohol use: Yes     Comment: wine    Drug use: No       FAMILY HISTORY:She has a family history includes No Known Problems in her biological father and biological mother.    ALLERGIES:  Allergies   Allergen Reactions    Codeine Hives      n/v    Diazepam Other (see comments)     Blurred vision    Sulfa (Sulfonamide Antibiotics) Hives    Sulfur Hives       CURRENT MEDICATIONS:    acetaminophen  975 mg oral BID    apixaban  2.5 mg oral BID    carbidopa-levodopa  1 tablet oral QID    docusate sodium  100 mg oral Daily    famotidine  20 mg oral Daily    gabapentin  300 mg oral TID    hydrALAZINE  50 mg oral TID    lisinopriL  20 mg oral Daily    melatonin  3 mg oral Nightly    mirtazapine  30 mg oral Nightly    nitrofurantoin (macrocrystal-monohydrate)  100 mg oral Daily before dinner    senna  2 tablet oral Daily before dinner    sertraline  50 mg oral BID    topiramate  25 mg oral BID       HOME MEDS:    acetaminophen, Take 1,000 mg by mouth 2 (two) times a day. 2 tablets. Lunch and dinner    apixaban, Take 2.5 mg by mouth 2 (two) times a day.    bimatoprost, Administer 1 drop into both eyes nightly.    carbidopa-levodopa, Take 1 tablet by mouth 4 (four) times a day. 0900, 1300, 1700, 2100     docusate sodium, Take 100 mg by mouth daily.    estradioL, Insert into the vagina 2 (two) times a week (Tue, Fri).    famotidine, Take 20 mg by mouth daily.    gabapentin, Take 300 mg by mouth 3 (three) times a day.    hydrALAZINE, Take 50 mg by mouth 3 (three) times a day. 2 tablets    lisinopriL, Take 20 mg by mouth daily.    melatonin, Take 3 mg by mouth nightly.    mirtazapine, Take 30 mg by mouth nightly.    nitrofurantoin (macrocrystal-monohydrate), Take 100 mg by mouth daily before dinner.    polyethylene glycol, Take 8.5 g by mouth daily as needed (constipation). 1/2 capful    senna, Take 2  "tablets by mouth daily before dinner.    sertraline, Take 50 mg by mouth 2 (two) times a day. 0900, 1700     topiramate, Take 25 mg by mouth 2 (two) times a day.    Review of Systems:  Pertinent items are noted in HPI.  A comprehensive review of systems was negative except as noted in HPI.    Labs:   Results from last 7 days   Lab Units 09/23/23  0519 09/22/23  1309   SODIUM mEQ/L 134* 134*   POTASSIUM mEQ/L 4.4 4.3   CHLORIDE mEQ/L 105 104   CO2 mEQ/L 23 25   BUN mg/dL 19 22   CREATININE mg/dL 0.6 0.7   GLUCOSE mg/dL 88 107*   CALCIUM mg/dL 10.4* 10.5*             Results from last 7 days   Lab Units 09/23/23  0519 09/22/23  1309   WBC K/uL 6.48 7.32   HEMOGLOBIN g/dL 14.2 14.7   HEMATOCRIT % 44.4 45.2*   PLATELETS K/uL 198 219     Lab Results   Component Value Date    BNP <5 11/27/2021       Physical Exam:  Visit Vitals  BP (!) 167/80   Pulse 78   Temp 36.4 °C (97.6 °F) (Oral)   Resp 18   Ht 1.676 m (5' 6\")   Wt 64.8 kg (142 lb 12.8 oz)   SpO2 95%   BMI 23.05 kg/m²     General appearance: alert, appears stated age and cooperative  Head: without obvious abnormality  Eyes: PERRLA, EOM's intact  Lungs: clear to auscultation bilaterally, no rales or wheezing  Heart: S1, S2 normal, no murmur, click, rub or gallop, regular rate and rhythm, no JVD  Abdomen: soft, non-tender; bowel sounds normal; no masses  Extremities: peripheral pulses intact, no edema  Skin: Skin color, texture, turgor normal. No rashes or lesions  Neurologic: Alert and oriented X 3, no focal deficits    CARDIAC TESTING:  Telemetry (09/23/23): Sinus rhythm, no arrhythmias    ECG: NSR, possible LVH.  Normal intervals.     Echocardiogram (6/2022):  EF 70-75%, no significant valvular disease    Impression and Plan: 88-year-old woman with past medical history of Parkinson's disease, seizure disorder, multiple prior episodes of syncope, prior DVT/PE on Eliquis who presented to the hospital after an episode of syncope.    #Syncope  -Doubt cardiac process.  " Prior workup benign.  Echo June 2022 normal.  Outpatient 1 week rhythm monitor Sept 2022 normal.  EKG here benign/normal.  No arrhythmias on telemetry.  Despite lack of typical seizure features (no shaking or loss of bowel/bladder control), etiology could be seizure.  Neurology consulted.  If here Monday, can repeat echo, otherwise as outpt.  No murmur on exam.    -No recent orthostatic symptoms but reportedly BP has been labile and low with PT.  Given resting hypertension, would favor no adjustment in hydralazine or lisinopril at this time.   -Check orthostatic vitals.  Parkinson's disease and sinemet can both contribute to orthostasis, if that is culprit, but clinically I doubt this.     #Prior DVT/PE  -Continue eliquis at home dose     #Seizure d/o  -Management per [facility]/neurology    #Parkinson's disease  -Neurology consulted    Thank you for consult.  Cardiology will follow.     Electronic signature:    Shant Turcios MD 09/23/23

## 2023-09-23 NOTE — HOSPITAL COURSE
Vicki is a 88 y.o. female admitted on 9/22/2023 with Unresponsive episode [R41.89]  Syncope, unspecified syncope type [R55]  Syncope with normal neurologic examination [R55]. Principal problem is Syncope with normal neurologic examination.    Past Medical History  Vicki has a past medical history of Anxiety, Arthritis, Basal cell carcinoma, Glaucoma, Hypertension, Low back pain, Lumbosacral disc disease, Parkinson's disease (CMS/HCC), Peripheral neuropathy, and Seizures (CMS/HCC).    History of Present Illness   Per H&P: 88 y.o. female with a past medical history of Parkinson's disease, DVT and PE on Eliquis, history of syncope twice this year, was eating lunch with family and suddenly lost consciousness for almost 5 minutes.  Patient's son tried to wake up but was unable to wake her up.  There was no reported seizure activity, no bladder or bowel incontinence or tongue bite.  The patient did not complain of any focal neurological complaints.  No postictal confusion.  No chest pain or shortness of breath.  Did not feel dizzy or lightheaded.  911 was called and patient was brought to the ER.  Her CT head was negative for any acute stroke or bleeding.  Troponins were negative.  EKG did not show any acute ischemic changes. Electrolytes did not show any significant abnormalities.  There have been no changes in medications recently over the last 30 days and she has been taking all her medications . She also has a history of seizures and takes Topamax.  Her last seizure was 3 to 4 months ago but today's episode did not seem like a seizure to her.  She denies any headache or visual changes.   No aura.  No palpitations or diaphoresis.  No fever.  No urinary complaints.  No hematuria or black stools.  No epigastric pain.  No nausea vomiting or diarrhea or abdominal pain.  Currently she does not have any active symptoms but because this is her third episode this year, she is being admitted for further work-up

## 2023-09-23 NOTE — ASSESSMENT & PLAN NOTE
Patient with a lot of pain right hip when standing.      X-ray right hip- COMMENT:   Bilateral hip osteoarthritis. No acute fractures are identified. Normal   alignment.     Orthopedics eval-discussed with resident and nothing to be done inpatient for hip arthritis and follow-up with orthopedics as outpatient if she will be a candidate for right hip replacement.  DC'd the Ortho consult    MRI right hip    1.  No acute fracture at either hip. No osteonecrosis.  2.  Mild bilateral hip degenerative joint disease.  3.  Multisite tendinopathy with moderate grade partial tearing of the bilateral  hamstring tendon origins and low-grade partial tear of the right gluteus minimus  tendons without tendon rupture or retraction.  4.  Multiple areas of intramuscular edema and partial fatty atrophy.    She reports that she recently received an epidural injection

## 2023-09-23 NOTE — UM PHYSICIAN REVIEW NOTE
Utilization Secondary Review Note      Patient Name: Mariya Porras      MRN: [id number]  Insurance: MEDICARE  Admission date: 9/22/2023  Initial order:    Observation  Planned admission: No  Post Discharge Review: No            Inpatient services are appropriate for this 88 y.o. year old patient with pmhx of parkinson disease, HTN, dvt presenting with episode of loss of awareness. Seen by neurologist, awaiting MRI brain and EEG. Patient is working with PT/OT. Patient anticipated to receive >2  [place] HLOC.     Vida Mancia, DO  9/23/2023

## 2023-09-23 NOTE — PROGRESS NOTES
Physical Therapy -  Initial Evaluation     Patient: Mariya Porras  Location: [facility] 3A 3011  MRN: [id number]  Today's date: 9/23/2023    HISTORY OF PRESENT ILLNESS     Vicki is a 88 y.o. female admitted on 9/22/2023 with Unresponsive episode [R41.89]  Syncope, unspecified syncope type [R55]  Syncope with normal neurologic examination [R55]. Principal problem is Syncope with normal neurologic examination.    Past Medical History  Vicki has a past medical history of Anxiety, Arthritis, Basal cell carcinoma, Glaucoma, Hypertension, Low back pain, Lumbosacral disc disease, Parkinson's disease (CMS/HCC), Peripheral neuropathy, and Seizures (CMS/HCC).    History of Present Illness   Per H&P: 88 y.o. female with a past medical history of Parkinson's disease, DVT and PE on Eliquis, history of syncope twice this year, was eating lunch with family and suddenly lost consciousness for almost 5 minutes.  Patient's son tried to wake up but was unable to wake her up.  There was no reported seizure activity, no bladder or bowel incontinence or tongue bite.  The patient did not complain of any focal neurological complaints.  No postictal confusion.  No chest pain or shortness of breath.  Did not feel dizzy or lightheaded.  911 was called and patient was brought to the ER.  Her CT head was negative for any acute stroke or bleeding.  Troponins were negative.  EKG did not show any acute ischemic changes. Electrolytes did not show any significant abnormalities.  There have been no changes in medications recently over the last 30 days and she has been taking all her medications . She also has a history of seizures and takes Topamax.  Her last seizure was 3 to 4 months ago but today's episode did not seem like a seizure to her.  She denies any headache or visual changes.   No aura.  No palpitations or diaphoresis.  No fever.  No urinary complaints.  No hematuria or black stools.  No epigastric pain.  No nausea vomiting  or diarrhea or abdominal pain.  Currently she does not have any active symptoms but because this is her third episode this year, she is being admitted for further work-up       PRIOR LEVEL OF FUNCTION AND LIVING ENVIRONMENT     Prior Level of Function    Flowsheet Row Most Recent Value   Dominant Hand right   Ambulation assistive equipment   Transferring assistive equipment   Toileting assistive person   Bathing assistive equipment and person   Dressing assistive person   Prior Level of Function Comment Pt somewhat unclear historian, reports ambulates w/ RW and first states has HHA 1x/week then states HHA stays overnight. Reports she has assist with bathing and dressing   Assistive Device Currently Used at Home walker, front-wheeled, wheelchair, shower chair        Prior Living Environment    Flowsheet Row Most Recent Value   People in Home spouse   Living Environment Comment Pt reports she lives in home w/ elevator, walk in shower        VITALS AND PAIN     PT Vitals    Date/Time Pulse SpO2 Pt Activity O2 Therapy BP BP Method Pt Position [facility]   09/23/23 0938 87 94 % At rest None (Room air) 96/64 Automatic Lying EEW   09/23/23 0941 -- -- -- -- 105/59 Automatic Sitting EEW   09/23/23 0945 91 -- -- -- 111/68 Automatic Lying EEW      PT Pain    Date/Time Pain Type Side/Orientation Location Rating: Rest Rating: Activity Interventions [facility]   09/23/23 0938 Pain Assessment right hip 0 - no pain 6 - moderate-severe pain position adjusted EEW   09/23/23 0945 Pain Assessment right hip 0 - no pain 6 - moderate-severe pain position adjusted EEW           Objective   OBJECTIVE     Start time:  0936  End time:  0956  Session Length: 20 min  Mode of Treatment: individual therapy, physical therapy    General Observations  Patient received in bed. She was agreeable to therapy, no issues or concerns identified by nurse prior to session.      Precautions: contact, fall (ESBL)       Limitations/Impairments: safety/cognitive      PT Eval  and Treat - 09/23/23 0936        Cognition    Orientation Status oriented x 3     Affect/Mental Status flat/blunted affect     Follows Commands follows one-step commands;verbal cues/prompting required     Comment, Cognition cooperative but somewhat unclear historian and limited by pain        Sensory Assessment    Sensory Assessment --   BLE sensation intact except pt reports numbness in feet       Lower Extremity Assessment    General Observations Assessed functionally BLE ROM WFL except ankle DF grossly limited and feet resting in significantly plantarflexed position; BLE strength at least 2+ to 3-/5 assessed functionally        Bed Mobility    Bed Mobility Activities left;supine to sit;sit to supine     [place] moderate assist (50-74% patient effort);1 person assist     Safety/Cues increased time to complete;verbal cues;hand placement;sequencing;technique     Assistive Device bed rails;head of bed elevated     Comment Assist at trunk & LEs for supine to sit; assist at LEs for sit to supine        Mobility Belt    Mobility Belt Used for All Out of Bed Activity no     Reason Mobility Belt Not Used medical contraindication     Reason Mobility Belt Not Used chronic compression fracture per imaging       Sit/Stand Transfer    Surface edge of bed     [place] moderate assist (50-74% patient effort);1 person assist     Safety/Cues verbal cues;preparatory posture;technique     Assistive Device walker, front-wheeled     Transfer Comments from bed x2 trials; noted posterior lean upon standing        Surface-to-Surface Transfers    Transfer Comments RN requests return pt to bed d/t dec BP        Gait Training    [place], Gait moderate assist (50-74% patient effort);2 person assist     Safety/Cues verbal cues;technique     Assistive Device walker, front-wheeled     Distance in Feet 2 feet     Pattern step-to     Deviations/Abnormal Patterns step length decreased;stride length decreased;gait speed decreased    dec LE clearance    Comment (Gait/Stairs) Sidestepping to the L along EOB, somewhat unsteady w/ dec foot clearance        Balance    Static Sitting Balance mild impairment     Sit to Stand Dynamic Balance moderate impairment     Static Standing Balance moderate impairment;supported     Dynamic Standing Balance moderate impairment;supported     Comment, Balance modA x1-2 w/ RW        Impairments/Safety Issues    Impairments Affecting Function balance;endurance/activity tolerance;cognition;pain;strength;range of motion (ROM)                               Education Documentation  Joint Mobility/Strength, taught by Sharee Heredia PT at 9/23/2023  1:40 PM.  Learner: Patient  Readiness: Acceptance  Method: Explanation  Response: Verbalizes Understanding  Comment: Role of PT, PT POC, safety during mobility        Session Outcome  Patient upright, in bed at end of session, bed alarm on, call light in reach, personal items in reach, all needs met. Nursing notified about patient's performance and patient's position. (+ BPs)    AM-PAC - Mobility (Current Function)     Turning form your back to your side while in flat bed without using bedrails 2 - A Lot   Moving from lying on your back to sitting on the side of a flat bed without using bedrails 2 - A Lot   Moving to and from a bed to a chair 2 - A Lot   Standing up from a chair using your arms 2 - A Lot   To walk in a hospital room 2 - A Lot   Climbing 3-5 steps with a railing 1 - Total   AM-PAC Mobility Score 11      ASSESSMENT AND PLAN     Progress Summary  [facility] 11. Pt req modA x1 for bed mobility & STS & modA x2 for sidestepping w/ RW d/t pain as well as balance, strength, ROM & endurance deficits. Limited by R hip pain. She req cont'd skilled PT to progress mobility & address noted deficits in order to max fxnl independence & dec fall risk. Rec SNF.    Patient/Family Therapy Goals Statement: less pain    PT Plan    Flowsheet Row Most Recent Value   Rehab Potential fair,  will monitor progress closely at 09/23/2023 0936   Therapy Frequency 3 times/wk at 09/23/2023 0936   Planned Therapy Interventions balance training, bed mobility training, gait training, patient/family education, ROM (range of motion), strengthening, transfer training at 09/23/2023 0936          PT Discharge Recommendations    Flowsheet Row Most Recent Value   PT Recommended Discharge Disposition skilled nursing facility at 09/23/2023 0936   Anticipated Equipment Needs at Discharge (PT) other (see comments)  [TBD, pt owns RW and w/c] at 09/23/2023 0936               PT Goals    Flowsheet Row Most Recent Value   Bed Mobility Goal 1    Activity/Assistive Device bed mobility activities, all at 09/23/2023 0936   [place] supervision required at 09/23/2023 0936   Time Frame by discharge at 09/23/2023 0936   Progress/Outcome goal ongoing at 09/23/2023 0936   Transfer Goal 1    Activity/Assistive Device sit-to-stand/stand-to-sit, bed-to-chair/chair-to-bed, stand pivot, walker, front-wheeled at 09/23/2023 0936   [place] supervision required at 09/23/2023 0936   Time Frame by discharge at 09/23/2023 0936   Progress/Outcome goal ongoing at 09/23/2023 0936   Gait Training Goal 1    Activity/Assistive Device gait (walking locomotion), walker, front-wheeled at 09/23/2023 0936   [place] minimum assist (75% or more patient effort) at 09/23/2023 0936   Distance 20 at 09/23/2023 0936   Time Frame by discharge at 09/23/2023 0936   Progress/Outcome goal ongoing at 09/23/2023 0936

## 2023-09-23 NOTE — CONSULTS
Neurology Inpatient Consult    PATIENT NAME:  Mariya Porras  MRN:  [id number]  DATE OF SERVICE:  9/23/2023    REASON FOR CONSULTATION/CHIEF COMPLAINT: Episode of loss of awareness    HISTORY OF PRESENT ILLNESS:    88-year-old right-handed female with past medical history of Parkinson disease, hypertension, DVT on apixaban brought to the hospital following an episode of loss of awareness.      History obtained from chart review, patient at bedside and family.    Per patient, she was in her usual state of health when she got up yesterday.  She remembers getting down and was seated eating her breakfast.  Soon after, her head slumped forwards, eyes open and she was unresponsive for about 5 minutes.  Patient reports no recollection, no warning sign and remembers waking up surrounded by her .  No obvious seizure-like activity reported.  No reported confusion when she regained awareness.  No bladder or bowel incontinence.    No recent changes to medication.  No recent illnesses.  On arrival afebrile, blood pressure 159/85, heart rate 89/min.    Patient was seen by Neurology in 2019 for similar episode of syncope, found to have PE.  Patient reports no respiratory symptoms, not hypoxic.  She follows up with  Dr. Orestes Payan for Parkinson disease.      REVIEW OF SYSTEMS:  Except as mentioned in the HPI, review of systems is essentially negative for the 10 major systems.     PAST MEDICAL HISTORY:    Past Medical History:   Diagnosis Date    Anxiety     Arthritis     Basal cell carcinoma     forehead    Glaucoma     Hypertension     Low back pain     Lumbosacral disc disease     Parkinson's disease (CMS/HCC)     Peripheral neuropathy     Seizures (CMS/HCC)        PAST SURGICAL HISTORY:    Past Surgical History:   Procedure Laterality Date    BLADDER SUSPENSION  2009    Prolift M    CATARACT EXTRACTION, BILATERAL      COLONOSCOPY      HYSTERECTOMY      JOINT REPLACEMENT      KNEE ARTHROPLASTY   2015    left       ALLERGIES:    Allergies   Allergen Reactions    Codeine Hives      n/v    Diazepam Other (see comments)     Blurred vision    Sulfa (Sulfonamide Antibiotics) Hives    Sulfur Hives       CURRENT MEDICATIONS:    acetaminophen  975 mg oral BID    apixaban  2.5 mg oral BID    carbidopa-levodopa  1 tablet oral QID    docusate sodium  100 mg oral Daily    famotidine  20 mg oral Daily    gabapentin  300 mg oral TID    hydrALAZINE  50 mg oral TID    lisinopriL  20 mg oral Daily    melatonin  3 mg oral Nightly    mirtazapine  30 mg oral Nightly    nitrofurantoin (macrocrystal-monohydrate)  100 mg oral Daily before dinner    senna  2 tablet oral Daily before dinner    sertraline  50 mg oral BID    topiramate  25 mg oral BID         FAMILY HISTORY:    Family History   Problem Relation Age of Onset    No Known Problems Biological Mother     No Known Problems Biological Father        SOCIAL HISTORY:    Social History     Tobacco Use    Smoking status: Former    Smokeless tobacco: Never    Tobacco comments:     as a young teenager   Substance Use Topics    Alcohol use: Yes     Comment: wine    Drug use: No        PHYSICAL EXAMS:  Temp:  [36.4 °C (97.6 °F)-36.8 °C (98.2 °F)] 36.6 °C (97.8 °F)  Heart Rate:  [64-91] 77  Resp:  [12-22] 18  BP: ()/(59-97) 183/89    General appearance: NAD, conversant  Eyes: anicteric sclerae, moist conjunctivae; no lid-lag;   HENT: Atraumatic; oropharynx clear with moist mucous membranes and no mucosal ulcerations; normal hard and soft palate  Neck: Trachea midline; , supple.  Lungs: CTA, with normal respiratory effort and no intercostal retractions  CV: RRR, no MRGs  Abdomen: Soft, non-tender; no masses or HSM  Extremities: No peripheral edema. Pulses are intact  Skin: no rash, ulcers  Psych: Appropriate affect.        Neurological Examination  Patient is awake alert   There is no aphasia.   Speech hypophonic and mild dysarthria.   Flat  facies    Cranial nerves :  Pupils are equal, round, and reactive to light and accommodation bilaterally.    Eye movements are full without nystagmus.   Visual fields are full.    Facial sensation is intact bilaterally    Facial strength is normal.    The palate was midline.    Hearing is intact.     Shoulder shrug is normal.  The tongue is midline.       Motor:   Left foot plantar flexion 1/5, rest 4+/5       Sensory exam:  Sensation is intact to light touch on the upper and lower extremities.     Cerebellar:  Finger to Nose and Heel to shin intact bilaterally    NIH Stroke Scale    Interval: Baseline  Time:   Person Administering Scale: Alfie Poole     1a  Level of consciousness: 0=alert; keenly responsive   1b. LOC questions:  0=Performs both tasks correctly   1c. LOC commands: 0=Performs both tasks correctly   2.  Best Gaze: 0=normal   3. Visual: 0=No visual loss   4. Facial Palsy: 0=Normal symmetric movement   5a.  Motor left arm: 0=No drift, limb holds 90 (or 45) degrees for full 10 seconds   5b.  Motor right arm: 0=No drift, limb holds 90 (or 45) degrees for full 10 seconds   6a. motor left le=No drift, limb holds 90 (or 45) degrees for full 10 seconds   6b  Motor right le=No drift, limb holds 90 (or 45) degrees for full 10 seconds   7. Limb Ataxia: 0=Absent   8.  Sensory: 0=Normal; no sensory loss   9. Best Language:  0=No aphasia, normal   10. Dysarthria: 1=mild dysarthria    11. Extinction and Inattention: 0=No abnormality    Total:   1         PERTINENT DIAGNOSTIC TESTS:     CT head without contrast 2023  IMPRESSION:  No acute intracranial hemorrhage, acute infarct in a major vascular  territory or mass-effect    I have personally reviewed the above imaging, procedure and lab studies.      ASSESSMENT:    88-year-old female with past medical history of Parkinson's comes to the hospital following an episode of loss of awareness.      Patient reports no warning signs, lost awareness for  about 5 minutes.  No tonic-clonic activity noted.  No significant confusional state post episode.  Patient reports 5-6 similar episodes over the last 12 months.  No prior history of seizures    Given history of Parkinson's and slightly high risk of seizures with neurodegenerative disease, recommend obtain MRI brain without contrast and EEG  No role for antiseizure medications for now  Continue home dose Parkinson's medications.  PT/OT  Hydration encouraged  We will follow-up with results.      The case was discussed with Tano Pitts MD    I have personally seen this patient, reviewed the history and all the available data, performed the physical examination as documented above, and formulated the plan of care. Also discussed with patient about the side effects of the medications. All questions the patient and her/his family have are answered.    Thank you for allowing me to participate in the care of your patient.  If you have any further questions, please do not hesitate to contact me.    This encounter was completed utilizing the Direct Speech Voice Recognition Software. Grammatical errors, random word insertions, pronoun errors, and incomplete sentences are occasional consequences of the system due to software limitation, ambient noise, and hardware issues. These may be missed by proof reading prior to affixing electronic signature. Any questions or concerns about the content, text, or information contained within the body of this dictation should be directly addressed to the physician for clarification. If you have any questions or concerns please do not hesitate to call me directly.       Alfie Poole MD

## 2023-09-24 ENCOUNTER — APPOINTMENT (INPATIENT)
Dept: RADIOLOGY | Facility: HOSPITAL | Age: 88
DRG: 312 | End: 2023-09-24
Attending: HOSPITALIST
Payer: MEDICARE

## 2023-09-24 ENCOUNTER — BMR PREADMISSION ASSESSMENT (INPATIENT)
Dept: ADMISSIONS | Facility: REHABILITATION | Age: 88
End: 2023-09-24
Payer: MEDICARE

## 2023-09-24 PROBLEM — S32.019A CLOSED FRACTURE OF FIRST LUMBAR VERTEBRA (CMS/HCC): Status: ACTIVE | Noted: 2023-09-24

## 2023-09-24 LAB
ANION GAP SERPL CALC-SCNC: 5 MEQ/L (ref 3–15)
BUN SERPL-MCNC: 20 MG/DL (ref 7–25)
CALCIUM SERPL-MCNC: 10.2 MG/DL (ref 8.6–10.3)
CHLORIDE SERPL-SCNC: 104 MEQ/L (ref 98–107)
CO2 SERPL-SCNC: 25 MEQ/L (ref 21–31)
CREAT SERPL-MCNC: 0.6 MG/DL (ref 0.6–1.2)
GFR SERPL CREATININE-BSD FRML MDRD: >60 ML/MIN/1.73M*2
GLUCOSE BLD-MCNC: 108 MG/DL (ref 70–99)
GLUCOSE BLD-MCNC: 118 MG/DL (ref 70–99)
GLUCOSE BLD-MCNC: 94 MG/DL (ref 70–99)
GLUCOSE SERPL-MCNC: 97 MG/DL (ref 70–99)
POCT TEST: ABNORMAL
POCT TEST: ABNORMAL
POCT TEST: NORMAL
POTASSIUM SERPL-SCNC: 4.3 MEQ/L (ref 3.5–5.1)
SODIUM SERPL-SCNC: 134 MEQ/L (ref 136–145)
TOPIRAMATE SERPL-MCNC: 3 MCG/ML

## 2023-09-24 PROCEDURE — 63700000 HC SELF-ADMINISTRABLE DRUG: Performed by: HOSPITALIST

## 2023-09-24 PROCEDURE — G1004 CDSM NDSC: HCPCS

## 2023-09-24 PROCEDURE — 99232 SBSQ HOSP IP/OBS MODERATE 35: CPT | Performed by: HOSPITALIST

## 2023-09-24 PROCEDURE — 36415 COLL VENOUS BLD VENIPUNCTURE: CPT | Performed by: HOSPITALIST

## 2023-09-24 PROCEDURE — 63700000 HC SELF-ADMINISTRABLE DRUG: Performed by: INTERNAL MEDICINE

## 2023-09-24 PROCEDURE — 20600000 HC ROOM AND CARE INTERMEDIATE/TELEMETRY

## 2023-09-24 PROCEDURE — 80048 BASIC METABOLIC PNL TOTAL CA: CPT | Performed by: HOSPITALIST

## 2023-09-24 RX ORDER — ACETAMINOPHEN 325 MG/1
975 TABLET ORAL EVERY 8 HOURS
Status: DISCONTINUED | OUTPATIENT
Start: 2023-09-24 | End: 2023-09-27 | Stop reason: HOSPADM

## 2023-09-24 RX ORDER — GABAPENTIN 300 MG/1
300 CAPSULE ORAL EVERY 8 HOURS
Status: DISCONTINUED | OUTPATIENT
Start: 2023-09-24 | End: 2023-09-27 | Stop reason: HOSPADM

## 2023-09-24 RX ORDER — LISINOPRIL 20 MG/1
20 TABLET ORAL DAILY
Status: DISCONTINUED | OUTPATIENT
Start: 2023-09-25 | End: 2023-09-27 | Stop reason: HOSPADM

## 2023-09-24 RX ORDER — HYDRALAZINE HYDROCHLORIDE 50 MG/1
50 TABLET, FILM COATED ORAL 2 TIMES DAILY
Status: DISCONTINUED | OUTPATIENT
Start: 2023-09-24 | End: 2023-09-27 | Stop reason: HOSPADM

## 2023-09-24 RX ORDER — LISINOPRIL 20 MG/1
40 TABLET ORAL DAILY
Status: DISCONTINUED | OUTPATIENT
Start: 2023-09-24 | End: 2023-09-24

## 2023-09-24 RX ORDER — POLYETHYLENE GLYCOL 3350 17 G/17G
17 POWDER, FOR SOLUTION ORAL DAILY PRN
Status: DISCONTINUED | OUTPATIENT
Start: 2023-09-24 | End: 2023-09-26

## 2023-09-24 RX ADMIN — ACETAMINOPHEN 975 MG: 325 TABLET ORAL at 08:40

## 2023-09-24 RX ADMIN — GABAPENTIN 300 MG: 300 CAPSULE ORAL at 08:42

## 2023-09-24 RX ADMIN — MIRTAZAPINE 30 MG: 15 TABLET, FILM COATED ORAL at 21:41

## 2023-09-24 RX ADMIN — SERTRALINE HYDROCHLORIDE 50 MG: 50 TABLET ORAL at 21:42

## 2023-09-24 RX ADMIN — LISINOPRIL 40 MG: 20 TABLET ORAL at 11:43

## 2023-09-24 RX ADMIN — CARBIDOPA AND LEVODOPA 1 TABLET: 25; 100 TABLET ORAL at 11:45

## 2023-09-24 RX ADMIN — GABAPENTIN 300 MG: 300 CAPSULE ORAL at 14:51

## 2023-09-24 RX ADMIN — NITROFURANTOIN (MONOHYDRATE/MACROCRYSTALS) 100 MG: 75; 25 CAPSULE ORAL at 14:51

## 2023-09-24 RX ADMIN — Medication 3 MG: at 21:42

## 2023-09-24 RX ADMIN — TOPIRAMATE 25 MG: 25 TABLET, FILM COATED ORAL at 21:42

## 2023-09-24 RX ADMIN — SENNOSIDES 2 TABLET: 8.6 TABLET, FILM COATED ORAL at 08:49

## 2023-09-24 RX ADMIN — DOCUSATE SODIUM 100 MG: 100 CAPSULE, LIQUID FILLED ORAL at 08:44

## 2023-09-24 RX ADMIN — HYDRALAZINE HYDROCHLORIDE 50 MG: 50 TABLET ORAL at 08:45

## 2023-09-24 RX ADMIN — TOPIRAMATE 25 MG: 25 TABLET, FILM COATED ORAL at 08:44

## 2023-09-24 RX ADMIN — APIXABAN 2.5 MG: 2.5 TABLET, FILM COATED ORAL at 08:44

## 2023-09-24 RX ADMIN — CARBIDOPA AND LEVODOPA 1 TABLET: 25; 100 TABLET ORAL at 21:41

## 2023-09-24 RX ADMIN — CARBIDOPA AND LEVODOPA 1 TABLET: 25; 100 TABLET ORAL at 17:04

## 2023-09-24 RX ADMIN — ACETAMINOPHEN 975 MG: 325 TABLET ORAL at 21:42

## 2023-09-24 RX ADMIN — SERTRALINE HYDROCHLORIDE 50 MG: 50 TABLET ORAL at 08:44

## 2023-09-24 RX ADMIN — ACETAMINOPHEN 975 MG: 325 TABLET ORAL at 14:49

## 2023-09-24 RX ADMIN — GABAPENTIN 300 MG: 300 CAPSULE ORAL at 21:41

## 2023-09-24 RX ADMIN — HYDRALAZINE HYDROCHLORIDE 50 MG: 50 TABLET ORAL at 21:41

## 2023-09-24 RX ADMIN — CARBIDOPA AND LEVODOPA 1 TABLET: 25; 100 TABLET ORAL at 08:43

## 2023-09-24 RX ADMIN — FAMOTIDINE 20 MG: 20 TABLET ORAL at 08:43

## 2023-09-24 RX ADMIN — APIXABAN 2.5 MG: 2.5 TABLET, FILM COATED ORAL at 21:41

## 2023-09-24 ASSESSMENT — COGNITIVE AND FUNCTIONAL STATUS - GENERAL
MOVING TO AND FROM BED TO CHAIR: 2 - A LOT
WALKING IN HOSPITAL ROOM: 1 - TOTAL
CLIMB 3 TO 5 STEPS WITH RAILING: 1 - TOTAL
STANDING UP FROM CHAIR USING ARMS: 2 - A LOT
CLIMB 3 TO 5 STEPS WITH RAILING: 1 - TOTAL
WALKING IN HOSPITAL ROOM: 1 - TOTAL
MOVING TO AND FROM BED TO CHAIR: 2 - A LOT
STANDING UP FROM CHAIR USING ARMS: 2 - A LOT

## 2023-09-24 NOTE — PROGRESS NOTES
"Daily Progress Note (LOS: 1)    Interval History:   No cardiac complaints.  Denies CP or SOB. No arrhythmias on telemetry.  No further episodes of syncope or unresponsiveness while hospitalized.     Review of Systems:  All organ systems normal except as per HPI.    Current Medications:   acetaminophen  975 mg oral BID    apixaban  2.5 mg oral BID    carbidopa-levodopa  1 tablet oral QID    docusate sodium  100 mg oral Daily    famotidine  20 mg oral Daily    gabapentin  300 mg oral TID    hydrALAZINE  50 mg oral TID    lisinopriL  40 mg oral Daily    melatonin  3 mg oral Nightly    mirtazapine  30 mg oral Nightly    nitrofurantoin (macrocrystal-monohydrate)  100 mg oral Daily before dinner    senna  2 tablet oral Daily before dinner    sertraline  50 mg oral BID    topiramate  25 mg oral BID       Physical Exam:  Visit Vitals  BP (!) 174/84 (BP Location: Right upper arm, Patient Position: Lying)   Pulse 76   Temp 36.5 °C (97.7 °F) (Oral)   Resp 18   Ht 1.676 m (5' 6\")   Wt 63 kg (138 lb 14.4 oz)   SpO2 96%   BMI 22.42 kg/m²       Gen: NAD  HEENT: no JVD, no thyromegaly  Heart: RRR, nl S1, S2, no m/r/g  Lungs: Clear bilaterally  Abd: soft, nt, nd, +bs  Ext: no edema      I&Os:    Intake/Output Summary (Last 24 hours) at 9/24/2023 1025  Last data filed at 9/23/2023 1910  Gross per 24 hour   Intake --   Output 1100 ml   Net -1100 ml       Weights:   Wt Readings from Last 3 Encounters:   09/24/23 63 kg (138 lb 14.4 oz)   05/08/23 68 kg (150 lb)   05/03/23 63.5 kg (140 lb)       Labs:  Results from last 7 days   Lab Units 09/24/23  0623 09/23/23  0519 09/22/23  1309   SODIUM mEQ/L 134* 134* 134*   POTASSIUM mEQ/L 4.3 4.4 4.3   CHLORIDE mEQ/L 104 105 104   CO2 mEQ/L 25 23 25   BUN mg/dL 20 19 22   CREATININE mg/dL 0.6 0.6 0.7   GLUCOSE mg/dL 97 88 107*   CALCIUM mg/dL 10.2 10.4* 10.5*   MAGNESIUM mg/dL  --   --  2.1             Results from last 7 days   Lab Units 09/23/23  0519 09/22/23  1309   WBC K/uL " 6.48 7.32   HEMOGLOBIN g/dL 14.2 14.7   HEMATOCRIT % 44.4 45.2*   PLATELETS K/uL 198 219     Lab Results   Component Value Date    BNP <5 11/27/2021     Lab Results   Component Value Date    INR 1.2 11/27/2021       Impression and Plan: 88-year-old woman with past medical history of Parkinson's disease, seizure disorder, multiple prior episodes of syncope, prior DVT/PE on Eliquis who presented to the hospital after an episode of syncope.     #Syncope  -Doubt cardiac process.  Prior workup benign.  Echo June 2022 normal.  Outpatient 1 week rhythm monitor Sept 2022 normal.  EKG here benign/normal.  No arrhythmias on telemetry.  Despite lack of typical seizure features (no shaking or loss of bowel/bladder control), etiology could be seizure.  Neurology consulted.  If here Monday, can repeat echo, otherwise as outpt.  No murmur on exam.    -No recent orthostatic symptoms but reportedly BP has been labile and low with PT.    -Check orthostatic vitals.  Parkinson's disease and sinemet can both contribute to orthostasis, if that is culprit, but clinically I doubt this.     #HTN  -Resting BP significantly elevated.  Increase lisinopril from 20 to 40mg daily.      #Prior DVT/PE  -Continue eliquis at home dose      #Seizure d/o  -[place] [facility] [place]     #Parkinson's disease  -Neurology following

## 2023-09-24 NOTE — ASSESSMENT & PLAN NOTE
History of L1 vertebral fracture in 2021    Has back pain and gets epidural injections as outpatient

## 2023-09-24 NOTE — PLAN OF CARE
Plan of Care Review  Plan of Care Reviewed With: patient  Progress: improving  Outcome Evaluation: pt. aaox4. no SOB, lightheadedness, or dizziness reported. VSS. pt. continues to c/o of right hip pain with activity but reports no pain at rest.

## 2023-09-24 NOTE — CONSULTS
Orthopedic Surgery Consult Note    Subjective     Mariya Porras is a 88 y.o. female who was admitted for   Unresponsive episode [R41.89]  Syncope, unspecified syncope type [R55]  Syncope with normal neurologic examination [R55].     Patient is a an 88-year-old female who presented to [place] emergency department on 9/22 S/P syncopal episode in her home witnessed by her  and son. As per patient she did not fall during this syncopal episode, she was seated at a table and her chest fell forward onto the table.  She denies falling out of the chair or any trauma to her right hip.    Patient states she has had this right hip pain for about 1 year and it has not changed following the syncopal episode.  She receives lumbar epidurals following an L1 compression fracture a few years ago and central cord stenosis.  The injections do not help alleviate her hip pain.  She ambulates with a rolling walker.  Denies paresthesias, weakness, fever, chills, SOB, CP.    Radiographs of the right hip and pelvis demonstrate bilateral hip osteoarthritis without acute fracture or dislocation.  Lumbar spine degenerative joint disease is also noted.      Medical History:   Past Medical History:   Diagnosis Date    Anxiety     Arthritis     Basal cell carcinoma     forehead    Glaucoma     Hypertension     Low back pain     Lumbosacral disc disease     Parkinson's disease (CMS/HCC)     Peripheral neuropathy     Seizures (CMS/HCC)        Surgical History:   Past Surgical History:   Procedure Laterality Date    BLADDER SUSPENSION  2009    Prolift M    CATARACT EXTRACTION, BILATERAL      COLONOSCOPY      HYSTERECTOMY      JOINT REPLACEMENT      KNEE ARTHROPLASTY  2015    left       Social History:   Social History     Social History Narrative    Not on file       Family History:   Family History   Problem Relation Age of Onset    No Known Problems Biological Mother     No Known Problems Biological Father         Allergies: Codeine, Diazepam, Sulfa (sulfonamide antibiotics), and Sulfur    Current Inpatient Medications   Medication Dose Route Frequency Provider Last Rate Last Admin    acetaminophen (TYLENOL) tablet 975 mg  975 mg oral q8h Tano Ybarra MD   975 mg at 09/24/23 1449    apixaban (ELIQUIS) tablet 2.5 mg  2.5 mg oral BID Gabino Bishop MD   2.5 mg at 09/24/23 0844    carbidopa-levodopa (SINEMET)  mg per tablet 1 tablet  1 tablet oral QID Gabino Bishop MD   1 tablet at 09/24/23 1145    glucose chewable tablet 16-32 g of dextrose  16-32 g of dextrose oral PRN Gabino Bishop MD        Or    dextrose 40 % oral gel 15-30 g of dextrose  15-30 g of dextrose oral PRN Gabino Bishop MD        Or    glucagon (GLUCAGEN) injection 1 mg  1 mg intramuscular PRN Gabino Bishop MD        Or    dextrose 50 % in water (D50) injection 12.5 g  25 mL intravenous PRN Gabino Bishop MD        docusate sodium (COLACE) capsule 100 mg  100 mg oral Daily Gabino Bishop MD   100 mg at 09/24/23 0844    famotidine (PEPCID) tablet 20 mg  20 mg oral Daily Gabino Bishop MD   20 mg at 09/24/23 0843    gabapentin (NEURONTIN) capsule 300 mg  300 mg oral q8h Tano Ybarra MD   300 mg at 09/24/23 1451    hydrALAZINE (APRESOLINE) tablet 50 mg  50 mg oral BID Tano Ybarra MD        [START ON 9/25/2023] lisinopriL (PRINIVIL) tablet 20 mg  20 mg oral Daily Tano Ybarra MD        melatonin tablet 3 mg  3 mg oral Nightly Gabino Bishop MD   3 mg at 09/23/23 2046    mirtazapine (REMERON) tablet 30 mg  30 mg oral Nightly Gabino Bishop MD   30 mg at 09/23/23 2047    nitrofurantoin (macrocrystal-monohydrate) (MACROBID) capsule 100 mg  100 mg oral Daily before dinner Gabino Bishop MD   100 mg at 09/24/23 1451    senna (SENOKOT) tablet 2 tablet  2 tablet oral Daily before dinner Gabino Bishop MD   2 tablet at 09/24/23 0849    sertraline (ZOLOFT) tablet 50 mg  50 mg oral BID Gabino Bishop MD   50  mg at 09/24/23 0844    topiramate (TOPAMAX) tablet 25 mg  25 mg oral BID Gabino Bishop MD   25 mg at 09/24/23 0844        Medication List      ASK your doctor about these medications    acetaminophen 500 mg tablet  Commonly known as: TYLENOL  Take 1,000 mg by mouth 2 (two) times a day. 2 tablets. Lunch and dinner  Dose: 1,000 mg     apixaban 2.5 mg tablet  Commonly known as: ELIQUIS  Take 2.5 mg by mouth 2 (two) times a day.  Dose: 2.5 mg     bimatoprost 0.01 % ophthalmic drops  Commonly known as: LUMIGAN  Administer 1 drop into both eyes nightly.  Dose: 1 drop     carbidopa-levodopa  mg per tablet  Commonly known as: SINEMET  Take 1 tablet by mouth 4 (four) times a day. 0900, 1300, 1700, 2100  Dose: 1 tablet     docusate sodium 100 mg capsule  Commonly known as: COLACE  Take 100 mg by mouth daily.  Dose: 100 mg     estradioL 0.01 % (0.1 mg/gram) vaginal cream  Commonly known as: ESTRACE  Insert into the vagina 2 (two) times a week (Tue, Fri).     famotidine 20 mg tablet  Commonly known as: PEPCID  Take 20 mg by mouth daily.  Dose: 20 mg     gabapentin 300 mg capsule  Commonly known as: NEURONTIN  Take 300 mg by mouth 3 (three) times a day.  Dose: 300 mg     hydrALAZINE 25 mg tablet  Commonly known as: APRESOLINE  Take 50 mg by mouth 3 (three) times a day. 2 tablets  Dose: 50 mg     lisinopriL 20 mg tablet  Commonly known as: PRINIVIL  Take 20 mg by mouth daily.  Dose: 20 mg     melatonin 3 mg tablet  Take 3 mg by mouth nightly.  Dose: 3 mg     MIRALAX 17 gram/dose powder  Take 8.5 g by mouth daily as needed (constipation). 1/2 capful  Dose: 8.5 g  Generic drug: polyethylene glycol     mirtazapine 30 mg tablet  Commonly known as: REMERON  Take 30 mg by mouth nightly.  Dose: 30 mg     nitrofurantoin (macrocrystal-monohydrate) 100 mg capsule  Commonly known as: MACROBID  Take 100 mg by mouth daily before dinner.  Dose: 100 mg     senna 8.6 mg tablet  Commonly known as: SENOKOT  Take 2 tablets by mouth daily  before dinner.  Dose: 2 tablet     sertraline 50 mg tablet  Commonly known as: ZOLOFT  Take 50 mg by mouth 2 (two) times a day. 0900, 1700  Dose: 50 mg     topiramate 25 mg tablet  Commonly known as: TOPAMAX  Take 25 mg by mouth 2 (two) times a day.  Dose: 25 mg          Review of Systems  See HPI    Objective   Labs  CBC Results       09/23/23 09/22/23 08/16/22     0519 1309 1045    WBC 6.48 7.32 7.23    RBC 4.42 4.60 4.63    HGB 14.2 14.7 14.7    HCT 44.4 45.2 44.8    .5 98.3 96.8    MCH 32.1 32.0 31.7    MCHC 32.0 32.5 32.8     219 211         BMP Results       09/24/23 09/23/23 09/22/23     0623 0519 1309     134 134    K 4.3 4.4 4.3    Cl 104 105 104    CO2 25 23 25    Glucose 97 88 107    BUN 20 19 22    Creatinine 0.6 0.6 0.7    Calcium 10.2 10.4 10.5    Anion Gap 5 6 5    EGFR >60.0 >60.0 >60.0         Comment for K at 1309 on 09/22/23: Results obtained on plasma. Plasma Potassium values may be up to 0.4 mEQ/L less than serum values. The differences may be greater for patients with high platelet or white cell counts.             Imaging  X-ray right hip: Significant bilateral hip osteoarthritis, no acute fractures or dislocations.       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, flexible  -No TTP of bony prominences  -No pain or crepitus with ROM of hip/knee/ankle/toes, including internal and external rotation of the right hip  -No palpable defects to toes/foot/ankle/tibia/knee/femur  -Negative logroll, negative heel strike  -Able to SLR  -No pain with Axial load  -No pain with axial compression of flexed hip and knee  -No pain with  compression at ASIS  -SILT in sural/saphenous/DP/SP/tibial distributions  -Motor intact to quad/hamstrings/EHL/TA/peroneals/GSC  -DP Pulse 2+  -Foot wwp, BCR    LLE:  -Skin intact, no abrasions, no lacerations   -Compartments soft and compressible  -Talipes Equinus deformity of the left foot, flexible  -No TTP of bony prominences  -No pain or crepitus with ROM of hip/knee/ankle/toes, including internal and external rotation of the left hip  -No palpable defects to toes/foot/ankle/tibia/knee/femur  -Negative logroll, negative heel strike  -Able to SLR  -No pain with Axial load  -No pain with axial compression of flexed hip and knee  -No pain with compression at ASIS  -SILT in sural/saphenous/DP/SP/tibial distributions  -Motor intact to quad/hamstrings/EHL/TA/peroneals/GSC  -DP Pulse 2+  -Foot wwp, BCR      Assessment   88 y.o. female admitted for syncopal episode and orthopedic surgery consulted for chronic right hip pain, fracture unlikely, will obtain additional imaging in order to further evaluate, no orthopedic surgical intervention indicated at this time       Plan   -No orthopedic surgical intervention indicated at this time   -Obtain MRI of the right hip to further assess etiology of pain  -Recommend PT/OT  -Multimodal pain control and lidocaine patches as needed  -NWB RLE  -Discussed with Dr. Byrnes who agrees with plan    Ana Ibarra DO   Orthopedic Surgery Resident

## 2023-09-24 NOTE — PLAN OF CARE
Problem: Adult Inpatient Plan of Care  Goal: Plan of Care Review  Outcome: Progressing     Pt requesting to get OOB for relief of L hip and back pain.  Stood and pivoted to chair assist of 2  BP now 135/70 - Dr Turcios aware that pt 20 mg lisinopril given late and holding new dose of 40 mg.  Will recheck pt BP before return to bed.  Noted that pt L drop foot is severe and appears to require an orthotic brace. Pt states she has not been able to return to orthopadist to get another brace as hers is damaged in some way. Dr Adair notified

## 2023-09-24 NOTE — PLAN OF CARE
Care Coordination Admission Assessment Note    General Information:  Readmission Within the last 30 days: no previous admission in last 30 days  Does patient have a : No  Patient-Specific Goals (include timeframe): medical stability    Living Arrangements:  Arrived From: home  Current Living Arrangements: home  People in Home: spouse (and 24 hour caregiver)  Home Accessibility: wheelchair accessible, elevator accessible  Living Arrangement Comments: patient lives with  and 24 hour caregiver.  Several children also live near her and her     Housing Stability and Financial Resources (SDOH):  In the last 12 months, was there a time when you were not able to pay the mortgage or rent on time?: No  In the last 12 months, how many places have you lived?: 1  In the last 12 months, was there a time when you did not have a steady place to sleep or slept in a shelter (including now)?: No  How hard is it for you to pay for the very basics like food, housing, medical care, and heating?: Not hard at all    Functional Status Prior to Admission:   Assistive Device/Animal Currently Used at Home: bath bench, cane, straight, grab bar, shower chair, walker, front-wheeled, wheelchair  Functional Status Comments:    IADL Comments: pt needs assistance with showering and dressing     Supports and Services:  Current Outpatient/Agency/Support Group:    Type of Current Home Care Services: Other (Comment) (private aide)  History of home care episode or rehab stay:      Discharge Needs Assessment:   Concerns to be Addressed: discharge planning  Current Discharge Risk: dependent with mobility/activities of daily living, physical impairment  Anticipated Changes Related to Illness: inability to care for self    Patient/Family Anticipated Discharge Plan:  Patient/Family Anticipates Transition To: home with family, home with help/services  Patient/Family Anticipated Services at Transition: , skilled  nursing    Connection to Community  Patient declined offered resources.      Patient Choice:   Offered/Gave Vendor List: yes  Patient's Choice of Community Agency(s):    Patient and/or patient guardian/advocate was made aware of their right to choose a provider. A list of eligible providers was presented and reviewed with the patient and/or patient guardian/advocate in written and/or verbal form. The list delineates providers in the patients desired geographic area who are participating in the Medicare program and/or providers contracted with the patients primary insurance. The Medicare list and quality ratings were obtained from the Medicare.gov [medicare.gov] website.    Anticipated Discharge Plan:  Met with patient. Provided education and contact information for Care Coordination services.: yes  Anticipated Discharge Disposition: home with home health, home with assistance  Type of Home Care Services: home OT, home PT, nursing      Transportation Needs (SDOH):  Transportation Concerns:    Transportation Anticipated: family or friend will provide  Is Out of Hospital DNR needed at discharge?: no    In the past 12 months, has lack of transportation kept you from medical appointments or from getting medications?: No  In the past 12 months, has lack of transportation kept you from meetings, work, or from getting things needed for daily living?: No    Concerns - comments:     CCRN met with the patient at the bedside and explained role.  Demographics, PCP, insurance, POA and pharmacy verified. The patient lives in her home with her  and 24 hour caregiver from home helpers.  The patient does not anticipate the need for home care at this time due to the fact that she has 24 hour a day help.  Patient has supportive children also in the area.  Elevator, walker, wheelchair, shower chair and grab bars in the home.  Will follow for DC needs.  Mary Behler CCRN

## 2023-09-24 NOTE — NURSING NOTE
R hip pain seems to most problematic with turning side to side in bed - pt did not have discomfort after pivotted to chair

## 2023-09-24 NOTE — PROGRESS NOTES
Hospital Medicine Service  Daily Progress Note       SUBJECTIVE     Patient seen earlier today.  Denied any lightheadedness or dizziness.  Reports right hip pain when she stands up on it     OBJECTIVE        Vital Signs  Temp:  [36.3 °C (97.4 °F)-36.7 °C (98.1 °F)] 36.5 °C (97.7 °F)  Heart Rate:  [66-89] 84  Resp:  [18] 18  BP: (126-178)/(68-92) 135/72     SpO2 Readings from Last 3 Encounters:   09/24/23 97%   05/08/23 98%   08/16/22 96%       I/O last 3 completed shifts:  In: 120 [P.O.:120]  Out: 2600 [Urine:2600]    PHYSICAL EXAMINATION        GEN:; not in acute distress  HEENT: normocephalic; atraumatic     CARDIO: regular rate and rhythm;   RESP: decreased at bases  ABD: soft, , non-tender, normal bowel sounds  EXT: Left ankle foot drop, left ankle swelling present  NEURO: alert and oriented to person, place; nonfocal       LABS / IMAGING / TELE        Labs  Lab Results   Component Value Date    WBC 6.48 09/23/2023    HGB 14.2 09/23/2023    HCT 44.4 09/23/2023    .5 (H) 09/23/2023     09/23/2023     Lab Results   Component Value Date    GLUCOSE 97 09/24/2023    CALCIUM 10.2 09/24/2023     (L) 09/24/2023    K 4.3 09/24/2023    CO2 25 09/24/2023     09/24/2023    BUN 20 09/24/2023    CREATININE 0.6 09/24/2023     Lab Results   Component Value Date    INR 1.2 11/27/2021       Imaging  MRI BRAIN WITHOUT CONTRAST    Result Date: 9/24/2023  IMPRESSION: 1.  No focal acute intracranial abnormality. 2. Other incidental findings noted under the comment.    X-RAY HIP WITH OR WITHOUT PELVIS 2-3 VW RIGHT    Result Date: 9/23/2023  IMPRESSION: No acute osseous abnormalities. TECHNIQUE: AP view of the pelvis and two views of the right hip. COMMENT: Bilateral hip osteoarthritis. No acute fractures are identified. Normal alignment.     CT HEAD WITHOUT IV CONTRAST    Result Date: 9/22/2023  IMPRESSION:  No acute intracranial hemorrhage, acute infarct in a major vascular territory or mass-effect.  COMMENT: Axial noncontrast CT images of the head were obtained. Sagittal and coronal reconstructions were created. CT DOSE:  One or more dose reduction techniques (e.g. automated exposure control, adjustment of the mA and/or kV according to patient size, use of iterative reconstruction technique) utilized for this examination. Comparison:  6/6/2022 Findings:  Sulci and ventricles are within normal limits for patient's age. Bilateral periventricular white matter lucencies are nonspecific but compatible with microangiopathic change. There is no mass effect, midline shift, territorial infarction, acute hemorrhage or extra-axial fluid collection. Visualized paranasal sinuses and mastoid air cells are clear.    X-RAY CHEST 1 VIEW    Result Date: 9/22/2023  IMPRESSION: No acute disease in chest. COMMENT: Comparison: Chest x-ray 8/16/2022. Technique: A single frontal AP portable upright projection of the chest was obtained. FINDINGS: The lungs are hypoexpanded but grossly clear without focal airspace consolidation, pleural effusion or pneumothorax. The cardiac silhouette is normal. The aorta is again noted to be uncoiled. The upper abdomen is unremarkable. There is dextrocurvature of the lumbar spine. There is no acute osseous abnormality. A coarse calcification is again seen projecting over the left breast.          ASSESSMENT AND PLAN      * Syncope with normal neurologic examination  Assessment & Plan  Syncope with loss of consciousness with no clear etiology.    Seizure is high on the differential especially with a history of seizures 3 to 4 months back.   Continue Topamax.  topiramate level ending  Continue Neurontin at home dose  Neurology consult noted  Closely monitor for any recurrence of symptoms.  If patient has recurrent symptoms or active seizure, will consider Ativan as needed    Telemetry  Cardiology consult noted.  No arrhythmias on monitor.  orthostatics positive-decreased hydralazine to twice  daily  Echocardiogram  EEG tomorrow  , MRI brain noncontrast-no acute abnormality      Closed fracture of first lumbar vertebra (CMS/[facility])  Assessment & Plan  History of L1 vertebral fracture in 2021    Has back pain and gets epidural injections as outpatient    Pain of right hip  Assessment & Plan  Patient with a lot of pain right hip when standing.      X-ray right hip- COMMENT:   Bilateral hip osteoarthritis. No acute fractures are identified. Normal   alignment.     Orthopedics eval-discussed with resident and nothing to be done inpatient for hip arthritis and follow-up with orthopedics as outpatient if she will be a candidate for right hip replacement.  DC'd the Ortho consult    Seizure disorder (CMS/[facility])  Assessment & Plan  Patient has history of seizures  Last Seizure was 3 to 4 months back  Continue Topamax.  topiramate level pending  Continue Neurontin at home dose  Neurology consult with a history of seizure.   Closely monitor for any recurrence of symptoms.  If patient has recurrent symptoms or active seizure, will consider Ativan as needed and EEG.     MRI brain-IMPRESSION:     1.  No focal acute intracranial abnormality.   2. Other incidental findings noted under the comment.    EEG tomorrow    Parkinson's disease (CMS/[facility])  Assessment & Plan  Patient has history of Parkinson's disease  Continue carbidopa levodopa at home dose    History of UTI  Assessment & Plan  Patient history of UTI with no active UTI related symptoms  Continue nitrofurantoin for prophylaxis      Depression  Assessment & Plan  Patient has history of depression.  No actively suicidal ideation thoughts or plans  Continue sertraline, mirtazapine    Gastroesophageal reflux disease without esophagitis  Assessment & Plan  Chronic and stable.  Continue PPI    History of venous thromboembolism  Assessment & Plan  Patient has history of DVT and PE.  Continue Eliquis at home dose  Check an echocardiogram    Hypertension  Assessment &  Plan  Chronic and stable.  Continue current home medications for blood pressure  Continue hydralazine, lisinopril      Patient orthostatic and blood pressure 90/60 sitting as per nursing.  Will decrease hydralazine to 50 mg p.o. twice daily.     EEG, echo tomorrow..  Discussed with patient's son Jose Roberto and updated him.  Raml brian pending.  If denied from [facility] possibly home with home care  VTE Assessment: Apixaban  Code Status: Full Code  Estimated discharge date: 9/25/2023     Tano Ybarra MD  9/24/2023  2:27 PM

## 2023-09-25 ENCOUNTER — APPOINTMENT (INPATIENT)
Dept: CARDIOLOGY | Facility: HOSPITAL | Age: 88
DRG: 312 | End: 2023-09-25
Attending: INTERNAL MEDICINE
Payer: MEDICARE

## 2023-09-25 ENCOUNTER — APPOINTMENT (INPATIENT)
Dept: RADIOLOGY | Facility: HOSPITAL | Age: 88
DRG: 312 | End: 2023-09-25
Attending: PHYSICAL THERAPIST
Payer: MEDICARE

## 2023-09-25 ENCOUNTER — APPOINTMENT (INPATIENT)
Dept: CARDIOLOGY | Facility: HOSPITAL | Age: 88
DRG: 312 | End: 2023-09-25
Attending: HOSPITALIST
Payer: MEDICARE

## 2023-09-25 LAB
ANION GAP SERPL CALC-SCNC: 6 MEQ/L (ref 3–15)
AORTIC ROOT ANNULUS: 3.3 CM
AORTIC VALVE MEAN VELOCITY: 0.73 M/S
AORTIC VALVE VELOCITY TIME INTEGRAL: 24.1 CM
ASCENDING AORTA: 2.9 CM
AV PEAK GRADIENT: 5 MMHG
AV PEAK VELOCITY-S: 1.12 M/S
AV VALVE AREA INDEX: 1.5
AV VALVE AREA: 2.22 CM2
AV VELOCITY RATIO: 0.82
AVA (VTI): 2.57 CM2
BSA FOR ECHO PROCEDURE: 1.71 M2
BUN SERPL-MCNC: 20 MG/DL (ref 7–25)
CALCIUM SERPL-MCNC: 10 MG/DL (ref 8.6–10.3)
CHLORIDE SERPL-SCNC: 102 MEQ/L (ref 98–107)
CO2 SERPL-SCNC: 23 MEQ/L (ref 21–31)
CREAT SERPL-MCNC: 0.5 MG/DL (ref 0.6–1.2)
DOP CALC LVOT STROKE VOLUME: 61.86 CM3
E WAVE DECELERATION TIME: 144 MS
E/A RATIO: 0.5
E/E' RATIO: 6.3
E/LAT E' RATIO: 9.3
ERYTHROCYTE [DISTWIDTH] IN BLOOD BY AUTOMATED COUNT: 13.7 % (ref 11.7–14.4)
EST RIGHT VENT SYSTOLIC PRESSURE BY TRICUSPID REGURGITATION JET: 25 MMHG
FRACTIONAL SHORTENING: 27.48 %
GFR SERPL CREATININE-BSD FRML MDRD: >60 ML/MIN/1.73M*2
GLUCOSE BLD-MCNC: 108 MG/DL (ref 70–99)
GLUCOSE SERPL-MCNC: 94 MG/DL (ref 70–99)
HCT VFR BLDCO AUTO: 41.1 % (ref 35–45)
HGB BLD-MCNC: 14.5 G/DL (ref 11.8–15.7)
INTERVENTRICULAR SEPTUM: 1.22 CM
LA ESV (BP): 35.7 CM3
LA ESV INDEX (A2C): 17.78 CM3/M2
LA ESV INDEX (BP): 20.88 CM3/M2
LA/AORTA RATIO: 0.79
LAAS-AP2: 12.9 CM2
LAAS-AP4: 15.7 CM2
LAD 2D: 2.6 CM
LALD A4C: 4.64 CM
LALD A4C: 5.55 CM
LAV-S: 30.4 CM3
LEFT ATRIUM VOLUME INDEX: 20.7 CM3/M2
LEFT ATRIUM VOLUME: 35.4 CM3
LEFT INTERNAL DIMENSION IN SYSTOLE: 2.56 CM (ref 2.39–3.62)
LEFT VENTRICULAR INTERNAL DIMENSION IN DIASTOLE: 3.53 CM (ref 4.03–5.6)
LEFT VENTRICULAR POSTERIOR WALL IN END DIASTOLE: 1.15 CM (ref 0.53–0.98)
LVOT 2D: 2 CM
LVOT A: 3.14 CM2
LVOT MG: 2 MMHG
LVOT MV: 0.57 M/S
LVOT PEAK VELOCITY: 1.01 M/S
LVOT PG: 4 MMHG
LVOT STROKE VOLUME INDEX: 36.17 ML/M2
LVOT VTI: 19.7 CM
MCH RBC QN AUTO: 33.8 PG (ref 28–33.2)
MCHC RBC AUTO-ENTMCNC: 35.3 G/DL (ref 32.2–35.5)
MCV RBC AUTO: 95.8 FL (ref 83–98)
MLH CV ECHO AVA INDEX VELOCITY RATIO: 1.3
MV E'TISSUE VEL-LAT: 0.06 M/S
MV E'TISSUE VEL-MED: 0.08 M/S
MV PEAK A VEL: 1.06 M/S
MV PEAK E VEL: 0.51 M/S
PDW BLD AUTO: 10.5 FL (ref 9.4–12.3)
PLATELET # BLD AUTO: 182 K/UL (ref 150–369)
POCT TEST: ABNORMAL
POSTERIOR WALL: 1.15 CM
POTASSIUM SERPL-SCNC: 5 MEQ/L (ref 3.5–5.1)
RAP: 5 MMHG
RBC # BLD AUTO: 4.29 M/UL (ref 3.93–5.22)
SODIUM SERPL-SCNC: 131 MEQ/L (ref 136–145)
TAPSE: 1.79 CM
TR MAX PG: 19.71 MMHG
TRICUSPID VALVE PEAK REGURGITATION VELOCITY: 2.22 M/S
WBC # BLD AUTO: 6.15 K/UL (ref 3.8–10.5)
Z-SCORE OF LEFT VENTRICULAR DIMENSION IN END DIASTOLE: -2.97
Z-SCORE OF LEFT VENTRICULAR DIMENSION IN END SYSTOLE: -1.09
Z-SCORE OF LEFT VENTRICULAR POSTERIOR WALL IN END DIASTOLE: 2.46

## 2023-09-25 PROCEDURE — 63700000 HC SELF-ADMINISTRABLE DRUG: Performed by: HOSPITALIST

## 2023-09-25 PROCEDURE — 80048 BASIC METABOLIC PNL TOTAL CA: CPT | Performed by: HOSPITALIST

## 2023-09-25 PROCEDURE — 63700000 HC SELF-ADMINISTRABLE DRUG: Performed by: INTERNAL MEDICINE

## 2023-09-25 PROCEDURE — 20600000 HC ROOM AND CARE INTERMEDIATE/TELEMETRY

## 2023-09-25 PROCEDURE — 25000000 HC PHARMACY GENERAL: Performed by: HOSPITALIST

## 2023-09-25 PROCEDURE — 73721 MRI JNT OF LWR EXTRE W/O DYE: CPT | Mod: RT,ME

## 2023-09-25 PROCEDURE — 95816 EEG AWAKE AND DROWSY: CPT

## 2023-09-25 PROCEDURE — C8929 TTE W OR WO FOL WCON,DOPPLER: HCPCS

## 2023-09-25 PROCEDURE — 85027 COMPLETE CBC AUTOMATED: CPT | Performed by: HOSPITALIST

## 2023-09-25 PROCEDURE — 99232 SBSQ HOSP IP/OBS MODERATE 35: CPT | Mod: FS | Performed by: HOSPITALIST

## 2023-09-25 PROCEDURE — 25500000 HC DRUGS/INCIDENT RAD: Mod: JZ | Performed by: HOSPITALIST

## 2023-09-25 PROCEDURE — 95816 EEG AWAKE AND DROWSY: CPT | Mod: 26 | Performed by: STUDENT IN AN ORGANIZED HEALTH CARE EDUCATION/TRAINING PROGRAM

## 2023-09-25 PROCEDURE — 36415 COLL VENOUS BLD VENIPUNCTURE: CPT | Performed by: HOSPITALIST

## 2023-09-25 RX ADMIN — CARBIDOPA AND LEVODOPA 1 TABLET: 25; 100 TABLET ORAL at 09:25

## 2023-09-25 RX ADMIN — MIRTAZAPINE 30 MG: 15 TABLET, FILM COATED ORAL at 21:08

## 2023-09-25 RX ADMIN — HYDRALAZINE HYDROCHLORIDE 50 MG: 50 TABLET ORAL at 21:08

## 2023-09-25 RX ADMIN — SERTRALINE HYDROCHLORIDE 50 MG: 50 TABLET ORAL at 09:25

## 2023-09-25 RX ADMIN — GABAPENTIN 300 MG: 300 CAPSULE ORAL at 21:08

## 2023-09-25 RX ADMIN — CARBIDOPA AND LEVODOPA 1 TABLET: 25; 100 TABLET ORAL at 13:33

## 2023-09-25 RX ADMIN — TOPIRAMATE 25 MG: 25 TABLET, FILM COATED ORAL at 09:25

## 2023-09-25 RX ADMIN — HYDRALAZINE HYDROCHLORIDE 50 MG: 50 TABLET ORAL at 09:26

## 2023-09-25 RX ADMIN — TOPIRAMATE 25 MG: 25 TABLET, FILM COATED ORAL at 21:08

## 2023-09-25 RX ADMIN — ACETAMINOPHEN 975 MG: 325 TABLET ORAL at 21:08

## 2023-09-25 RX ADMIN — ACETAMINOPHEN 975 MG: 325 TABLET ORAL at 13:33

## 2023-09-25 RX ADMIN — SERTRALINE HYDROCHLORIDE 50 MG: 50 TABLET ORAL at 21:09

## 2023-09-25 RX ADMIN — CARBIDOPA AND LEVODOPA 1 TABLET: 25; 100 TABLET ORAL at 21:08

## 2023-09-25 RX ADMIN — Medication 3 MG: at 21:09

## 2023-09-25 RX ADMIN — LISINOPRIL 20 MG: 20 TABLET ORAL at 09:26

## 2023-09-25 RX ADMIN — CARBIDOPA AND LEVODOPA 1 TABLET: 25; 100 TABLET ORAL at 18:12

## 2023-09-25 RX ADMIN — GABAPENTIN 300 MG: 300 CAPSULE ORAL at 13:33

## 2023-09-25 RX ADMIN — SENNOSIDES 2 TABLET: 8.6 TABLET, FILM COATED ORAL at 18:12

## 2023-09-25 RX ADMIN — GABAPENTIN 300 MG: 300 CAPSULE ORAL at 09:25

## 2023-09-25 RX ADMIN — NITROFURANTOIN (MONOHYDRATE/MACROCRYSTALS) 100 MG: 75; 25 CAPSULE ORAL at 18:12

## 2023-09-25 RX ADMIN — APIXABAN 2.5 MG: 2.5 TABLET, FILM COATED ORAL at 21:08

## 2023-09-25 RX ADMIN — APIXABAN 2.5 MG: 2.5 TABLET, FILM COATED ORAL at 09:26

## 2023-09-25 RX ADMIN — SODIUM CHLORIDE: 9 INJECTION INTRAMUSCULAR; INTRAVENOUS; SUBCUTANEOUS at 12:33

## 2023-09-25 RX ADMIN — POLYETHYLENE GLYCOL (3350) 17 G: 17 POWDER, FOR SOLUTION ORAL at 18:13

## 2023-09-25 RX ADMIN — FAMOTIDINE 20 MG: 20 TABLET ORAL at 09:26

## 2023-09-25 RX ADMIN — DOCUSATE SODIUM 100 MG: 100 CAPSULE, LIQUID FILLED ORAL at 09:26

## 2023-09-25 ASSESSMENT — COGNITIVE AND FUNCTIONAL STATUS - GENERAL
WALKING IN HOSPITAL ROOM: 1 - TOTAL
MOVING TO AND FROM BED TO CHAIR: 2 - A LOT
CLIMB 3 TO 5 STEPS WITH RAILING: 1 - TOTAL
WALKING IN HOSPITAL ROOM: 1 - TOTAL
STANDING UP FROM CHAIR USING ARMS: 2 - A LOT
STANDING UP FROM CHAIR USING ARMS: 2 - A LOT
MOVING TO AND FROM BED TO CHAIR: 2 - A LOT
CLIMB 3 TO 5 STEPS WITH RAILING: 1 - TOTAL

## 2023-09-25 NOTE — PROGRESS NOTES
Patient sleeping comfortably and I elected not to wake her.  Prior exams have not been revealing for culprit.     Telemetry reviewed.  No arrhythmias.     For echo today.     BP was controlled yesterday after receiving Lisinopril 20mg.  Will leave at this dose.     Please see my note from 9/24.   Dr. Gaytan to resume care tomorrow if patient remains hospitalized.

## 2023-09-25 NOTE — PROGRESS NOTES
Patient: Mariya Porras  Location: [facility] 3A 3011  MRN:  [id number]  Today's date:  9/25/2023    Attempted to see patient for therapy. Unable due to patient unavailable (TONY DA SILVA will resume PT after MRI results).

## 2023-09-25 NOTE — CONSULTS
Physical Medicine and Rehabilitation Consult Note    Subjective     Mariya Porras is a 88 y.o. female who was admitted for Unresponsive episode [R41.89]  Syncope, unspecified syncope type [R55]  Syncope with normal neurologic examination [R55]. Patient was referred by  Dr Ybarra for evaluate rehab needs. Patient is an 88-year-old female with a history of Parkinson's disease, neuropathy with foot drop, seizure disorder, chronic right hip pain admitted after a syncopal episode with loss of consciousness.  She does have a history of seizures her last seizure was 3 to 4 months ago but this event did not seem like a seizure event.  Imaging studies ruled out CVA.  Cardiology did not feel as though this was cardiac event.  Evaluated by neurology, EEG is pending.  She was evaluated by therapies needing assist for mobility.  She does have bilateral foot drop, has a brace for her left lower extremity which is currently being repaired.  She continues to complain of right hip pain.  She was evaluated by orthopedics no intervention is recommended at this time and the suggested outpatient follow-up.  She has no other complaint of headache or dizziness, chest pain or shortness of breath.  She is voiding.  Pertinent radiology results reviewed. Pertinent lab results reviewed..    Medical History:   Past Medical History:   Diagnosis Date    Anxiety     Arthritis     Basal cell carcinoma     forehead    Glaucoma     Hypertension     Low back pain     Lumbosacral disc disease     Parkinson's disease (CMS/HCC)     Peripheral neuropathy     Seizures (CMS/HCC)        Surgical History:   Past Surgical History:   Procedure Laterality Date    BLADDER SUSPENSION  2009    Prolift M    CATARACT EXTRACTION, BILATERAL      COLONOSCOPY      HYSTERECTOMY      JOINT REPLACEMENT      KNEE ARTHROPLASTY  2015    left       Social History:   Social History   She lives with her  in a two-story home elevator, she needed  assistance with ADLs.  She was a limited household ambulator.  She has aides 24/7.  Social History Narrative    Not on file     Lives with:    Prior Function Level: Prior Level of Function  Dominant Hand: right  Ambulation: assistive equipment  Transferring: assistive equipment  Toileting: assistive person  Bathing: assistive equipment and person  Dressing: assistive person  Prior Level of Function Comment: Pt somewhat unclear historian, reports ambulates w/ RW and first states has HHA 1x/week then states HHA stays overnight. Reports she has assist with bathing and dressing  Family History:   Family History   Problem Relation Age of Onset    No Known Problems Biological Mother     No Known Problems Biological Father      History also provided by:   Allergies: Codeine, Diazepam, Sulfa (sulfonamide antibiotics), and Sulfur    Current Inpatient Medications   Medication Dose Route Frequency Provider Last Rate Last Admin    acetaminophen (TYLENOL) tablet 975 mg  975 mg oral q8h Tano Ybarra MD   975 mg at 09/24/23 2142    apixaban (ELIQUIS) tablet 2.5 mg  2.5 mg oral BID Gabino Bishop MD   2.5 mg at 09/24/23 2141    carbidopa-levodopa (SINEMET)  mg per tablet 1 tablet  1 tablet oral QID Gabino Bishop MD   1 tablet at 09/24/23 2141    glucose chewable tablet 16-32 g of dextrose  16-32 g of dextrose oral PRN Gabino Bishop MD        Or    dextrose 40 % oral gel 15-30 g of dextrose  15-30 g of dextrose oral PRN Gabino Bishop MD        Or    glucagon (GLUCAGEN) injection 1 mg  1 mg intramuscular PRN Gabino Bishop MD        Or    dextrose 50 % in water (D50) injection 12.5 g  25 mL intravenous PRN Gabino Bishop MD        docusate sodium (COLACE) capsule 100 mg  100 mg oral Daily Gabino Bishop MD   100 mg at 09/24/23 0844    famotidine (PEPCID) tablet 20 mg  20 mg oral Daily Gabino Bishop MD   20 mg at 09/24/23 0843    gabapentin (NEURONTIN) capsule 300 mg  300 mg oral q8h Tano Ybarra  MD   300 mg at 09/24/23 2141    hydrALAZINE (APRESOLINE) tablet 50 mg  50 mg oral BID Tano Ybarra MD   50 mg at 09/24/23 2141    lisinopriL (PRINIVIL) tablet 20 mg  20 mg oral Daily Tano Ybarra MD        melatonin tablet 3 mg  3 mg oral Nightly Gabino Bishop MD   3 mg at 09/24/23 2142    mirtazapine (REMERON) tablet 30 mg  30 mg oral Nightly Gabino Bishop MD   30 mg at 09/24/23 2141    nitrofurantoin (macrocrystal-monohydrate) (MACROBID) capsule 100 mg  100 mg oral Daily before dinner Gabino Bishop MD   100 mg at 09/24/23 1451    polyethylene glycol (MIRALAX) 17 gram packet 17 g  17 g oral Daily PRN Rosalinda Bird MD        senna (SENOKOT) tablet 2 tablet  2 tablet oral Daily before dinner Gabino Bishop MD   2 tablet at 09/24/23 0849    sertraline (ZOLOFT) tablet 50 mg  50 mg oral BID Gabino Bishop MD   50 mg at 09/24/23 2142    topiramate (TOPAMAX) tablet 25 mg  25 mg oral BID Gabino Bishop MD   25 mg at 09/24/23 2142        Medication List      ASK your doctor about these medications    acetaminophen 500 mg tablet  Commonly known as: TYLENOL  Take 1,000 mg by mouth 2 (two) times a day. 2 tablets. Lunch and dinner  Dose: 1,000 mg     apixaban 2.5 mg tablet  Commonly known as: ELIQUIS  Take 2.5 mg by mouth 2 (two) times a day.  Dose: 2.5 mg     bimatoprost 0.01 % ophthalmic drops  Commonly known as: LUMIGAN  Administer 1 drop into both eyes nightly.  Dose: 1 drop     carbidopa-levodopa  mg per tablet  Commonly known as: SINEMET  Take 1 tablet by mouth 4 (four) times a day. 0900, 1300, 1700, 2100  Dose: 1 tablet     docusate sodium 100 mg capsule  Commonly known as: COLACE  Take 100 mg by mouth daily.  Dose: 100 mg     estradioL 0.01 % (0.1 mg/gram) vaginal cream  Commonly known as: ESTRACE  Insert into the vagina 2 (two) times a week (Tue, Fri).     famotidine 20 mg tablet  Commonly known as: PEPCID  Take 20 mg by mouth daily.  Dose: 20 mg     gabapentin 300 mg  "capsule  Commonly known as: NEURONTIN  Take 300 mg by mouth 3 (three) times a day.  Dose: 300 mg     hydrALAZINE 25 mg tablet  Commonly known as: APRESOLINE  Take 50 mg by mouth 3 (three) times a day. 2 tablets  Dose: 50 mg     lisinopriL 20 mg tablet  Commonly known as: PRINIVIL  Take 20 mg by mouth daily.  Dose: 20 mg     melatonin 3 mg tablet  Take 3 mg by mouth nightly.  Dose: 3 mg     MIRALAX 17 gram/dose powder  Take 8.5 g by mouth daily as needed (constipation). 1/2 capful  Dose: 8.5 g  Generic drug: polyethylene glycol     mirtazapine 30 mg tablet  Commonly known as: REMERON  Take 30 mg by mouth nightly.  Dose: 30 mg     nitrofurantoin (macrocrystal-monohydrate) 100 mg capsule  Commonly known as: MACROBID  Take 100 mg by mouth daily before dinner.  Dose: 100 mg     senna 8.6 mg tablet  Commonly known as: SENOKOT  Take 2 tablets by mouth daily before dinner.  Dose: 2 tablet     sertraline 50 mg tablet  Commonly known as: ZOLOFT  Take 50 mg by mouth 2 (two) times a day. 0900, 1700  Dose: 50 mg     topiramate 25 mg tablet  Commonly known as: TOPAMAX  Take 25 mg by mouth 2 (two) times a day.  Dose: 25 mg          Review of Systems  Pertinent items are noted in HPI.    Objective   Labs  I have reviewed the patient's labs.  Significant abnormals are Sodium is 131.    Imaging  I have reviewed the Imaging from the last 24 hrs.    Telemetry:   I have independently reviewed the patient's telemtry. All telemetry are within normal limits.    Physical Exam  Visit Vitals  BP (!) 188/95 (BP Location: Right upper arm, Patient Position: Lying)   Pulse 73   Temp 36.4 °C (97.5 °F) (Oral)   Resp 17   Ht 1.676 m (5' 6\")   Wt 62.7 kg (138 lb 3.2 oz)   SpO2 96%   BMI 22.31 kg/m²     General appearance: well-developed, well-nourished, pleasant and cooperative  Eyes: negative findings: conjunctivae and sclerae normal and pupils equal, round, reactive to light and accomodation  Neck: supple, no carotid bruit and no adenopathy  Lungs: " clear to auscultation bilaterally  Heart: regular rate and rhythm, S1, S2 normal, no murmur, click, rub or gallop  Abdomen: soft, non-tender, bowel sounds normal and no masses/no organomegaly  Extremities: extremities normal, warm and well-perfused; no cyanosis, clubbing, or edema  Neurologic: Motor Exam: Grossly normal strength and tone of both upper extremities.  She has bilateral foot drop left greater than right.  Is difficult to assess strength in the lower extremities due to presence of pain.  There was no clonus present.  There was no Babinski reflex elicited.        Assessment   88 y.o. female being consulted for evaluate rehab needs  Plan of care was discussed with patient           Plan     Ms. Porras is an 88-year-old female with an ADL mobility dysfunction.  She is status post syncopal episode.'s seizure is being considered.  She has a history of Parkinson's disease.  At baseline she is a limited ambulator needing assistance with self-care.  She has 24 7 care in the home setting.  Given her current presentation recommendation once determined he medically stable that she can return home with home care services.  I discussed this with the patient who is agreeable.  Case management will assist in discharge planning.

## 2023-09-25 NOTE — PROGRESS NOTES
Hospital Medicine Service -  Daily Progress Note       SUBJECTIVE   Interval History: She reports right hip pain, and states Tylenol normally helps. Seen and examined. No events overnight     OBJECTIVE      Vital signs in last 24 hours:  Temp:  [36.3 °C (97.4 °F)-37 °C (98.6 °F)] 36.4 °C (97.5 °F)  Heart Rate:  [53-87] 73  Resp:  [17-19] 17  BP: (126-188)/(65-95) 144/77    Intake/Output Summary (Last 24 hours) at 9/25/2023 1213  Last data filed at 9/25/2023 0619  Gross per 24 hour   Intake --   Output 750 ml   Net -750 ml       PHYSICAL EXAMINATION      Physical Exam  Eyes:      Extraocular Movements: Extraocular movements intact.      Pupils: Pupils are equal, round, and reactive to light.   Cardiovascular:      Rate and Rhythm: Regular rhythm.      Heart sounds: Normal heart sounds.   Pulmonary:      Breath sounds: Normal breath sounds.   Abdominal:      General: Bowel sounds are normal.      Palpations: Abdomen is soft.   Musculoskeletal:         General: Normal range of motion.   Skin:     General: Skin is warm.   Neurological:      General: No focal deficit present.      Mental Status: She is alert and oriented to person, place, and time.   Psychiatric:         Mood and Affect: Mood normal.         Behavior: Behavior normal.         Thought Content: Thought content normal.         Judgment: Judgment normal.            LINES, CATHETERS, DRAINS, AIRWAYS, AND WOUNDS   Lines, Drains, and Airways:  Wounds (agree with documentation and present on admission):  Peripheral IV (Adult) 09/22/23 Left Antecubital (Active)   Number of days: 3       External Urinary Catheter Female (one size) (Active)   Number of days: 3         Comments:      LABS / IMAGING / TELE      Labs  Results from last 7 days   Lab Units 09/25/23  0613 09/24/23  0623 09/23/23  0519   SODIUM mEQ/L 131* 134* 134*   POTASSIUM mEQ/L 5.0 4.3 4.4   CHLORIDE mEQ/L 102 104 105   CO2 mEQ/L 23 25 23   BUN mg/dL 20 20 19   CREATININE mg/dL 0.5* 0.6 0.6    GLUCOSE mg/dL 94 97 88   CALCIUM mg/dL 10.0 10.2 10.4*     Results from last 7 days   Lab Units 09/25/23  0613 09/23/23  0519 09/22/23  1309   WBC K/uL 6.15 6.48 7.32   HEMOGLOBIN g/dL 14.5 14.2 14.7   HEMATOCRIT % 41.1 44.4 45.2*   PLATELETS K/uL 182 198 219         Imaging  MRI BRAIN WITHOUT CONTRAST    Result Date: 9/24/2023  CLINICAL HISTORY: Syncope versus seizures. Technique: MRI of the brain is performed. Following image sequences are obtained: 1. Sagittal T1. 2. Axial T2, FLAIR, T2 weighted gradient echo, diffusion. 3. Coronal T2, diffusion. COMPARISON:  CT brain performed on 9/22/2023 COMMENT: There is generalized cerebral volume loss. Increase in T2 signal abnormality is visualized in the periventricular white matter tracts most likely representing chronic small vessel ischemic disease. No intraparenchymal mass, acute infarcts or hemorrhages are seen. The cisterns are normal.  Body of the lateral ventricles are prominent.  The right clinical setting, this can be seen with normal pressure hydrocephalus.  This finding is unchanged.  No extra-axial masses or focal fluid collections are seen. Visualized portions of the paranasal sinuses and the mastoid air cells are clear.     IMPRESSION: 1.  No focal acute intracranial abnormality. 2. Other incidental findings noted under the comment.      ECG/Telemetry  I have independently reviewed the telemetry. No events for the last 24 hours.    ASSESSMENT AND PLAN      * Syncope with normal neurologic examination  Assessment & Plan  Syncope with loss of consciousness with no clear etiology.    Seizure is high on the differential especially with a history of seizures 3 to 4 months back.   Continue Topamax.  topiramate level ending  Continue Neurontin at home dose  Neurology consult noted  Closely monitor for any recurrence of symptoms.  If patient has recurrent symptoms or active seizure, will consider Ativan as needed    Telemetry  Cardiology consult noted.  No  arrhythmias on monitor.  orthostatics positive-decreased hydralazine to twice daily  Echocardiogram  EEG    MRI brain noncontrast-no acute abnormality  Per cardiology, symptoms not cardiac in nature  PM&R recs is to return home with her 24 hour caregivers      Closed fracture of first lumbar vertebra (CMS/[facility])  Assessment & Plan  History of L1 vertebral fracture in 2021    Has back pain and gets epidural injections as outpatient    Pain of right hip  Assessment & Plan  Patient with a lot of pain right hip when standing.      X-ray right hip- COMMENT:   Bilateral hip osteoarthritis. No acute fractures are identified. Normal   alignment.     Orthopedics eval-discussed with resident and nothing to be done inpatient for hip arthritis and follow-up with orthopedics as outpatient if she will be a candidate for right hip replacement.  DC'd the Ortho consult    MRI right hip pending    History of UTI  Assessment & Plan  Patient history of UTI with no active UTI related symptoms  Continue nitrofurantoin for prophylaxis      Depression  Assessment & Plan  Patient has history of depression.  No actively suicidal ideation thoughts or plans  Continue sertraline, mirtazapine    Gastroesophageal reflux disease without esophagitis  Assessment & Plan  Chronic and stable.  Continue PPI    Seizure disorder (CMS/[facility])  Assessment & Plan  Patient has history of seizures  Last Seizure was 3 to 4 months back  Continue Topamax.  topiramate level pending  Continue Neurontin at home dose  Neurology consult with a history of seizure.   Closely monitor for any recurrence of symptoms.  If patient has recurrent symptoms or active seizure, will consider Ativan as needed and EEG.     MRI brain-IMPRESSION:     1.  No focal acute intracranial abnormality.   2. Other incidental findings noted under the comment.    EEG tomorrow    History of venous thromboembolism  Assessment & Plan  Patient has history of DVT and PE.  Continue Eliquis at home  dose  Check an echocardiogram    Hypertension  Assessment & Plan  Chronic and stable.  Continue current home medications for blood pressure  Continue hydralazine, lisinopril      Patient orthostatic and blood pressure 90/60 sitting as per nursing.  Will decrease hydralazine to 50 mg p.o. twice daily.    Parkinson's disease (CMS/[facility])  Assessment & Plan  Patient has history of Parkinson's disease  Continue carbidopa levodopa at home dose         VTE Assessment: Padua    VTE Prophylaxis:  Current anticoagulants:  apixaban (ELIQUIS) tablet 2.5 mg, oral, BID      Code Status: Full Code      Estimated Discharge Date: 9/26/2023     Disposition Planning: Awaiting further imaging. Discharge tomorrow     OSCAR Loya  9/25/2023

## 2023-09-25 NOTE — PLAN OF CARE
Care Coordination Discharge Plan Note     Discharge Needs Assessment  Concerns to be Addressed: discharge planning  Current Discharge Risk: dependent with mobility/activities of daily living, physical impairment    Anticipated Discharge Plan  Anticipated Discharge Disposition: home with home health, home with assistance  Type of Home Care Services: home OT, home PT, nursing        Patient Choice  Offered/Gave Vendor List: yes  Patient's Choice of Community Agency(s):      Patient and/or patient guardian/advocate was made aware of their right to choose a provider. A list of eligible providers was presented and reviewed with the patient and/or patient guardian/advocate in written and/or verbal form. The list delineates providers in the patients desired geographic area who are participating in the Medicare program and/or providers contracted with the patients primary insurance. The Medicare list and quality ratings were obtained from the Medicare.gov [medicare.gov] website.    ---------------------------------------------------------------------------------------------------------------------    Interdisciplinary Discharge Plan Review:  Participants:     Concerns Comments:      Discharge Plan:   Disposition/Destination:   /    Discharge Facility:    Community Resources:      Discharge Transportation:  Is Out of Hospital DNR needed at Discharge: no  Does patient need discharge transport?        Patient reviewed in rounds today. Patient not ready for discharge pending Echo, MRI, EEG, and PT eval. [facility] met wit patient, and discharge plan remains for patient to return home with 24/7 care already in place. [facility] to assist with discharge planning when patient is ready for discharge.

## 2023-09-26 LAB
ATRIAL RATE: 90
P AXIS: 43
PR INTERVAL: 194
QRS DURATION: 86
QT INTERVAL: 346
QTC CALCULATION(BAZETT): 423
R AXIS: -19
T WAVE AXIS: -6
VENTRICULAR RATE: 90

## 2023-09-26 PROCEDURE — 63700000 HC SELF-ADMINISTRABLE DRUG: Performed by: INTERNAL MEDICINE

## 2023-09-26 PROCEDURE — 99232 SBSQ HOSP IP/OBS MODERATE 35: CPT | Mod: FS | Performed by: HOSPITALIST

## 2023-09-26 PROCEDURE — 20600000 HC ROOM AND CARE INTERMEDIATE/TELEMETRY

## 2023-09-26 PROCEDURE — 99233 SBSQ HOSP IP/OBS HIGH 50: CPT | Performed by: NURSE PRACTITIONER

## 2023-09-26 PROCEDURE — 63700000 HC SELF-ADMINISTRABLE DRUG: Performed by: HOSPITALIST

## 2023-09-26 RX ORDER — POLYETHYLENE GLYCOL 3350 17 G/17G
17 POWDER, FOR SOLUTION ORAL DAILY
Status: DISCONTINUED | OUTPATIENT
Start: 2023-09-27 | End: 2023-09-27 | Stop reason: HOSPADM

## 2023-09-26 RX ADMIN — TOPIRAMATE 25 MG: 25 TABLET, FILM COATED ORAL at 20:50

## 2023-09-26 RX ADMIN — NITROFURANTOIN (MONOHYDRATE/MACROCRYSTALS) 100 MG: 75; 25 CAPSULE ORAL at 17:30

## 2023-09-26 RX ADMIN — Medication 3 MG: at 20:50

## 2023-09-26 RX ADMIN — HYDRALAZINE HYDROCHLORIDE 50 MG: 50 TABLET ORAL at 20:50

## 2023-09-26 RX ADMIN — SERTRALINE HYDROCHLORIDE 50 MG: 50 TABLET ORAL at 20:50

## 2023-09-26 RX ADMIN — GABAPENTIN 300 MG: 300 CAPSULE ORAL at 21:00

## 2023-09-26 RX ADMIN — TOPIRAMATE 25 MG: 25 TABLET, FILM COATED ORAL at 09:59

## 2023-09-26 RX ADMIN — MIRTAZAPINE 30 MG: 15 TABLET, FILM COATED ORAL at 20:50

## 2023-09-26 RX ADMIN — APIXABAN 2.5 MG: 2.5 TABLET, FILM COATED ORAL at 09:58

## 2023-09-26 RX ADMIN — DOCUSATE SODIUM 100 MG: 100 CAPSULE, LIQUID FILLED ORAL at 09:59

## 2023-09-26 RX ADMIN — SERTRALINE HYDROCHLORIDE 50 MG: 50 TABLET ORAL at 09:58

## 2023-09-26 RX ADMIN — ACETAMINOPHEN 975 MG: 325 TABLET ORAL at 20:53

## 2023-09-26 RX ADMIN — HYDRALAZINE HYDROCHLORIDE 50 MG: 50 TABLET ORAL at 09:59

## 2023-09-26 RX ADMIN — CARBIDOPA AND LEVODOPA 1 TABLET: 25; 100 TABLET ORAL at 09:59

## 2023-09-26 RX ADMIN — ACETAMINOPHEN 975 MG: 325 TABLET ORAL at 09:59

## 2023-09-26 RX ADMIN — LISINOPRIL 20 MG: 20 TABLET ORAL at 09:59

## 2023-09-26 RX ADMIN — SENNOSIDES 2 TABLET: 8.6 TABLET, FILM COATED ORAL at 17:30

## 2023-09-26 RX ADMIN — CARBIDOPA AND LEVODOPA 1 TABLET: 25; 100 TABLET ORAL at 20:50

## 2023-09-26 RX ADMIN — FAMOTIDINE 20 MG: 20 TABLET ORAL at 09:59

## 2023-09-26 RX ADMIN — CARBIDOPA AND LEVODOPA 1 TABLET: 25; 100 TABLET ORAL at 13:37

## 2023-09-26 RX ADMIN — CARBIDOPA AND LEVODOPA 1 TABLET: 25; 100 TABLET ORAL at 17:30

## 2023-09-26 RX ADMIN — GABAPENTIN 300 MG: 300 CAPSULE ORAL at 13:37

## 2023-09-26 ASSESSMENT — COGNITIVE AND FUNCTIONAL STATUS - GENERAL
WALKING IN HOSPITAL ROOM: 1 - TOTAL
WALKING IN HOSPITAL ROOM: 2 - A LOT
MOVING TO AND FROM BED TO CHAIR: 2 - A LOT
CLIMB 3 TO 5 STEPS WITH RAILING: 2 - A LOT
STANDING UP FROM CHAIR USING ARMS: 2 - A LOT
MOVING TO AND FROM BED TO CHAIR: 2 - A LOT
STANDING UP FROM CHAIR USING ARMS: 2 - A LOT
CLIMB 3 TO 5 STEPS WITH RAILING: 1 - TOTAL

## 2023-09-26 NOTE — PROGRESS NOTES
Hospital Medicine Service -  Daily Progress Note       SUBJECTIVE   Interval History: She reports improvement in her hip pain after taking tylenol. Seen and examined.No events overnight.     OBJECTIVE      Vital signs in last 24 hours:  Temp:  [36.2 °C (97.2 °F)-36.7 °C (98 °F)] 36.5 °C (97.7 °F)  Heart Rate:  [69-89] 81  Resp:  [18-21] 18  BP: (117-172)/(65-86) 141/65  No intake or output data in the 24 hours ending 09/26/23 1125    PHYSICAL EXAMINATION      Physical Exam  Eyes:      Extraocular Movements: Extraocular movements intact.      Pupils: Pupils are equal, round, and reactive to light.   Cardiovascular:      Rate and Rhythm: Regular rhythm.      Heart sounds: Normal heart sounds.   Pulmonary:      Breath sounds: Normal breath sounds.   Abdominal:      General: Bowel sounds are normal.      Palpations: Abdomen is soft.   Musculoskeletal:         General: Normal range of motion.   Skin:     General: Skin is warm.   Neurological:      General: No focal deficit present.      Mental Status: She is alert and oriented to person, place, and time.   Psychiatric:         Mood and Affect: Mood normal.         Behavior: Behavior normal.            LINES, CATHETERS, DRAINS, AIRWAYS, AND WOUNDS   Lines, Drains, and Airways:  Wounds (agree with documentation and present on admission):  Peripheral IV (Adult) 09/22/23 Left Antecubital (Active)   Number of days: 4       External Urinary Catheter Female (one size) (Active)   Number of days: 4         Comments:      LABS / IMAGING / TELE      Labs  Results from last 7 days   Lab Units 09/25/23  0613 09/24/23  0623 09/23/23  0519   SODIUM mEQ/L 131* 134* 134*   POTASSIUM mEQ/L 5.0 4.3 4.4   CHLORIDE mEQ/L 102 104 105   CO2 mEQ/L 23 25 23   BUN mg/dL 20 20 19   CREATININE mg/dL 0.5* 0.6 0.6   GLUCOSE mg/dL 94 97 88   CALCIUM mg/dL 10.0 10.2 10.4*           Imaging  MRI HIP RIGHT WITHOUT CONTRAST    Result Date: 9/26/2023  CLINICAL HISTORY: Hip pain, stress fracture  suspected negative x-rays.     IMPRESSION: 1.  No acute fracture at either hip. No osteonecrosis. 2.  Mild bilateral hip degenerative joint disease. 3.  Multisite tendinopathy with moderate grade partial tearing of the bilateral hamstring tendon origins and low-grade partial tear of the right gluteus minimus tendons without tendon rupture or retraction. 4.  Multiple areas of intramuscular edema and partial fatty atrophy. 5.  Additional findings are discussed below. COMMENT: Comparison: 9/23/2023 x-rays.. TECHNIQUE: MRI of the right hip was performed using a dedicated noncontrast protocol which also includes  imaging through the bony pelvis. FINDINGS: Right hip: Right hip alignment is within normal limits. No acute fracture or osteonecrosis. There is a small island of hematopoietic marrow residual in the proximal diaphysis/subtrochanteric region of the right femur. No suspicious marrow replacing lesion. Small fluid in the right hip joint at the upper end of physiologic without significant joint effusion. Mild chondral thinning throughout the hip joint. Degeneration and tearing of the right hip labrum is suspected superior laterally and anterosuperiorly. Remainder of the right hip labrum appears grossly within normal limits by noncontrast MRI. Normal appearance of the ligamentum teres. Femoral head neck junction is within normal limits. Mild degenerative osteophytic spurring along the lateral aspect of the acetabulum. Left hip: Limited survey imaging of the left hip reveals mild degenerative changes very similar to the right without evidence of significant joint effusion, fracture or osteonecrosis. No stress response. Disease Bony pelvis: Pubic symphysis demonstrate mild to moderate chronic degenerative changes. SI joints demonstrate mild degenerative change. No acute fracture in the bony pelvis. No evidence of any suspicious bone marrow replacing lesion. Of note, there is multilevel degenerative disease  incompletely assessed in the lower included lumbar spine. Tendons: No significant excess fluid in the iliopsoas bursae bilaterally. The iliopsoas/psoas major tendons remain intact bilaterally. There is moderate grade partial tearing of the bilateral hamstring tendon origins without rupture or retraction superimposed upon tendinosis. There is no rupture or retraction of either the right or left gluteus medius or minimus tendons noting mild tendinosis bilaterally. Involving there is suggestion of a small amount of partial tearing involving the right gluteus minimus insertion. There is tendinosis and low-grade partial tearing involving the bilateral adductor tendon origin without rupture or retraction. Remaining major tendons about the pelvis appear intact. Musculature: Small amount of muscle edema is noted within the bilateral adductor muscles with some slight fatty atrophy nonspecific could be seen with denervation. The degree of strain is also possible. There is also similar edema in the bilateral gluteus minimus muscles also with partial fatty atrophy also raising the possibility of denervation change. Minimal edema and partial fatty infiltration of the gluteus and medius muscles. No evidence of a high-grade focal muscle tear. Symmetry of the piriformis muscles. No evidence of significant ischiofemoral impingement. Visceral pelvis: Limited survey of the visceral pelvis with this technique and due to motion artifact reveals no gross findings of concern. No findings of concern in the lower abdominal wall noting mild edema in the subcutaneous tissues.    EEG Awake and Drowsy    Result Date: 2023  Table formatting from the original result was not included.  ROUTINE EEG NOTE   Date of study initiation:  23 Time of study initiation: 1117  Date of study completion: 23 Time of study completion: 1141  EEG #:      Name: Mariya Porras  MRN:  [id number]  AGE:  88 y.o.  :  [id number]  Sex:  female   Location: Inpatient   History   Reason for study:  Seizure 88-year-old woman with a history of Parkinson's disease and episode with loss of consciousness Code:  G40.219  Anti-epileptic medications: None  Sedation:  None    Technique  This routine EEG was performed in the laboratory utilizing standard 10/20 system of electrode placement and one channel of EKG monitoring.  Recording was Routine study  Conditions of the recording were: Awake, Drowsy, and Asleep  Activation procedures were: Photic stimulation    Background   Background activity: The background rhythm was characterized by theta with intermixed faster frequencies at normal voltages. There was an 8 Hz posterior dominant rhythm.   Symmetry & focal abnormalities: The background was asymmetric with occasional right temporal (F8/T4) delta slowing  Sleep rhythms:  stage 1 sleep  Epileptiform activity: No epileptiform discharges were seen.  Seizures: No electrographic seizures were seen.  Video events: No clinical events reported     Activation   Photic stimulation performed, and no photoparoxysmal response seen    Other Findings   Regular  Interpretation   This is an abnormal EEG due to: Occasional right temporal slowing Mild diffuse slowing  Clinical correlation: These findings suggest focal cerebral dysfunction in the right temporal region and mild diffuse cerebral dysfunction which are non-specific with regard to etiology.  No epileptiform discharges or electrographic seizures.    Signature  Attending Signature:  Kaleigh Murillo MD       Transthoracic Echo (TTE) Complete    Result Date: 9/25/2023    Normal left ventricular size, mild concentric hypertrophy, and normal systolic function.  Estimated LVEF 60-65%.   No significant valvular, pericardial, or aortic abnormalities.         ECG/Telemetry  I have independently reviewed the telemetry. No events for the last 24 hours.    ASSESSMENT AND PLAN      * Syncope with normal neurologic  examination  Assessment & Plan  Syncope with loss of consciousness with no clear etiology.    Seizure is high on the differential especially with a history of seizures 3 to 4 months back.   Continue Topamax.  topiramate level ending  Continue Neurontin at home dose  Neurology consult noted  Closely monitor for any recurrence of symptoms.  If patient has recurrent symptoms or active seizure, will consider Ativan as needed    Telemetry  Cardiology consult noted.  No arrhythmias on monitor.  orthostatics positive-decreased hydralazine to twice daily  Echocardiogram  EEG    MRI brain noncontrast-no acute abnormality  Per cardiology, symptoms not cardiac in nature  PM&R recs is to return home with her 24 hour caregivers  Needs SNF      Closed fracture of first lumbar vertebra (CMS/[facility])  Assessment & Plan  History of L1 vertebral fracture in 2021    Has back pain and gets epidural injections as outpatient    Pain of right hip  Assessment & Plan  Patient with a lot of pain right hip when standing.      X-ray right hip- COMMENT:   Bilateral hip osteoarthritis. No acute fractures are identified. Normal   alignment.     Orthopedics eval-discussed with resident and nothing to be done inpatient for hip arthritis and follow-up with orthopedics as outpatient if she will be a candidate for right hip replacement.  DC'd the Ortho consult    MRI right hip    1.  No acute fracture at either hip. No osteonecrosis.  2.  Mild bilateral hip degenerative joint disease.  3.  Multisite tendinopathy with moderate grade partial tearing of the bilateral  hamstring tendon origins and low-grade partial tear of the right gluteus minimus  tendons without tendon rupture or retraction.  4.  Multiple areas of intramuscular edema and partial fatty atrophy.    She reports that she recently received an epidural injection    History of UTI  Assessment & Plan  Patient history of UTI with no active UTI related symptoms  Continue nitrofurantoin for  prophylaxis      Depression  Assessment & Plan  Patient has history of depression.  No actively suicidal ideation thoughts or plans  Continue sertraline, mirtazapine    Gastroesophageal reflux disease without esophagitis  Assessment & Plan  Chronic and stable.  Continue PPI    Seizure disorder (CMS/[facility])  Assessment & Plan  Patient has history of seizures  Last Seizure was 3 to 4 months back  Continue Topamax.  topiramate level pending  Continue Neurontin at home dose  Neurology consult with a history of seizure.   Closely monitor for any recurrence of symptoms.  If patient has recurrent symptoms or active seizure, will consider Ativan as needed and EEG.     MRI brain-IMPRESSION:     1.  No focal acute intracranial abnormality.   2. Other incidental findings noted under the comment.    EEG with no epileptiform activity    History of venous thromboembolism  Assessment & Plan  Patient has history of DVT and PE.  Continue Eliquis at home dose  Check an echocardiogram    Hypertension  Assessment & Plan  Chronic and stable.  Continue current home medications for blood pressure  Continue hydralazine, lisinopril      Patient orthostatic and blood pressure 90/60 sitting as per nursing.  Will decrease hydralazine to 50 mg p.o. twice daily.    Parkinson's disease (CMS/[facility])  Assessment & Plan  Patient has history of Parkinson's disease  Continue carbidopa levodopa at home dose         VTE Assessment: Padua    VTE Prophylaxis:  Current anticoagulants:  apixaban (ELIQUIS) tablet 2.5 mg, oral, BID      Code Status: Full Code      Estimated Discharge Date: 9/27/2023     Disposition Planning: Awaiting SNF. Discharge tomorrow.     OSCAR Loya  9/26/2023

## 2023-09-26 NOTE — PLAN OF CARE
Care Coordination Discharge Plan Note     Discharge Needs Assessment  Concerns to be Addressed: discharge planning  Current Discharge Risk: dependent with mobility/activities of daily living, physical impairment    Anticipated Discharge Plan  Anticipated Discharge Disposition: home with home health, home with assistance  Type of Home Care Services: home OT, home PT, nursing        Patient Choice  Offered/Gave Vendor List: yes  Patient's Choice of Community Agency(s):      Patient and/or patient guardian/advocate was made aware of their right to choose a provider. A list of eligible providers was presented and reviewed with the patient and/or patient guardian/advocate in written and/or verbal form. The list delineates providers in the patients desired geographic area who are participating in the Medicare program and/or providers contracted with the patients primary insurance. The Medicare list and quality ratings were obtained from the Medicare.gov [medicare.gov] website.    ---------------------------------------------------------------------------------------------------------------------    Interdisciplinary Discharge Plan Review:  Participants:     Concerns Comments:      Discharge Plan:   Disposition/Destination:   /    Discharge Facility:    Community Resources:      Discharge Transportation:  Is Out of Hospital DNR needed at Discharge: no  Does patient need discharge transport?         Met with patient, and TC with patient's spouse and son Jose Roberto. Reviewed recommendations for SNF for patient for rehab at discharge. [facility]'s [facility]. Referrals sent via [employer].   16:00 Met with patient and TC with patient's son and spouse. Reviewed bed offers. Family requesting SNF care at [facility].  Patient may be ready for discharge tomorrow. [facility] will assist with discharge planning when stable.

## 2023-09-26 NOTE — PROGRESS NOTES
She notes right hip pain specially try to ambulate.  Her prior injections were the right SI joint and a caudal epidural injection    Temperature 97.3  Blood pressure 134/68  Oxygen saturation 95%    Lumbar spine tender about the right SI joint  Right hip no pain range of motion  Limited hip active flexion  No leg swelling  Weak tibialis anterior  No evidence of ischemia  No swelling    Assessment right hip pain most likely due to radicular pain from the L1 burst fracture and the severe stenosis.    Plan I reviewed these findings with her once again as both a diagnostic and therapeutic procedure she does wish to proceed with an epidural injection at the L1 level.    She does understand the procedure as well as the outcomes and risks.  She is n.p.o. after midnight for epidural injection at 7:30 AM on September 27    Shant Byrnes JR, MD

## 2023-09-26 NOTE — PROGRESS NOTES
Call placed to Dr. Braswell' office>patient received cauda epidural with bupivicaine and kenolog on 9/14 as well as a right SI joint injection on 6/29.  Dr. Braswell office staff also provided information that patient had injection done by Dr. Kaiser but unsure of location on 8/17.  Call placed to Dr. Kaiser's office.  Patient had dysport injection of left tibant and left gastroc on 8/17.  Above information provided to Dr. Byrnes.  Will plan on right L1 PRISCILA tomorrow.  Made NPO at midnight and Eliquis placed on hold.  [facility] updated.

## 2023-09-26 NOTE — PROGRESS NOTES
NEUROLOGY DAILY PROGRESS NOTE       PATIENT NAME:  Mariya Porras        YOB: 1935  AGE:  88 y.o.     GENDER: female  MRN:  [id number]          PATIENT #: [id number]      SUBJECTIVE     Interval History: The patient was seen and examined. No new neurological symptoms since last seen.  Patient was able to provide some history on events leading to this admission.  She remembers having an episode that she lost awareness.  She was eating lunch but that is all she remembers.  Patient reported her son witnessed the whole event.  I called the patient's son and spoke with them on the phone.  Patient's son described the episode the patient just lost awareness, eyes closed and became unresponsive.  Patient's son denies any tonic-clonic movement or extremity stiffness.  Patient is on Topamax 25 mg twice a day.  Patient is unaware why she is on it.  Patient's son reported she sees a physician at [facility] who manages her pain who added Topamax in addition to gabapentin for pain.  Family denies seizure history.      REVIEW OF SYSTEMS   13 point ROS unchanged    MEDICATIONS     Infusions:         Scheduled:    acetaminophen  975 mg oral q8h    [Provider Managed Hold] apixaban  2.5 mg oral BID    carbidopa-levodopa  1 tablet oral QID    docusate sodium  100 mg oral Daily    famotidine  20 mg oral Daily    gabapentin  300 mg oral q8h    hydrALAZINE  50 mg oral BID    lisinopriL  20 mg oral Daily    melatonin  3 mg oral Nightly    mirtazapine  30 mg oral Nightly    nitrofurantoin (macrocrystal-monohydrate)  100 mg oral Daily before dinner    senna  2 tablet oral Daily before dinner    sertraline  50 mg oral BID    topiramate  25 mg oral BID       VITAL SIGNS   Temperature: Temp (24hrs), Av.4 °C (97.5 °F), Min:36.2 °C (97.2 °F), Max:36.7 °C (98 °F)     BP Max:  Systolic (24hrs), Av , Min:117 , Max:172      BP Cruz:  Diastolic (24hrs), Av, Min:65, Max:86    Recent:    Patient Vitals for  the past 4 hrs:   BP Temp Temp src Pulse Resp SpO2   09/26/23 1100 134/65 36.2 °C (97.2 °F) Oral 78 18 96 %         PHYSICAL EXAM     General: No acute distress, appears stated age.   Neck: Supple  Cardiovascular: Rate and rhythm are regular  Lungs: Regular and non labored   Neurologic Exam:   Mental status: Alert and oriented x 3. Speech is clear and fluent. Memory is intact.   Cranial nerves: .  Extraocular movements are intact without nystagmus.  Visual fields by confrontation are full.  Facial strength is symmetric.  Facial sensation is intact to light touch. Hearing is intact.   Strength: 5/5 throughout.  No pronator drift.  Tone is normal.  Sensation: Intact to light touch.             Imaging and labs reviewed  Lab Results   Component Value Date    WBC 6.15 09/25/2023    HGB 14.5 09/25/2023    HCT 41.1 09/25/2023     09/25/2023    CHOL 251 (H) 09/08/2021    TRIG 96 09/08/2021    HDL 49 (L) 09/08/2021    ALT 3 (L) 09/23/2023    AST 10 (L) 09/23/2023     (L) 09/25/2023    K 5.0 09/25/2023     09/25/2023    CREATININE 0.5 (L) 09/25/2023    BUN 20 09/25/2023    CO2 23 09/25/2023    TSH 1.88 06/06/2022    INR 1.2 11/27/2021    HGBA1C 5.3 09/08/2021     MRI HIP RIGHT WITHOUT CONTRAST    Result Date: 9/26/2023  IMPRESSION: 1.  No acute fracture at either hip. No osteonecrosis. 2.  Mild bilateral hip degenerative joint disease. 3.  Multisite tendinopathy with moderate grade partial tearing of the bilateral hamstring tendon origins and low-grade partial tear of the right gluteus minimus tendons without tendon rupture or retraction. 4.  Multiple areas of intramuscular edema and partial fatty atrophy. 5.  Additional findings are discussed below. COMMENT: Comparison: 9/23/2023 x-rays.. TECHNIQUE: MRI of the right hip was performed using a dedicated noncontrast protocol which also includes  imaging through the bony pelvis. FINDINGS: Right hip: Right hip alignment is within normal limits. No acute  fracture or osteonecrosis. There is a small island of hematopoietic marrow residual in the proximal diaphysis/subtrochanteric region of the right femur. No suspicious marrow replacing lesion. Small fluid in the right hip joint at the upper end of physiologic without significant joint effusion. Mild chondral thinning throughout the hip joint. Degeneration and tearing of the right hip labrum is suspected superior laterally and anterosuperiorly. Remainder of the right hip labrum appears grossly within normal limits by noncontrast MRI. Normal appearance of the ligamentum teres. Femoral head neck junction is within normal limits. Mild degenerative osteophytic spurring along the lateral aspect of the acetabulum. Left hip: Limited survey imaging of the left hip reveals mild degenerative changes very similar to the right without evidence of significant joint effusion, fracture or osteonecrosis. No stress response. Disease Bony pelvis: Pubic symphysis demonstrate mild to moderate chronic degenerative changes. SI joints demonstrate mild degenerative change. No acute fracture in the bony pelvis. No evidence of any suspicious bone marrow replacing lesion. Of note, there is multilevel degenerative disease incompletely assessed in the lower included lumbar spine. Tendons: No significant excess fluid in the iliopsoas bursae bilaterally. The iliopsoas/psoas major tendons remain intact bilaterally. There is moderate grade partial tearing of the bilateral hamstring tendon origins without rupture or retraction superimposed upon tendinosis. There is no rupture or retraction of either the right or left gluteus medius or minimus tendons noting mild tendinosis bilaterally. Involving there is suggestion of a small amount of partial tearing involving the right gluteus minimus insertion. There is tendinosis and low-grade partial tearing involving the bilateral adductor tendon origin without rupture or retraction. Remaining major tendons  about the pelvis appear intact. Musculature: Small amount of muscle edema is noted within the bilateral adductor muscles with some slight fatty atrophy nonspecific could be seen with denervation. The degree of strain is also possible. There is also similar edema in the bilateral gluteus minimus muscles also with partial fatty atrophy also raising the possibility of denervation change. Minimal edema and partial fatty infiltration of the gluteus and medius muscles. No evidence of a high-grade focal muscle tear. Symmetry of the piriformis muscles. No evidence of significant ischiofemoral impingement. Visceral pelvis: Limited survey of the visceral pelvis with this technique and due to motion artifact reveals no gross findings of concern. No findings of concern in the lower abdominal wall noting mild edema in the subcutaneous tissues.    MRI BRAIN WITHOUT CONTRAST    Result Date: 9/24/2023  IMPRESSION: 1.  No focal acute intracranial abnormality. 2. Other incidental findings noted under the comment.    X-RAY HIP WITH OR WITHOUT PELVIS 2-3 VW RIGHT    Result Date: 9/23/2023  IMPRESSION: No acute osseous abnormalities. TECHNIQUE: AP view of the pelvis and two views of the right hip. COMMENT: Bilateral hip osteoarthritis. No acute fractures are identified. Normal alignment.     CT HEAD WITHOUT IV CONTRAST    Result Date: 9/22/2023  IMPRESSION:  No acute intracranial hemorrhage, acute infarct in a major vascular territory or mass-effect. COMMENT: Axial noncontrast CT images of the head were obtained. Sagittal and coronal reconstructions were created. CT DOSE:  One or more dose reduction techniques (e.g. automated exposure control, adjustment of the mA and/or kV according to patient size, use of iterative reconstruction technique) utilized for this examination. Comparison:  6/6/2022 Findings:  Sulci and ventricles are within normal limits for patient's age. Bilateral periventricular white matter lucencies are nonspecific but  compatible with microangiopathic change. There is no mass effect, midline shift, territorial infarction, acute hemorrhage or extra-axial fluid collection. Visualized paranasal sinuses and mastoid air cells are clear.    X-RAY CHEST 1 VIEW    Result Date: 9/22/2023  IMPRESSION: No acute disease in chest. COMMENT: Comparison: Chest x-ray 8/16/2022. Technique: A single frontal AP portable upright projection of the chest was obtained. FINDINGS: The lungs are hypoexpanded but grossly clear without focal airspace consolidation, pleural effusion or pneumothorax. The cardiac silhouette is normal. The aorta is again noted to be uncoiled. The upper abdomen is unremarkable. There is dextrocurvature of the lumbar spine. There is no acute osseous abnormality. A coarse calcification is again seen projecting over the left breast.      IMPRESSION/PLAN     In Summary: This is an 88-year-old female with a history for Parkinson's, DVT/PE on Eliquis who presents to [facility] with loss of awareness lasting approximately 5 minutes.  There was no tonic-clonic activity or come fusion.  Patient has had several episodes similar to this in the past.    EEG was negative for seizures    MRI brain with and without contrast negative for acute intracranial abnormalities.    1.Loss of awareness.  Seizures were ruled out.  We are recommending cardiology work-up, outpatient cardiac monitoring to rule out arrhythmias as a possible cause.    Other causes of the patient's altered mental status possibly related to orthostasis in the setting of Parkinson's disease.    Other considerations include polypharmacy. Patient follows pain management/ physician at [facility] who has put her on both gabapentin and Topamax for pain.      Plan:  We are recommending follow-up with her outpatient neurologist Dr. Payan for Parkinson's Disease/orthostasis, in addition to stopping Topamax.  Out patient f/u with cardiology  Good hydration  Orthostatic vital  signs    Neurology is signing off, call us back if needed.    Medical management and supportive care to continue by primary service.  Thank you for allowing me to participate in the care of this patient. If you have any further questions, please do not hesitate to contact me.  OSCAR Snyder  1:10 PM

## 2023-09-26 NOTE — PLAN OF CARE
Plan of Care Review  Plan of Care Reviewed With: patient  Progress: no change  Outcome Evaluation: NPO after midnight for steroid shot 9/27 refused ti get to chair pain relieved by lindsay PO tylenol forgetfulskin bundle maintained will cont to monitor

## 2023-09-26 NOTE — CONSULTS
Orthopedic Consult Note    Subjective     Mariya Porras is a 88 y.o. female who was admitted for Unresponsive episode [R41.89]  Syncope, unspecified syncope type [R55]  Syncope with normal neurologic examination [R55].     Orthopedic consultation was requested due to right hip pain.  She had a single episode but did not fall.  She denies any specific trauma.  She has had hip pain for approximately a year she has a history of L1 burst fracture that has been followed by neurosurgery and she is not an operative candidate.  She has had injections which she states have not significantly helped.  She ambulate with a rolling walker.  She does note weakness in her feet left greater than right and contributes this to neuropathy as well as her Parkinsons.      Her pain is mostly trying to stand and walk.  She denies current pain at rest.  She denies any significant left leg pain at this time      Medical History:   Past Medical History:   Diagnosis Date    Anxiety     Arthritis     Basal cell carcinoma     forehead    Glaucoma     Hypertension     Low back pain     Lumbosacral disc disease     Parkinson's disease (CMS/HCC)     Peripheral neuropathy     Seizures (CMS/HCC)        Surgical History:   Past Surgical History:   Procedure Laterality Date    BLADDER SUSPENSION  2009    Prolift M    CATARACT EXTRACTION, BILATERAL      COLONOSCOPY      HYSTERECTOMY      JOINT REPLACEMENT      KNEE ARTHROPLASTY  2015    left       Social History:   She is  and lives at home with her   She denies current tobacco use  Social alcohol use Wine    Family History:   Family History   Problem Relation Age of Onset    No Known Problems Biological Mother     No Known Problems Biological Father        Allergies: Codeine, Diazepam, Sulfa (sulfonamide antibiotics), and Sulfur    Current Inpatient Medications   Medication Dose Route Frequency Provider Last Rate Last Admin    acetaminophen (TYLENOL) tablet 975 mg   975 mg oral q8h Tano Ybarra MD   975 mg at 09/25/23 1333    apixaban (ELIQUIS) tablet 2.5 mg  2.5 mg oral BID Gabino Bishop MD   2.5 mg at 09/25/23 0926    carbidopa-levodopa (SINEMET)  mg per tablet 1 tablet  1 tablet oral QID Gabino Bishop MD   1 tablet at 09/25/23 1812    glucose chewable tablet 16-32 g of dextrose  16-32 g of dextrose oral PRN Gabino Bishop MD        Or    dextrose 40 % oral gel 15-30 g of dextrose  15-30 g of dextrose oral PRN Gabino Bishop MD        Or    glucagon (GLUCAGEN) injection 1 mg  1 mg intramuscular PRN Gabino Bishop MD        Or    dextrose 50 % in water (D50) injection 12.5 g  25 mL intravenous PRN Gabino Bishop MD        docusate sodium (COLACE) capsule 100 mg  100 mg oral Daily Gabino Bishop MD   100 mg at 09/25/23 0926    famotidine (PEPCID) tablet 20 mg  20 mg oral Daily Gabino Bishop MD   20 mg at 09/25/23 0926    gabapentin (NEURONTIN) capsule 300 mg  300 mg oral q8h Tano Ybarra MD   300 mg at 09/25/23 1333    hydrALAZINE (APRESOLINE) tablet 50 mg  50 mg oral BID Tano Ybarra MD   50 mg at 09/25/23 0926    lisinopriL (PRINIVIL) tablet 20 mg  20 mg oral Daily Tano Ybarra MD   20 mg at 09/25/23 0926    melatonin tablet 3 mg  3 mg oral Nightly Gabino Bishop MD   3 mg at 09/24/23 2142    mirtazapine (REMERON) tablet 30 mg  30 mg oral Nightly Gabino Bishop MD   30 mg at 09/24/23 2141    nitrofurantoin (macrocrystal-monohydrate) (MACROBID) capsule 100 mg  100 mg oral Daily before dinner Gabino Bishop MD   100 mg at 09/25/23 1812    polyethylene glycol (MIRALAX) 17 gram packet 17 g  17 g oral Daily PRN Rosalinda Bird MD   17 g at 09/25/23 1813    senna (SENOKOT) tablet 2 tablet  2 tablet oral Daily before dinner Gabino Bishop MD   2 tablet at 09/25/23 1812    sertraline (ZOLOFT) tablet 50 mg  50 mg oral BID Gabino Bishop MD   50 mg at 09/25/23 0925    topiramate (TOPAMAX) tablet 25 mg  25 mg oral BID  Gabino Bishop MD   25 mg at 09/25/23 0952        Medication List      ASK your doctor about these medications    acetaminophen 500 mg tablet  Commonly known as: TYLENOL  Take 1,000 mg by mouth 2 (two) times a day. 2 tablets. Lunch and dinner  Dose: 1,000 mg     apixaban 2.5 mg tablet  Commonly known as: ELIQUIS  Take 2.5 mg by mouth 2 (two) times a day.  Dose: 2.5 mg     bimatoprost 0.01 % ophthalmic drops  Commonly known as: LUMIGAN  Administer 1 drop into both eyes nightly.  Dose: 1 drop     carbidopa-levodopa  mg per tablet  Commonly known as: SINEMET  Take 1 tablet by mouth 4 (four) times a day. 0900, 1300, 1700, 2100  Dose: 1 tablet     docusate sodium 100 mg capsule  Commonly known as: COLACE  Take 100 mg by mouth daily.  Dose: 100 mg     estradioL 0.01 % (0.1 mg/gram) vaginal cream  Commonly known as: ESTRACE  Insert into the vagina 2 (two) times a week (Tue, Fri).     famotidine 20 mg tablet  Commonly known as: PEPCID  Take 20 mg by mouth daily.  Dose: 20 mg     gabapentin 300 mg capsule  Commonly known as: NEURONTIN  Take 300 mg by mouth 3 (three) times a day.  Dose: 300 mg     hydrALAZINE 25 mg tablet  Commonly known as: APRESOLINE  Take 50 mg by mouth 3 (three) times a day. 2 tablets  Dose: 50 mg     lisinopriL 20 mg tablet  Commonly known as: PRINIVIL  Take 20 mg by mouth daily.  Dose: 20 mg     melatonin 3 mg tablet  Take 3 mg by mouth nightly.  Dose: 3 mg     MIRALAX 17 gram/dose powder  Take 8.5 g by mouth daily as needed (constipation). 1/2 capful  Dose: 8.5 g  Generic drug: polyethylene glycol     mirtazapine 30 mg tablet  Commonly known as: REMERON  Take 30 mg by mouth nightly.  Dose: 30 mg     nitrofurantoin (macrocrystal-monohydrate) 100 mg capsule  Commonly known as: MACROBID  Take 100 mg by mouth daily before dinner.  Dose: 100 mg     senna 8.6 mg tablet  Commonly known as: SENOKOT  Take 2 tablets by mouth daily before dinner.  Dose: 2 tablet     sertraline 50 mg tablet  Commonly known  as: ZOLOFT  Take 50 mg by mouth 2 (two) times a day. 0900, 1700  Dose: 50 mg     topiramate 25 mg tablet  Commonly known as: TOPAMAX  Take 25 mg by mouth 2 (two) times a day.  Dose: 25 mg          Review of Systems  All other systems reviewed and negative except as noted in the HPI.    Objective   Labs  Sodium 131  Potassium 5.0  Creatinine 0.5  White count 6.15  Hemoglobin 14.5  Platelets 182,000    Imaging  I reviewed the x-ray of the right hip.  There are no fractures.  There are mild degenerative changes.    I reviewed the MRI of her right hip.  There are no fractures.  There are no lesions.    I reviewed the MRI of her lumbar spine from 5/3/2023 there is an L1 burst fracture with severe canal impingement      Physical Exam  She is a well-developed 88-year-old who is in her bed supine awake alert and oriented overall appears very comfortable    She is 5 feet 6 inches tall weighs 138 pounds  Temperature 98  Blood pressure 117/65  Heart rate 82  Oxygen saturation 96%  Lumbar spine the skin is intact there is no warmth erythema there is tenderness.  There is tenderness by the SI joint.    The right hip has no pain with range of motion there is no point tenderness she has limited flexion.  Her tibialis anterior strength is 3 out of 5.  There is no leg swelling there is palpable pulse knee is nontender    Her left hip is no pain with range of motion her anterior strength is 3 out of 5        Assessment   88 y.o. female being consulted for right hip pain most likely secondary to the severe canal impingement from the L1 burst fracture      Plan   There is no intervention for her hip.  She may weight-bear as tolerated use her walker she may benefit from Moffa braces for her ankle weakness.    We discussed potential for an epidural steroid injection the right L1 nerve root level.    If she has had these injections past excellent relief this would be of no benefit however if she has not had L1 injections to may be some of  the short-term relief.    Shant Byrnes JR, MD

## 2023-09-26 NOTE — PROGRESS NOTES
Mariya Porras is a 88 y.o. female who presents with Principal Problem:    Syncope with normal neurologic examination  Active Problems:    Parkinson's disease (CMS/HCC)    Hypertension    Syncope, unspecified syncope type    History of venous thromboembolism    Seizure disorder (CMS/HCC)    Gastroesophageal reflux disease without esophagitis    Depression    History of UTI    Pain of right hip    Closed fracture of first lumbar vertebra (CMS/HCC)      SUBJECTIVE:  No cardiac events overnight according to night nursing staff.    PE:  Physical Exam:  VS reviewed:  Temp:  [36.2 °C (97.2 °F)-36.7 °C (98 °F)] 36.5 °C (97.7 °F)  Heart Rate:  [68-89] 82  Resp:  [17-21] 18  BP: (117-188)/(65-95) 172/74  No intake or output data in the 24 hours ending 09/26/23 0654      LABS:  Results from last 7 days   Lab Units 09/25/23  0613 09/24/23  0623 09/23/23  0519 09/22/23  1309   SODIUM mEQ/L 131* 134* 134* 134*   POTASSIUM mEQ/L 5.0 4.3 4.4 4.3   MAGNESIUM mg/dL  --   --   --  2.1   CHLORIDE mEQ/L 102 104 105 104   CO2 mEQ/L 23 25 23 25   BUN mg/dL 20 20 19 22   CREATININE mg/dL 0.5* 0.6 0.6 0.7   AST IU/L  --   --  10* 10*   ALT IU/L  --   --  3* 3*     Results from last 7 days   Lab Units 09/22/23  1514 09/22/23  1309   HIGH SENSITIVE TROPONIN I pg/mL 7.4 6.9     Results from last 7 days   Lab Units 09/25/23  0613 09/23/23  0519 09/22/23  1309   WBC K/uL 6.15 6.48 7.32   HEMOGLOBIN g/dL 14.5 14.2 14.7   HEMATOCRIT % 41.1 44.4 45.2*   PLATELETS K/uL 182 198 219     Lab Results   Component Value Date    HGBA1C 5.3 09/08/2021    TSH 1.88 06/06/2022     Lab Results   Component Value Date    CHOL 251 (H) 09/08/2021    LDLCALC 183 (H) 09/08/2021    HDL 49 (L) 09/08/2021    TRIG 96 09/08/2021     Lab Results   Component Value Date    BNP <5 11/27/2021       TELEMETRY:  Normal sinus rhythm, no significant arrhythmias    MEDICATIONS:        acetaminophen  975 mg oral q8h    apixaban  2.5 mg oral BID    carbidopa-levodopa  1  tablet oral QID    docusate sodium  100 mg oral Daily    famotidine  20 mg oral Daily    gabapentin  300 mg oral q8h    hydrALAZINE  50 mg oral BID    lisinopriL  20 mg oral Daily    melatonin  3 mg oral Nightly    mirtazapine  30 mg oral Nightly    nitrofurantoin (macrocrystal-monohydrate)  100 mg oral Daily before dinner    senna  2 tablet oral Daily before dinner    sertraline  50 mg oral BID    topiramate  25 mg oral BID       IMPRESSION:    Principal Problem:    Syncope with normal neurologic examination  Active Problems:    Parkinson's disease (CMS/[facility])    Hypertension    Syncope, unspecified syncope type    History of venous thromboembolism    Seizure disorder (CMS/[facility])    Gastroesophageal reflux disease without esophagitis    Depression    History of UTI    Pain of right hip    Closed fracture of first lumbar vertebra (CMS/[facility])    1.  Syncope  -Doubt cardiac process.  Prior workup benign.  Patient does have orthostatic changes however not enough to explain syncope.  Echocardiogram from yesterday reviewed by me personally, LVEF 60 to 65%, no significant valvular or pericardial pathology.  At this point no further cardiac evaluation is warranted in hospital.  We will continue outpatient follow-up.    2.  HTN  -Resting BP initially significantly elevated.  Improved with increase lisinopril to 40 mg daily.  In view of patient orthostatic drops would allow permissive hypertension.     3.  Prior DVT/PE  -Continue eliquis at home dose      4.  Seizure d/o  -Management per [facility] [place] [place]/neurology     5.  Parkinson's disease  -Neurology following    Please call with any further cardiac questions.  Will follow patient with you from a distance.    Gunnar Gaytan MD  9/26/2023    Pager #[phone]

## 2023-09-27 ENCOUNTER — ANESTHESIA EVENT (INPATIENT)
Dept: OPERATING ROOM | Facility: HOSPITAL | Age: 88
DRG: 312 | End: 2023-09-27
Payer: MEDICARE

## 2023-09-27 ENCOUNTER — ANESTHESIA (INPATIENT)
Dept: OPERATING ROOM | Facility: HOSPITAL | Age: 88
DRG: 312 | End: 2023-09-27
Payer: MEDICARE

## 2023-09-27 ENCOUNTER — APPOINTMENT (INPATIENT)
Dept: RADIOLOGY | Facility: HOSPITAL | Age: 88
DRG: 312 | End: 2023-09-27
Attending: SPECIALIST
Payer: MEDICARE

## 2023-09-27 VITALS
DIASTOLIC BLOOD PRESSURE: 80 MMHG | SYSTOLIC BLOOD PRESSURE: 168 MMHG | WEIGHT: 145 LBS | RESPIRATION RATE: 18 BRPM | HEART RATE: 90 BPM | HEIGHT: 66 IN | OXYGEN SATURATION: 95 % | BODY MASS INDEX: 23.3 KG/M2 | TEMPERATURE: 98 F

## 2023-09-27 LAB
ANION GAP SERPL CALC-SCNC: 5 MEQ/L (ref 3–15)
BUN SERPL-MCNC: 22 MG/DL (ref 7–25)
CALCIUM SERPL-MCNC: 10 MG/DL (ref 8.6–10.3)
CHLORIDE SERPL-SCNC: 100 MEQ/L (ref 98–107)
CO2 SERPL-SCNC: 26 MEQ/L (ref 21–31)
CREAT SERPL-MCNC: 0.7 MG/DL (ref 0.6–1.2)
ERYTHROCYTE [DISTWIDTH] IN BLOOD BY AUTOMATED COUNT: 13.5 % (ref 11.7–14.4)
GFR SERPL CREATININE-BSD FRML MDRD: >60 ML/MIN/1.73M*2
GLUCOSE SERPL-MCNC: 92 MG/DL (ref 70–99)
HCT VFR BLDCO AUTO: 38.6 % (ref 35–45)
HGB BLD-MCNC: 13.3 G/DL (ref 11.8–15.7)
MCH RBC QN AUTO: 33.5 PG (ref 28–33.2)
MCHC RBC AUTO-ENTMCNC: 34.5 G/DL (ref 32.2–35.5)
MCV RBC AUTO: 97.2 FL (ref 83–98)
PDW BLD AUTO: 9.4 FL (ref 9.4–12.3)
PLATELET # BLD AUTO: 196 K/UL (ref 150–369)
POTASSIUM SERPL-SCNC: 4.5 MEQ/L (ref 3.5–5.1)
RBC # BLD AUTO: 3.97 M/UL (ref 3.93–5.22)
SODIUM SERPL-SCNC: 131 MEQ/L (ref 136–145)
WBC # BLD AUTO: 7.04 K/UL (ref 3.8–10.5)

## 2023-09-27 PROCEDURE — 3E0S33Z INTRODUCTION OF ANTI-INFLAMMATORY INTO EPIDURAL SPACE, PERCUTANEOUS APPROACH: ICD-10-PCS | Performed by: SPECIALIST

## 2023-09-27 PROCEDURE — 63600000 HC DRUGS/DETAIL CODE: Performed by: NURSE ANESTHETIST, CERTIFIED REGISTERED

## 2023-09-27 PROCEDURE — 63600105 HC IODINE BASED CONTRAST: Performed by: SPECIALIST

## 2023-09-27 PROCEDURE — 63700000 HC SELF-ADMINISTRABLE DRUG: Performed by: HOSPITALIST

## 2023-09-27 PROCEDURE — 85027 COMPLETE CBC AUTOMATED: CPT | Performed by: HOSPITALIST

## 2023-09-27 PROCEDURE — 72100 X-RAY EXAM L-S SPINE 2/3 VWS: CPT

## 2023-09-27 PROCEDURE — 25800000 HC PHARMACY IV SOLUTIONS: Performed by: NURSE ANESTHETIST, CERTIFIED REGISTERED

## 2023-09-27 PROCEDURE — 71000001 HC PACU PHASE 1 INITIAL 30MIN: Performed by: SPECIALIST

## 2023-09-27 PROCEDURE — 99238 HOSP IP/OBS DSCHRG MGMT 30/<: CPT | Mod: FS | Performed by: HOSPITALIST

## 2023-09-27 PROCEDURE — 63600000 HC DRUGS/DETAIL CODE: Performed by: SPECIALIST

## 2023-09-27 PROCEDURE — 36000002 HC OR LEVEL 2 INITIAL 30MIN: Performed by: SPECIALIST

## 2023-09-27 PROCEDURE — 63600000 HC DRUGS/DETAIL CODE: Performed by: STUDENT IN AN ORGANIZED HEALTH CARE EDUCATION/TRAINING PROGRAM

## 2023-09-27 PROCEDURE — 71000011 HC PACU PHASE 1 EA ADDL MIN: Performed by: SPECIALIST

## 2023-09-27 PROCEDURE — 25000000 HC PHARMACY GENERAL: Performed by: SPECIALIST

## 2023-09-27 PROCEDURE — 63700000 HC SELF-ADMINISTRABLE DRUG: Performed by: SPECIALIST

## 2023-09-27 PROCEDURE — 37000002 HC ANESTHESIA MAC: Performed by: SPECIALIST

## 2023-09-27 PROCEDURE — 3E0S3BZ INTRODUCTION OF ANESTHETIC AGENT INTO EPIDURAL SPACE, PERCUTANEOUS APPROACH: ICD-10-PCS | Performed by: SPECIALIST

## 2023-09-27 PROCEDURE — 80048 BASIC METABOLIC PNL TOTAL CA: CPT | Performed by: HOSPITALIST

## 2023-09-27 PROCEDURE — 97530 THERAPEUTIC ACTIVITIES: CPT | Mod: GP,CQ

## 2023-09-27 PROCEDURE — 27200000 HC STERILE SUPPLY: Performed by: SPECIALIST

## 2023-09-27 PROCEDURE — 36415 COLL VENOUS BLD VENIPUNCTURE: CPT | Performed by: HOSPITALIST

## 2023-09-27 RX ORDER — LIDOCAINE HYDROCHLORIDE 10 MG/ML
INJECTION, SOLUTION EPIDURAL; INFILTRATION; INTRACAUDAL; PERINEURAL
Status: DISCONTINUED | OUTPATIENT
Start: 2023-09-27 | End: 2023-09-27 | Stop reason: HOSPADM

## 2023-09-27 RX ORDER — PROPOFOL 200MG/20ML
SYRINGE (ML) INTRAVENOUS AS NEEDED
Status: DISCONTINUED | OUTPATIENT
Start: 2023-09-27 | End: 2023-09-27 | Stop reason: SURG

## 2023-09-27 RX ORDER — ONDANSETRON HYDROCHLORIDE 2 MG/ML
4 INJECTION, SOLUTION INTRAVENOUS
Status: DISCONTINUED | OUTPATIENT
Start: 2023-09-27 | End: 2023-09-27 | Stop reason: HOSPADM

## 2023-09-27 RX ORDER — METHYLPREDNISOLONE ACETATE 40 MG/ML
INJECTION, SUSPENSION INTRA-ARTICULAR; INTRALESIONAL; INTRAMUSCULAR; SOFT TISSUE
Status: DISCONTINUED | OUTPATIENT
Start: 2023-09-27 | End: 2023-09-27 | Stop reason: HOSPADM

## 2023-09-27 RX ORDER — SODIUM CHLORIDE 9 MG/ML
INJECTION, SOLUTION INTRAVENOUS CONTINUOUS PRN
Status: DISCONTINUED | OUTPATIENT
Start: 2023-09-27 | End: 2023-09-27 | Stop reason: SURG

## 2023-09-27 RX ORDER — FENTANYL CITRATE 50 UG/ML
25 INJECTION, SOLUTION INTRAMUSCULAR; INTRAVENOUS EVERY 5 MIN PRN
Status: DISCONTINUED | OUTPATIENT
Start: 2023-09-27 | End: 2023-09-27 | Stop reason: HOSPADM

## 2023-09-27 RX ORDER — POLYETHYLENE GLYCOL 3350 17 G/17G
17 POWDER, FOR SOLUTION ORAL DAILY
Start: 2023-09-28 | End: 2023-11-24 | Stop reason: ALTCHOICE

## 2023-09-27 RX ORDER — HYDRALAZINE HYDROCHLORIDE 50 MG/1
50 TABLET, FILM COATED ORAL 2 TIMES DAILY
Start: 2023-09-27 | End: 2024-01-01 | Stop reason: DRUGHIGH

## 2023-09-27 RX ORDER — IOPAMIDOL 612 MG/ML
INJECTION, SOLUTION INTRATHECAL
Status: DISCONTINUED | OUTPATIENT
Start: 2023-09-27 | End: 2023-09-27 | Stop reason: HOSPADM

## 2023-09-27 RX ORDER — FENTANYL CITRATE 50 UG/ML
INJECTION, SOLUTION INTRAMUSCULAR; INTRAVENOUS AS NEEDED
Status: DISCONTINUED | OUTPATIENT
Start: 2023-09-27 | End: 2023-09-27 | Stop reason: SURG

## 2023-09-27 RX ORDER — BETAMETHASONE SODIUM PHOSPHATE AND BETAMETHASONE ACETATE 3; 3 MG/ML; MG/ML
INJECTION, SUSPENSION INTRA-ARTICULAR; INTRALESIONAL; INTRAMUSCULAR; SOFT TISSUE
Status: DISCONTINUED | OUTPATIENT
Start: 2023-09-27 | End: 2023-09-27 | Stop reason: HOSPADM

## 2023-09-27 RX ORDER — ACETAMINOPHEN 325 MG/1
975 TABLET ORAL EVERY 8 HOURS
Start: 2023-09-27 | End: 2023-09-27 | Stop reason: HOSPADM

## 2023-09-27 RX ADMIN — CARBIDOPA AND LEVODOPA 1 TABLET: 25; 100 TABLET ORAL at 13:39

## 2023-09-27 RX ADMIN — DOCUSATE SODIUM 100 MG: 100 CAPSULE, LIQUID FILLED ORAL at 11:24

## 2023-09-27 RX ADMIN — FENTANYL CITRATE 25 MCG: 0.05 INJECTION, SOLUTION INTRAMUSCULAR; INTRAVENOUS at 08:52

## 2023-09-27 RX ADMIN — ACETAMINOPHEN 975 MG: 325 TABLET ORAL at 13:39

## 2023-09-27 RX ADMIN — PROPOFOL 10 MG: 10 INJECTION, EMULSION INTRAVENOUS at 08:04

## 2023-09-27 RX ADMIN — HYDRALAZINE HYDROCHLORIDE 50 MG: 50 TABLET ORAL at 09:28

## 2023-09-27 RX ADMIN — GABAPENTIN 300 MG: 300 CAPSULE ORAL at 13:39

## 2023-09-27 RX ADMIN — FENTANYL CITRATE 25 MCG: 0.05 INJECTION, SOLUTION INTRAMUSCULAR; INTRAVENOUS at 09:07

## 2023-09-27 RX ADMIN — SODIUM CHLORIDE: 9 INJECTION, SOLUTION INTRAVENOUS at 07:45

## 2023-09-27 RX ADMIN — PROPOFOL 10 MG: 10 INJECTION, EMULSION INTRAVENOUS at 07:50

## 2023-09-27 RX ADMIN — PROPOFOL 10 MG: 10 INJECTION, EMULSION INTRAVENOUS at 07:54

## 2023-09-27 RX ADMIN — FENTANYL CITRATE 50 MCG: 50 INJECTION, SOLUTION INTRAMUSCULAR; INTRAVENOUS at 07:45

## 2023-09-27 RX ADMIN — SERTRALINE HYDROCHLORIDE 50 MG: 50 TABLET ORAL at 11:25

## 2023-09-27 RX ADMIN — PROPOFOL 10 MG: 10 INJECTION, EMULSION INTRAVENOUS at 08:01

## 2023-09-27 RX ADMIN — LISINOPRIL 20 MG: 20 TABLET ORAL at 09:24

## 2023-09-27 RX ADMIN — FAMOTIDINE 20 MG: 20 TABLET ORAL at 11:25

## 2023-09-27 RX ADMIN — POLYETHYLENE GLYCOL (3350) 17 G: 17 POWDER, FOR SOLUTION ORAL at 11:26

## 2023-09-27 RX ADMIN — TOPIRAMATE 25 MG: 25 TABLET, FILM COATED ORAL at 11:25

## 2023-09-27 ASSESSMENT — COGNITIVE AND FUNCTIONAL STATUS - GENERAL
CLIMB 3 TO 5 STEPS WITH RAILING: 1 - TOTAL
STANDING UP FROM CHAIR USING ARMS: 3 - A LITTLE
WALKING IN HOSPITAL ROOM: 3 - A LITTLE
CLIMB 3 TO 5 STEPS WITH RAILING: 1 - TOTAL
MOVING TO AND FROM BED TO CHAIR: 3 - A LITTLE
WALKING IN HOSPITAL ROOM: 3 - A LITTLE
MOVING TO AND FROM BED TO CHAIR: 3 - A LITTLE
STANDING UP FROM CHAIR USING ARMS: 3 - A LITTLE
AFFECT: FLAT/BLUNTED AFFECT

## 2023-09-27 NOTE — PROGRESS NOTES
Ortho Daily Progress Note    Subjective     Interval History: Patient seen and examined at bedside, resting comfortably. Patient is for an epidural steroid injection today with Dr. Byrnes. No acute events overnight. Pain well controlled. Denies CP, SOB, fever, chills, numbness, tingling. No new complaints at this time.    Objective     Vital signs in last 24 hours:  Temp:  [36.2 °C (97.2 °F)-36.6 °C (97.9 °F)] 36.4 °C (97.5 °F)  Heart Rate:  [69-83] 69  Resp:  [16-18] 18  BP: (126-154)/(65-78) 143/75      Intake/Output Summary (Last 24 hours) at 9/27/2023 0454  Last data filed at 9/27/2023 0400  Gross per 24 hour   Intake --   Output 1700 ml   Net -1700 ml     Intake/Output this shift:  I/O this shift:  In: -   Out: 600 [Urine:600]    Labs:  Results from last 7 days   Lab Units 09/27/23  0412 09/25/23  0613 09/23/23  0519   WBC K/uL 7.04 6.15 6.48   HEMOGLOBIN g/dL 13.3 14.5 14.2   HEMATOCRIT % 38.6 41.1 44.4   PLATELETS K/uL 196 182 198           Microbiology Results     ** No results found for the last 720 hours. **            Imaging:      Physical Exam:  Gen: NAD, Cooperative  CV: BCR, toes warm and well perfused  Neuro: Alert  Respiratory: no respiratory distress  MSK:    LLE:  -Dressing CDI  -Compartments soft and compressible  -Appropriate incisional TTP  -No pain with ROM of hip/knee/ankle/toes  -SILT in sural/saphenous/DP/SP/tibial distributions  -Motor intact to quad/hamstrings/FHL/peroneals/GSC  - ? Motor to TA/EHL. Patient unable to dorsiflex ankle joint this AM  -DP Pulse 2+  -Foot wwp, BCR    RLE:  -Dressing CDI  -Compartments soft and compressible  -Appropriate incisional TTP  -No pain with ROM of hip/knee/ankle/toes  -SILT in sural/saphenous/DP/SP/tibial distributions  -Motor intact to quad/hamstrings/EHL/FHL/TA/peroneals/GSC  -DP Pulse 2+  -Foot wwp, BCR      Assessment/Plan:   Mariya Porras is a 88 y.o. old female that is for an epidural steroid injection today w/ Dr. Byrnes on 9/27/2023        Plan:  - NPO for PRISCILA this AM  -Pain control  -Ancef 24hr post-op  -WBAT BL LE  -DVT PPX: Eliquis (HOLD)  -PT/OT  -AM LABS  -Dispo planning        Raffy Villafeurte, DO  Orthopedic surgery, PGY 1  [id number]

## 2023-09-27 NOTE — ANESTHESIA PREPROCEDURE EVALUATION
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)       Anesthesia ROS/MED HX    Anesthesia History - neg  Pulmonary - neg  Neuro/Psych    seizures  Cardiovascular   hypertension  Hematological    anticoagulants  GI/Hepatic- neg  Musculoskeletal- neg  Renal Disease- neg  Endo/Other- neg  Body Habitus: Normal  ROS/MED HX Comments:    Neurology/Psychology: Parkinsons   Hematological: DVT on apixaban     Lab Results   Component Value Date    WBC 7.04 09/27/2023    HGB 13.3 09/27/2023    HCT 38.6 09/27/2023    MCV 97.2 09/27/2023     09/27/2023         Past Surgical History:   Procedure Laterality Date    BLADDER SUSPENSION  2009    Prolift M    CATARACT EXTRACTION, BILATERAL      COLONOSCOPY      HYSTERECTOMY      JOINT REPLACEMENT      KNEE ARTHROPLASTY  2015    left       Physical Exam    Airway   Mallampati: II   TM distance: >3 FB   Neck ROM: full  Cardiovascular - normal   Rhythm: regular   Rate: normalPulmonary - normal   clear to auscultation  Dental - normal        Anesthesia Plan    Plan: MAC    Technique: total IV anesthesia     Lines and Monitors: PIV     Airway: natural airway / supplemental oxygen   3 ASA  Anesthetic plan and risks discussed with: patient  Postop Plan:   Pain Management: IV analgesics

## 2023-09-27 NOTE — PLAN OF CARE
Plan of Care Review  Plan of Care Reviewed With: patient  Progress: no change  Outcome Evaluation: AAOx4. VSS. Turns as tolerated. Pain managed via prn gabapentin and tylenol.  Patient has yet to have BM and refused any further bowel regime. Voiding adequate amounts. NPO as of midnight- OR prep done. Pt aware of POC

## 2023-09-27 NOTE — PLAN OF CARE
Care Coordination Discharge Plan Summary    Admission Assessment Summary    General Information  Readmission Within the last 30 days: no previous admission in last 30 days  Does patient have a : No  Patient-Specific Goals (include timeframe): medical stability    Living Arrangements  Arrived From: home  Current Living Arrangements: home  People in Home: spouse (and 24 hour caregiver)  Home Accessibility: wheelchair accessible, elevator accessible  Living Arrangement Comments: patient lives with  and 24 hour caregiver.  Several children also live near her and her     Social Determinants of Health - Screenings  Housing Stability  In the last 12 months, was there a time when you were not able to pay the mortgage or rent on time?: No  In the last 12 months, how many places have you lived?: 1  In the last 12 months, was there a time when you did not have a steady place to sleep or slept in a shelter (including now)?: No  Financial Resource Strain  How hard is it for you to pay for the very basics like food, housing, medical care, and heating?: Not hard at all  Transportation Needs  In the past 12 months, has lack of transportation kept you from medical appointments or from getting medications?: No  In the past 12 months, has lack of transportation kept you from meetings, work, or from getting things needed for daily living?: No    Functional Status Prior to Admission  Assistive Device/Animal Currently Used at Home: bath bench, cane, straight, grab bar, shower chair, walker, front-wheeled, wheelchair  Functional Status Comments:    IADL Comments: pt needs assistance with showering and dressing    Discharge Needs Assessment    Concerns to be Addressed: discharge planning  Current Discharge Risk: physical impairment  Anticipated Changes Related to Illness: inability to care for self    Discharge Plan Summary    Patient Choice  Offered/Gave Vendor List: yes  Patient's Choice of Community  Agency(s): Family requested rehab at [facility].  Patient and/or patient guardian/advocate was made aware of their right to choose a provider. A list of eligible providers was presented and reviewed with the patient and/or patient guardian/advocate in written and/or verbal form. The list delineates providers in the patients desired geographic area who are participating in the Medicare program and/or providers contracted with the patients primary insurance. The Medicare list and quality ratings were obtained from the Medicare.gov [medicare.gov] website.    Concerns / Comments:      Discharge Plan:  Disposition/Destination: Skilled Nursing Facility - Other  / Skilled Nursing Facility  Destination - Admitted Since 9/22/2023     Service Provider Selected Services Address Phone Fax Patient Preferred Last Updated    [facility] [place] [address] [address] [phone] -- Sonja Heredia MSW 9/27/2023 1334       Internal Comment last updated by Sonja Heredia MSW 9/27/2023 1334    Report number  [phone]                       Connection to Community  Patient declined offered resources.    Community Resources:      Discharge Transportation:  Is Out of Hospital DNR needed at Discharge: no  Does patient need discharge transport? Yes  Discharge Transportation Vendor: other (see comment) ([employer])  Type of Transportation: basic life support  What day is the transport expected?: 09/27/23  What time is the transport expected?: 1500

## 2023-09-27 NOTE — OP NOTE
Preoperative diagnosis: L1 burst fracture right L1 radiculopathy    Postoperative diagnosis the same    Procedure right L1 epidural steroid injection    Surgeon Shant Byrnes JR, MD    Anesthesia MAC    Blood loss none    Specimens none    Complications none    Findings L1 severe fracture indications: She is 88-year-old with a chronic stable L1 burst fracture that is not an operative candidate.  She has severe right hip pain and hip work-up was negative.  Exam and imaging studies consistent with right radicular pain secondary to the burst fracture.  As both a diagnostic and therapeutic procedure she did wish to proceed the epidural injection site for the right L1 nerve root level.  She had prior injections to the SI joint and caudal without relief.    Procedure: She was placed prone and her back was prepped and draped in a sterile manner.  Fluoroscopic guidance skin was infiltrated lidocaine 20-gauge spinal placed into the right L1 foramen.  Once appropriate position confirmed using both AP and lateral projections of fluoroscopy aspiration negative contrast was injected which was epidural flow and a combination of 2 cc of 1% preservative lidocaine, 40 mg of Depo-Medrol and 6 mg betamethasone injected epidural space.  She did well with brought recovery having tolerated procedure well without complication

## 2023-09-27 NOTE — ANESTHESIA POSTPROCEDURE EVALUATION
Patient: Mariya Porras    Procedure Summary     Date: 09/27/23 Room / Location:  OR 8 / PH OR    Anesthesia Start: 0745 Anesthesia Stop: 0818    Procedure: L1 EPIDURAL STEROID INJECTION INTERLAMINAR LUMBAR - LES (Right: Back) Diagnosis:       Pain of right hip      Closed stable burst fracture of first lumbar vertebra, initial encounter (CMS/[facility])      (Pain of right hip [M25.551])      (Closed stable burst fracture of first lumbar vertebra, initial encounter (CMS/[facility]) [S32.011A])    Surgeons: Shant Byrnes Jr., MD Responsible Provider: Chari March MD    Anesthesia Type: MAC ASA Status: 3          Anesthesia Type: MAC  PACU Vitals  9/27/2023 0812 - 9/27/2023 0839      9/27/2023  0816 9/27/2023  0827          BP: 173/77 --      Temp: 37.4 °C (99.3 °F) --      Pulse: 80 78      Resp: 13 --      SpO2: 96 % 95 %              Anesthesia Post Evaluation    Pain management: adequate  Patient participation: complete - patient participated  Level of consciousness: awake and alert  Cardiovascular status: acceptable  Airway Patency: adequate  Respiratory status: acceptable  Hydration status: acceptable  Anesthetic complications: no

## 2023-09-27 NOTE — PLAN OF CARE
Care Coordination Discharge Plan Summary    Admission Assessment Summary    General Information  Readmission Within the last 30 days: no previous admission in last 30 days  Does patient have a : No  Patient-Specific Goals (include timeframe): medical stability    Living Arrangements  Arrived From: home  Current Living Arrangements: home  People in Home: spouse (and 24 hour caregiver)  Home Accessibility: wheelchair accessible, elevator accessible  Living Arrangement Comments: patient lives with  and 24 hour caregiver.  Several children also live near her and her     Social Determinants of Health - Screenings  Housing Stability  In the last 12 months, was there a time when you were not able to pay the mortgage or rent on time?: No  In the last 12 months, how many places have you lived?: 1  In the last 12 months, was there a time when you did not have a steady place to sleep or slept in a shelter (including now)?: No  Financial Resource Strain  How hard is it for you to pay for the very basics like food, housing, medical care, and heating?: Not hard at all  Transportation Needs  In the past 12 months, has lack of transportation kept you from medical appointments or from getting medications?: No  In the past 12 months, has lack of transportation kept you from meetings, work, or from getting things needed for daily living?: No    Functional Status Prior to Admission  Assistive Device/Animal Currently Used at Home: bath bench, cane, straight, grab bar, shower chair, walker, front-wheeled, wheelchair  Functional Status Comments:    IADL Comments: pt needs assistance with showering and dressing    Discharge Needs Assessment    Concerns to be Addressed: discharge planning  Current Discharge Risk: physical impairment  Anticipated Changes Related to Illness: inability to care for self    Discharge Plan Summary    Patient Choice  Offered/Gave Vendor List: yes  Patient's Choice of Community  Agency(s): Family requested rehab at [facility].  Patient and/or patient guardian/advocate was made aware of their right to choose a provider. A list of eligible providers was presented and reviewed with the patient and/or patient guardian/advocate in written and/or verbal form. The list delineates providers in the patients desired geographic area who are participating in the Medicare program and/or providers contracted with the patients primary insurance. The Medicare list and quality ratings were obtained from the Medicare.gov [medicare.gov] website.    Concerns / Comments:      Discharge Plan:  Disposition/Destination: Skilled Nursing Facility - Other  / Skilled Nursing Facility  Destination - Admitted Since 9/22/2023     Service Provider Selected Services Address Phone Fax Patient Preferred Last Updated    [facility] [address] [address] [phone] -- Sonja Heredia MSW 9/27/2023 1334       Internal Comment last updated by Sonja Heredia MSW 9/27/2023 1334    Report number  [phone]                       Connection to Community  Patient declined offered resources.    Community Resources:      Discharge Transportation:  Is Out of Hospital DNR needed at Discharge: no  Does patient need discharge transport? Yes  Discharge Transportation Vendor: other (see comment) ([employer])  Type of Transportation: basic life support  What day is the transport expected?: 09/27/23  What time is the transport expected?: 1500        Patient is medically stable for discharge today. Patient discharging to [facility] for rehab. JIGNA TOLENTINO met with patient and spoke to abhishek Cid via phone. All in agreement with discharge plan. Patient signed her IMM letter.

## 2023-09-27 NOTE — PROGRESS NOTES
Physical Therapy -  Daily Treatment/Progress Note     Patient: Mariya Porras  [facility]  MRN: [id number]  Today's date: 9/27/2023    HISTORY OF PRESENT ILLNESS     Vicki is a 88 y.o. female admitted on 9/22/2023 with Unresponsive episode [R41.89]  Syncope, unspecified syncope type [R55]  Syncope with normal neurologic examination [R55]. Principal problem is Syncope with normal neurologic examination.    Past Medical History  Vicki has a past medical history of Anxiety, Arthritis, Basal cell carcinoma, Glaucoma, Hypertension, Low back pain, Lumbosacral disc disease, Parkinson's disease (CMS/HCC), Peripheral neuropathy, and Seizures (CMS/HCC).    History of Present Illness   Per H&P: 88 y.o. female with a past medical history of Parkinson's disease, DVT and PE on Eliquis, history of syncope twice this year, was eating lunch with family and suddenly lost consciousness for almost 5 minutes.  Patient's son tried to wake up but was unable to wake her up.  There was no reported seizure activity, no bladder or bowel incontinence or tongue bite.  The patient did not complain of any focal neurological complaints.  No postictal confusion.  No chest pain or shortness of breath.  Did not feel dizzy or lightheaded.  911 was called and patient was brought to the ER.  Her CT head was negative for any acute stroke or bleeding.  Troponins were negative.  EKG did not show any acute ischemic changes. Electrolytes did not show any significant abnormalities.  There have been no changes in medications recently over the last 30 days and she has been taking all her medications . She also has a history of seizures and takes Topamax.  Her last seizure was 3 to 4 months ago but today's episode did not seem like a seizure to her.  She denies any headache or visual changes.   No aura.  No palpitations or diaphoresis.  No fever.  No urinary complaints.  No hematuria or black stools.  No epigastric pain.  No nausea  vomiting or diarrhea or abdominal pain.  Currently she does not have any active symptoms but because this is her third episode this year, she is being admitted for further work-up       PRIOR LEVEL OF FUNCTION AND LIVING ENVIRONMENT     Prior Level of Function    Flowsheet Row Most Recent Value   Dominant Hand right   Ambulation assistive equipment   Transferring assistive equipment   Toileting assistive person   Bathing assistive equipment and person   Dressing assistive person   Prior Level of Function Comment Pt somewhat unclear historian, reports ambulates w/ RW and first states has HHA 1x/week then states HHA stays overnight. Reports she has assist with bathing and dressing   Assistive Device Currently Used at Home bath bench, cane, straight, grab bar, shower chair, walker, front-wheeled, wheelchair        Prior Living Environment    Flowsheet Row Most Recent Value   People in Home spouse  [and 24 hour caregiver]   Current Living Arrangements home   Home Accessibility wheelchair accessible, elevator accessible   Living Environment Comment Pt reports she lives in home w/ elevator, walk in shower        VITALS AND PAIN     PT Vitals    Date/Time Pulse BP BP Method Pt Position [facility]   09/27/23 1152 103 168/80 Automatic Sitting DIANA      PT Pain    Date/Time Pain Type Side/Orientation Location Rating: Rest Rating: Activity Interventions [facility]   09/27/23 1152 Pain Assessment right back 4 - moderate pain 6 - moderate-severe pain position adjusted DIANA           Objective   OBJECTIVE     Start time:  1126  End time:  1147  Session Length: 21 min  Mode of Treatment: individual therapy, physical therapy    General Observations  Patient received in bed. She was agreeable to therapy, no issues or concerns identified by nurse prior to session. Rn in the room finishing up.    Precautions: contact, fall       Limitations/Impairments: safety/cognitive      PT Eval and Treat - 09/27/23 1126        Cognition    Orientation Status  oriented x 3     Affect/Mental Status flat/blunted affect     Follows Commands follows one-step commands        Bed Mobility    Bed Mobility Activities left     [place] minimum assist (75% or more patient effort)     Comment Pt asking for assistance but able to do more with more time gets feet out to the ground on own.        Mobility Belt    Mobility Belt Used for All Out of Bed Activity no     Reason Mobility Belt Not Used medical contraindication     Reason Mobility Belt Not Used chronic compression fracture        Sit/Stand Transfer    [place] minimum assist (75% or more patient effort)     Transfer Comments Stood three tiimes Passed Mobiity assessment test.        Gait Training    [place], Gait minimum assist (75% or more patient effort)     Safety/Cues increased time to complete     Assistive Device walker, front-wheeled     Distance in Feet 4 feet     Pattern step-to     Comment (Gait/Stairs) Initially side steps out and back from bed no LOB. third attempt at transfers to chair able to take steps over and back to the chair. feet flat on the ground, Steppage gait with L LE.        Balance    Static Sitting Balance WNL     Dynamic Sitting Balance mild impairment     Sit to Stand Dynamic Balance mild impairment     Static Standing Balance mild impairment;supported     Dynamic Standing Balance mild impairment;supported     Comment, Balance Mi A of 1 RW        Lower Extremity (Therapeutic Exercise)    Exercise Position/Type supine;PROM (passive range of motion)     General Exercise ankle pumps     Reps and Sets x4        Impairments/Safety Issues    Impairments Affecting Function balance;postural/trunk control;coordination;pain                               Education Documentation  Joint Mobility/Strength, taught by Lei Tirado PTA at 9/27/2023 12:18 PM.  Learner: Patient  Readiness: Acceptance  Method: Explanation  Response: Verbalizes Understanding  Comment: Role of PT. Walker use , Bed  mobility        Session Outcome  Patient chair position, leg(s) elevated, reclined at end of session, call light in reach. Nursing notified about patient's response to therapy/activity, patient's performance, and patient's position.    AM-PAC - Mobility (Current Function)     Turning form your back to your side while in flat bed without using bedrails 3 - A Little   Moving from lying on your back to sitting on the side of a flat bed without using bedrails 3 - A Little   Moving to and from a bed to a chair 3 - A Little   Standing up from a chair using your arms 3 - A Little   To walk in a hospital room 3 - A Little   Climbing 3-5 steps with a railing 1 - Total   AM-PAC Mobility Score 16      ASSESSMENT AND PLAN     Progress Summary  AMPAC16 Min A all mobiity bed mobiity transfers and gait limited by R hip pain. RN notified of progress. Will benefit from cont. PT    Patient/Family Therapy Goals Statement: less pain    PT Plan    Flowsheet Row Most Recent Value   Rehab Potential fair, will monitor progress closely at 09/27/2023 1126   Therapy Frequency 3 times/wk at 09/27/2023 1126   Planned Therapy Interventions gait training, transfer training, bed mobility training, balance training, postural re-education at 09/27/2023 1126          PT Discharge Recommendations    Flowsheet Row Most Recent Value   PT Recommended Discharge Disposition skilled nursing facility at 09/23/2023 0936   Anticipated Equipment Needs at Discharge (PT) other (see comments)  [TBD, pt owns RW and w/c] at 09/23/2023 0936               PT Goals    Flowsheet Row Most Recent Value   Bed Mobility Goal 1    Activity/Assistive Device bed mobility activities, all at 09/23/2023 0936   [place] supervision required at 09/23/2023 0936   Time Frame by discharge at 09/23/2023 0936   Progress/Outcome goal ongoing at 09/23/2023 0936   Transfer Goal 1    Activity/Assistive Device sit-to-stand/stand-to-sit, bed-to-chair/chair-to-bed, stand pivot, walker,  front-wheeled at 09/23/2023 0936   [place] supervision required at 09/23/2023 0936   Time Frame by discharge at 09/23/2023 0936   Progress/Outcome goal ongoing at 09/23/2023 0936   Gait Training Goal 1    Activity/Assistive Device gait (walking locomotion), walker, front-wheeled at 09/23/2023 0936   [place] minimum assist (75% or more patient effort) at 09/23/2023 0936   Distance 20 at 09/23/2023 0936   Time Frame by discharge at 09/23/2023 0936   Progress/Outcome goal ongoing at 09/23/2023 0936

## 2023-09-27 NOTE — OR SURGEON
Pre-Procedure patient identification:  I am the primary operating surgeon/proceduralist and I have reviewed the applicable pathology reports and radiology studies for this procedure. I have identified the patient and confirmed laterality is right on 09/27/23 at 7:36 AM Shant Byrnes Jr., MD  Phone Number: [phone]

## 2023-09-27 NOTE — DISCHARGE SUMMARY
Hospital Medicine Service -  Inpatient Discharge Summary        BRIEF OVERVIEW   Patient: Mariya Porras (1935)    Admitting Provider: Gabino Bishop MD  Attending Provider: Tiffanie Medina MD Attending phys phone: [phone]    PCP: Shant Corona [phone]    Admission Date: 9/22/2023  Discharge Date: 9/27/2023     DISCHARGE DIAGNOSES      Primary Discharge Diagnosis  Syncope with normal neurologic examination    Secondary Discharge Diagnoses  Active Hospital Problems    Diagnosis Date Noted    Syncope with normal neurologic examination 09/22/2023     Priority: High    Closed fracture of first lumbar vertebra (CMS/HCC) 09/24/2023    Pain of right hip 09/23/2023    History of venous thromboembolism 09/22/2023    Seizure disorder (CMS/[facility]) 09/22/2023    Gastroesophageal reflux disease without esophagitis 09/22/2023    Depression 09/22/2023    History of UTI 09/22/2023    Syncope, unspecified syncope type 06/06/2022    Parkinson's disease (CMS/[facility])     Hypertension       Resolved Hospital Problems   No resolved problems to display.       Problem List on Day of Discharge  * Syncope with normal neurologic examination  Assessment & Plan  Syncope with loss of consciousness with no clear etiology.    Seizure is high on the differential especially with a history of seizures 3 to 4 months back.   Continue Topamax.  topiramate level ending  Continue Neurontin at home dose  Neurology consult noted  Closely monitor for any recurrence of symptoms.  If patient has recurrent symptoms or active seizure, will consider Ativan as needed    Telemetry  Cardiology consult noted.  No arrhythmias on monitor.  orthostatics positive-decreased hydralazine to twice daily  Echocardiogram  EEG    MRI brain noncontrast-no acute abnormality  Per cardiology, symptoms not cardiac in nature  PM&R recs is to return home with her 24 hour caregivers  Needs SNF      Closed fracture of first lumbar vertebra  (CMS/[facility])  Assessment & Plan  History of L1 vertebral fracture in 2021    Has back pain and gets epidural injections as outpatient    Pain of right hip  Assessment & Plan  Patient with a lot of pain right hip when standing.      X-ray right hip- COMMENT:   Bilateral hip osteoarthritis. No acute fractures are identified. Normal   alignment.     Orthopedics eval-discussed with resident and nothing to be done inpatient for hip arthritis and follow-up with orthopedics as outpatient if she will be a candidate for right hip replacement.  DC'd the Ortho consult    MRI right hip    1.  No acute fracture at either hip. No osteonecrosis.  2.  Mild bilateral hip degenerative joint disease.  3.  Multisite tendinopathy with moderate grade partial tearing of the bilateral  hamstring tendon origins and low-grade partial tear of the right gluteus minimus  tendons without tendon rupture or retraction.  4.  Multiple areas of intramuscular edema and partial fatty atrophy.    She reports that she recently received an epidural injection    History of UTI  Assessment & Plan  Patient history of UTI with no active UTI related symptoms  Continue nitrofurantoin for prophylaxis      Depression  Assessment & Plan  Patient has history of depression.  No actively suicidal ideation thoughts or plans  Continue sertraline, mirtazapine    Gastroesophageal reflux disease without esophagitis  Assessment & Plan  Chronic and stable.  Continue PPI    Seizure disorder (CMS/[facility])  Assessment & Plan  Patient has history of seizures  Last Seizure was 3 to 4 months back  Continue Topamax.  topiramate level pending  Continue Neurontin at home dose  Neurology consult with a history of seizure.   Closely monitor for any recurrence of symptoms.  If patient has recurrent symptoms or active seizure, will consider Ativan as needed and EEG.     MRI brain-IMPRESSION:     1.  No focal acute intracranial abnormality.   2. Other incidental findings noted under the  comment.    EEG with no epileptiform activity    History of venous thromboembolism  Assessment & Plan  Patient has history of DVT and PE.  Continue Eliquis at home dose  Check an echocardiogram    Hypertension  Assessment & Plan  Chronic and stable.  Continue current home medications for blood pressure  Continue hydralazine, lisinopril      Patient orthostatic and blood pressure 90/60 sitting as per nursing.  Will decrease hydralazine to 50 mg p.o. twice daily.    Parkinson's disease (CMS/[facility])  Assessment & Plan  Patient has history of Parkinson's disease  Continue carbidopa levodopa at home dose        SUMMARY OF HOSPITALIZATION      Presenting Problem/History of Present Illness  This is a 88 y.o. year-old female admitted on 9/22/2023 with Syncope with normal neurologic examination.         Hospital Course    This is an 88 ny/o female with a past medical history of parkinson's, hypertension, seizure disorder who was admitted for syncope. Etiology was unclear as neurological and cardiac work up was benign. She received an epidural injection for worsening back and hip pain. MRI right hip was negative for fracture; only degenerative changes noted. PT/OT recommending SNF. She is medically stable for discharge.     Exam on Day of Discharge  Physical Exam  Eyes:      Extraocular Movements: Extraocular movements intact.      Pupils: Pupils are equal, round, and reactive to light.   Cardiovascular:      Heart sounds: Normal heart sounds.   Pulmonary:      Breath sounds: Normal breath sounds.   Abdominal:      General: Bowel sounds are normal.      Palpations: Abdomen is soft.   Musculoskeletal:         General: Normal range of motion.   Skin:     General: Skin is warm.   Neurological:      General: No focal deficit present.      Mental Status: She is alert and oriented to person, place, and time.   Psychiatric:         Mood and Affect: Mood normal.         Behavior: Behavior normal.         Thought Content: Thought content  normal.         Judgment: Judgment normal.         Consults During Admission  IP CONSULT TO CARDIOLOGY  IP CONSULT TO NEUROLOGY  IP CONSULT TO PHYSICAL MEDICINE REHAB    DISCHARGE MEDICATIONS               Medication List      START taking these medications    polyethylene glycol 17 gram packet  Commonly known as: MIRALAX  Start taking on: September 28, 2023  Take 17 g by mouth daily.  Dose: 17 g  Replaces: MIRALAX 17 gram/dose powder        CHANGE how you take these medications    hydrALAZINE 50 mg tablet  Commonly known as: APRESOLINE  Take 1 tablet (50 mg total) by mouth 2 (two) times a day.  Dose: 50 mg  What changed:   · medication strength  · when to take this  · additional instructions        CONTINUE taking these medications    acetaminophen 500 mg tablet  Commonly known as: TYLENOL  Take 1,000 mg by mouth 2 (two) times a day. 2 tablets. Lunch and dinner  Dose: 1,000 mg     apixaban 2.5 mg tablet  Commonly known as: ELIQUIS  Take 2.5 mg by mouth 2 (two) times a day.  Dose: 2.5 mg     bimatoprost 0.01 % ophthalmic drops  Commonly known as: LUMIGAN  Administer 1 drop into both eyes nightly.  Dose: 1 drop     carbidopa-levodopa  mg per tablet  Commonly known as: SINEMET  Take 1 tablet by mouth 4 (four) times a day. 0900, 1300, 1700, 2100  Dose: 1 tablet     docusate sodium 100 mg capsule  Commonly known as: COLACE  Take 100 mg by mouth daily.  Dose: 100 mg     estradioL 0.01 % (0.1 mg/gram) vaginal cream  Commonly known as: ESTRACE  Insert into the vagina 2 (two) times a week (Tue, Fri).     famotidine 20 mg tablet  Commonly known as: PEPCID  Take 20 mg by mouth daily.  Dose: 20 mg     gabapentin 300 mg capsule  Commonly known as: NEURONTIN  Take 300 mg by mouth 3 (three) times a day.  Dose: 300 mg     lisinopriL 20 mg tablet  Commonly known as: PRINIVIL  Take 20 mg by mouth daily.  Dose: 20 mg     melatonin 3 mg tablet  Take 3 mg by mouth nightly.  Dose: 3 mg     mirtazapine 30 mg tablet  Commonly known  as: REMERON  Take 30 mg by mouth nightly.  Dose: 30 mg     nitrofurantoin (macrocrystal-monohydrate) 100 mg capsule  Commonly known as: MACROBID  Take 100 mg by mouth daily before dinner.  Dose: 100 mg     senna 8.6 mg tablet  Commonly known as: SENOKOT  Take 2 tablets by mouth daily before dinner.  Dose: 2 tablet     sertraline 50 mg tablet  Commonly known as: ZOLOFT  Take 50 mg by mouth 2 (two) times a day. 0900, 1700  Dose: 50 mg     topiramate 25 mg tablet  Commonly known as: TOPAMAX  Take 25 mg by mouth 2 (two) times a day.  Dose: 25 mg        STOP taking these medications    MIRALAX 17 gram/dose powder  Generic drug: polyethylene glycol  Replaced by: polyethylene glycol 17 gram packet             Instructions for after discharge     Discharge diet      Diet Type / Texture: Regular    Discharge diet      Diet Type / Texture: Regular    Follow Up:  With Primary MD within 1 week discharge from facility      Follow-up with Provider:      On discharge from [facility]    Orestes Payan DO   [phone]    [address]  [address]  [place] [id number]       Follow-up with Provider:      On discharge from [facility]    Gunnar Gaytan MD   [phone]    [address]  [address]  [place] [id number]       Occupational Therapy Eval and Treat      Occupational Therapy Eval and Treat      Physical Therapy Eval and Treat      Physical Therapy Eval and Treat      Treatment Options: Full Resuscitation               PROCEDURES / LABS / IMAGING      Operative Procedures  None    Other Procedures  Steroid injection    Pertinent Labs  Results from last 7 days   Lab Units 09/27/23 0412 09/25/23  0613 09/24/23  0623   SODIUM mEQ/L 131* 131* 134*   POTASSIUM mEQ/L 4.5 5.0 4.3   CHLORIDE mEQ/L 100 102 104   CO2 mEQ/L 26 23 25   BUN mg/dL 22 20 20   CREATININE mg/dL 0.7 0.5* 0.6   GLUCOSE mg/dL 92 94 97   CALCIUM mg/dL 10.0 10.0 10.2     Results from last 7 days   Lab Units 09/27/23 0412 09/25/23  0613 09/23/23  0519   WBC K/uL 7.04 6.15 6.48    HEMOGLOBIN g/dL 13.3 14.5 14.2   HEMATOCRIT % 38.6 41.1 44.4   PLATELETS K/uL 196 182 198           Pertinent Imaging  FL FLUOROSCOPY TECHNICAL ASSISTANCE    Result Date: 9/27/2023  IMPRESSION: Intraprocedural fluoroscopic guidance, please see below. COMMENT: Comparison: MRI lumbar spine 5/3/2023. Technique: 2 intraprocedural fluoroscopic spot images of the lumbar spine were obtained without the radiologist present. Total fluoroscopy time is 20.3 seconds and total radiation dose is 8.6 mGy. FINDINGS: Intraprocedural fluoroscopic guidance was provided during reported L1-L2 interlaminar epidural steroid injection with a spinal needle seen projecting over the region of the posterior canal of the upper lumbar spine with subsequent injection of a small amount of contrast material. Please see the procedure note for additional details.     X-RAY LUMBAR SPINE 2 OR 3 VIEWS    Result Date: 9/27/2023  IMPRESSION: Intraprocedural fluoroscopic guidance, please see below. COMMENT: Comparison: MRI lumbar spine 5/3/2023. Technique: 2 intraprocedural fluoroscopic spot images of the lumbar spine were obtained without the radiologist present. Total fluoroscopy time is 20.3 seconds and total radiation dose is 8.6 mGy. FINDINGS: Intraprocedural fluoroscopic guidance was provided during reported L1-L2 interlaminar epidural steroid injection with a spinal needle seen projecting over the region of the posterior canal of the upper lumbar spine with subsequent injection of a small amount of contrast material. Please see the procedure note for additional details.     MRI HIP RIGHT WITHOUT CONTRAST    Result Date: 9/26/2023  IMPRESSION: 1.  No acute fracture at either hip. No osteonecrosis. 2.  Mild bilateral hip degenerative joint disease. 3.  Multisite tendinopathy with moderate grade partial tearing of the bilateral hamstring tendon origins and low-grade partial tear of the right gluteus minimus tendons without tendon rupture or  retraction. 4.  Multiple areas of intramuscular edema and partial fatty atrophy. 5.  Additional findings are discussed below. COMMENT: Comparison: 9/23/2023 x-rays.. TECHNIQUE: MRI of the right hip was performed using a dedicated noncontrast protocol which also includes  imaging through the bony pelvis. FINDINGS: Right hip: Right hip alignment is within normal limits. No acute fracture or osteonecrosis. There is a small island of hematopoietic marrow residual in the proximal diaphysis/subtrochanteric region of the right femur. No suspicious marrow replacing lesion. Small fluid in the right hip joint at the upper end of physiologic without significant joint effusion. Mild chondral thinning throughout the hip joint. Degeneration and tearing of the right hip labrum is suspected superior laterally and anterosuperiorly. Remainder of the right hip labrum appears grossly within normal limits by noncontrast MRI. Normal appearance of the ligamentum teres. Femoral head neck junction is within normal limits. Mild degenerative osteophytic spurring along the lateral aspect of the acetabulum. Left hip: Limited survey imaging of the left hip reveals mild degenerative changes very similar to the right without evidence of significant joint effusion, fracture or osteonecrosis. No stress response. Disease Bony pelvis: Pubic symphysis demonstrate mild to moderate chronic degenerative changes. SI joints demonstrate mild degenerative change. No acute fracture in the bony pelvis. No evidence of any suspicious bone marrow replacing lesion. Of note, there is multilevel degenerative disease incompletely assessed in the lower included lumbar spine. Tendons: No significant excess fluid in the iliopsoas bursae bilaterally. The iliopsoas/psoas major tendons remain intact bilaterally. There is moderate grade partial tearing of the bilateral hamstring tendon origins without rupture or retraction superimposed upon tendinosis. There is no  rupture or retraction of either the right or left gluteus medius or minimus tendons noting mild tendinosis bilaterally. Involving there is suggestion of a small amount of partial tearing involving the right gluteus minimus insertion. There is tendinosis and low-grade partial tearing involving the bilateral adductor tendon origin without rupture or retraction. Remaining major tendons about the pelvis appear intact. Musculature: Small amount of muscle edema is noted within the bilateral adductor muscles with some slight fatty atrophy nonspecific could be seen with denervation. The degree of strain is also possible. There is also similar edema in the bilateral gluteus minimus muscles also with partial fatty atrophy also raising the possibility of denervation change. Minimal edema and partial fatty infiltration of the gluteus and medius muscles. No evidence of a high-grade focal muscle tear. Symmetry of the piriformis muscles. No evidence of significant ischiofemoral impingement. Visceral pelvis: Limited survey of the visceral pelvis with this technique and due to motion artifact reveals no gross findings of concern. No findings of concern in the lower abdominal wall noting mild edema in the subcutaneous tissues.    MRI BRAIN WITHOUT CONTRAST    Result Date: 9/24/2023  IMPRESSION: 1.  No focal acute intracranial abnormality. 2. Other incidental findings noted under the comment.    X-RAY HIP WITH OR WITHOUT PELVIS 2-3 VW RIGHT    Result Date: 9/23/2023  IMPRESSION: No acute osseous abnormalities. TECHNIQUE: AP view of the pelvis and two views of the right hip. COMMENT: Bilateral hip osteoarthritis. No acute fractures are identified. Normal alignment.     CT HEAD WITHOUT IV CONTRAST    Result Date: 9/22/2023  IMPRESSION:  No acute intracranial hemorrhage, acute infarct in a major vascular territory or mass-effect. COMMENT: Axial noncontrast CT images of the head were obtained. Sagittal and coronal reconstructions were  created. CT DOSE:  One or more dose reduction techniques (e.g. automated exposure control, adjustment of the mA and/or kV according to patient size, use of iterative reconstruction technique) utilized for this examination. Comparison:  6/6/2022 Findings:  Sulci and ventricles are within normal limits for patient's age. Bilateral periventricular white matter lucencies are nonspecific but compatible with microangiopathic change. There is no mass effect, midline shift, territorial infarction, acute hemorrhage or extra-axial fluid collection. Visualized paranasal sinuses and mastoid air cells are clear.    X-RAY CHEST 1 VIEW    Result Date: 9/22/2023  IMPRESSION: No acute disease in chest. COMMENT: Comparison: Chest x-ray 8/16/2022. Technique: A single frontal AP portable upright projection of the chest was obtained. FINDINGS: The lungs are hypoexpanded but grossly clear without focal airspace consolidation, pleural effusion or pneumothorax. The cardiac silhouette is normal. The aorta is again noted to be uncoiled. The upper abdomen is unremarkable. There is dextrocurvature of the lumbar spine. There is no acute osseous abnormality. A coarse calcification is again seen projecting over the left breast.      OUTPATIENT  FOLLOW-UP / REFERRALS / PENDING TESTS        Outpatient Follow-Up Appointments  Encounter Information    This patient does not currently have any appointments scheduled.         Referrals  No orders of the defined types were placed in this encounter.      Test Results Pending at Discharge  Unresulted Labs (From admission, onward)     Start     Ordered    09/22/23 1308  [place] Draw Panel  STAT        Question Answer Comment   Red Top No Labels    Gold Top 1 Label    Light Blue 1 Label    Lavender Top No Labels    Pink Top No Labels    Yellow - Urine Tall No Labels    Urine Culture Tube No Labels    Blood Culture No Labels        09/22/23 1307                Important Issues to Address in Follow-Up  Follow  up with your PCP in 1 week      DISCHARGE DISPOSITION AND DESTINATION      Disposition: Home    Destination: Home                              Code Status At Discharge: Full Code    Physician Order for Life-Sustaining Treatment Document Status      No documents found

## 2023-09-27 NOTE — BRIEF OP NOTE
L1 EPIDURAL STEROID INJECTION INTERLAMINAR LUMBAR - LES (R) Procedure Note    Procedure:    L1 EPIDURAL STEROID INJECTION INTERLAMINAR LUMBAR - LES  CPT(R) Code:  39021 - CHG FLUOR NEEDLE/CATH SPINE/PARASPINAL DX/THER ADDON      Pre-op Diagnosis     * Pain of right hip [M25.551]     * Closed stable burst fracture of first lumbar vertebra, initial encounter (CMS/[facility]) [S32.011A]     Right L1 radiculopathy  Post-op Diagnosis     * Pain of right hip [M25.551]     * Closed stable burst fracture of first lumbar vertebra, initial encounter (CMS/[facility]) [S32.011A]  L1 radiculopathy  Surgeon(s) and Role:     * Shant Byrnes Jr., MD - Primary    Anesthesia: Monitor Anesthesia Care    Staff:   Circulator: Nickie Phillip RN  Radiology IR Technologist: Anali Donis RTR  Scrub Person: Susie Payan RN    Procedure Details   Right L1 epidural steroid injection    Estimated Blood Loss: No blood loss     Specimens:                No specimens collected during this procedure.      Drains:   External Urinary Catheter Female (one size) (Active)   Securement Method Other (Comment) 09/22/23 1644   Skin no redness;no breakdown 09/26/23 2040   Tolerance no signs/symptoms of discomfort 09/26/23 2040   Output (mL) 600 mL 09/27/23 0400       Implants: * No implants in log *           Complications:  None; patient tolerated the procedure well.           Disposition: PACU - hemodynamically stable.           Condition: stable    Shant Byrnes Jr., MD  Phone Number: [phone]

## 2023-09-27 NOTE — ANESTHESIOLOGIST PRE-PROCEDURE ATTESTATION
Pre-Procedure Patient Identification:  I am the Primary Anesthesiologist and have identified the patient on 09/27/23 at 6:59 AM.   I have confirmed the procedure(s) will be performed by the following surgeon/proceduralist Shant Byrnes Jr., MD.

## 2023-09-27 NOTE — DISCHARGE INSTRUCTIONS
EPIDURAL STEROID INJECTION  Post Procedure Instructions:  1. Have someone drive you from the procedure. You should not drive until 4 hours after the time of your procedure.  2. Avoid aggravating activities that increase your pain. Relative rest is recommended the day of your procedure.   3. Resume your normal diet and previous medication once discharged home.  4. To reduce the chance of infection, do not soak in a bath tub, hot tub, or pool for 2 days. You may shower.  5. Ice the injection site for any soreness for 20 minutes, 3-4 times per day, for 3-4 days. Take oral pain medication for any continued discomfort from the injection.  6. You may experience immediate relief from the anesthetic phase of the medication, which can last for a few hours. The procedure takes approximately 1 week to take full effect.  7. Please make a follow up appointment 10 days to 2 weeks from the date of your procedure.  Complications and Side Effects:  *There is always a risk of allergic reaction to the medication. Seek emergency medical attention if you experience difficulty breathing and/or swelling of the tongue, lip, or throat.  *You may experience worsening symptoms, including increased pain lasting for several days following your procedure. This is normal and will subside on its own.  *Epidurals, though rare, can cause headaches. Sitting or standing upright can make it worse. Most headaches go away in a few days. Laying flat, increasing your water intake, and taking over the counter pain medication such as ibuprofen (Advil, Motrin) or acetaminophen (Tylenol) can help reduce your headache.  *If you are a diabetic, there may be an increase in your blood glucose levels.  *Less serious side effects can include sleep problems such as insomnia, changes in mood, flushing and/or warm sensation throughout the body, and mild fluid retention.  *There are other possible complications including but not limited to nerve damage and  infections.

## 2023-09-27 NOTE — PERIOPERATIVE NURSING NOTE
0918- Dr. March wants po BP meds given if pt.able to take them . Pt is raghavendra to take po  so phamacy will bring them .

## 2023-09-28 PROCEDURE — 99306 1ST NF CARE HIGH MDM 50: CPT | Performed by: INTERNAL MEDICINE

## 2023-10-02 PROCEDURE — 99309 SBSQ NF CARE MODERATE MDM 30: CPT | Performed by: INTERNAL MEDICINE

## 2023-10-05 PROCEDURE — 99309 SBSQ NF CARE MODERATE MDM 30: CPT | Performed by: INTERNAL MEDICINE

## 2023-10-09 PROCEDURE — 99309 SBSQ NF CARE MODERATE MDM 30: CPT

## 2023-11-17 ENCOUNTER — TRANSCRIBE ORDERS (OUTPATIENT)
Dept: ADMINISTRATIVE | Age: 88
End: 2023-11-17

## 2023-11-17 DIAGNOSIS — R29.6 FREQUENT FALLS: ICD-10-CM

## 2023-11-17 DIAGNOSIS — G20.A1 PARKINSON'S DISEASE (CMS/HCC): Primary | ICD-10-CM

## 2023-11-24 ENCOUNTER — HOME VISIT (OUTPATIENT)
Dept: PALLIATIVE CARE | Facility: CLINIC | Age: 88
End: 2023-11-24
Attending: FAMILY MEDICINE
Payer: MEDICARE

## 2023-11-24 VITALS
OXYGEN SATURATION: 97 % | DIASTOLIC BLOOD PRESSURE: 67 MMHG | SYSTOLIC BLOOD PRESSURE: 130 MMHG | HEART RATE: 77 BPM | RESPIRATION RATE: 16 BRPM | TEMPERATURE: 97.3 F

## 2023-11-24 DIAGNOSIS — I10 PRIMARY HYPERTENSION: ICD-10-CM

## 2023-11-24 DIAGNOSIS — G20.B2 PARKINSON'S DISEASE WITH DYSKINESIA AND FLUCTUATING MANIFESTATIONS (CMS/HCC): ICD-10-CM

## 2023-11-24 DIAGNOSIS — G20.A1 MODERATE DEMENTIA DUE TO PARKINSON'S DISEASE, WITH AGITATION (CMS/HCC): ICD-10-CM

## 2023-11-24 DIAGNOSIS — M54.40 CHRONIC LOW BACK PAIN WITH SCIATICA, SCIATICA LATERALITY UNSPECIFIED, UNSPECIFIED BACK PAIN LATERALITY: ICD-10-CM

## 2023-11-24 DIAGNOSIS — Z87.440 HISTORY OF UTI: ICD-10-CM

## 2023-11-24 DIAGNOSIS — R53.81 DEBILITY: ICD-10-CM

## 2023-11-24 DIAGNOSIS — F02.B11 MODERATE DEMENTIA DUE TO PARKINSON'S DISEASE, WITH AGITATION (CMS/HCC): ICD-10-CM

## 2023-11-24 DIAGNOSIS — Z51.5 ENCOUNTER FOR PALLIATIVE CARE IN HOME, NON-HOSPICE: Primary | ICD-10-CM

## 2023-11-24 DIAGNOSIS — G89.29 CHRONIC LOW BACK PAIN WITH SCIATICA, SCIATICA LATERALITY UNSPECIFIED, UNSPECIFIED BACK PAIN LATERALITY: ICD-10-CM

## 2023-11-24 DIAGNOSIS — Z86.718 HISTORY OF VENOUS THROMBOEMBOLISM: ICD-10-CM

## 2023-11-24 PROBLEM — F03.B11 MODERATE DEMENTIA WITH AGITATION (CMS/HCC): Status: ACTIVE | Noted: 2023-11-24

## 2023-11-24 PROBLEM — Z71.89 ACP (ADVANCE CARE PLANNING): Status: RESOLVED | Noted: 2021-11-27 | Resolved: 2023-11-24

## 2023-11-24 PROBLEM — R79.89 ELEVATED TROPONIN: Status: RESOLVED | Noted: 2021-11-27 | Resolved: 2023-11-24

## 2023-11-24 PROBLEM — D72.829 LEUKOCYTOSIS: Status: RESOLVED | Noted: 2021-11-28 | Resolved: 2023-11-24

## 2023-11-24 PROBLEM — R55 SYNCOPE: Status: RESOLVED | Noted: 2021-11-27 | Resolved: 2023-11-24

## 2023-11-24 PROBLEM — E78.5 HYPERLIPIDEMIA, UNSPECIFIED: Status: ACTIVE | Noted: 2023-11-24

## 2023-11-24 PROBLEM — G40.909 SEIZURE DISORDER (CMS/HCC): Status: RESOLVED | Noted: 2023-09-22 | Resolved: 2023-11-24

## 2023-11-24 PROBLEM — I26.92: Status: RESOLVED | Noted: 2021-11-27 | Resolved: 2023-11-24

## 2023-11-24 PROBLEM — M25.551 PAIN OF RIGHT HIP: Status: RESOLVED | Noted: 2023-09-23 | Resolved: 2023-11-24

## 2023-11-24 PROBLEM — J96.01 ACUTE RESPIRATORY FAILURE WITH HYPOXIA (CMS/HCC): Status: RESOLVED | Noted: 2021-12-01 | Resolved: 2023-11-24

## 2023-11-24 PROBLEM — M54.50 CHRONIC LOW BACK PAIN: Status: ACTIVE | Noted: 2021-10-18

## 2023-11-24 PROBLEM — G62.9 POLYNEUROPATHY, UNSPECIFIED: Status: ACTIVE | Noted: 2023-11-24

## 2023-11-24 PROBLEM — R55 SYNCOPE, UNSPECIFIED SYNCOPE TYPE: Status: RESOLVED | Noted: 2022-06-06 | Resolved: 2023-11-24

## 2023-11-24 PROBLEM — N30.90 CYSTITIS: Status: RESOLVED | Noted: 2018-07-16 | Resolved: 2023-11-24

## 2023-11-24 PROBLEM — M19.90 UNSPECIFIED OSTEOARTHRITIS, UNSPECIFIED SITE: Status: ACTIVE | Noted: 2023-11-24

## 2023-11-24 PROCEDURE — 99345 HOME/RES VST NEW HIGH MDM 75: CPT | Mod: 25 | Performed by: NURSE PRACTITIONER

## 2023-11-24 PROCEDURE — 99497 ADVNCD CARE PLAN 30 MIN: CPT | Mod: 25 | Performed by: NURSE PRACTITIONER

## 2023-11-24 RX ORDER — PIMAVANSERIN TARTRATE 34 MG/1
34 CAPSULE ORAL DAILY
COMMUNITY
End: 2024-01-01 | Stop reason: HOSPADM

## 2023-11-24 RX ORDER — BIMATOPROST 0.1 MG/ML
1 SOLUTION/ DROPS OPHTHALMIC NIGHTLY
COMMUNITY

## 2023-11-24 RX ORDER — BUPRENORPHINE 5 UG/H
1 PATCH TRANSDERMAL WEEKLY
COMMUNITY
Start: 2023-11-13

## 2023-11-24 ASSESSMENT — PAIN SCALES - GENERAL: PAINLEVEL: 0-NO PAIN

## 2023-11-24 NOTE — ASSESSMENT & PLAN NOTE
Due to Parkinson's disease and chronic back pain   Monitor for falls  Continue use of assistive devices  Has 24-7 paid caregiver support  Continues with [facility] home care PT

## 2023-11-24 NOTE — ASSESSMENT & PLAN NOTE
Goals are Disease-Directed  Code Status: FULL code  Patient has an advanced directive that is not on file that names  - John Porras ([phone]) as her health care power of  if she is unable to make her own medical decisions.   Reviewed a copy of a POLST and will assist with completion as able  Patient lacks complex medical decision making capacity due to confusion from dementia.

## 2023-11-24 NOTE — ASSESSMENT & PLAN NOTE
Follows with Neurologist Dr Payan  Complicated by agitation, anxiety, hallucinations, and vivid dreams  Current meds include: Carbidopa-Levodopa  four times daily   Recently started trial of Nuplazid 34 mg in the AM daily   Continues Melatonin 5mg at bedtime   Continues with physical therapy and has received speech therapy

## 2023-11-24 NOTE — ASSESSMENT & PLAN NOTE
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  Followed by pain management specialists and receives epidural of the spine every 3 months.     Continues on Topamax 25 mg twice a day  Continues on Tylenol 1000 mg three times a day  Continues on Gabapentin 300 mg three times a day  Recently started Butrans 5mcg/hr transdermal patch changed every week   Monitor pain

## 2023-11-24 NOTE — PROGRESS NOTES
Palliative Home Based Care Initial Visit    Subjective     Patient ID: Vicki Porras is a 88 y.o. female.  MRN: [id number]  Date/ Time Visit Made:  Referring Physician: Shant Corona Md  [address] [place] [address]  [place] [id number]  Primary Care Physician:Shant Corona MD    Reason for Visit:  Palliative Care      Mariya Porras is an 88 year old female seen in the home for Initial Palliative Care visit with a PMH of Parkinson's Disease, HTN, PE & DVT (), GERD, Anxiety, Depression, Chronic Pain, Neuropathy of feet, and Debility.     Patient was recently admitted to [facility] from 23-23 after an unresponsive episode. This was patient's third unresponsive episode. Neurology and Cardiology were consulted and the etiology was determined to be unclear. Patient complained of right hip pain during hospitalization. MRI did not demonstrate a fracture. Orthopedic Surgery felt the pain was due to severe canal impingement from L1 and patient received an epidural steroid injection. Patient was discharged to a SNF.     Met with patient, her , and children Liz and Jose Roberto, and paid caregiver Lucia. Introduced role of Palliative Care and answered questions re Palliative care.     Patient lives with  and  paid caregiver Lucia. Liz and Jose Roberto both live in [place] and visit frequently.  Jose Roberto handles doctor's appts . Additional Son also lives in [place] and daughter lives in [place].  Son John Johnston who had lived with his parents  recently.     Patient unable to provide history, caregiver reports her short tem memory is limited, worse in the am and improves later in the day. Memory is worsening. Not able to remember something discussed a few minutes ago. Repeating the same questions.   Patient dependent on caregiver for all ADLs, she can assist with dressing and toileting. She can cut her food and feed herself. Dependent in all iADLs.   Noticed increase in choking/coughing with food  in the last 2 weeks; phlegm production worse . Per caregiver she does not have difficulty swallowing liquids. Seems to do better with moist foods.   Sleep interrupted by vivid dreams which frighten her. Nuplazid started a week ago has not been helpful yet. Averages about 8- 9 hours at night and sleeps a lot during the day. Sleep variable with interruptions. Hallucinations -has seen people outside and paranoid re staff at [facility].  Sleeping ~ 16 hrs in a 24 hour.   History of frequent UTIs. Has urinary incontinence. UTI symptoms - has dysuria, urgency, and AMS. Has UTI colonization. On Macrobid.   Is continent of stool. On laxatives. Normally has daily BMs, no BM in past 2 days. Has hemorrhoids, which are uncomfortable. Using preparation-H.   Ambulates with assist and walker short distances, has ramp for wheelchair and chair glide for second floor. Has an elevator in her home. Freezing more often when walking and with turns. Steps are shorter. Walking less.  No falls in the last month.   Skin has been intact, left hand with wart which is falls off and returns.   Anxiety and agitation. Has physical agitation. Notes this is worse in the morning, especially when she is woken up. Is more agitated when she doesn't want to do something.  Frustrated easily. OCD is enhanced.   Chronic pain of low back with difficulty ambulating since she had L1 fracture.  No pain seated. Has severe pain with movement. Pain moves down her back. Sees a pain management specialist for medications and epidural injections. Receives botox-like injection into her foot from PM&R provider. Has LE neuropathy.   Discussed GOC, see ACP note for details.    Called patient's Neurologist, Dr. Payan. Left a message requesting provider would like to discuss patient's Parkinson's Disease and symptoms. Provided contact information for Dr. Payan to return the call.        HPI Source: Patient, Family, Medical Record, & [facility]  Past Medical History:   Past  Medical History:   Diagnosis Date    Anxiety     Arthritis     Basal cell carcinoma     forehead    Glaucoma     Hypertension     Low back pain     Lumbosacral disc disease     Parkinson's disease (CMS/HCC)     Peripheral neuropathy     Seizures (CMS/HCC)      Past Surgical History:    Past Surgical History:   Procedure Laterality Date    BLADDER SUSPENSION  2009    Prolift M    CATARACT EXTRACTION, BILATERAL      COLONOSCOPY      HYSTERECTOMY      JOINT REPLACEMENT      KNEE ARTHROPLASTY  2015    left     Family History:  family history includes No Known Problems in her biological father and biological mother.  Social History:    Social History     Socioeconomic History    Marital status:    Tobacco Use    Smoking status: Former    Smokeless tobacco: Never    Tobacco comments:     as a young teenager   Substance and Sexual Activity    Alcohol use: Yes     Comment: wine    Drug use: No    Sexual activity: Not Currently     Partners: Male     Social Determinants of Health     Financial Resource Strain: Low Risk  (9/24/2023)    Overall Financial Resource Strain (CARDIA)     Difficulty of Paying Living Expenses: Not hard at all   Food Insecurity: No Food Insecurity (9/22/2023)    Hunger Vital Sign     Worried About Running Out of Food in the Last Year: Never true     Ran Out of Food in the Last Year: Never true   Transportation Needs: No Transportation Needs (9/24/2023)    PRAPARE - Transportation     Lack of Transportation (Medical): No     Lack of Transportation (Non-Medical): No   Housing Stability: Low Risk  (9/24/2023)    Housing Stability Vital Sign     Unable to Pay for Housing in the Last Year: No     Number of Places Lived in the Last Year: 1     Unstable Housing in the Last Year: No       Yazidi:    Spiritual Assessment: Radha and Belief: Yazdanism and No Spiritual concerns identified    Service: Patient did not serve in the   Environmental Assessment:   Home environment safe and suitable for care  Caregiver [place]: No burden of care issues identified  Data Review  Lab Studies: Pertinent radiology and labs reviewed  Labs last six months:    No results displayed because visit has over 200 results.                Labs Reviewed: I have reviewed the patient's pertinent labs to the time of note.  Significant abnormals are 9/27/23 - Sodium 131    Radiology and Imaging Studies: Pertinent radiology and labs reviewed   None  Radiology and Imaging Reviewed: Significant findings include: MRI Hip (9/25/23):  1.  No acute fracture at either hip. No osteonecrosis. 2.  Mild bilateral hip degenerative joint disease. 3.  Multisite tendinopathy with moderate grade partial tearing of the bilateral hamstring tendon origins and low-grade partial tear of the right gluteus minimus tendons without tendon rupture or retraction. 4.  Multiple areas of intramuscular edema and partial fatty atrophy.  MRI Brain (9/24/23): There is generalized cerebral volume loss. Increase in T2 signal abnormality is  visualized in the periventricular white matter tracts most likely representing chronic small vessel ischemic disease. No intraparenchymal mass, acute infarcts or hemorrhages are seen. The cisterns are normal.  Body of the lateral ventricles are prominent.  The right clinical setting, this can be seen with normal pressure hydrocephalus.  This finding is unchanged.  No extra-axial masses or focal fluid collections are seen.  Visualized portions of the paranasal sinuses and the mastoid air cells are clear.  Cardiac Studies Reviewed: Significant findings include: TTE (9/25/23):    Normal left ventricular size, mild concentric hypertrophy, and normal systolic function.  Estimated LVEF 60-65%.    No significant valvular, pericardial, or aortic abnormalities.  EKG (9/22/23): QTc = 423 & NSR  Medicine Tests reviewed: Not applicable      Review of Systems   Unable to perform ROS: Dementia   Respiratory:         Per caregiver chokes with certain foods - not with liquids.       Objective     Vitals:    11/24/23 1312   BP: 130/67   BP Location: Left upper arm   Patient Position: Sitting   Pulse: 77   Resp: 16   Temp: 36.3 °C (97.3 °F)   TempSrc: Temporal   SpO2: 97%     There is no height or weight on file to calculate BMI.  Allergies:   Allergies   Allergen Reactions    Codeine Hives      n/v    Diazepam Other (see comments)     Blurred vision    Sulfa (Sulfonamide Antibiotics) Hives     Other reaction(s): Unknown    Sulfur Hives         Medications:   Current Outpatient Medications:     acetaminophen (TYLENOL) 500 mg tablet, Take 1,000 mg by mouth 3 (three) times a day., Disp: , Rfl:     apixaban (ELIQUIS) 2.5 mg tablet, Take 2.5 mg by mouth 2 (two) times a day., Disp: , Rfl:     bimatoprost (LUMIGAN) 0.01 % ophthalmic drops, Administer 1 drop into both eyes nightly., Disp: , Rfl:     buprenorphine (BUTRANS) 5 mcg/hour patch weekly, Place 1 patch on the skin every (seven) 7 days., Disp: , Rfl:     carbidopa-levodopa  mg per tablet, Take 1 tablet by mouth 4 (four) times a day. 0700, 1100, 1500, 2000, Disp: , Rfl:     docusate sodium (COLACE) 100 mg capsule, Take 100 mg by mouth daily., Disp: , Rfl:     famotidine (PEPCID) 20 mg tablet, Take 20 mg by mouth daily., Disp: , Rfl:     gabapentin (NEURONTIN) 300 mg capsule, Take 300 mg by mouth 3 (three) times a day., Disp: , Rfl:     hydrALAZINE (APRESOLINE) 50 mg tablet, Take 1 tablet (50 mg total) by mouth 2 (two) times a day. (Patient taking differently: Take 25 mg by mouth 2 (two) times a day.), Disp: , Rfl:     lisinopriL 20 mg tablet, Take 20 mg by mouth daily., Disp: , Rfl:     melatonin 3 mg tablet, Take 5 mg by mouth nightly., Disp: , Rfl:     mirtazapine 30 mg tablet, Take 30 mg by mouth nightly., Disp: , Rfl:     nitrofurantoin, macrocrystal-monohydrate, (MACROBID) 100 mg capsule, Take 100 mg by mouth daily before dinner., Disp: , Rfl:      pimavanserin (NUPLAZID) 34 mg capsule, Take 34 mg by mouth daily., Disp: , Rfl:     senna (SENOKOT) 8.6 mg tablet, Take 2 tablets by mouth daily before dinner., Disp: , Rfl:     sertraline 50 mg tablet, Take 150 mg by mouth daily. Taking 50 mg in the AM & then 100 mg in the PM, Disp: , Rfl:     topiramate (TOPAMAX) 25 mg tablet, Take 25 mg by mouth 2 (two) times a day., Disp: , Rfl:   [facility], [facility] AWARxE: Query reviewed and no concerns      Physical Exam  Constitutional:       General: She is not in acute distress.     Appearance: Normal appearance. She is not ill-appearing.   HENT:      Head: Normocephalic.      Right Ear: External ear normal.      Left Ear: External ear normal.      Nose: No congestion.      Mouth/Throat:      Mouth: Mucous membranes are moist.      Pharynx: Oropharynx is clear.   Eyes:      Conjunctiva/sclera: Conjunctivae normal.      Pupils: Pupils are equal, round, and reactive to light.   Cardiovascular:      Rate and Rhythm: Normal rate and regular rhythm.      Pulses: Normal pulses.      Heart sounds: Normal heart sounds. No murmur heard.     Comments: No edema  Pulmonary:      Effort: Pulmonary effort is normal.      Breath sounds: Normal breath sounds. No wheezing.   Abdominal:      General: Abdomen is flat. Bowel sounds are normal. There is no distension.      Palpations: Abdomen is soft.      Tenderness: There is no abdominal tenderness.   Musculoskeletal:         General: No swelling.      Cervical back: Normal range of motion.      Right lower leg: No edema.      Left lower leg: No edema.      Comments: sarcopenia  Limited ROM  Foot drop b/l   Skin:     General: Skin is warm and dry.      Findings: Bruising present. No rash.      Comments: Wart left hand no erythematous    Neurological:      General: No focal deficit present.      Mental Status: She is alert. Mental status is at baseline. She is disoriented.      Motor: Weakness present.      Gait: Gait  abnormal.   Psychiatric:         Mood and Affect: Mood normal.         Thought Content: Thought content normal.      Comments: No agitation or restlessness observed, confusion noted          Palliative Assessment  FAST Score:  6c.- Inability to handle mechanics of toileting  Palliative Performance Scale: 40%  Mid Upper Arm Circumference (MUAC): unable to perform due to clothing  ESASr: Not applicable - Unable to perform due to Dementia   Palliative Disease State: Deteriorating despite treatments and At high risk for hospitalization  Patient Prognostic Awareness: Demonstrates limited understanding of prognosis and Patient unable to participate in discussion about disease process or prognosis  Palliative Care Risk Stratification: Level Three- High risk.  Requires highest level of intervention.  Not likely to return to usual care    Goals of Care  Advance Directives Status:  Not Received  Advance Directives:    Document type(s) Durable Power of  and Living Will  Healthcare Proxy Name, Contact Information, and Relationship  Lev Porras ([phone])  Goals of Care Discussion: yes   Change in medical orders resulting from advance care planning discussions: No change in medical orders  Code status Full code    Assessment/Plan   Diagnoses and all orders for this visit:    Encounter for palliative care in home, non-hospice (Primary)  Assessment & Plan:  Goals are Disease-Directed  Code Status: FULL code  Patient has an advanced directive that is not on file that names  - John Porras ([phone]) as her health care power of  if she is unable to make her own medical decisions.   Reviewed a copy of a POLST and will assist with completion as able  Patient lacks complex medical decision making capacity due to confusion from dementia.       Parkinson's disease with dyskinesia and fluctuating manifestations  Assessment & Plan:  Follows with Neurologist Dr Payan  Complicated by agitation,  anxiety, hallucinations, and vivid dreams  Current meds include: Carbidopa-Levodopa  four times daily   Recently started trial of Nuplazid 34 mg in the AM daily   Continues Melatonin 5mg at bedtime   Continues with physical therapy and has received speech therapy      Moderate dementia due to Parkinson's disease, with agitation (CMS/HCC)  Assessment & Plan:  Followed by neurologist Dr Payan  Monitor cognitive status  Monitor for falls  Continue close supervision and assist with ADLs  Continues on Sertraline 50 mg in the AM & 100 mg in the PM  Continues on Mirtazapine 30 mg nightly  Has 24/7 caregiver support       Primary hypertension  Assessment & Plan:  Followed by PCP Dr. Corona  Blood pressure fluctuates greatly, goal to avoid hypotension with past syncopal episodes.    Continues on Lisinopril 20 mg daily  Continues on Hydralazine 50 mg twice a day  Normotensive on exam  Monitor BP      History of UTI  Assessment & Plan:  Follows with Dr. Conti for management   Currently on macrobid 100 mg daily as suppressant   Discussed possible supplement and vit C, Cranberry and D Mannose  UTI symptoms: dysuria and AMS  Monitor for future UTI symptoms      Chronic low back pain with sciatica, sciatica laterality unspecified, unspecified back pain laterality  Assessment & Plan:  Chronic pain of lumbar spine with radiculopathy & neuropathy of both feet.  Followed by pain management specialists and receives epidural of the spine every 3 months.     Continues on Topamax 25 mg twice a day  Continues on Tylenol 1000 mg three times a day  Continues on Gabapentin 300 mg three times a day  Recently started Butrans 5mcg/hr transdermal patch changed every week   Monitor pain      History of venous thromboembolism  Assessment & Plan:  Hx of DVT and PE  Followed by PCP for managment  Continues on Eliquis 2.5 mg twice daily  Monitor for bleeding with Eliquis         Debility  Assessment & Plan:  Due to Parkinson's disease and  chronic back pain   Monitor for falls  Continue use of assistive devices  Has 24-7 paid caregiver support  Continues with [facility] home care PT         Advance Care Planning     Palliative Home Based Care Advance Care Planning Note      Patient Name: Vicki Porras                                                                                 Patient MRN: [id number]  Discussion Date: 11/24/23   Discussion Participants: patient, spouse, daughter and son   Start time: 1:20 PM   End time: 1:40PM    Patient present for the conversation, but did not participate.     Patients current medical state including medical necessity for ACP discussion:    Patient with life limiting illness, Patient with progressive chronic illness and Patient at risk for overmedicalized death    Todays participants wished to discuss advance care planning.  The following summarizes our discussion.   During our visit today we discussed advance directives and goals of care including:  Patient medical status and prognosis, The value and importance of advance care planning, Exploration of personal, cultural, or spiritual beliefs that might influence medical decisions., Exploration of goals of care considering current illness or in the event of a sudden injury or illness., Treatment decisions/ types of medical care preferred by the patient. Code Status, Types of advance care planning documents and Identification of a healthcare agent.    Code status Full code  Health Care Agent/ Surrogate Decision Maker: Patient has a surrogate decision maker.  Surrogate Decision maker is:  Lev Porras ([phone])    Today participants wished to discuss Advance Care Planning. The following summarizes our discussion.    During our visit today, we discussed:     Code Status  Full Code    Health Care Agent/Surrogate Decision Maker   Patient has an advanced directive that is not on file that names  Lev Porras ([phone]) as her health care  power of  if unable to make own medical decisions.     Goals of Care  Goals are Disease-Directed. Focus on pain management and improved functional abilities.     Treatment Decisions/ types of medical care preferred by the patient  Agreeable with return to the hospital for treatment.     Advance Care Documents  Has an advanced directive that is not on file.   Reviewed a copy of a POLST. Will assist with completion at future visits.       Patient Capacity  Patient does not have capacity to make their own medical decisions due to confusion related to dementia.       Attestation Statement:  I have spent a total of 20 minutes in face-to-face discussion of patient advance care planning with the patient and/or surrogate decision makers.  No active management of the patients problem(s) was undertaken during the time period reported      OSCAR Omer  11/24/2023 4:24 PM       I spent 150 minutes on this date of service performing the following activities: obtaining history, performing examination, documenting, preparing for visit, obtaining / reviewing records, providing counseling and education, independently reviewing study/studies, communicating results and coordinating care.

## 2023-11-24 NOTE — ASSESSMENT & PLAN NOTE
Followed by neurologist Dr Payan  Monitor cognitive status  Monitor for falls  Continue close supervision and assist with ADLs  Continues on Sertraline 50 mg in the AM & 100 mg in the PM  Continues on Mirtazapine 30 mg nightly  Has 24/7 caregiver support

## 2023-11-24 NOTE — ASSESSMENT & PLAN NOTE
Follows with Dr. Conti for management   Currently on macrobid 100 mg daily as suppressant   Discussed possible supplement and vit C, Cranberry and D Mannose  UTI symptoms: dysuria and AMS  Monitor for future UTI symptoms

## 2023-11-24 NOTE — ASSESSMENT & PLAN NOTE
Hx of DVT and PE  Followed by PCP for managment  Continues on Eliquis 2.5 mg twice daily  Monitor for bleeding with Eliquis

## 2023-11-24 NOTE — ASSESSMENT & PLAN NOTE
Followed by PCP Dr. Corona  Blood pressure fluctuates greatly, goal to avoid hypotension with past syncopal episodes.    Continues on Lisinopril 20 mg daily  Continues on Hydralazine 50 mg twice a day  Normotensive on exam  Monitor BP

## 2023-11-27 NOTE — ED PROVIDER NOTES
HPI    Chief Complaint   Patient presents with   • Fatigue        HPI   88-year-old female with past medical history significant for Parkinson's disease, anxiety, arthritis, hypertension, low back pain, peripheral neuropathy, seizures and hyperlipidemia presenting for evaluation of hypotension this morning.  Presents with  and home aide who states that earlier patient seemed fatigued and her blood pressure was checked and was low.  Her blood pressure was elevated earlier today but when she came down for lunch blood pressure was low. They gave her some food with salt and she had improvement of her symptoms. On arrival she states she feels slightly tired but otherwise well and has no specific complaints including any focal weakness, new or worsening paresthesias, headache, blurry or double vision, altered level of consciousness, altered speech or altered coordination.  She did not pass out or have any fall to the ground or head injury.  Denying any recent fevers or chills.  She has had a cough for the last few months but denies sputum production.  Denying any chest pain or pressure, shortness of breath, palpitations, tachycardia or diaphoresis.  Denying abdominal pain, nausea, vomiting, bowel or bladder changes including rectal pain or bleeding, dark or black stools, dysuria, hematuria, frequency or urgency.    Past Medical History:   Diagnosis Date   • Anxiety    • Arthritis    • Basal cell carcinoma     forehead   • Glaucoma    • Hypertension    • Low back pain    • Lumbosacral disc disease    • Parkinson's disease    • Peripheral neuropathy    • Seizures (CMS/HCC)          Past Surgical History:   Procedure Laterality Date   • BLADDER SUSPENSION  2009    [facility]   • CATARACT EXTRACTION, BILATERAL     • COLONOSCOPY     • HYSTERECTOMY     • JOINT REPLACEMENT     • KNEE ARTHROPLASTY  2015    left       Family History   Problem Relation Age of Onset   • No Known Problems Biological Mother    • No Known  Problems Biological Father        Social History     Tobacco Use   • Smoking status: Former   • Smokeless tobacco: Never   • Tobacco comments:     as a young teenager   Substance Use Topics   • Alcohol use: Yes     Comment: wine   • Drug use: No       Systems Reviewed from Nursing Triage:               Review of Systems   Constitutional: Positive for fatigue. Negative for chills and fever.   HENT: Negative.  Negative for facial swelling and trouble swallowing.    Eyes: Negative.  Negative for visual disturbance.   Respiratory: Negative.  Negative for cough, chest tightness and shortness of breath.    Cardiovascular: Negative.  Negative for chest pain and palpitations.   Gastrointestinal: Negative.  Negative for abdominal pain, anal bleeding, blood in stool, constipation, diarrhea, nausea and vomiting.   Genitourinary: Negative.  Negative for dysuria, flank pain, frequency, hematuria and urgency.   Musculoskeletal: Negative.  Negative for back pain, neck pain and neck stiffness.   Skin: Negative.  Negative for rash and wound.   Neurological: Negative.  Negative for dizziness, tremors, seizures, syncope, facial asymmetry, speech difficulty, weakness, light-headedness, numbness and headaches.   Psychiatric/Behavioral: Negative.  Negative for suicidal ideas.            ED Triage Vitals   Temp Heart Rate Resp BP SpO2   11/27/23 1430 11/27/23 1432 11/27/23 1430 11/27/23 1432 11/27/23 1432   36.7 °C (98.1 °F) 92 16 118/76 94 %      Temp Source Heart Rate Source Patient Position BP Location FiO2 (%) (Set)   11/27/23 1430 -- -- -- --   Temporal           Vitals:    11/27/23 1430 11/27/23 1432 11/27/23 1433 11/27/23 1611   BP:  118/76  (!) 182/93   Pulse:  92  86   Resp: 16   (!) 22   Temp: 36.7 °C (98.1 °F)      TempSrc: Temporal      SpO2:  94%  93%   Weight:   68.5 kg (151 lb 0.2 oz)    Height:                             Physical Exam  Vitals and nursing note reviewed.   Constitutional:       General: She is not in acute  distress.     Appearance: She is not toxic-appearing.   HENT:      Head: Normocephalic.      Mouth/Throat:      Mouth: Mucous membranes are moist.      Pharynx: Oropharynx is clear.   Eyes:      Extraocular Movements: Extraocular movements intact.      Conjunctiva/sclera: Conjunctivae normal.      Pupils: Pupils are equal, round, and reactive to light.   Cardiovascular:      Rate and Rhythm: Normal rate.      Pulses: Normal pulses.   Pulmonary:      Effort: Pulmonary effort is normal. No respiratory distress.      Breath sounds: Normal breath sounds.   Abdominal:      Palpations: Abdomen is soft.      Tenderness: There is no abdominal tenderness. There is no guarding.   Musculoskeletal:         General: No swelling or deformity.      Cervical back: Neck supple. No rigidity or tenderness.   Skin:     General: Skin is warm.      Capillary Refill: Capillary refill takes less than 2 seconds.   Neurological:      General: No focal deficit present.      Mental Status: She is alert and oriented to person, place, and time.      Comments: Symmetrical movement of the face, speech is not slow or slurred, answering questions appropriately   Psychiatric:         Behavior: Behavior normal.              X-RAY CHEST 1 VIEW   Final Result   IMPRESSION:   1. No acute findings in the chest      EKG 12 lead             Labs Reviewed   CBC AND DIFF - Abnormal       Result Value    WBC 7.79      RBC 3.95      Hemoglobin 12.7      Hematocrit 39.9      .0 (*)     MCH 32.2      MCHC 31.8 (*)     RDW 13.2      Platelets 232      MPV 9.2 (*)     Differential Type Auto      nRBC 0.0      Immature Granulocytes 0.5      Neutrophils 72.2      Lymphocytes 19.3      Monocytes 5.9      Eosinophils 1.3      Basophils 0.8      Immature Granulocytes, Absolute 0.04      Neutrophils, Absolute 5.63      Lymphocytes, Absolute 1.50      Monocytes, Absolute 0.46      Eosinophils, Absolute 0.10      Basophils, Absolute 0.06     COMPREHENSIVE METABOLIC  PANEL - Abnormal    Sodium 139      Potassium 4.2      Chloride 107      CO2 26      BUN 14      Creatinine 0.7      Glucose 144 (*)     Calcium 9.8      AST (SGOT) 12 (*)     ALT (SGPT) <3 (*)     Alkaline Phosphatase 40      Total Protein 6.1      Albumin 3.6      Bilirubin, Total 0.5      eGFR >60.0      Anion Gap 6     UA REFLEX CULTURE (MACROSCOPIC) - Abnormal    Color, Urine Yellow      Clarity, Urine Cloudy (*)     Specific Gravity, Urine 1.009      pH, Urine 7.0      Leukocyte Esterase +3 (*)     Nitrite, Urine Positive (*)     Protein, Urine Negative      Glucose, Urine Negative      Ketones, Urine Negative      Urobilinogen, Urine 0.2      Bilirubin, Urine Negative      Blood, Urine Negative     UA MICROSCOPIC ([facility]) - Abnormal    RBC, Urine 0 TO 4      WBC, Urine 20 TO 35 (*)     Squamous Epithelial Rare (*)     Hyaline Cast 0 TO 2 (*)     Bacteria, Urine Rare (*)     Mucus Rare (*)    SARS-COV-2 (COVID-19)/ FLU A/B, AND RSV, PCR - Normal    SARS-CoV-2 (COVID-19) Negative      Influenza A Negative      Influenza B Negative      Respiratory Syncytial Virus Negative      Narrative:     Testing performed using real-time PCR for detection of COVID-19. EUA approved validation studies performed on site.    HIGH SENSITIVE TROPONIN I (BASELINE - REFLEX 2HR) - Normal    High Sens Troponin I 9.4     MAGNESIUM - Normal    Magnesium 1.9     HIGH SENSITIVE TROPONIN I (NO REFLEX) - Normal    High Sens Troponin I 8.5     SARS-COV-2 (COVID 19), PCR    Narrative:     The following orders were created for panel order SARS-CoV-2 (COVID-19) Nasopharynx.  Procedure                               Abnormality         Status                     ---------                               -----------         ------                     SARS-COV-2 (COVID-19)/ F...[[id number]]  Normal              Final result                 Please view results for these tests on the individual orders.   URINE CULTURE   URINALYSIS REFLEX CULTURE (ED AND  OUTPATIENT ONLY)    Narrative:     The following orders were created for panel order UA w/ reflex culture (ED Only).  Procedure                               Abnormality         Status                     ---------                               -----------         ------                     UA Reflex to Culture (Ma...[[id number]]  Abnormal            Final result               UA Microscopic[[id number]]               Abnormal            Final result                 Please view results for these tests on the individual orders.   RAINBOW DRAW PANEL    Narrative:     The following orders were created for panel order [facility] Draw Panel.  Procedure                               Abnormality         Status                     ---------                               -----------         ------                     RAINBOW RED[[id number]]                                      In process                 RAINBOW LT BLUE[[id number]]                                  In process                 RAINBOW GOLD[[id number]]                                     In process                   Please view results for these tests on the individual orders.   RAINBOW RED   RAINBOW LT BLUE   RAINBOW GOLD       X-RAY CHEST 1 VIEW   Final Result   IMPRESSION:   1. No acute findings in the chest      EKG 12 lead               Procedures    Final diagnoses:   [R53.83] Fatigue, unspecified type   [N39.0] Acute UTI       ED Course & MDM              Medical Decision Making  Amount and/or Complexity of Data Reviewed  Independent Historian: caregiver and spouse  Labs: ordered. Decision-making details documented in ED Course.  Radiology: ordered. Decision-making details documented in ED Course.  ECG/medicine tests: ordered. Decision-making details documented in ED Course.  Discussion of management or test interpretation with external provider(s): Case discussed and managed with ER attending physician.      EKG performed at 1437, ventricular rate of 91, NV  interval 196, QRS duration 74, QT/QTc 324/398, normal sinus rhythm with minimal LVH criteria, nonspecific changes to the inferior and anterior leads, no acute ischemic changes seen, reviewed by attending physician.    6:14 PM      Impression/Medical Decision Makin-year-old female with history of Parkinson's presenting after transient episode of low blood pressure at home, no fall, head injury, syncope or seizure    Plan: Blood pressure is normal on arrival, mentating appropriately with no focal findings on exam, ECG, cardiac monitoring, chest x-ray, labs including troponin and magnesium, UA, respiratory panel, will give some IV fluid, observe and re-evaluate  She is scheduled to follow-up with her neurologist tomorrow    UA shows UTI, will start on Ceftin.  White blood cell count within normal limits, no tachycardia or fever, labs otherwise reassuring, first troponin 9.4, second 8.5.  Advised to follow-up with neurology tomorrow as scheduled, follow-up with PCP for repeat evaluation within the next 1 to 2 days, return to the emergency department for any new or worsening symptoms including focal weakness, confusion, fall, chest pain or any other symptoms that concern her.  Patient is alert and active here in the emergency department and in no acute distress.  She is medically stable.  She is comfortable with discharge instructions and all questions were answered.    Vital Signs Review: Vital signs have been reviewed. The oxygen saturation is  SpO2: 94 % which is within normal limits.      NASRIN Solano  2023      We are in a period of time with increased volumes and decreased capacity.     This document was created using dragon dictation software.  There might be some typographical errors due to this technology.     Maricruz Magana PA C  [phone]

## 2023-11-27 NOTE — ED ATTESTATION NOTE
I have personally seen and examined Vicki Porras.  I was involved in the care and medical decision making for this patient.    I reviewed and agree with physician assistant / nurse practitioner’s assessment and plan of care; any exceptions are documented below.      My focused history, examination, assessment and plan of care of Vicki Porras is as follows:    Brief History:  HPI    88-year-old female with history of Parkinson's, hypertension presents with an episode of low blood pressure when she was eating lunch this afternoon.  Patient's  wanted her to be checked.  Patient has Parkinson's with a caregiver at home.  Patient's blood pressure was elevated this morning.  When she came down to eat lunch her blood pressure was noted to be low.  The caregiver reports this is not uncommon.  She gave her tomatoes and salt.  The  wanted the patient to be checked.  Patient was mentating.  She was mildly sleepy at the time.    Focused Physical Exam:  Physical Exam  Patient is afebrile.  She is awake, alert in no acute distress.  Her head is normocephalic and atraumatic.  Her speech is normal.  She has no respiratory distress.  Her skin is dry normal in color.  Her blood pressure is normal here.      Assessment / Plan / MDM:  MDM  Lab work was obtained.  Her white blood cell count is normal.  CBC is unremarkable.  Her magnesium and troponin are normal.  Troponins x 2 are normal.  COVID, flu, RSV testing are negative.  Urinalysis was obtained and shows evidence of infection.  Patient was started on antibiotics and was discharged home.  Patient has an appointment scheduled with her neurologist tomorrow.               Anneliese Garcia MD  11/27/23 [id number]

## 2023-12-28 PROBLEM — R13.10 DYSPHAGIA: Status: ACTIVE | Noted: 2023-01-01

## 2023-12-28 NOTE — ASSESSMENT & PLAN NOTE
Follows with Neurologist Dr Payan  Complicated by agitation, anxiety, hallucinations, and vivid dreams  Continues on Carbidopa-Levodopa  mg three times daily   Continues Nuplazid 34 mg in the AM daily   Continues Melatonin 6 mg at bedtime

## 2023-12-28 NOTE — ASSESSMENT & PLAN NOTE
Encouraged upright positioning for oral intake  Aspiration precautions  Continue soft moist foods  SLP referral placed

## 2023-12-28 NOTE — ASSESSMENT & PLAN NOTE
Followed by PCP Dr. Corona  Blood pressure fluctuates greatly, goal to avoid hypotension with past syncopal episodes.    Continues on Lisinopril 20 mg daily  Continues on Hydralazine 25 mg twice a day  Normotensive on exam  Monitor BP

## 2023-12-28 NOTE — PROGRESS NOTES
"Palliative Home Based Care Established Visit    Subjective     Patient ID: Vicki Porras is a 88 y.o. female.  MRN: [id number]  Date/ Time Visit Made:  Referring Physician: No referring provider defined for this encounter.  Primary Care Physician:Shant Corona MD    Reason for Visit:  Palliative Care Follow-up      Mariya Porras is an 88 year old female seen in the home for Palliative Care follow-up visit with a PMH of Parkinson's Disease, HTN, PE & DVT (2021), GERD, Anxiety, Depression, Chronic Pain, Neuropathy of feet, and Debility.     At baseline patient lives with her  & has 24/7 paid caregiver support. Patient is confused with short-term memory issues. Patient is dependent in most ADLs. Patient can assist with dressing and toileting. Patient is independent in eating. Dependent in all iADLs. Ambulates with assist and walker for short distances. Has a ramp for wheelchair mobility and a chair glide to move to the second floor. Has an elevator to move between levels.     Patient had initial Palliative Care visit on 11/24/23. Since then patient was seen in the ED on 11/27/23 for fatigue without specific complaints, noted to have concern for a UTI on UA for which she was discharged from the ED on Ceftin.     Met with patient's  and paid caregiver Floresita.    notes Neurologist Dr. Mota prescribed Nuplazid on 11/20 and on 11/28 decreased the Sinemet to three times a day. Reports patient continues with \"hallucinations\" or vivid dreams where she talks in her sleep. Last night the patient tried to get out of the bed.  ordered a new bed rail for patient's bed.   Appetite is okay. Continues to struggles with swallowing. Sometimes coughs and spits out the food. Finds food is better if it is moist and soft food.   Not getting PT currently. Working with family and paid caregiver on home exercises. Feel patient does better when she is getting home therapy.   Reports patient is moving her " bowels. Had a BM on 12/27. Continues with chronic hemorrhoids for which they are using preparation-H.  Uses a pure wick at nighttime. No recent UTI symptoms.   No skin breakdown.   Continues with chronic pain. Complains when of pain when she stands. Continues with Butrans patch every 7 days.  notes sometimes he gives the patient OxyContin, about once a week. Discussed this is another long-acting pain medication and shouldn't be given with Butrans. Encouraged follow-up with Dr. Braswell regarding persistent pain.   No falls or hospital admissions since last Palliative Care.     Patient resting in chair in bedroom. Patient is fatigued but conversant.   Reports she is tired today. Ongoing right hip and back pain. Endorses b/l lower extremity numbness.   Patient reports no other symptoms - no chest pain, SOB, abdominal pain, nausea, or anxiety/depression.   Reviewed role of provider.   Discussed elevating legs given LE edema when seated.       HPI Source: obtaining history, performing examination, entering orders, documenting, preparing for visit, obtaining / reviewing records, providing counseling and education, independently reviewing study/studies, communicating results and coordinating care  Past Medical History:   Past Medical History:   Diagnosis Date   • Anxiety    • Arthritis    • Basal cell carcinoma     forehead   • Glaucoma    • Hypertension    • Low back pain    • Lumbosacral disc disease    • Parkinson's disease    • Peripheral neuropathy    • Seizures (CMS/HCC)      Past Surgical History:    Past Surgical History:   Procedure Laterality Date   • BLADDER SUSPENSION  2009    Prolift M   • CATARACT EXTRACTION, BILATERAL     • COLONOSCOPY     • HYSTERECTOMY     • JOINT REPLACEMENT     • KNEE ARTHROPLASTY  2015    left     Family History:  family history includes No Known Problems in her biological father and biological mother.  Social History:    Social History     Socioeconomic History   • Marital  status:    Tobacco Use   • Smoking status: Former   • Smokeless tobacco: Never   • Tobacco comments:     as a young teenager   Substance and Sexual Activity   • Alcohol use: Yes     Comment: wine   • Drug use: No   • Sexual activity: Not Currently     Partners: Male     Social Determinants of Health     Financial Resource Strain: Low Risk  (9/24/2023)    Overall Financial Resource Strain (CARDIA)    • Difficulty of Paying Living Expenses: Not hard at all   Food Insecurity: No Food Insecurity (11/27/2023)    Hunger Vital Sign    • Worried About Running Out of Food in the Last Year: Never true    • Ran Out of Food in the Last Year: Never true   Transportation Needs: No Transportation Needs (9/24/2023)    PRAPARE - Transportation    • Lack of Transportation (Medical): No    • Lack of Transportation (Non-Medical): No   Housing Stability: Low Risk  (9/24/2023)    Housing Stability Vital Sign    • Unable to Pay for Housing in the Last Year: No    • Number of Places Lived in the Last Year: 1    • Unstable Housing in the Last Year: No       Lutheran:    Changes or updates to Spiritual Assessment: Radha and Belief: Mandaen and No Spiritual concerns identified     Data Review  Lab Studies: Lab Studies: Pertinent radiology and labs reviewed  Labs last six months:    Admission on 11/27/2023, Discharged on 11/27/2023   Component Date Value Ref Range Status   • WBC 11/27/2023 7.79  3.80 - 10.50 K/uL Final   • RBC 11/27/2023 3.95  3.93 - 5.22 M/uL Final   • Hemoglobin 11/27/2023 12.7  11.8 - 15.7 g/dL Final   • Hematocrit 11/27/2023 39.9  35.0 - 45.0 % Final   • MCV 11/27/2023 101.0 (H)  83.0 - 98.0 fL Final   • MCH 11/27/2023 32.2  28.0 - 33.2 pg Final   • MCHC 11/27/2023 31.8 (L)  32.2 - 35.5 g/dL Final   • RDW 11/27/2023 13.2  11.7 - 14.4 % Final   • Platelets 11/27/2023 232  150 - 369 K/uL Final   • MPV 11/27/2023 9.2 (L)  9.4 - 12.3 fL Final   • Differential Type 11/27/2023 Auto   Final   • nRBC 11/27/2023 0.0  <=0.0  % Final   • Immature Granulocytes 11/27/2023 0.5  % Final   • Neutrophils 11/27/2023 72.2  % Final   • Lymphocytes 11/27/2023 19.3  % Final   • Monocytes 11/27/2023 5.9  % Final   • Eosinophils 11/27/2023 1.3  % Final   • Basophils 11/27/2023 0.8  % Final   • Immature Granulocytes, Absolute 11/27/2023 0.04  0.00 - 0.08 K/uL Final   • Neutrophils, Absolute 11/27/2023 5.63  1.70 - 7.00 K/uL Final   • Lymphocytes, Absolute 11/27/2023 1.50  1.20 - 3.50 K/uL Final   • Monocytes, Absolute 11/27/2023 0.46  0.28 - 0.80 K/uL Final   • Eosinophils, Absolute 11/27/2023 0.10  0.04 - 0.36 K/uL Final   • Basophils, Absolute 11/27/2023 0.06  0.01 - 0.10 K/uL Final   • Sodium 11/27/2023 139  136 - 145 mEQ/L Final   • Potassium 11/27/2023 4.2  3.5 - 5.1 mEQ/L Final    Results obtained on plasma. Plasma Potassium values may be up to 0.4 mEQ/L less than serum values. The differences may be greater for patients with high platelet or white cell counts.   • Chloride 11/27/2023 107  98 - 107 mEQ/L Final   • CO2 11/27/2023 26  21 - 31 mEQ/L Final   • BUN 11/27/2023 14  7 - 25 mg/dL Final   • Creatinine 11/27/2023 0.7  0.6 - 1.2 mg/dL Final   • Glucose 11/27/2023 144 (H)  70 - 99 mg/dL Final   • Calcium 11/27/2023 9.8  8.6 - 10.3 mg/dL Final   • AST (SGOT) 11/27/2023 12 (L)  13 - 39 IU/L Final   • ALT (SGPT) 11/27/2023 <3 (L)  7 - 52 IU/L Final   • Alkaline Phosphatase 11/27/2023 40  34 - 125 IU/L Final   • Total Protein 11/27/2023 6.1  6.0 - 8.2 g/dL Final    Test performed on plasma which typically contains approximately 0.4 g/dL more protein than serum.   • Albumin 11/27/2023 3.6  3.5 - 5.7 g/dL Final   • Bilirubin, Total 11/27/2023 0.5  0.3 - 1.2 mg/dL Final   • eGFR 11/27/2023 >60.0  >=60.0 mL/min/1.73m*2 Final   • Anion Gap 11/27/2023 6  3 - 15 mEQ/L Final   • High Sens Troponin I 11/27/2023 9.4  <15.0 pg/mL Final   • Ventricular rate 11/27/2023 91   Final   • Atrial rate 11/27/2023 91   Final   • MT Interval 11/27/2023 196   Final    • QRS duration 11/27/2023 74   Final   • QT Interval 11/27/2023 324   Final   • QTC Calculation(Bazett) 11/27/2023 398   Final   • P Axis 11/27/2023 23   Final   • R Axis 11/27/2023 -21   Final   • T Wave Axis 11/27/2023 -22   Final   • Magnesium 11/27/2023 1.9  1.8 - 2.5 mg/dL Final   • SARS-CoV-2 (COVID-19) 11/27/2023 Negative  Negative Final   • Influenza A 11/27/2023 Negative  Negative Final   • Influenza B 11/27/2023 Negative  Negative Final   • Respiratory Syncytial Virus 11/27/2023 Negative  Negative Final   • High Sens Troponin I 11/27/2023 8.5  <15.0 pg/mL Final   • Color, Urine 11/27/2023 Yellow  Yellow, Colorless Final   • Clarity, Urine 11/27/2023 Cloudy (A)  Clear Final   • Specific Gravity, Urine 11/27/2023 1.009  1.005 - 1.030 Final   • pH, Urine 11/27/2023 7.0  4.5 - 8.0 Final   • Leukocyte Esterase 11/27/2023 +3 (A)  Negative Final   • Nitrite, Urine 11/27/2023 Positive (A)  Negative Final   • Protein, Urine 11/27/2023 Negative  Negative Final   • Glucose, Urine 11/27/2023 Negative  Negative mg/dL Final   • Ketones, Urine 11/27/2023 Negative  Negative mg/dL Final   • Urobilinogen, Urine 11/27/2023 0.2  <2.0 EU/dL EU/dL Final   • Bilirubin, Urine 11/27/2023 Negative  Negative mg/dL Final   • Blood, Urine 11/27/2023 Negative  Negative Final    The sensitivity of the occult blood test is equivalent to approximately 4 intact RBC/HPF.   • RBC, Urine 11/27/2023 0 TO 4  0 TO 4 /HPF Final   • WBC, Urine 11/27/2023 20 TO 35 (A)  0 TO 3 /HPF Final   • Squamous Epithelial 11/27/2023 Rare (A)  None Seen /hpf Final   • Hyaline Cast 11/27/2023 0 TO 2 (A)  None Seen /lpf Final   • Bacteria, Urine 11/27/2023 Rare (A)  None Seen /HPF Final   • Mucus 11/27/2023 Rare (A)  None Seen /LPF Final   • Urine Culture 11/27/2023 **Positive Culture** (A)   Final   • Urine Culture 11/27/2023 >=100,000 cfu/mL Escherichia coli   Final   No results displayed because visit has over 200 results.              Labs Reviewed: I have  reviewed the patient's pertinent labs to the time of note.  Significant abnormals are 11/27/23: Urine Culture - >=100,000 cfu/mL Escherichia coli     Radiology and Imaging Studies: Pertinent radiology and labs reviewed   AMB REFERRAL TO PHYSICAL THERAPY  AMB REFERRAL TO OCCUPATIONAL THERAPY  AMB REFERRAL TO SPEECH THERAPY      Radiology and Imaging Reviewed: Significant findings include: Chest X-ray (11/27/23):  No new focal airspace opacity, pneumothorax, or pleural effusion. Non-enlarged and unchanged cardiomediastinal silhouette.  Cardiac Studies Reviewed: Significant findings include: EKG (11/27/23): QTc = 398 & NSR  Medicine Tests reviewed: Not applicable      Review of Systems   Constitutional: Positive for activity change and fatigue. Negative for appetite change and unexpected weight change.   HENT: Positive for trouble swallowing. Negative for hearing loss.    Eyes: Negative for visual disturbance.   Respiratory: Negative for cough, shortness of breath and wheezing.    Cardiovascular: Positive for leg swelling. Negative for chest pain and palpitations.   Gastrointestinal: Positive for abdominal pain. Negative for constipation, nausea and vomiting.   Genitourinary: Negative for decreased urine volume, difficulty urinating, dysuria and frequency.   Musculoskeletal: Positive for arthralgias, back pain and gait problem.   Skin: Negative for rash and wound.   Neurological: Positive for weakness and numbness. Negative for dizziness and light-headedness.   Psychiatric/Behavioral: Positive for confusion. Negative for dysphoric mood and sleep disturbance. The patient is not nervous/anxious.        Objective     Vitals:    12/28/23 1029   BP: 135/74   BP Location: Left upper arm   Patient Position: Sitting   Pulse: 88   Resp: 18   Temp: 36.4 °C (97.5 °F)   TempSrc: Temporal   SpO2: 95%     There is no height or weight on file to calculate BMI.  Allergies:   Allergies   Allergen Reactions   • Codeine Hives      n/v    • Diazepam Other (see comments)     Blurred vision   • Sulfa (Sulfonamide Antibiotics) Hives     Other reaction(s): Unknown   • Sulfur Hives         Medications:   Current Outpatient Medications:   •  acetaminophen (TYLENOL) 500 mg tablet, Take 1,000 mg by mouth 3 (three) times a day., Disp: , Rfl:   •  apixaban (ELIQUIS) 2.5 mg tablet, Take 2.5 mg by mouth 2 (two) times a day., Disp: , Rfl:   •  bimatoprost (LUMIGAN) 0.01 % ophthalmic drops, Administer 1 drop into both eyes nightly., Disp: , Rfl:   •  buprenorphine (BUTRANS) 5 mcg/hour patch weekly, Place 1 patch on the skin every (seven) 7 days., Disp: , Rfl:   •  carbidopa-levodopa  mg per tablet, Take 1 tablet by mouth 3 (three) times a day. 0700, 1100, 1500, Disp: , Rfl:   •  docusate sodium (COLACE) 100 mg capsule, Take 100 mg by mouth daily., Disp: , Rfl:   •  famotidine (PEPCID) 20 mg tablet, Take 20 mg by mouth daily., Disp: , Rfl:   •  gabapentin (NEURONTIN) 300 mg capsule, Take 300 mg by mouth 3 (three) times a day., Disp: , Rfl:   •  hydrALAZINE (APRESOLINE) 50 mg tablet, Take 1 tablet (50 mg total) by mouth 2 (two) times a day. (Patient taking differently: Take 25 mg by mouth 2 (two) times a day.), Disp: , Rfl:   •  lisinopriL 20 mg tablet, Take 20 mg by mouth daily., Disp: , Rfl:   •  melatonin 3 mg tablet, Take 6 mg by mouth nightly., Disp: , Rfl:   •  mirtazapine 30 mg tablet, Take 30 mg by mouth nightly., Disp: , Rfl:   •  nitrofurantoin, macrocrystal-monohydrate, (MACROBID) 100 mg capsule, Take 100 mg by mouth daily before dinner., Disp: , Rfl:   •  pimavanserin (NUPLAZID) 34 mg capsule, Take 34 mg by mouth daily., Disp: , Rfl:   •  senna (SENOKOT) 8.6 mg tablet, Take 2 tablets by mouth daily before dinner., Disp: , Rfl:   •  sertraline 50 mg tablet, Take 150 mg by mouth daily. Taking 50 mg in the AM & then 100 mg in the PM, Disp: , Rfl:   •  topiramate (TOPAMAX) 25 mg tablet, Take 25 mg by mouth 2 (two) times a day., Disp: , Rfl:    [facility] [place]  AWARxE: Query reviewed and no concerns      Physical Exam  Constitutional:       General: She is not in acute distress.     Appearance: She is not toxic-appearing.      Comments: Fatigued  Frail   Older adult   HENT:      Head: Normocephalic and atraumatic.      Right Ear: External ear normal.      Left Ear: External ear normal.      Nose: Nose normal.      Mouth/Throat:      Mouth: Mucous membranes are moist.      Pharynx: Oropharynx is clear.   Eyes:      Conjunctiva/sclera: Conjunctivae normal.   Cardiovascular:      Rate and Rhythm: Normal rate and regular rhythm.      Pulses: Normal pulses.      Heart sounds: Normal heart sounds.      Comments: Trace LE edema (L>R)  Pulmonary:      Effort: Pulmonary effort is normal.      Breath sounds: Normal breath sounds.   Abdominal:      General: Abdomen is flat. Bowel sounds are normal. There is no distension.      Palpations: Abdomen is soft.      Tenderness: There is no abdominal tenderness.   Musculoskeletal:      Cervical back: Normal range of motion.      Right lower leg: Edema present.      Left lower leg: Edema present.      Comments: sarcopenia    Skin:     General: Skin is warm and dry.      Findings: No lesion or rash.   Neurological:      Mental Status: Mental status is at baseline. She is disoriented.      Motor: Weakness present.      Gait: Gait abnormal.   Psychiatric:         Mood and Affect: Mood normal.         Thought Content: Thought content normal.      Comments: Forgetful  No agitation         Palliative Assessment  FAST Score:  6c.- Inability to handle mechanics of toileting (unchanged from 11/24/23)  Palliative Performance Scale: 40% (unchanged from 11/24/23)  Mid Upper Arm Circumference (MUAC):  Unable to perform due to clothing  ESASr: Pain 9, Tiredness 4, Drowsiness 4, Nausea 4, Appetite 0, Shortness of Breath 0, Depression 3, Anxiety 8 and Wellbeing 5  Palliative Disease State: Controlled with current  treatments  Patient Prognostic Awareness: Unknown- did not assess understanding of disease process, prognosis, or resuscitation status  Palliative Care Risk Stratification: Level Three- High risk.  Requires highest level of intervention.  Not likely to return to usual care    Goals of Care  Advance Directives Status:  Not Received  Advance Directives:    Document type(s) Durable Power of  and Living Will  Healthcare Proxy Name, Contact Information, and Relationship  Lev Porras ([phone])  Goals of Care Discussion: No  Change in medical orders resulting from advance care planning discussions: No change in medical orders  Code status Full code      Assessment:  Assessment/Plan   Diagnoses and all orders for this visit:    Encounter for palliative care in home, non-hospice (Primary)  Assessment & Plan:  Goals are Disease-Directed  Code Status: FULL code  Patient has an advanced directive that is not on file that names  Lev Porras ([phone]) as her health care power of  if she is unable to make her own medical decisions.   Reviewed a copy of a POLST and will assist with completion as able  Patient lacks complex medical decision making capacity due to confusion from dementia.       Parkinson's disease with dyskinesia and fluctuating manifestations  Assessment & Plan:  Follows with Neurologist Dr Payan  Complicated by agitation, anxiety, hallucinations, and vivid dreams  Continues on Carbidopa-Levodopa  mg three times daily   Continues Nuplazid 34 mg in the AM daily   Continues Melatonin 6 mg at bedtime       Orders:  -     Ambulatory referral to Physical Therapy; Future  -     Ambulatory referral to Occupational Therapy; Future  -     Ambulatory referral to Speech Therapy; Future    Moderate dementia due to Parkinson's disease, with agitation (CMS/[facility])  Assessment & Plan:  Followed by neurologist Dr Payan  Monitor cognitive status  Monitor for falls  Continue  close supervision and assist with ADLs  Continues on Sertraline 50 mg in the AM & 100 mg in the PM  Continues on Mirtazapine 30 mg nightly  Has 24/7 caregiver support       Dysphagia, unspecified type  Assessment & Plan:  Encouraged upright positioning for oral intake  Aspiration precautions  Continue soft moist foods  SLP referral placed      Orders:  -     Ambulatory referral to Speech Therapy; Future    Primary hypertension  Assessment & Plan:  Followed by PCP Dr. Corona  Blood pressure fluctuates greatly, goal to avoid hypotension with past syncopal episodes.    Continues on Lisinopril 20 mg daily  Continues on Hydralazine 25 mg twice a day  Normotensive on exam  Monitor BP      History of UTI  Assessment & Plan:  Follows with Dr. Conti for management   Currently on Macrobid 100 mg daily as suppressant   Discussed possible supplement and vit C, Cranberry and D Mannose  UTI symptoms: dysuria and AMS  Monitor for future UTI symptoms      History of venous thromboembolism  Assessment & Plan:  Hx of DVT and PE  Followed by PCP for managment  Continues on Eliquis 2.5 mg twice daily  Monitor for bleeding with Eliquis         Chronic low back pain with sciatica, sciatica laterality unspecified, unspecified back pain laterality  Assessment & Plan:  Chronic pain of lumbar spine with radiculopathy & neuropathy of both feet.  Followed by pain management specialists and receives epidural of the spine every 3 months.     Continues on Topamax 25 mg twice a day  Continues on Tylenol 1000 mg three times a day  Continues on Gabapentin 300 mg three times a day  Recently started Butrans 5mcg/hr transdermal patch changed every week   Monitor pain      Debility  Assessment & Plan:  Due to Parkinson's disease and chronic back pain   Monitor for falls  Continue use of assistive devices  Has 24/7 paid caregiver support  Referral placed for PT, OT & SLP through Medicare Part B therapy    Orders:  -     Ambulatory referral to Physical  Therapy; Future  -     Ambulatory referral to Occupational Therapy; Future        I spent 60 minutes on this date of service performing the following activities: obtaining history, performing examination, entering orders, documenting, preparing for visit, obtaining / reviewing records, providing counseling and education, independently reviewing study/studies, communicating results and coordinating care.

## 2023-12-28 NOTE — ASSESSMENT & PLAN NOTE
Due to Parkinson's disease and chronic back pain   Monitor for falls  Continue use of assistive devices  Has 24/7 paid caregiver support  Referral placed for PT, OT & SLP through Medicare Part B therapy

## 2023-12-28 NOTE — ASSESSMENT & PLAN NOTE
Goals are Disease-Directed  Code Status: FULL code  Patient has an advanced directive that is not on file that names  - John Porras ([phone]) as her health care power of  if she is unable to make her own medical decisions.   Reviewed a copy of a POLST and will assist with completion as able  Patient lacks complex medical decision making capacity due to confusion from dementia.

## 2024-01-01 ENCOUNTER — TELEPHONE (OUTPATIENT)
Dept: PALLIATIVE CARE | Facility: CLINIC | Age: 89
End: 2024-01-01
Payer: MEDICARE

## 2024-01-01 ENCOUNTER — HOME VISIT (OUTPATIENT)
Dept: PALLIATIVE CARE | Facility: CLINIC | Age: 89
End: 2024-01-01
Payer: MEDICARE

## 2024-01-01 ENCOUNTER — TELEPHONE (OUTPATIENT)
Dept: HOME HEALTH SERVICES | Facility: HOME HEALTH | Age: 89
End: 2024-01-01
Payer: MEDICARE

## 2024-01-01 ENCOUNTER — TELEPHONE (OUTPATIENT)
Dept: PALLIATIVE CARE | Facility: CLINIC | Age: 89
End: 2024-01-01

## 2024-01-01 ENCOUNTER — DOCUMENTATION (OUTPATIENT)
Dept: PALLIATIVE CARE | Facility: CLINIC | Age: 89
End: 2024-01-01
Payer: MEDICARE

## 2024-01-01 VITALS
RESPIRATION RATE: 16 BRPM | TEMPERATURE: 97.7 F | DIASTOLIC BLOOD PRESSURE: 87 MMHG | HEART RATE: 81 BPM | OXYGEN SATURATION: 97 % | SYSTOLIC BLOOD PRESSURE: 165 MMHG

## 2024-01-01 VITALS
SYSTOLIC BLOOD PRESSURE: 190 MMHG | DIASTOLIC BLOOD PRESSURE: 95 MMHG | HEART RATE: 84 BPM | OXYGEN SATURATION: 93 % | RESPIRATION RATE: 18 BRPM | TEMPERATURE: 97.7 F

## 2024-01-01 VITALS
TEMPERATURE: 97.7 F | RESPIRATION RATE: 18 BRPM | SYSTOLIC BLOOD PRESSURE: 142 MMHG | HEART RATE: 79 BPM | DIASTOLIC BLOOD PRESSURE: 78 MMHG | OXYGEN SATURATION: 94 %

## 2024-01-01 VITALS
RESPIRATION RATE: 18 BRPM | OXYGEN SATURATION: 96 % | HEART RATE: 82 BPM | TEMPERATURE: 97.5 F | SYSTOLIC BLOOD PRESSURE: 122 MMHG | DIASTOLIC BLOOD PRESSURE: 64 MMHG

## 2024-01-01 VITALS
OXYGEN SATURATION: 96 % | HEART RATE: 80 BPM | DIASTOLIC BLOOD PRESSURE: 66 MMHG | RESPIRATION RATE: 20 BRPM | TEMPERATURE: 97.1 F | SYSTOLIC BLOOD PRESSURE: 110 MMHG

## 2024-01-01 VITALS
SYSTOLIC BLOOD PRESSURE: 122 MMHG | RESPIRATION RATE: 18 BRPM | TEMPERATURE: 97.7 F | DIASTOLIC BLOOD PRESSURE: 68 MMHG | HEART RATE: 90 BPM | OXYGEN SATURATION: 92 %

## 2024-01-01 VITALS
HEART RATE: 76 BPM | TEMPERATURE: 97.3 F | RESPIRATION RATE: 16 BRPM | DIASTOLIC BLOOD PRESSURE: 68 MMHG | OXYGEN SATURATION: 95 % | SYSTOLIC BLOOD PRESSURE: 129 MMHG

## 2024-01-01 VITALS
RESPIRATION RATE: 18 BRPM | OXYGEN SATURATION: 95 % | SYSTOLIC BLOOD PRESSURE: 140 MMHG | TEMPERATURE: 97.7 F | HEART RATE: 81 BPM | DIASTOLIC BLOOD PRESSURE: 82 MMHG

## 2024-01-01 DIAGNOSIS — R13.10 DYSPHAGIA, UNSPECIFIED TYPE: ICD-10-CM

## 2024-01-01 DIAGNOSIS — G20.A1 PARKINSON'S DISEASE, UNSPECIFIED WHETHER DYSKINESIA PRESENT, UNSPECIFIED WHETHER MANIFESTATIONS FLUCTUATE (CMS/HCC): ICD-10-CM

## 2024-01-01 DIAGNOSIS — F41.9 ANXIETY: ICD-10-CM

## 2024-01-01 DIAGNOSIS — Z87.440 HISTORY OF UTI: ICD-10-CM

## 2024-01-01 DIAGNOSIS — Z51.5 ENCOUNTER FOR PALLIATIVE CARE IN HOME, NON-HOSPICE: Primary | ICD-10-CM

## 2024-01-01 DIAGNOSIS — M54.41 CHRONIC LOW BACK PAIN WITH RIGHT-SIDED SCIATICA, UNSPECIFIED BACK PAIN LATERALITY: ICD-10-CM

## 2024-01-01 DIAGNOSIS — F02.B11 MODERATE DEMENTIA DUE TO PARKINSON'S DISEASE, WITH AGITATION (CMS/HCC): ICD-10-CM

## 2024-01-01 DIAGNOSIS — R53.81 DEBILITY: ICD-10-CM

## 2024-01-01 DIAGNOSIS — I10 PRIMARY HYPERTENSION: ICD-10-CM

## 2024-01-01 DIAGNOSIS — G20.A1 MODERATE DEMENTIA DUE TO PARKINSON'S DISEASE, WITH AGITATION (CMS/HCC): Primary | ICD-10-CM

## 2024-01-01 DIAGNOSIS — G20.A1 MODERATE DEMENTIA DUE TO PARKINSON'S DISEASE, WITH AGITATION (CMS/HCC): ICD-10-CM

## 2024-01-01 DIAGNOSIS — G89.29 CHRONIC LOW BACK PAIN WITH RIGHT-SIDED SCIATICA, UNSPECIFIED BACK PAIN LATERALITY: ICD-10-CM

## 2024-01-01 DIAGNOSIS — L89.152 PRESSURE INJURY OF SACRAL REGION, STAGE 2 (CMS/HCC): ICD-10-CM

## 2024-01-01 DIAGNOSIS — Z86.718 HISTORY OF VENOUS THROMBOEMBOLISM: ICD-10-CM

## 2024-01-01 DIAGNOSIS — G20.A1 SEVERE DEMENTIA DUE TO PARKINSON'S DISEASE, WITH AGITATION (CMS/HCC): ICD-10-CM

## 2024-01-01 DIAGNOSIS — F02.C11 SEVERE DEMENTIA DUE TO PARKINSON'S DISEASE, WITH AGITATION (CMS/HCC): ICD-10-CM

## 2024-01-01 DIAGNOSIS — G89.29 CHRONIC LOW BACK PAIN WITH SCIATICA, SCIATICA LATERALITY UNSPECIFIED, UNSPECIFIED BACK PAIN LATERALITY: ICD-10-CM

## 2024-01-01 DIAGNOSIS — F41.9 ANXIETY: Primary | ICD-10-CM

## 2024-01-01 DIAGNOSIS — F02.B11 MODERATE DEMENTIA DUE TO PARKINSON'S DISEASE, WITH AGITATION (CMS/HCC): Primary | ICD-10-CM

## 2024-01-01 DIAGNOSIS — L89.159 PRESSURE INJURY OF SKIN OF SACRAL REGION, UNSPECIFIED INJURY STAGE: ICD-10-CM

## 2024-01-01 DIAGNOSIS — M54.40 CHRONIC LOW BACK PAIN WITH SCIATICA, SCIATICA LATERALITY UNSPECIFIED, UNSPECIFIED BACK PAIN LATERALITY: ICD-10-CM

## 2024-01-01 DIAGNOSIS — R09.89 DECREASED BREATH SOUNDS OF BOTH LUNGS: ICD-10-CM

## 2024-01-01 PROCEDURE — 99350 HOME/RES VST EST HIGH MDM 60: CPT | Mod: 25 | Performed by: NURSE PRACTITIONER

## 2024-01-01 PROCEDURE — 99350 HOME/RES VST EST HIGH MDM 60: CPT | Performed by: NURSE PRACTITIONER

## 2024-01-01 PROCEDURE — 99497 ADVNCD CARE PLAN 30 MIN: CPT | Mod: 25 | Performed by: NURSE PRACTITIONER

## 2024-01-01 RX ORDER — DOCUSATE SODIUM 100 MG/1
100 CAPSULE, LIQUID FILLED ORAL DAILY PRN
COMMUNITY
Start: 2024-01-01

## 2024-01-01 RX ORDER — ESCITALOPRAM OXALATE 20 MG/1
20 TABLET ORAL DAILY
Qty: 90 TABLET | Refills: 3 | Status: SHIPPED | OUTPATIENT
Start: 2024-01-01 | End: 2024-12-03

## 2024-01-01 RX ORDER — ESZOPICLONE 1 MG/1
1 TABLET, FILM COATED ORAL NIGHTLY PRN
COMMUNITY
End: 2024-01-01 | Stop reason: HOSPADM

## 2024-01-01 RX ORDER — TRAZODONE HYDROCHLORIDE 50 MG/1
25 TABLET ORAL NIGHTLY
Qty: 45 TABLET | Refills: 3 | Status: SHIPPED | OUTPATIENT
Start: 2024-01-01 | End: 2024-01-01 | Stop reason: ALTCHOICE

## 2024-01-01 RX ORDER — QUETIAPINE FUMARATE 25 MG/1
25 TABLET, FILM COATED ORAL NIGHTLY
Qty: 30 TABLET | Refills: 0 | Status: SHIPPED | OUTPATIENT
Start: 2024-01-01 | End: 2024-01-01

## 2024-01-01 RX ORDER — MIRTAZAPINE 15 MG/1
TABLET, FILM COATED ORAL
Qty: 21 TABLET | Refills: 0 | Status: SHIPPED | OUTPATIENT
Start: 2024-01-01 | End: 2024-01-01 | Stop reason: HOSPADM

## 2024-01-01 RX ORDER — POLYETHYLENE GLYCOL 3350 17 G/17G
17 POWDER, FOR SOLUTION ORAL EVERY OTHER DAY
COMMUNITY

## 2024-01-01 RX ORDER — HYDRALAZINE HYDROCHLORIDE 25 MG/1
25 TABLET, FILM COATED ORAL 3 TIMES DAILY
COMMUNITY

## 2024-01-01 RX ORDER — OXYCODONE HYDROCHLORIDE 10 MG/1
10 TABLET ORAL DAILY PRN
COMMUNITY

## 2024-01-01 RX ORDER — CLONAZEPAM 0.5 MG/1
0.25 TABLET ORAL 2 TIMES DAILY
Qty: 30 TABLET | Refills: 0 | Status: SHIPPED | OUTPATIENT
Start: 2024-01-01 | End: 2024-01-01

## 2024-01-01 RX ORDER — CLONAZEPAM 0.5 MG/1
0.5 TABLET ORAL 2 TIMES DAILY
Qty: 60 TABLET | Refills: 0 | Status: SHIPPED | OUTPATIENT
Start: 2024-01-01 | End: 2024-12-03

## 2024-01-01 RX ORDER — LISINOPRIL 10 MG/1
10 TABLET ORAL 2 TIMES DAILY
COMMUNITY

## 2024-01-01 ASSESSMENT — ENCOUNTER SYMPTOMS
WHEEZING: 0
ABDOMINAL PAIN: 0
APPETITE CHANGE: 0
HEMATURIA: 0
APPETITE CHANGE: 1
FREQUENCY: 0
ABDOMINAL PAIN: 0
DYSURIA: 0
TROUBLE SWALLOWING: 1
NERVOUS/ANXIOUS: 0
NUMBNESS: 1
WOUND: 1
DYSPHORIC MOOD: 0
FATIGUE: 1
NAUSEA: 0
ACTIVITY CHANGE: 0
WEAKNESS: 1
NERVOUS/ANXIOUS: 0
DIFFICULTY URINATING: 0
DIZZINESS: 0
WOUND: 0
SHORTNESS OF BREATH: 0
ABDOMINAL PAIN: 0
FATIGUE: 1
NAUSEA: 0
NUMBNESS: 1
ARTHRALGIAS: 1
NUMBNESS: 1
WHEEZING: 0
ARTHRALGIAS: 1
NUMBNESS: 1
ACTIVITY CHANGE: 0
WOUND: 1
NERVOUS/ANXIOUS: 0
APPETITE CHANGE: 0
DIZZINESS: 0
NERVOUS/ANXIOUS: 0
CONSTIPATION: 0
DIFFICULTY URINATING: 0
CONSTIPATION: 0
BACK PAIN: 1
DIZZINESS: 0
FREQUENCY: 0
ARTHRALGIAS: 0
COUGH: 1
COUGH: 0
WOUND: 0
BRUISES/BLEEDS EASILY: 1
DIFFICULTY URINATING: 0
NERVOUS/ANXIOUS: 0
PALPITATIONS: 0
TROUBLE SWALLOWING: 1
NAUSEA: 0
PALPITATIONS: 0
NERVOUS/ANXIOUS: 0
BACK PAIN: 1
LIGHT-HEADEDNESS: 0
ACTIVITY CHANGE: 0
WHEEZING: 0
CONFUSION: 1
SLEEP DISTURBANCE: 1
UNEXPECTED WEIGHT CHANGE: 0
FREQUENCY: 0
SHORTNESS OF BREATH: 0
DIFFICULTY URINATING: 0
LIGHT-HEADEDNESS: 0
DIZZINESS: 0
COUGH: 0
LIGHT-HEADEDNESS: 0
APPETITE CHANGE: 0
UNEXPECTED WEIGHT CHANGE: 0
WEAKNESS: 1
DYSURIA: 0
COUGH: 1
DIZZINESS: 0
BACK PAIN: 1
WHEEZING: 0
APPETITE CHANGE: 0
ABDOMINAL PAIN: 0
WOUND: 0
CONSTIPATION: 0
APPETITE CHANGE: 0
NAUSEA: 0
FREQUENCY: 0
VOMITING: 0
COUGH: 0
FREQUENCY: 0
CONSTIPATION: 0
PALPITATIONS: 0
PALPITATIONS: 0
TROUBLE SWALLOWING: 1
COUGH: 1
TROUBLE SWALLOWING: 1
DYSURIA: 0
FATIGUE: 1
WOUND: 0
FATIGUE: 1
NERVOUS/ANXIOUS: 0
BRUISES/BLEEDS EASILY: 1
DYSPHORIC MOOD: 0
DIZZINESS: 0
DYSPHORIC MOOD: 0
DIZZINESS: 0
ABDOMINAL PAIN: 0
FATIGUE: 1
FATIGUE: 1
BRUISES/BLEEDS EASILY: 1
LIGHT-HEADEDNESS: 0
NUMBNESS: 1
WEAKNESS: 1
CONFUSION: 1
UNEXPECTED WEIGHT CHANGE: 0
NAUSEA: 0
APPETITE CHANGE: 1
BACK PAIN: 1
SLEEP DISTURBANCE: 0
CONFUSION: 1
DYSPHORIC MOOD: 0
UNEXPECTED WEIGHT CHANGE: 0
DYSPHORIC MOOD: 0
FREQUENCY: 0
BACK PAIN: 1
TROUBLE SWALLOWING: 1
DYSURIA: 0
WEAKNESS: 1
PALPITATIONS: 0
DYSURIA: 0
DIFFICULTY URINATING: 0
WHEEZING: 0
ARTHRALGIAS: 1
SLEEP DISTURBANCE: 0
NERVOUS/ANXIOUS: 0
ACTIVITY CHANGE: 1
CONSTIPATION: 0
WEAKNESS: 1
LIGHT-HEADEDNESS: 0
SLEEP DISTURBANCE: 0
DIFFICULTY URINATING: 0
COUGH: 0
WOUND: 0
WEAKNESS: 1
DIZZINESS: 0
ARTHRALGIAS: 1
SLEEP DISTURBANCE: 0
ACTIVITY CHANGE: 0
DYSPHORIC MOOD: 0
WHEEZING: 0
WEAKNESS: 1
UNEXPECTED WEIGHT CHANGE: 0
DIFFICULTY URINATING: 0
FATIGUE: 1
CONFUSION: 1
VOMITING: 0
ARTHRALGIAS: 1
SHORTNESS OF BREATH: 0
CONFUSION: 1
ARTHRALGIAS: 1
UNEXPECTED WEIGHT CHANGE: 0
DYSURIA: 0
DIARRHEA: 0
VOMITING: 0
PALPITATIONS: 0
ACTIVITY CHANGE: 1
PALPITATIONS: 0
BRUISES/BLEEDS EASILY: 1
BACK PAIN: 1
ACTIVITY CHANGE: 0
BACK PAIN: 1
VOMITING: 0
VOMITING: 0
BRUISES/BLEEDS EASILY: 1
SHORTNESS OF BREATH: 0
HALLUCINATIONS: 1
BACK PAIN: 1
FATIGUE: 1
LIGHT-HEADEDNESS: 0
CONSTIPATION: 0
DYSPHORIC MOOD: 0
ACTIVITY CHANGE: 0
DYSPHORIC MOOD: 0
VOMITING: 0
ABDOMINAL PAIN: 0
TROUBLE SWALLOWING: 0
CONSTIPATION: 0
CONSTIPATION: 0
FREQUENCY: 0
CONFUSION: 1
SHORTNESS OF BREATH: 0
SLEEP DISTURBANCE: 0
WEAKNESS: 1
APPETITE CHANGE: 0
UNEXPECTED WEIGHT CHANGE: 0
WHEEZING: 0
NAUSEA: 1
NUMBNESS: 1
CONFUSION: 1
ARTHRALGIAS: 1
LIGHT-HEADEDNESS: 0
SHORTNESS OF BREATH: 1
DYSURIA: 0
VOMITING: 0
SHORTNESS OF BREATH: 0
BRUISES/BLEEDS EASILY: 1
NAUSEA: 0
WOUND: 0
TROUBLE SWALLOWING: 1
LIGHT-HEADEDNESS: 0
SHORTNESS OF BREATH: 0
ABDOMINAL PAIN: 0
DYSURIA: 0
SLEEP DISTURBANCE: 0
SLEEP DISTURBANCE: 0
CONFUSION: 1
NUMBNESS: 1
DIFFICULTY URINATING: 0
NAUSEA: 0
VOMITING: 0
COUGH: 0
WHEEZING: 0
TROUBLE SWALLOWING: 0
PALPITATIONS: 0
UNEXPECTED WEIGHT CHANGE: 0

## 2024-01-01 ASSESSMENT — PAIN SCALES - GENERAL
PAINLEVEL: 3
PAINLEVEL: 0-NO PAIN
PAINLEVEL: 0-NO PAIN
PAINLEVEL_OUTOF10: 5
PAINLEVEL: 3
PAINLEVEL: 6
PAINLEVEL: 0-NO PAIN
PAINLEVEL_OUTOF10: 4

## 2024-02-05 PROBLEM — Z86.718 HISTORY OF DVT (DEEP VEIN THROMBOSIS): Status: RESOLVED | Noted: 2021-11-27 | Resolved: 2024-01-01

## 2024-02-05 NOTE — ASSESSMENT & PLAN NOTE
Due to Parkinson's disease and chronic back pain   Monitor for falls  Continue use of assistive devices  Has 24/7 paid caregiver support  Continues with OT & SLP through [facility], waiting for PT services to start

## 2024-02-05 NOTE — ASSESSMENT & PLAN NOTE
Encouraged upright positioning for oral intake  Aspiration precautions  Continue soft moist foods  SLP therapy through [facility]

## 2024-02-05 NOTE — PROGRESS NOTES
Palliative Home Based Care Established Visit    Subjective     Patient ID: Vicki Porras is a 88 y.o. female.  MRN: [id number]  Date/ Time Visit Made:  Referring Physician: No referring provider defined for this encounter.  Primary Care Physician:Shant Corona MD    Reason for Visit:  Palliative Care Follow-up      Mariya Porras is an 88 year old female seen in the home for Palliative Care follow-up visit with a PMH of Parkinson's Disease, HTN, PE & DVT (2021), GERD, Anxiety, Depression, Chronic Pain, Neuropathy of feet, and Debility.     At baseline patient lives with her  & has 24/7 paid caregiver support. Patient is confused with short-term memory issues. Patient is dependent in most ADLs. Patient can assist with dressing and toileting. Patient is independent in eating. Dependent in all iADLs. Ambulates with assist and walker for short distances. Has a ramp for wheelchair mobility and a chair glide to move to the second floor. Has an elevator to move between levels.     Patient last seen by Palliative Care on 12/28/23.     Met with patient in her living room.   Reports she is stable.   Reports is is fatigued.   Reports pain in her hip and back that moves down to her knee. Reports as long as she is sitting she is comfortable. Currently pain is a 0/10.   Reports appetite is okay. Notes that she some trouble swallowing. Will be meeting a SLP today. Continues with a lot of mucous.   Reports no issues moving her bowels.   Notes that when she tries to go to the bathroom, sometimes she is unable to void in the toilet. Notes she voids in her depends.    Has OT and SLP scheduled for today. Waiting for PT to start.   Last few nights has woken up in the middle of the night, but has been able to fall back to sleep.   Ambulating with her walker.   No falls since last Palliative Care.     Spoke with  and caregiver.   Notes that patient is doing well with therapy. Was able to stand for 6 for minutes with  OT. Waiting for PT to start. Discussed provider had placed referral and it may be a delay due to staffing.   Is trying to modify diet to help with swallowing. No issues with choking since last visit. Notes patient has a harder time with chips and when she eats quickly.   Since last visit met with Pain Management provider Dr. Braswell and continues with Butrans patch and epidural injections.   No skin wounds or problems at this time.   Assisted with completion of POLST and uploaded a copy into the EHR.     No falls, ER visits, or Hospital stays since lats Palliative Care visit.       HPI Source: Patient, Medical Record, , & caregiver  Past Medical History:   Past Medical History:   Diagnosis Date   • Anxiety    • Arthritis    • Basal cell carcinoma     forehead   • Glaucoma    • Hypertension    • Low back pain    • Lumbosacral disc disease    • Parkinson's disease    • Peripheral neuropathy    • Seizures (CMS/HCC)      Past Surgical History:    Past Surgical History:   Procedure Laterality Date   • BLADDER SUSPENSION  2009    Prolift M   • CATARACT EXTRACTION, BILATERAL     • COLONOSCOPY     • HYSTERECTOMY     • JOINT REPLACEMENT     • KNEE ARTHROPLASTY  2015    left     Family History:  family history includes No Known Problems in her biological father and biological mother.  Social History:    Social History     Socioeconomic History   • Marital status:    Tobacco Use   • Smoking status: Former   • Smokeless tobacco: Never   • Tobacco comments:     as a young teenager   Substance and Sexual Activity   • Alcohol use: Yes     Comment: wine   • Drug use: No   • Sexual activity: Not Currently     Partners: Male     Social Determinants of Health     Financial Resource Strain: Low Risk  (9/24/2023)    Overall Financial Resource Strain (CARDIA)    • Difficulty of Paying Living Expenses: Not hard at all   Food Insecurity: No Food Insecurity (11/27/2023)    Hunger Vital Sign    • Worried About Running Out of  Food in the Last Year: Never true    • Ran Out of Food in the Last Year: Never true   Transportation Needs: No Transportation Needs (9/24/2023)    PRAPARE - Transportation    • Lack of Transportation (Medical): No    • Lack of Transportation (Non-Medical): No   Housing Stability: Low Risk  (9/24/2023)    Housing Stability Vital Sign    • Unable to Pay for Housing in the Last Year: No    • Number of Places Lived in the Last Year: 1    • Unstable Housing in the Last Year: No       Synagogue:    Changes or updates to Spiritual Assessment: Radha and Belief: [employer] and No Spiritual concerns identified     Data Review  Lab Studies: Lab Studies: Pertinent radiology and labs reviewed  Labs last six months:    Admission on 11/27/2023, Discharged on 11/27/2023   Component Date Value Ref Range Status   • WBC 11/27/2023 7.79  3.80 - 10.50 K/uL Final   • RBC 11/27/2023 3.95  3.93 - 5.22 M/uL Final   • Hemoglobin 11/27/2023 12.7  11.8 - 15.7 g/dL Final   • Hematocrit 11/27/2023 39.9  35.0 - 45.0 % Final   • MCV 11/27/2023 101.0 (H)  83.0 - 98.0 fL Final   • MCH 11/27/2023 32.2  28.0 - 33.2 pg Final   • MCHC 11/27/2023 31.8 (L)  32.2 - 35.5 g/dL Final   • RDW 11/27/2023 13.2  11.7 - 14.4 % Final   • Platelets 11/27/2023 232  150 - 369 K/uL Final   • MPV 11/27/2023 9.2 (L)  9.4 - 12.3 fL Final   • Differential Type 11/27/2023 Auto   Final   • nRBC 11/27/2023 0.0  <=0.0 % Final   • Immature Granulocytes 11/27/2023 0.5  % Final   • Neutrophils 11/27/2023 72.2  % Final   • Lymphocytes 11/27/2023 19.3  % Final   • Monocytes 11/27/2023 5.9  % Final   • Eosinophils 11/27/2023 1.3  % Final   • Basophils 11/27/2023 0.8  % Final   • Immature Granulocytes, Absolute 11/27/2023 0.04  0.00 - 0.08 K/uL Final   • Neutrophils, Absolute 11/27/2023 5.63  1.70 - 7.00 K/uL Final   • Lymphocytes, Absolute 11/27/2023 1.50  1.20 - 3.50 K/uL Final   • Monocytes, Absolute 11/27/2023 0.46  0.28 - 0.80 K/uL Final   • Eosinophils, Absolute 11/27/2023 0.10   0.04 - 0.36 K/uL Final   • Basophils, Absolute 11/27/2023 0.06  0.01 - 0.10 K/uL Final   • Sodium 11/27/2023 139  136 - 145 mEQ/L Final   • Potassium 11/27/2023 4.2  3.5 - 5.1 mEQ/L Final    Results obtained on plasma. Plasma Potassium values may be up to 0.4 mEQ/L less than serum values. The differences may be greater for patients with high platelet or white cell counts.   • Chloride 11/27/2023 107  98 - 107 mEQ/L Final   • CO2 11/27/2023 26  21 - 31 mEQ/L Final   • BUN 11/27/2023 14  7 - 25 mg/dL Final   • Creatinine 11/27/2023 0.7  0.6 - 1.2 mg/dL Final   • Glucose 11/27/2023 144 (H)  70 - 99 mg/dL Final   • Calcium 11/27/2023 9.8  8.6 - 10.3 mg/dL Final   • AST (SGOT) 11/27/2023 12 (L)  13 - 39 IU/L Final   • ALT (SGPT) 11/27/2023 <3 (L)  7 - 52 IU/L Final   • Alkaline Phosphatase 11/27/2023 40  34 - 125 IU/L Final   • Total Protein 11/27/2023 6.1  6.0 - 8.2 g/dL Final    Test performed on plasma which typically contains approximately 0.4 g/dL more protein than serum.   • Albumin 11/27/2023 3.6  3.5 - 5.7 g/dL Final   • Bilirubin, Total 11/27/2023 0.5  0.3 - 1.2 mg/dL Final   • eGFR 11/27/2023 >60.0  >=60.0 mL/min/1.73m*2 Final   • Anion Gap 11/27/2023 6  3 - 15 mEQ/L Final   • High Sens Troponin I 11/27/2023 9.4  <15.0 pg/mL Final   • Ventricular rate 11/27/2023 91   Final   • Atrial rate 11/27/2023 91   Final   • WV Interval 11/27/2023 196   Final   • QRS duration 11/27/2023 74   Final   • QT Interval 11/27/2023 324   Final   • QTC Calculation(Bazett) 11/27/2023 398   Final   • P Axis 11/27/2023 23   Final   • R Axis 11/27/2023 -21   Final   • T Wave Axis 11/27/2023 -22   Final   • Magnesium 11/27/2023 1.9  1.8 - 2.5 mg/dL Final   • SARS-CoV-2 (COVID-19) 11/27/2023 Negative  Negative Final   • Influenza A 11/27/2023 Negative  Negative Final   • Influenza B 11/27/2023 Negative  Negative Final   • Respiratory Syncytial Virus 11/27/2023 Negative  Negative Final   • High Sens Troponin I 11/27/2023 8.5  <15.0 pg/mL  Final   • Color, Urine 11/27/2023 Yellow  Yellow, Colorless Final   • Clarity, Urine 11/27/2023 Cloudy (A)  Clear Final   • Specific Gravity, Urine 11/27/2023 1.009  1.005 - 1.030 Final   • pH, Urine 11/27/2023 7.0  4.5 - 8.0 Final   • Leukocyte Esterase 11/27/2023 +3 (A)  Negative Final   • Nitrite, Urine 11/27/2023 Positive (A)  Negative Final   • Protein, Urine 11/27/2023 Negative  Negative Final   • Glucose, Urine 11/27/2023 Negative  Negative mg/dL Final   • Ketones, Urine 11/27/2023 Negative  Negative mg/dL Final   • Urobilinogen, Urine 11/27/2023 0.2  <2.0 EU/dL EU/dL Final   • Bilirubin, Urine 11/27/2023 Negative  Negative mg/dL Final   • Blood, Urine 11/27/2023 Negative  Negative Final    The sensitivity of the occult blood test is equivalent to approximately 4 intact RBC/HPF.   • RBC, Urine 11/27/2023 0 TO 4  0 TO 4 /HPF Final   • WBC, Urine 11/27/2023 20 TO 35 (A)  0 TO 3 /HPF Final   • Squamous Epithelial 11/27/2023 Rare (A)  None Seen /hpf Final   • Hyaline Cast 11/27/2023 0 TO 2 (A)  None Seen /lpf Final   • Bacteria, Urine 11/27/2023 Rare (A)  None Seen /HPF Final   • Mucus 11/27/2023 Rare (A)  None Seen /LPF Final   • Urine Culture 11/27/2023 **Positive Culture** (A)   Final   • Urine Culture 11/27/2023 >=100,000 cfu/mL Escherichia coli   Final   No results displayed because visit has over 200 results.              Labs Reviewed: No new labs    Radiology and Imaging Studies: Pertinent radiology and labs reviewed   None  Radiology and Imaging Reviewed: No new imaging   Cardiac Studies Reviewed: No new studies  Medicine Tests reviewed: No new tests      Review of Systems   Constitutional: Positive for fatigue. Negative for activity change, appetite change and unexpected weight change.   HENT: Positive for trouble swallowing. Negative for hearing loss.    Eyes: Negative for visual disturbance.   Respiratory: Negative for cough, shortness of breath and wheezing.    Cardiovascular: Positive for leg  swelling. Negative for chest pain and palpitations.   Gastrointestinal: Negative for abdominal pain, constipation, nausea and vomiting.   Endocrine: Positive for cold intolerance.   Genitourinary: Negative for decreased urine volume, difficulty urinating, dysuria and frequency.   Musculoskeletal: Positive for arthralgias, back pain and gait problem.   Skin: Negative for rash and wound.   Neurological: Positive for weakness and numbness. Negative for dizziness and light-headedness.   Psychiatric/Behavioral: Positive for confusion. Negative for dysphoric mood and sleep disturbance. The patient is not nervous/anxious.        Objective     Vitals:    02/05/24 1042   BP: (!) 165/87   BP Location: Left upper arm   Patient Position: Sitting   Pulse: 81   Resp: 16   Temp: 36.5 °C (97.7 °F)   TempSrc: Temporal   SpO2: 97%     There is no height or weight on file to calculate BMI.  Allergies:   Allergies   Allergen Reactions   • Codeine Hives      n/v   • Diazepam Other (see comments)     Blurred vision   • Sulfa (Sulfonamide Antibiotics) Hives     Other reaction(s): Unknown   • Sulfur Hives         Medications:   Current Outpatient Medications:   •  acetaminophen (TYLENOL) 500 mg tablet, Take 1,000 mg by mouth 3 (three) times a day., Disp: , Rfl:   •  apixaban (ELIQUIS) 2.5 mg tablet, Take 2.5 mg by mouth 2 (two) times a day., Disp: , Rfl:   •  bimatoprost (LUMIGAN) 0.01 % ophthalmic drops, Administer 1 drop into both eyes nightly., Disp: , Rfl:   •  buprenorphine (BUTRANS) 5 mcg/hour patch weekly, Place 1 patch on the skin every (seven) 7 days., Disp: , Rfl:   •  carbidopa-levodopa  mg per tablet, Take 1 tablet by mouth 3 (three) times a day. 0700, 1100, 1500, Disp: , Rfl:   •  docusate sodium (COLACE) 100 mg capsule, Take 100 mg by mouth daily., Disp: , Rfl:   •  famotidine (PEPCID) 20 mg tablet, Take 20 mg by mouth daily., Disp: , Rfl:   •  gabapentin (NEURONTIN) 300 mg capsule, Take 300 mg by mouth 3 (three) times  a day., Disp: , Rfl:   •  hydrALAZINE (APRESOLINE) 50 mg tablet, Take 1 tablet (50 mg total) by mouth 2 (two) times a day. (Patient taking differently: Take 25 mg by mouth 2 (two) times a day.), Disp: , Rfl:   •  lisinopriL 20 mg tablet, Take 20 mg by mouth daily., Disp: , Rfl:   •  melatonin 3 mg tablet, Take 6 mg by mouth nightly., Disp: , Rfl:   •  mirtazapine 30 mg tablet, Take 30 mg by mouth nightly., Disp: , Rfl:   •  nitrofurantoin, macrocrystal-monohydrate, (MACROBID) 100 mg capsule, Take 100 mg by mouth daily before dinner., Disp: , Rfl:   •  pimavanserin (NUPLAZID) 34 mg capsule, Take 34 mg by mouth daily., Disp: , Rfl:   •  senna (SENOKOT) 8.6 mg tablet, Take 2 tablets by mouth daily before dinner., Disp: , Rfl:   •  sertraline 50 mg tablet, Take 150 mg by mouth daily. Taking 50 mg in the AM & then 100 mg in the PM, Disp: , Rfl:   •  topiramate (TOPAMAX) 25 mg tablet, Take 25 mg by mouth 2 (two) times a day., Disp: , Rfl:   [facility]: Query reviewed and no concerns      Physical Exam  Constitutional:       General: She is not in acute distress.     Appearance: She is not toxic-appearing.      Comments: Fatigued  Frail   Older adult   HENT:      Head: Normocephalic and atraumatic.      Right Ear: External ear normal.      Left Ear: External ear normal.      Nose: Nose normal.      Mouth/Throat:      Mouth: Mucous membranes are moist.      Pharynx: Oropharynx is clear.   Eyes:      Conjunctiva/sclera: Conjunctivae normal.   Cardiovascular:      Rate and Rhythm: Normal rate and regular rhythm.      Pulses: Normal pulses.      Heart sounds: Normal heart sounds.      Comments: Trace LE edema (L>R)  Pulmonary:      Effort: Pulmonary effort is normal.      Breath sounds: Normal breath sounds. No wheezing or rhonchi.   Abdominal:      General: Abdomen is flat. Bowel sounds are normal. There is no distension.      Palpations: Abdomen is soft.      Tenderness: There is no abdominal  tenderness.   Musculoskeletal:      Cervical back: Normal range of motion.      Right lower leg: Edema present.      Left lower leg: Edema present.      Comments: sarcopenia    Skin:     General: Skin is warm and dry.      Findings: No lesion or rash.   Neurological:      Mental Status: Mental status is at baseline. She is disoriented.      Motor: Weakness present.      Gait: Gait abnormal.   Psychiatric:         Mood and Affect: Mood normal.         Thought Content: Thought content normal.      Comments: Forgetful  No agitation         Palliative Assessment  FAST Score:  6c.- Inability to handle mechanics of toileting (unchanged from 11/24/23)  Palliative Performance Scale: 50% (increased from 40% on 12/28/23)  Mid Upper Arm Circumference (MUAC):  Unable to perform due to clothing  ESASr: Pain 0, Tiredness 3, Drowsiness 3, Nausea 0, Appetite 0, Shortness of Breath 0, Depression 2, Anxiety 0 and Wellbeing 3  Palliative Disease State: Controlled with current treatments  Patient Prognostic Awareness: Unknown- did not assess understanding of disease process, prognosis, or resuscitation status  Palliative Care Risk Stratification: Level Three- High risk.  Requires highest level of intervention.  Not likely to return to usual care    Goals of Care  Advance Directives Status:  Not Received  Advance Directives:    Document type(s) Durable Power of , Living Will and POLST  Healthcare Proxy Name, Contact Information, and Relationship  Lev Porras ([phone])  Goals of Care Discussion: No  Change in medical orders resulting from advance care planning discussions: No change in medical orders  Code status Full code      Assessment:  Assessment/Plan   Diagnoses and all orders for this visit:    Encounter for palliative care in home, non-hospice (Primary)  Assessment & Plan:  Goals are Disease-Directed  Code Status: FULL code  Patient has an advanced directive that is not on file that names  Lev Lopez  Vern ([phone]) as her health care power of  if she is unable to make her own medical decisions.   Assisted with completion of POLST and uploaded to EHR  Patient lacks complex medical decision making capacity due to confusion from dementia.       Moderate dementia due to Parkinson's disease, with agitation (CMS/[facility])  Assessment & Plan:  Followed by neurologist Dr Payan  Monitor cognitive status  Monitor for falls  Continue close supervision and assist with ADLs  Continues on Sertraline 50 mg in the AM & 100 mg in the PM  Continues on Mirtazapine 30 mg nightly  Has 24/7 caregiver support       Parkinson's disease, unspecified whether dyskinesia present, unspecified whether manifestations fluctuate  Assessment & Plan:  Follows with Neurologist Dr Payan  Complicated by agitation, anxiety, hallucinations, and vivid dreams  Continues on Carbidopa-Levodopa  mg three times daily   Continues Nuplazid 34 mg in the AM daily   Continues Melatonin 6 mg at bedtime         Primary hypertension  Assessment & Plan:  Followed by PCP Dr. Corona  Blood pressure fluctuates greatly, goal to avoid hypotension with past syncopal episodes.    Continues on Lisinopril 20 mg daily  Continues on Hydralazine 25 mg twice a day  BP is on the higher end today  Monitor BP      Dysphagia, unspecified type  Assessment & Plan:  Encouraged upright positioning for oral intake  Aspiration precautions  Continue soft moist foods  SLP therapy through [facility]         History of UTI  Assessment & Plan:  Follows with Dr. Conti for management   Currently on Macrobid 100 mg daily as suppressant   Discussed possible supplement and vit C, Cranberry and D Mannose  UTI symptoms: dysuria and AMS  Monitor for future UTI symptoms      History of venous thromboembolism  Assessment & Plan:  Hx of DVT and PE  Followed by PCP for managment  Continues on Eliquis 2.5 mg twice daily  Monitor for bleeding with Eliquis         Chronic low back pain  with sciatica, sciatica laterality unspecified, unspecified back pain laterality  Assessment & Plan:  Chronic pain of lumbar spine with radiculopathy & neuropathy of both feet.  Followed by pain management specialists and receives epidural of the spine every 3 months.     Continues on Topamax 25 mg twice a day  Continues on Tylenol 1000 mg three times a day  Continues on Gabapentin 300 mg three times a day  Recently started Butrans 5mcg/hr transdermal patch changed every week   Monitor pain      Debility  Assessment & Plan:  Due to Parkinson's disease and chronic back pain   Monitor for falls  Continue use of assistive devices  Has 24/7 paid caregiver support  Continues with OT & SLP through [facility], waiting for PT services to start          I spent 60 minutes on this date of service performing the following activities: obtaining history, performing examination, documenting, preparing for visit, providing counseling and education and coordinating care.

## 2024-02-05 NOTE — ASSESSMENT & PLAN NOTE
Followed by PCP Dr. Corona  Blood pressure fluctuates greatly, goal to avoid hypotension with past syncopal episodes.    Continues on Lisinopril 20 mg daily  Continues on Hydralazine 25 mg twice a day  BP is on the higher end today  Monitor BP

## 2024-03-15 PROBLEM — R55 SYNCOPE WITH NORMAL NEUROLOGIC EXAMINATION: Status: RESOLVED | Noted: 2023-09-22 | Resolved: 2024-01-01

## 2024-03-15 PROBLEM — R09.89: Status: ACTIVE | Noted: 2024-01-01

## 2024-03-15 PROBLEM — S32.019A CLOSED FRACTURE OF FIRST LUMBAR VERTEBRA (CMS/HCC): Status: RESOLVED | Noted: 2023-09-24 | Resolved: 2024-01-01

## 2024-03-15 NOTE — ASSESSMENT & PLAN NOTE
Follows with Dr. Conti for management   Currently on Macrobid 100 mg daily as suppressant   UTI symptoms: dysuria and AMS  Monitor for future UTI symptoms

## 2024-03-15 NOTE — ASSESSMENT & PLAN NOTE
Followed by PCP Dr. Corona  Blood pressure fluctuates greatly, goal to avoid hypotension with past syncopal episodes.    Continues on Lisinopril 20 mg daily  Continues on Hydralazine 25 mg twice a day  Elevated today - 185-190 systolic over 80-90 diastolic - denies headache, chest pain, or neurologic symptoms. Call to PCP Dr. Corona regarding management  Monitor BP

## 2024-03-15 NOTE — ASSESSMENT & PLAN NOTE
Noted on exam today with SPO2 readings of 92-93% on RA (decreased from baseline)  With concurrent elevated BP and LE edema (R>L)  Plan for chest x-ray   Call to PCP to discuss VS changes

## 2024-03-15 NOTE — ASSESSMENT & PLAN NOTE
Encouraged upright positioning for oral intake  Aspiration precautions  Continue soft moist foods

## 2024-03-15 NOTE — ASSESSMENT & PLAN NOTE
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  Followed by pain management specialists and receives epidural of the spine every 3 months.     Continues on Topamax 25 mg twice a day  Continues on Tylenol 1000 mg three times a day  Continues on Gabapentin 300 mg three times a day  Recently started Butrans 5mcg/hr transdermal patch changed every week   Continue PT/OT through [facility]  Monitor pain

## 2024-03-15 NOTE — PROGRESS NOTES
"Palliative Home Based Care Established Visit    Subjective     Patient ID: Vicki Porras is a 88 y.o. female.  MRN: [id number]  Date/ Time Visit Made:  Referring Physician: No referring provider defined for this encounter.  Primary Care Physician:Shant Corona MD    Reason for Visit:  Palliative Care Follow-up      Mariya Porras is an 88 year old female seen in the home for Palliative Care follow-up visit with a PMH of Parkinson's Disease, HTN, PE & DVT (2021), GERD, Anxiety, Depression, Chronic Pain, Neuropathy of feet, Dysphagia, and Debility.     At baseline patient lives with her  & has 24/7 paid caregiver support. Patient is confused with short-term memory issues. Patient is dependent in most ADLs. Patient can assist with dressing and toileting. Patient is independent in eating. Dependent in all iADLs. Ambulates with assist and walker for short distances. Has a ramp for wheelchair mobility and a chair glide to move to the second floor. Has an elevator to move between levels.     Patient last seen by Palliative Care on 2/5/24.     Met with patient in the living room. 24/7 caregiver Lucia present for the visit.   Feels okay. Notes she is tired today.   Reports chronic right hip pain in her back and knee. Reports b/l feet ar numb.   Reports her feet and her right knee is swollen.   Is working with PT/OT and doing lots of exercises.   Notes that she has strange dreams at night, but is not aware of them. Notes then her  wakes her up. Notes that this causes sleep disturbances. Reports vivid dreams that can sometimes be scary. One dream a man was coming through a window to get her. Notes the dreams are more recent.   Reports ongoing issues with swallowing. Notes it is harder when she eats to quickly or talks when she is eating. No choking.   Moving bowels well.   Had a UTI a few weeks ago. Reports she had an antibiotic recently.   Notes she has confusion and is \"losing her memory.\"   Bruises " easily.  Reports new swelling in her b/l legs. Thinks it started about 1 week ago. BP elevated on exam in b/l arms. Denies headache or neurologic symptoms. No chest pain. SPO2 also lower than baseline 92-93%.   Per Lucia patient has had no medication changes since last Palliative Care visit. Per Lucia with visits from OT/PT patients BP readings have been recently systolic in the 150-170's.   No falls, ER visits, or Hospital Stays since last Palliative Care visit.     Call to PCP office Dr. Corona. Spoke to  who notes Dr. Corona is out of the office today. Discussed patient's VS in Left arm 190/95 and Right arm 180/82, HR 80s and regular, discussed LE edema (R>L), and lower SPO2 92-93%. Discussed patient reports feeling okay today and has no headache or neuro symptoms. Discussed provider will order Chest X-ray and send results to PCP. Provided contact information for return call.       HPI Source: Patient, Medical Record, , & caregiver  Past Medical History:   Past Medical History:   Diagnosis Date   • Anxiety    • Arthritis    • Basal cell carcinoma     forehead   • Glaucoma    • Hypertension    • Low back pain    • Lumbosacral disc disease    • Parkinson's disease    • Peripheral neuropathy    • Seizures (CMS/HCC)      Past Surgical History:    Past Surgical History:   Procedure Laterality Date   • BLADDER SUSPENSION  2009    Prolift M   • CATARACT EXTRACTION, BILATERAL     • COLONOSCOPY     • HYSTERECTOMY     • JOINT REPLACEMENT     • KNEE ARTHROPLASTY  2015    left     Family History:  family history includes No Known Problems in her biological father and biological mother.  Social History:    Social History     Socioeconomic History   • Marital status:    Tobacco Use   • Smoking status: Former   • Smokeless tobacco: Never   • Tobacco comments:     as a young teenager   Substance and Sexual Activity   • Alcohol use: Yes     Comment: wine   • Drug use: No   • Sexual activity: Not  Currently     Partners: Male     Social Determinants of Health     Financial Resource Strain: Low Risk  (9/24/2023)    Overall Financial Resource Strain (CARDIA)    • Difficulty of Paying Living Expenses: Not hard at all   Food Insecurity: No Food Insecurity (11/27/2023)    Hunger Vital Sign    • Worried About Running Out of Food in the Last Year: Never true    • Ran Out of Food in the Last Year: Never true   Transportation Needs: No Transportation Needs (9/24/2023)    PRAPARE - Transportation    • Lack of Transportation (Medical): No    • Lack of Transportation (Non-Medical): No   Housing Stability: Low Risk  (9/24/2023)    Housing Stability Vital Sign    • Unable to Pay for Housing in the Last Year: No    • Number of Places Lived in the Last Year: 1    • Unstable Housing in the Last Year: No       Anabaptism:    Changes or updates to Spiritual Assessment: Radha and Belief: Methodist and No Spiritual concerns identified     Data Review  Lab Studies: Lab Studies: Pertinent radiology and labs reviewed  Labs last six months:    Admission on 11/27/2023, Discharged on 11/27/2023   Component Date Value Ref Range Status   • WBC 11/27/2023 7.79  3.80 - 10.50 K/uL Final   • RBC 11/27/2023 3.95  3.93 - 5.22 M/uL Final   • Hemoglobin 11/27/2023 12.7  11.8 - 15.7 g/dL Final   • Hematocrit 11/27/2023 39.9  35.0 - 45.0 % Final   • MCV 11/27/2023 101.0 (H)  83.0 - 98.0 fL Final   • MCH 11/27/2023 32.2  28.0 - 33.2 pg Final   • MCHC 11/27/2023 31.8 (L)  32.2 - 35.5 g/dL Final   • RDW 11/27/2023 13.2  11.7 - 14.4 % Final   • Platelets 11/27/2023 232  150 - 369 K/uL Final   • MPV 11/27/2023 9.2 (L)  9.4 - 12.3 fL Final   • Differential Type 11/27/2023 Auto   Final   • nRBC 11/27/2023 0.0  <=0.0 % Final   • Immature Granulocytes 11/27/2023 0.5  % Final   • Neutrophils 11/27/2023 72.2  % Final   • Lymphocytes 11/27/2023 19.3  % Final   • Monocytes 11/27/2023 5.9  % Final   • Eosinophils 11/27/2023 1.3  % Final   • Basophils 11/27/2023  0.8  % Final   • Immature Granulocytes, Absolute 11/27/2023 0.04  0.00 - 0.08 K/uL Final   • Neutrophils, Absolute 11/27/2023 5.63  1.70 - 7.00 K/uL Final   • Lymphocytes, Absolute 11/27/2023 1.50  1.20 - 3.50 K/uL Final   • Monocytes, Absolute 11/27/2023 0.46  0.28 - 0.80 K/uL Final   • Eosinophils, Absolute 11/27/2023 0.10  0.04 - 0.36 K/uL Final   • Basophils, Absolute 11/27/2023 0.06  0.01 - 0.10 K/uL Final   • Sodium 11/27/2023 139  136 - 145 mEQ/L Final   • Potassium 11/27/2023 4.2  3.5 - 5.1 mEQ/L Final    Results obtained on plasma. Plasma Potassium values may be up to 0.4 mEQ/L less than serum values. The differences may be greater for patients with high platelet or white cell counts.   • Chloride 11/27/2023 107  98 - 107 mEQ/L Final   • CO2 11/27/2023 26  21 - 31 mEQ/L Final   • BUN 11/27/2023 14  7 - 25 mg/dL Final   • Creatinine 11/27/2023 0.7  0.6 - 1.2 mg/dL Final   • Glucose 11/27/2023 144 (H)  70 - 99 mg/dL Final   • Calcium 11/27/2023 9.8  8.6 - 10.3 mg/dL Final   • AST (SGOT) 11/27/2023 12 (L)  13 - 39 IU/L Final   • ALT (SGPT) 11/27/2023 <3 (L)  7 - 52 IU/L Final   • Alkaline Phosphatase 11/27/2023 40  34 - 125 IU/L Final   • Total Protein 11/27/2023 6.1  6.0 - 8.2 g/dL Final    Test performed on plasma which typically contains approximately 0.4 g/dL more protein than serum.   • Albumin 11/27/2023 3.6  3.5 - 5.7 g/dL Final   • Bilirubin, Total 11/27/2023 0.5  0.3 - 1.2 mg/dL Final   • eGFR 11/27/2023 >60.0  >=60.0 mL/min/1.73m*2 Final   • Anion Gap 11/27/2023 6  3 - 15 mEQ/L Final   • High Sens Troponin I 11/27/2023 9.4  <15.0 pg/mL Final   • Ventricular rate 11/27/2023 91   Final   • Atrial rate 11/27/2023 91   Final   • WV Interval 11/27/2023 196   Final   • QRS duration 11/27/2023 74   Final   • QT Interval 11/27/2023 324   Final   • QTC Calculation(Bazett) 11/27/2023 398   Final   • P Axis 11/27/2023 23   Final   • R Axis 11/27/2023 -21   Final   • T Wave Axis 11/27/2023 -22   Final   •  Magnesium 11/27/2023 1.9  1.8 - 2.5 mg/dL Final   • SARS-CoV-2 (COVID-19) 11/27/2023 Negative  Negative Final   • Influenza A 11/27/2023 Negative  Negative Final   • Influenza B 11/27/2023 Negative  Negative Final   • Respiratory Syncytial Virus 11/27/2023 Negative  Negative Final   • High Sens Troponin I 11/27/2023 8.5  <15.0 pg/mL Final   • Color, Urine 11/27/2023 Yellow  Yellow, Colorless Final   • Clarity, Urine 11/27/2023 Cloudy (A)  Clear Final   • Specific Gravity, Urine 11/27/2023 1.009  1.005 - 1.030 Final   • pH, Urine 11/27/2023 7.0  4.5 - 8.0 Final   • Leukocyte Esterase 11/27/2023 +3 (A)  Negative Final   • Nitrite, Urine 11/27/2023 Positive (A)  Negative Final   • Protein, Urine 11/27/2023 Negative  Negative Final   • Glucose, Urine 11/27/2023 Negative  Negative mg/dL Final   • Ketones, Urine 11/27/2023 Negative  Negative mg/dL Final   • Urobilinogen, Urine 11/27/2023 0.2  <2.0 EU/dL EU/dL Final   • Bilirubin, Urine 11/27/2023 Negative  Negative mg/dL Final   • Blood, Urine 11/27/2023 Negative  Negative Final    The sensitivity of the occult blood test is equivalent to approximately 4 intact RBC/HPF.   • RBC, Urine 11/27/2023 0 TO 4  0 TO 4 /HPF Final   • WBC, Urine 11/27/2023 20 TO 35 (A)  0 TO 3 /HPF Final   • Squamous Epithelial 11/27/2023 Rare (A)  None Seen /hpf Final   • Hyaline Cast 11/27/2023 0 TO 2 (A)  None Seen /lpf Final   • Bacteria, Urine 11/27/2023 Rare (A)  None Seen /HPF Final   • Mucus 11/27/2023 Rare (A)  None Seen /LPF Final   • Urine Culture 11/27/2023 **Positive Culture** (A)   Final   • Urine Culture 11/27/2023 >=100,000 cfu/mL Escherichia coli   Final   No results displayed because visit has over 200 results.              Labs Reviewed: No new labs    Radiology and Imaging Studies: Pertinent radiology and labs reviewed   XR CHEST 2 VW  Radiology and Imaging Reviewed: No new imaging   Cardiac Studies Reviewed: No new studies  Medicine Tests reviewed: No new tests      Review of  Systems   Constitutional: Positive for fatigue. Negative for activity change, appetite change and unexpected weight change.   HENT: Positive for trouble swallowing. Negative for hearing loss.    Eyes: Negative for visual disturbance.   Respiratory: Negative for cough, shortness of breath and wheezing.    Cardiovascular: Positive for leg swelling. Negative for chest pain and palpitations.   Gastrointestinal: Negative for abdominal pain, constipation, nausea and vomiting.   Endocrine: Positive for cold intolerance.   Genitourinary: Negative for decreased urine volume, difficulty urinating, dysuria and frequency.   Musculoskeletal: Positive for arthralgias, back pain and gait problem.   Skin: Negative for rash and wound.   Neurological: Positive for weakness and numbness. Negative for dizziness and light-headedness.   Hematological: Bruises/bleeds easily.   Psychiatric/Behavioral: Positive for confusion. Negative for dysphoric mood and sleep disturbance. The patient is not nervous/anxious.        Objective     Vitals:    03/15/24 1420   BP: (!) 190/95   BP Location: Left upper arm   Patient Position: Sitting   Pulse: 84   Resp: 18   Temp: 36.5 °C (97.7 °F)   TempSrc: Temporal   SpO2: 93%     There is no height or weight on file to calculate BMI.  Allergies:   Allergies   Allergen Reactions   • Codeine Hives      n/v   • Diazepam Other (see comments)     Blurred vision   • Sulfa (Sulfonamide Antibiotics) Hives     Other reaction(s): Unknown   • Sulfur Hives         Medications:   Current Outpatient Medications:   •  acetaminophen (TYLENOL) 500 mg tablet, Take 1,000 mg by mouth 3 (three) times a day., Disp: , Rfl:   •  apixaban (ELIQUIS) 2.5 mg tablet, Take 2.5 mg by mouth 2 (two) times a day., Disp: , Rfl:   •  bimatoprost (LUMIGAN) 0.01 % ophthalmic drops, Administer 1 drop into both eyes nightly., Disp: , Rfl:   •  buprenorphine (BUTRANS) 5 mcg/hour patch weekly, Place 1 patch on the skin every (seven) 7 days., Disp:  , Rfl:   •  carbidopa-levodopa  mg per tablet, Take 1 tablet by mouth 3 (three) times a day. 0700, 1100, 1500, Disp: , Rfl:   •  docusate sodium (COLACE) 100 mg capsule, Take 100 mg by mouth daily., Disp: , Rfl:   •  famotidine (PEPCID) 20 mg tablet, Take 20 mg by mouth daily., Disp: , Rfl:   •  gabapentin (NEURONTIN) 300 mg capsule, Take 300 mg by mouth 3 (three) times a day., Disp: , Rfl:   •  hydrALAZINE (APRESOLINE) 50 mg tablet, Take 1 tablet (50 mg total) by mouth 2 (two) times a day. (Patient taking differently: Take 25 mg by mouth 2 (two) times a day.), Disp: , Rfl:   •  lisinopriL 20 mg tablet, Take 20 mg by mouth daily., Disp: , Rfl:   •  melatonin 3 mg tablet, Take 6 mg by mouth nightly., Disp: , Rfl:   •  mirtazapine 30 mg tablet, Take 30 mg by mouth nightly., Disp: , Rfl:   •  nitrofurantoin, macrocrystal-monohydrate, (MACROBID) 100 mg capsule, Take 100 mg by mouth daily before dinner., Disp: , Rfl:   •  pimavanserin (NUPLAZID) 34 mg capsule, Take 34 mg by mouth daily., Disp: , Rfl:   •  senna (SENOKOT) 8.6 mg tablet, Take 2 tablets by mouth daily before dinner., Disp: , Rfl:   •  sertraline 50 mg tablet, Take 150 mg by mouth daily. Taking 50 mg in the AM & then 100 mg in the PM, Disp: , Rfl:   •  topiramate (TOPAMAX) 25 mg tablet, Take 25 mg by mouth 2 (two) times a day., Disp: , Rfl:   [facility]: Query reviewed and no concerns      Physical Exam  Constitutional:       General: She is not in acute distress.     Appearance: She is not toxic-appearing.      Comments: Older adult   HENT:      Head: Normocephalic and atraumatic.      Right Ear: External ear normal.      Left Ear: External ear normal.      Nose: Nose normal.      Mouth/Throat:      Mouth: Mucous membranes are moist.      Pharynx: Oropharynx is clear.   Eyes:      Conjunctiva/sclera: Conjunctivae normal.   Cardiovascular:      Rate and Rhythm: Normal rate and regular rhythm.      Pulses: Normal pulses.       Heart sounds: Normal heart sounds. No murmur heard.     Comments: + 1 RLE edema in ankle  Trace LLE edema in ankle  Pulmonary:      Effort: Pulmonary effort is normal.      Breath sounds: Normal breath sounds. No wheezing, rhonchi or rales.      Comments: Decreased breath sounds in b/l bases  Abdominal:      General: Abdomen is flat. Bowel sounds are normal. There is no distension.      Palpations: Abdomen is soft.      Tenderness: There is no abdominal tenderness.   Musculoskeletal:         General: Swelling present.      Cervical back: Normal range of motion.      Right lower leg: Edema present.      Left lower leg: Edema present.      Comments: sarcopenia   Swelling to her her right knee   Skin:     General: Skin is warm and dry.      Findings: Bruising present. No lesion or rash.   Neurological:      Mental Status: She is alert. Mental status is at baseline. She is disoriented.      Motor: Weakness present.      Gait: Gait abnormal.   Psychiatric:         Mood and Affect: Mood normal.         Thought Content: Thought content normal.      Comments: Forgetful  No agitation         Palliative Assessment  FAST Score:  6c.- Inability to handle mechanics of toileting (unchanged from 2/5/24)  Palliative Performance Scale: 50% (unchanged from 2/5/24)  Mid Upper Arm Circumference (MUAC):  26.5 cm  ESASr: Pain 3, Tiredness 4, Drowsiness 4, Nausea 0, Appetite 0, Shortness of Breath 0, Depression 2, Anxiety 0 and Wellbeing 3  Palliative Disease State: Controlled with current treatments  Patient Prognostic Awareness: Demonstrates limited understanding of disease process, Demonstrates limited understanding of prognosis and Demonstrates limited understanding of resuscitation status  Palliative Care Risk Stratification: Level Three- High risk.  Requires highest level of intervention.  Not likely to return to usual care    Goals of Care  Advance Directives Status:  Not Received  Advance Directives:    Document type(s) Durable  Power of , Living Will and POLST  Healthcare Proxy Name, Contact Information, and Relationship  - John Porras ([phone])  Goals of Care Discussion: No  Change in medical orders resulting from advance care planning discussions: No change in medical orders  Code status Full code      Assessment:  Assessment/Plan   Diagnoses and all orders for this visit:    Encounter for palliative care in home, non-hospice (Primary)  Assessment & Plan:  Goals are Disease-Directed  Code Status: FULL code  Patient has an advanced directive that is not on file that names  - John Porras ([phone]) as her health care power of  if she is unable to make her own medical decisions.   Has completed POLST in EHR and in home   Due to confusion from Dementia patient benefits from involving her family in complex medical decision making.       Parkinson's disease, unspecified whether dyskinesia present, unspecified whether manifestations fluctuate  Assessment & Plan:  Follows with Neurologist Dr Payan  Complicated by agitation, anxiety, hallucinations, and vivid dreams  Continues on Carbidopa-Levodopa  mg three times daily   Continues Nuplazid 34 mg in the AM daily   Continues Melatonin 6 mg at bedtime         Moderate dementia due to Parkinson's disease, with agitation (CMS/[facility])  Assessment & Plan:  Followed by neurologist Dr Payan  Monitor cognitive status  Monitor for falls  Continue close supervision and assist with ADLs  Continues on Sertraline 50 mg in the AM & 100 mg in the PM  Continues on Mirtazapine 30 mg nightly  Has 24/7 caregiver support       Primary hypertension  Assessment & Plan:  Followed by PCP Dr. Corona  Blood pressure fluctuates greatly, goal to avoid hypotension with past syncopal episodes.    Continues on Lisinopril 20 mg daily  Continues on Hydralazine 25 mg twice a day  Elevated today - 185-190 systolic over 80-90 diastolic - denies headache, chest pain, or neurologic  symptoms. Call to PCP Dr. Corona regarding management  Monitor BP      Dysphagia, unspecified type  Assessment & Plan:  Encouraged upright positioning for oral intake  Aspiration precautions  Continue soft moist foods        Orders:  -     X-RAY CHEST 2 VIEWS; Future    Decreased breath sounds of both lungs  Assessment & Plan:  Noted on exam today with SPO2 readings of 92-93% on RA (decreased from baseline)  With concurrent elevated BP and LE edema (R>L)  Plan for chest x-ray   Call to PCP to discuss VS changes     Orders:  -     X-RAY CHEST 2 VIEWS; Future    History of UTI  Assessment & Plan:  Follows with Dr. 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  Followed by pain management specialists and receives epidural of the spine every 3 months.     Continues on Topamax 25 mg twice a day  Continues on Tylenol 1000 mg three times a day  Continues on Gabapentin 300 mg three times a day  Recently started Butrans 5mcg/hr transdermal patch changed every week   Continue PT/OT through [facility]  Monitor pain      Debility  Assessment & Plan:  Due to Parkinson's disease and chronic back pain   Monitor for falls  Continue use of assistive devices  Has 24/7 paid caregiver support  Continues with OT/PT through [facility]           I spent 60 minutes on this date of service performing the following activities: obtaining history, performing examination, entering orders, documenting, preparing for visit, obtaining / reviewing records, providing counseling and education and  coordinating care.

## 2024-03-15 NOTE — ASSESSMENT & PLAN NOTE
Goals are Disease-Directed  Code Status: FULL code  Patient has an advanced directive that is not on file that names  - John Porras ([phone]) as her health care power of  if she is unable to make her own medical decisions.   Has completed POLST in EHR and in home   Due to confusion from Dementia patient benefits from involving her family in complex medical decision making.

## 2024-03-15 NOTE — ASSESSMENT & PLAN NOTE
Due to Parkinson's disease and chronic back pain   Monitor for falls  Continue use of assistive devices  Has 24/7 paid caregiver support  Continues with OT/PT through [facility]

## 2024-04-15 PROBLEM — R09.89: Status: RESOLVED | Noted: 2024-01-01 | Resolved: 2024-01-01

## 2024-04-15 NOTE — PROGRESS NOTES
Palliative Home Based Care Established Visit    Subjective     Patient ID: Vicki Porras is a 88 y.o. female.  MRN: [id number]  Date/ Time Visit Made:  Referring Physician: No referring provider defined for this encounter.  Primary Care Physician:Shant Corona MD    Reason for Visit:  Palliative Care Follow-up      Mariya Porras is an 88 year old female seen in the home for Palliative Care follow-up visit with a PMH of Parkinson's Disease, HTN, PE & DVT (2021), GERD, Anxiety, Depression, Chronic Pain, Neuropathy of feet, Dysphagia, and Debility.     At baseline patient lives with her  & has 24/7 paid caregiver support. Patient is confused with short-term memory issues. Patient is dependent in most ADLs. Patient can assist with dressing and toileting. Patient is independent in eating. Dependent in all iADLs. Ambulates with assist and walker for short distances. Has a ramp for wheelchair mobility and a chair glide to move to the second floor. Has an elevator to move between levels.     Patient last seen by Palliative Care on 3/15/24.     Met with patient at kitchen table. Visit joined by  John and 24/7 caregiver Lucia. Patient's sister-in-law and niece had just left from a visit. They are visiting from [place] and brought meals for patient and her .   Patient reports she is overall stable.   Ongoing chronic pain in her hip and numbness in her feet.   Is sleeping at nighttime. Reports she got a new bed, that allows the head and feet to go up and down. Got the bed from [employer]. Ongoing sleep disturbances and her    Is getting PT twice a week, but that will decrease to once a week. Gets OT once a week.   Tries to walk daily.   Appetite is good.   Sometimes has trouble with the swallowing. Notes if she eats too quickly, then she vomits. SLP recommended small bites, eat slowly, and alternate water with solids.   No UTIs since last visit.   Always tired. Energy is okay.    Moving bowels well. Drinking Miralax.   No falls, ER visits, or Hospital Admissions since last Palliative Care visit.         HPI Source:  Patient, Medical Record, , & caregiver  Past Medical History:   Past Medical History:   Diagnosis Date    Anxiety     Arthritis     Basal cell carcinoma     forehead    Glaucoma     Hypertension     Low back pain     Lumbosacral disc disease     Parkinson's disease     Peripheral neuropathy     Seizures (CMS/HCC)      Past Surgical History:    Past Surgical History:   Procedure Laterality Date    BLADDER SUSPENSION  2009    Prolift M    CATARACT EXTRACTION, BILATERAL      COLONOSCOPY      HYSTERECTOMY      JOINT REPLACEMENT      KNEE ARTHROPLASTY  2015    left     Family History:  family history includes No Known Problems in her biological father and biological mother.  Social History:    Social History     Socioeconomic History    Marital status:    Tobacco Use    Smoking status: Former    Smokeless tobacco: Never    Tobacco comments:     as a young teenager   Substance and Sexual Activity    Alcohol use: Yes     Comment: wine    Drug use: No    Sexual activity: Not Currently     Partners: Male     Social Determinants of Health     Financial Resource Strain: Low Risk  (9/24/2023)    Overall Financial Resource Strain (CARDIA)     Difficulty of Paying Living Expenses: Not hard at all   Food Insecurity: No Food Insecurity (11/27/2023)    Hunger Vital Sign     Worried About Running Out of Food in the Last Year: Never true     Ran Out of Food in the Last Year: Never true   Transportation Needs: No Transportation Needs (9/24/2023)    PRAPARE - Transportation     Lack of Transportation (Medical): No     Lack of Transportation (Non-Medical): No   Housing Stability: Low Risk  (9/24/2023)    Housing Stability Vital Sign     Unable to Pay for Housing in the Last Year: No     Number of Places Lived in the Last Year: 1     Unstable Housing in the Last Year: No       [employer]:     Changes or updates to Spiritual Assessment: Radha and Belief: Sabianism and No Spiritual concerns identified     Data Review  Lab Studies: Lab Studies: Pertinent radiology and labs reviewed  Labs last six months:    Admission on 11/27/2023, Discharged on 11/27/2023   Component Date Value Ref Range Status    WBC 11/27/2023 7.79  3.80 - 10.50 K/uL Final    RBC 11/27/2023 3.95  3.93 - 5.22 M/uL Final    Hemoglobin 11/27/2023 12.7  11.8 - 15.7 g/dL Final    Hematocrit 11/27/2023 39.9  35.0 - 45.0 % Final    MCV 11/27/2023 101.0 (H)  83.0 - 98.0 fL Final    MCH 11/27/2023 32.2  28.0 - 33.2 pg Final    MCHC 11/27/2023 31.8 (L)  32.2 - 35.5 g/dL Final    RDW 11/27/2023 13.2  11.7 - 14.4 % Final    Platelets 11/27/2023 232  150 - 369 K/uL Final    MPV 11/27/2023 9.2 (L)  9.4 - 12.3 fL Final    Differential Type 11/27/2023 Auto   Final    nRBC 11/27/2023 0.0  <=0.0 % Final    Immature Granulocytes 11/27/2023 0.5  % Final    Neutrophils 11/27/2023 72.2  % Final    Lymphocytes 11/27/2023 19.3  % Final    Monocytes 11/27/2023 5.9  % Final    Eosinophils 11/27/2023 1.3  % Final    Basophils 11/27/2023 0.8  % Final    Immature Granulocytes, Absolute 11/27/2023 0.04  0.00 - 0.08 K/uL Final    Neutrophils, Absolute 11/27/2023 5.63  1.70 - 7.00 K/uL Final    Lymphocytes, Absolute 11/27/2023 1.50  1.20 - 3.50 K/uL Final    Monocytes, Absolute 11/27/2023 0.46  0.28 - 0.80 K/uL Final    Eosinophils, Absolute 11/27/2023 0.10  0.04 - 0.36 K/uL Final    Basophils, Absolute 11/27/2023 0.06  0.01 - 0.10 K/uL Final    Sodium 11/27/2023 139  136 - 145 mEQ/L Final    Potassium 11/27/2023 4.2  3.5 - 5.1 mEQ/L Final    Results obtained on plasma. Plasma Potassium values may be up to 0.4 mEQ/L less than serum values. The differences may be greater for patients with high platelet or white cell counts.    Chloride 11/27/2023 107  98 - 107 mEQ/L Final    CO2 11/27/2023 26  21 - 31 mEQ/L Final    BUN 11/27/2023 14  7 - 25 mg/dL Final    Creatinine  11/27/2023 0.7  0.6 - 1.2 mg/dL Final    Glucose 11/27/2023 144 (H)  70 - 99 mg/dL Final    Calcium 11/27/2023 9.8  8.6 - 10.3 mg/dL Final    AST (SGOT) 11/27/2023 12 (L)  13 - 39 IU/L Final    ALT (SGPT) 11/27/2023 <3 (L)  7 - 52 IU/L Final    Alkaline Phosphatase 11/27/2023 40  34 - 125 IU/L Final    Total Protein 11/27/2023 6.1  6.0 - 8.2 g/dL Final    Test performed on plasma which typically contains approximately 0.4 g/dL more protein than serum.    Albumin 11/27/2023 3.6  3.5 - 5.7 g/dL Final    Bilirubin, Total 11/27/2023 0.5  0.3 - 1.2 mg/dL Final    eGFR 11/27/2023 >60.0  >=60.0 mL/min/1.73m*2 Final    Anion Gap 11/27/2023 6  3 - 15 mEQ/L Final    High Sens Troponin I 11/27/2023 9.4  <15.0 pg/mL Final    Ventricular rate 11/27/2023 91   Final    Atrial rate 11/27/2023 91   Final    [place] Interval 11/27/2023 196   Final    QRS duration 11/27/2023 74   Final    QT Interval 11/27/2023 324   Final    QTC Calculation(Bazett) 11/27/2023 398   Final    P Axis 11/27/2023 23   Final    R Axis 11/27/2023 -21   Final    T Wave Axis 11/27/2023 -22   Final    Magnesium 11/27/2023 1.9  1.8 - 2.5 mg/dL Final    SARS-CoV-2 (COVID-19) 11/27/2023 Negative  Negative Final    Influenza A 11/27/2023 Negative  Negative Final    Influenza B 11/27/2023 Negative  Negative Final    Respiratory Syncytial Virus 11/27/2023 Negative  Negative Final    High Sens Troponin I 11/27/2023 8.5  <15.0 pg/mL Final    Color, Urine 11/27/2023 Yellow  Yellow, Colorless Final    Clarity, Urine 11/27/2023 Cloudy (A)  Clear Final    Specific Gravity, Urine 11/27/2023 1.009  1.005 - 1.030 Final    pH, Urine 11/27/2023 7.0  4.5 - 8.0 Final    Leukocyte Esterase 11/27/2023 +3 (A)  Negative Final    Nitrite, Urine 11/27/2023 Positive (A)  Negative Final    Protein, Urine 11/27/2023 Negative  Negative Final    Glucose, Urine 11/27/2023 Negative  Negative mg/dL Final    Ketones, Urine 11/27/2023 Negative  Negative mg/dL Final    Urobilinogen, Urine 11/27/2023  0.2  <2.0 EU/dL EU/dL Final    Bilirubin, Urine 11/27/2023 Negative  Negative mg/dL Final    Blood, Urine 11/27/2023 Negative  Negative Final    The sensitivity of the occult blood test is equivalent to approximately 4 intact RBC/HPF.    RBC, Urine 11/27/2023 0 TO 4  0 TO 4 /HPF Final    WBC, Urine 11/27/2023 20 TO 35 (A)  0 TO 3 /HPF Final    Squamous Epithelial 11/27/2023 Rare (A)  None Seen /hpf Final    Hyaline Cast 11/27/2023 0 TO 2 (A)  None Seen /lpf Final    Bacteria, Urine 11/27/2023 Rare (A)  None Seen /HPF Final    Mucus 11/27/2023 Rare (A)  None Seen /LPF Final    Urine Culture 11/27/2023 **Positive Culture** (A)   Final    Urine Culture 11/27/2023 >=100,000 cfu/mL Escherichia coli   Final           Labs Reviewed: No new labs    Radiology and Imaging Studies: Pertinent radiology and labs reviewed   None  Radiology and Imaging Reviewed:  No new imaging    Cardiac Studies Reviewed:  No new studies  Medicine Tests reviewed:  No new tests      Review of Systems   Constitutional:  Positive for fatigue. Negative for activity change, appetite change and unexpected weight change.   HENT:  Positive for trouble swallowing. Negative for hearing loss.    Eyes:  Negative for visual disturbance.   Respiratory:  Negative for cough, shortness of breath and wheezing.    Cardiovascular:  Positive for leg swelling. Negative for chest pain and palpitations.   Gastrointestinal:  Negative for abdominal pain, constipation, nausea and vomiting.   Endocrine: Positive for cold intolerance.   Genitourinary:  Negative for decreased urine volume, difficulty urinating, dysuria and frequency.   Musculoskeletal:  Positive for arthralgias, back pain and gait problem.   Skin:  Negative for rash and wound.   Neurological:  Positive for weakness and numbness. Negative for dizziness and light-headedness.   Hematological:  Bruises/bleeds easily.   Psychiatric/Behavioral:  Positive for confusion. Negative for dysphoric mood and sleep  disturbance. The patient is not nervous/anxious.        Objective     Vitals:    04/15/24 1317   BP: (!) 142/78   BP Location: Left upper arm   Patient Position: Sitting   Pulse: 79   Resp: 18   Temp: 36.5 °C (97.7 °F)   TempSrc: Temporal   SpO2: 94%       There is no height or weight on file to calculate BMI.  Allergies:   Allergies   Allergen Reactions    Codeine Hives      n/v    Diazepam Other (see comments)     Blurred vision    Sulfa (Sulfonamide Antibiotics) Hives     Other reaction(s): Unknown    Sulfur Hives         Medications:   Current Outpatient Medications:     acetaminophen (TYLENOL) 500 mg tablet, Take 1,000 mg by mouth 3 (three) times a day., Disp: , Rfl:     apixaban (ELIQUIS) 2.5 mg tablet, Take 2.5 mg by mouth 2 (two) times a day., Disp: , Rfl:     bimatoprost (LUMIGAN) 0.01 % ophthalmic drops, Administer 1 drop into both eyes nightly., Disp: , Rfl:     buprenorphine (BUTRANS) 5 mcg/hour patch weekly, Place 1 patch on the skin every (seven) 7 days., Disp: , Rfl:     carbidopa-levodopa  mg per tablet, Take 1 tablet by mouth 3 (three) times a day. 0700, 1100, 1500, Disp: , Rfl:     docusate sodium (COLACE) 100 mg capsule, Take 100 mg by mouth daily., Disp: , Rfl:     famotidine (PEPCID) 20 mg tablet, Take 20 mg by mouth daily., Disp: , Rfl:     gabapentin (NEURONTIN) 300 mg capsule, Take 300 mg by mouth 3 (three) times a day., Disp: , Rfl:     hydrALAZINE (APRESOLINE) 50 mg tablet, Take 1 tablet (50 mg total) by mouth 2 (two) times a day. (Patient taking differently: Take 25 mg by mouth 2 (two) times a day.), Disp: , Rfl:     lisinopriL 20 mg tablet, Take 20 mg by mouth daily., Disp: , Rfl:     melatonin 3 mg tablet, Take 6 mg by mouth nightly., Disp: , Rfl:     mirtazapine 30 mg tablet, Take 30 mg by mouth nightly., Disp: , Rfl:     nitrofurantoin, macrocrystal-monohydrate, (MACROBID) 100 mg capsule, Take 100 mg by mouth daily before dinner., Disp: , Rfl:     pimavanserin (NUPLAZID) 34 mg  capsule, Take 34 mg by mouth daily., Disp: , Rfl:     senna (SENOKOT) 8.6 mg tablet, Take 2 tablets by mouth daily before dinner., Disp: , Rfl:     sertraline 50 mg tablet, Take 150 mg by mouth daily. Taking 50 mg in the AM & then 100 mg in the PM, Disp: , Rfl:     topiramate (TOPAMAX) 25 mg tablet, Take 25 mg by mouth 2 (two) times a day., Disp: , Rfl:   [facility]: Query reviewed and no concerns      Physical Exam  Constitutional:       General: She is not in acute distress.     Appearance: She is not toxic-appearing.      Comments: Older adult   HENT:      Head: Normocephalic and atraumatic.      Right Ear: External ear normal.      Left Ear: External ear normal.      Nose: Nose normal.      Mouth/Throat:      Mouth: Mucous membranes are moist.      Pharynx: Oropharynx is clear.   Eyes:      Conjunctiva/sclera: Conjunctivae normal.   Cardiovascular:      Rate and Rhythm: Normal rate and regular rhythm.      Pulses: Normal pulses.      Heart sounds: Normal heart sounds. No murmur heard.     Comments: + 1 RLE edema in ankle  Trace LLE edema in ankle  Pulmonary:      Effort: Pulmonary effort is normal.      Breath sounds: Normal breath sounds. No wheezing, rhonchi or rales.      Comments: Decreased breath sounds in b/l bases  Abdominal:      General: Abdomen is flat. Bowel sounds are normal. There is no distension.      Palpations: Abdomen is soft.      Tenderness: There is no abdominal tenderness.   Musculoskeletal:         General: Swelling present.      Cervical back: Normal range of motion.      Right lower leg: Edema present.      Left lower leg: Edema present.      Comments: sarcopenia   Swelling to her her right knee   Skin:     General: Skin is warm and dry.      Findings: Bruising present. No lesion or rash.   Neurological:      Mental Status: She is alert. Mental status is at baseline. She is disoriented.      Motor: Weakness present.      Gait: Gait abnormal.   Psychiatric:          Mood and Affect: Mood normal.         Thought Content: Thought content normal.      Comments: Forgetful  No agitation         Palliative Assessment  FAST Score:  6c.- Inability to handle mechanics of toileting (unchanged from 3/15/24)  Palliative Performance Scale: 50% (unchanged from 3/15/24)  Mid Upper Arm Circumference (MUAC):  L -26.5 cm (unchanged from 3/15/24)  ESASr: Pain 6, Tiredness 5, Drowsiness 5, Nausea 0, Appetite 0, Shortness of Breath 0, Depression 2, Anxiety 0 and Wellbeing 5  Palliative Disease State: Controlled with current treatments  Patient Prognostic Awareness: Demonstrates limited understanding of disease process, Demonstrates limited understanding of prognosis and Demonstrates limited understanding of resuscitation status  Palliative Care Risk Stratification: Level Three- High risk.  Requires highest level of intervention.  Not likely to return to usual care    Goals of Care  Advance Directives Status:  Not Received  Advance Directives:    Document type(s) Durable Power of , Living Will and POLST  Healthcare Proxy Name, Contact Information, and Relationship  Lev Porras ([phone])  Goals of Care Discussion: No  Change in medical orders resulting from advance care planning discussions: No change in medical orders  Code status Full code      Assessment:  Assessment/Plan   Diagnoses and all orders for this visit:    Encounter for palliative care in home, non-hospice (Primary)  Assessment & Plan:  Goals are Disease-Directed  Code Status: FULL code  Patient has an advanced directive that is not on file that names  Lev Porras ([phone]) as her health care power of  if she is unable to make her own medical decisions.   Has completed POLST in EHR and in home   Due to confusion from Dementia patient benefits from involving her family in complex medical decision making.       Parkinson's disease, unspecified whether dyskinesia present, unspecified  whether manifestations fluctuate (CMS/HCC)  Assessment & Plan:  Follows with Neurologist Dr Payan  Complicated by agitation, anxiety, hallucinations, and vivid dreams  Continues on Carbidopa-Levodopa  mg three times daily   Continues Nuplazid 34 mg in the AM daily   Continues Melatonin 6 mg at bedtime         Moderate dementia due to Parkinson's disease, with agitation (CMS/HCC)  Assessment & Plan:  Followed by neurologist Dr Payan  Monitor cognitive status  Monitor for falls  Continue close supervision and assist with ADLs  Continues on Sertraline 50 mg in the AM & 100 mg in the PM  Continues on Mirtazapine 30 mg nightly  Has 24/7 caregiver support       Primary hypertension  Assessment & Plan:  Followed by PCP Dr. Corona  Blood pressure fluctuates greatly, goal to avoid hypotension with past syncopal episodes.    Continues on Lisinopril 20 mg daily  Continues on Hydralazine 25 mg twice a day  Normotensive on exam  Monitor BP      Dysphagia, unspecified type  Assessment & Plan:  Encouraged upright positioning for oral intake  Continue small bites of food, eat slowly, and alternate with sips of water  Aspiration precautions  Continue soft moist foods      History of UTI  Assessment & Plan:  Follows with Dr. Conti for management   Currently on Macrobid 100 mg daily as suppressant   UTI symptoms: dysuria and AMS  Monitor for future UTI symptoms      Chronic low back pain with right-sided sciatica, unspecified back pain laterality  Assessment & Plan:  Chronic pain of lumbar spine with radiculopathy & neuropathy of both feet.  Followed by pain management specialists and receives epidural of the spine every 3 months.     Continues on Topamax 25 mg twice a day  Continues on Tylenol 1000 mg three times a day  Continues on Gabapentin 300 mg three times a day  Continues on Butrans 5mcg/hr transdermal patch changed every week   Continue PT/OT through [facility]  Monitor pain      Debility  Assessment & Plan:  Due to  Parkinson's disease and chronic back pain   Monitor for falls  Continue use of assistive devices  Has 24/7 paid caregiver support  Continues with OT/PT through [facility]               I spent  60 minutes on this date of service performing the following activities: obtaining history, performing examination, documenting, preparing for visit, providing counseling and education, and coordinating care.

## 2024-04-15 NOTE — ASSESSMENT & PLAN NOTE
Encouraged upright positioning for oral intake  Continue small bites of food, eat slowly, and alternate with sips of water  Aspiration precautions  Continue soft moist foods

## 2024-04-15 NOTE — ASSESSMENT & PLAN NOTE
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  Followed by pain management specialists and receives epidural of the spine every 3 months.     Continues on Topamax 25 mg twice a day  Continues on Tylenol 1000 mg three times a day  Continues on Gabapentin 300 mg three times a day  Continues on Butrans 5mcg/hr transdermal patch changed every week   Continue PT/OT through [facility]  Monitor pain

## 2024-04-15 NOTE — ASSESSMENT & PLAN NOTE
Goals are Disease-Directed  Code Status: FULL code  Patient has an advanced directive that is not on file that names  - John Porras ([phone]) as her health care power of  if she is unable to make her own medical decisions.   Has completed POLST in EHR and in home   Due to confusion from Dementia patient benefits from involving her family in complex medical decision making.

## 2024-05-23 NOTE — OP PT TREATMENT LOG
"PT Neuro Exercises Current Session Time Done Today    THER ACT  TOTAL TIME FOR SESSION 0-7 Minutes    Bed mobility     Transfer training     HEP Develop and reviewed  1. Clam shell with OTB  2. Bridge   3. Repeated sit to stand     Tandem balance at counter 20 sec x 4 B      Patient Education Pt educated on balance strategies, posture, gait mechanics yes   THER EX TOTAL TIME FOR SESSION 8-22 minutes  20   STRETCHING     Stretching  B calf stretches on #2 incline;    B HS stretches seated w/ ft on block  20-30 sec x 3 Yes  yes   CARDIOVASCULAR     Nu Step X 7 minutes  Level 1  no   Stationary Bike Rec bike   L 3 x10 min yes   Treadmill     Standing Ther-Ex Repeated sit to stand 2 x 10   4 way kicks w/ 1 LE on 2\" block, hip flex, abd, ext and add 10x each direction  Mini squats 2 x 10 Yes  Yes    yes   SLR 2x10 B no                       Supine Ther-Ex Bridge 2 X 10   Clam shell 2 X 10 no   NEURO RE-ED TOTAL TIME FOR SESSION 23-27  minutes  30   COORDINATION     POSTURAL RE-ED     PRE-GAIT ACTIVITIES      BALANCE TRAINING      Balance Assessment  JENISE Muhammad  no   Sitting balance     Standing balance - static NBOS on Airex with head turns x 10 B yes   Standing balance - dynamic A/p on rocker board x 20  Tandem ambulation 10 ft x 4  6\" hurdles, lateral and fwd step overs Yes  Yes  yes   Standing balance - varied surface Stdg on airex, reaching for cones, all directions, levels,   Stdg on airex, ball toss/bounce, catch Yes    yes   GAIT TRAINING TOTAL TIME FOR SESSION 8-22 minutes  10   Ambulation with AD  Gait indoors w/o AD (pt arrived w/o SPC) w/ horizontal and vertical head turns w/ cueing for heel strike and postue   ambulation around slalom course of cones Yes      yes   Dynamic gait      Stair negotiation     Curb negotiation     Ramp negotiation     Outdoor ambulation X 400 ft with SPC cues for sequence outdoors. LOB x 2 outdoors requiring min A to recover no   BWS/vector training     MANUAL TOTAL TIME FOR " Nichelle Bishop, CNP     SESSION Not performed    /PROM     Mobilization      MODALITIES TOTAL TIME FOR SESSION Not performed    Ice     Heat     ATTENDED E-STIM TOTAL TIME FOR SESSION Not performed    Attended E-Stim

## 2024-05-28 NOTE — ASSESSMENT & PLAN NOTE
Due to Parkinson's disease and chronic back pain   At risk for further decline and debility due to advancing age and multimorbidity  Monitor for falls (0 falls since last visit)  Continue use of assistive devices (walker)  Has 24/7 paid caregiver support  Continues with OT/PT through [facility]

## 2024-05-28 NOTE — PROGRESS NOTES
Palliative Home Based Care Established Visit    Subjective     Patient ID: Vicki Porras is a 88 y.o. female.  MRN: [id number]  Date/ Time Visit Made:  Referring Physician: No referring provider defined for this encounter.  Primary Care Physician:Shant Corona MD    The start time of this visit was 1:00 PM.  The end time of this visit was 1:32 PM.      I spent  60 minutes on this date of service performing the following activities: obtaining history, performing examination, documenting, preparing for visit, obtaining / reviewing records, providing counseling and education, and coordinating care.        Reason for Visit:  Palliative Care Follow-up      Mariya Porras is an 88 year old female seen in the home for Palliative Care follow-up visit with a [facility] of Parkinson's Disease, HTN, PE & DVT (2021), GERD, Anxiety, Depression, Chronic Pain, Neuropathy of feet, Dysphagia, and Debility.     At baseline patient lives with her  & has 24/7 paid caregiver support. Patient is confused with short-term memory issues. Patient is dependent in most ADLs. Patient can assist with dressing and toileting. Patient is independent in eating. Dependent in all iADLs. Ambulates with assist and walker for short distances. Has a ramp for wheelchair mobility and a chair glide to move to the second floor. Has an elevator to move between levels.     Patient last seen by Palliative Care on 4/15/24. Patient had follow-up with Pain Management provider Dr. Braswell on 4/25/24.    Met with patient in her living room seated in her recliner.   Reports ongoing back pain and hip pain. Has numbness in her feet. Reports comfort at rest.   Reports eating well. Reports coughing on occasion with eating.   No issues with her breathing.   Low energy and fatigue. Notes she is sleeping well.   Is getting PT & OT once a week with [facility]. No falls. Has a walker for ambulation.   Believes she is moving her bowels well. Last BM 5/27.    Generalized fragile skin. Bruises and bleeds easily.   No other questions or concerns.   Spoke about her children upbringing and family.   Reviewed health conditions and advanced care planning with patient.      inquiring about provider's services. Reviewed role of Palliative Care as extra layer of support.     Spoke to Lucia caregiver who reports patient recently treated for a UTI. Notes patient also with ongoing hallucinations. Plan to meet with Neurologist Dr. Littlejohn this week. Inquiring about  support. Discussed plan for provider to follow-up in July. Reviewed medications - no changes.   No falls, ER visits, or Hospital admissions since last Palliative Care visit.         HPI Source:  Patient, Medical Record, , & caregiver  Past Medical History:   Past Medical History:   Diagnosis Date    Anxiety     Arthritis     Basal cell carcinoma     forehead    Glaucoma     Hypertension     Low back pain     Lumbosacral disc disease     Parkinson's disease     Peripheral neuropathy     Seizures (CMS/HCC)      Past Surgical History:    Past Surgical History:   Procedure Laterality Date    BLADDER SUSPENSION  2009    Prolift M    CATARACT EXTRACTION, BILATERAL      COLONOSCOPY      HYSTERECTOMY      JOINT REPLACEMENT      KNEE ARTHROPLASTY  2015    left     Family History:  family history includes No Known Problems in her biological father and biological mother.  Social History:    Social History     Socioeconomic History    Marital status:    Tobacco Use    Smoking status: Former    Smokeless tobacco: Never    Tobacco comments:     as a young teenager   Substance and Sexual Activity    Alcohol use: Yes     Comment: wine    Drug use: No    Sexual activity: Not Currently     Partners: Male     Social Determinants of Health     Financial Resource Strain: Low Risk  (9/24/2023)    Overall Financial Resource Strain (CARDIA)     Difficulty of Paying Living Expenses: Not hard at all   Food  Insecurity: No Food Insecurity (11/27/2023)    Hunger Vital Sign     Worried About Running Out of Food in the Last Year: Never true     Ran Out of Food in the Last Year: Never true   Transportation Needs: No Transportation Needs (9/24/2023)    PRAPARE - Transportation     Lack of Transportation (Medical): No     Lack of Transportation (Non-Medical): No   Housing Stability: Low Risk  (9/24/2023)    Housing Stability Vital Sign     Unable to Pay for Housing in the Last Year: No     Number of Places Lived in the Last Year: 1     Unstable Housing in the Last Year: No       Religious:    Changes or updates to Spiritual Assessment: Radha and Belief: Jew and No Spiritual concerns identified     Data Review  Lab Studies: Lab Studies: Pertinent radiology and labs reviewed  Labs last six months:    No visits with results within 6 Month(s) from this visit.   Latest known visit with results is:   Admission on 11/27/2023, Discharged on 11/27/2023   Component Date Value Ref Range Status    WBC 11/27/2023 7.79  3.80 - 10.50 K/uL Final    RBC 11/27/2023 3.95  3.93 - 5.22 M/uL Final    Hemoglobin 11/27/2023 12.7  11.8 - 15.7 g/dL Final    Hematocrit 11/27/2023 39.9  35.0 - 45.0 % Final    MCV 11/27/2023 101.0 (H)  83.0 - 98.0 fL Final    MCH 11/27/2023 32.2  28.0 - 33.2 pg Final    MCHC 11/27/2023 31.8 (L)  32.2 - 35.5 g/dL Final    RDW 11/27/2023 13.2  11.7 - 14.4 % Final    Platelets 11/27/2023 232  150 - 369 K/uL Final    MPV 11/27/2023 9.2 (L)  9.4 - 12.3 fL Final    Differential Type 11/27/2023 Auto   Final    nRBC 11/27/2023 0.0  <=0.0 % Final    Immature Granulocytes 11/27/2023 0.5  % Final    Neutrophils 11/27/2023 72.2  % Final    Lymphocytes 11/27/2023 19.3  % Final    Monocytes 11/27/2023 5.9  % Final    Eosinophils 11/27/2023 1.3  % Final    Basophils 11/27/2023 0.8  % Final    Immature Granulocytes, Absolute 11/27/2023 0.04  0.00 - 0.08 K/uL Final    Neutrophils, Absolute 11/27/2023 5.63  1.70 - 7.00 K/uL Final     Lymphocytes, Absolute 11/27/2023 1.50  1.20 - 3.50 K/uL Final    Monocytes, Absolute 11/27/2023 0.46  0.28 - 0.80 K/uL Final    Eosinophils, Absolute 11/27/2023 0.10  0.04 - 0.36 K/uL Final    Basophils, Absolute 11/27/2023 0.06  0.01 - 0.10 K/uL Final    Sodium 11/27/2023 139  136 - 145 mEQ/L Final    Potassium 11/27/2023 4.2  3.5 - 5.1 mEQ/L Final    Results obtained on plasma. Plasma Potassium values may be up to 0.4 mEQ/L less than serum values. The differences may be greater for patients with high platelet or white cell counts.    Chloride 11/27/2023 107  98 - 107 mEQ/L Final    CO2 11/27/2023 26  21 - 31 mEQ/L Final    BUN 11/27/2023 14  7 - 25 mg/dL Final    Creatinine 11/27/2023 0.7  0.6 - 1.2 mg/dL Final    Glucose 11/27/2023 144 (H)  70 - 99 mg/dL Final    Calcium 11/27/2023 9.8  8.6 - 10.3 mg/dL Final    AST (SGOT) 11/27/2023 12 (L)  13 - 39 IU/L Final    ALT (SGPT) 11/27/2023 <3 (L)  7 - 52 IU/L Final    Alkaline Phosphatase 11/27/2023 40  34 - 125 IU/L Final    Total Protein 11/27/2023 6.1  6.0 - 8.2 g/dL Final    Test performed on plasma which typically contains approximately 0.4 g/dL more protein than serum.    Albumin 11/27/2023 3.6  3.5 - 5.7 g/dL Final    Bilirubin, Total 11/27/2023 0.5  0.3 - 1.2 mg/dL Final    eGFR 11/27/2023 >60.0  >=60.0 mL/min/1.73m*2 Final    Anion Gap 11/27/2023 6  3 - 15 mEQ/L Final    High Sens Troponin I 11/27/2023 9.4  <15.0 pg/mL Final    Ventricular rate 11/27/2023 91   Final    Atrial rate 11/27/2023 91   Final    CT Interval 11/27/2023 196   Final    QRS duration 11/27/2023 74   Final    QT Interval 11/27/2023 324   Final    QTC Calculation(Bazett) 11/27/2023 398   Final    P Axis 11/27/2023 23   Final    R Axis 11/27/2023 -21   Final    T Wave Axis 11/27/2023 -22   Final    Magnesium 11/27/2023 1.9  1.8 - 2.5 mg/dL Final    SARS-CoV-2 (COVID-19) 11/27/2023 Negative  Negative Final    Influenza A 11/27/2023 Negative  Negative Final    Influenza B 11/27/2023 Negative   Negative Final    Respiratory Syncytial Virus 11/27/2023 Negative  Negative Final    High Sens Troponin I 11/27/2023 8.5  <15.0 pg/mL Final    Color, Urine 11/27/2023 Yellow  Yellow, Colorless Final    Clarity, Urine 11/27/2023 Cloudy (A)  Clear Final    Specific Gravity, Urine 11/27/2023 1.009  1.005 - 1.030 Final    pH, Urine 11/27/2023 7.0  4.5 - 8.0 Final    Leukocyte Esterase 11/27/2023 +3 (A)  Negative Final    Nitrite, Urine 11/27/2023 Positive (A)  Negative Final    Protein, Urine 11/27/2023 Negative  Negative Final    Glucose, Urine 11/27/2023 Negative  Negative mg/dL Final    Ketones, Urine 11/27/2023 Negative  Negative mg/dL Final    Urobilinogen, Urine 11/27/2023 0.2  <2.0 EU/dL EU/dL Final    Bilirubin, Urine 11/27/2023 Negative  Negative mg/dL Final    Blood, Urine 11/27/2023 Negative  Negative Final    The sensitivity of the occult blood test is equivalent to approximately 4 intact RBC/HPF.    RBC, Urine 11/27/2023 0 TO 4  0 TO 4 /HPF Final    WBC, Urine 11/27/2023 20 TO 35 (A)  0 TO 3 /HPF Final    Squamous Epithelial 11/27/2023 Rare (A)  None Seen /hpf Final    Hyaline Cast 11/27/2023 0 TO 2 (A)  None Seen /lpf Final    Bacteria, Urine 11/27/2023 Rare (A)  None Seen /HPF Final    Mucus 11/27/2023 Rare (A)  None Seen /LPF Final    Urine Culture 11/27/2023 **Positive Culture** (A)   Final    Urine Culture 11/27/2023 >=100,000 cfu/mL Escherichia coli   Final           Labs Reviewed: No new labs    Radiology and Imaging Studies: Pertinent radiology and labs reviewed   None  Radiology and Imaging Reviewed:  No new imaging    Cardiac Studies Reviewed:  No new studies  Medicine Tests reviewed:  No new tests      Review of Systems   Constitutional:  Positive for fatigue. Negative for activity change, appetite change and unexpected weight change.   HENT:  Positive for trouble swallowing. Negative for hearing loss.    Eyes:  Negative for visual disturbance.   Respiratory:  Negative for cough, shortness of  breath and wheezing.    Cardiovascular:  Negative for chest pain, palpitations and leg swelling.   Gastrointestinal:  Negative for abdominal pain, constipation, nausea and vomiting.   Endocrine: Positive for cold intolerance.   Genitourinary:  Negative for decreased urine volume, difficulty urinating, dysuria and frequency.   Musculoskeletal:  Positive for arthralgias, back pain and gait problem.   Skin:  Negative for rash and wound.   Neurological:  Positive for weakness and numbness. Negative for dizziness and light-headedness.   Hematological:  Bruises/bleeds easily.   Psychiatric/Behavioral:  Positive for confusion. Negative for dysphoric mood and sleep disturbance. The patient is not nervous/anxious.        Objective     Vitals:    05/28/24 1312   BP: (!) 140/82   BP Location: Left upper arm   Patient Position: Sitting   Pulse: 81   Resp: 18   Temp: 36.5 °C (97.7 °F)   TempSrc: Temporal   SpO2: 95%         There is no height or weight on file to calculate BMI.  Allergies:   Allergies   Allergen Reactions    Codeine Hives      n/v    Diazepam Other (see comments)     Blurred vision    Sulfa (Sulfonamide Antibiotics) Hives     Other reaction(s): Unknown    Sulfur Hives         Medications:   Current Outpatient Medications:     acetaminophen (TYLENOL) 500 mg tablet, Take 1,000 mg by mouth 3 (three) times a day., Disp: , Rfl:     apixaban (ELIQUIS) 2.5 mg tablet, Take 2.5 mg by mouth 2 (two) times a day., Disp: , Rfl:     bimatoprost (LUMIGAN) 0.01 % ophthalmic drops, Administer 1 drop into both eyes nightly., Disp: , Rfl:     buprenorphine (BUTRANS) 5 mcg/hour patch weekly, Place 1 patch on the skin every (seven) 7 days., Disp: , Rfl:     carbidopa-levodopa  mg per tablet, Take 1 tablet by mouth 3 (three) times a day. 0700, 1100, 1500, Disp: , Rfl:     docusate sodium (COLACE) 100 mg capsule, Take 100 mg by mouth daily., Disp: , Rfl:     famotidine (PEPCID) 20 mg tablet, Take 20 mg by mouth daily., Disp: ,  Rfl:     gabapentin (NEURONTIN) 300 mg capsule, Take 300 mg by mouth 3 (three) times a day., Disp: , Rfl:     hydrALAZINE (APRESOLINE) 50 mg tablet, Take 1 tablet (50 mg total) by mouth 2 (two) times a day. (Patient taking differently: Take 25 mg by mouth 2 (two) times a day.), Disp: , Rfl:     lisinopriL 20 mg tablet, Take 20 mg by mouth daily., Disp: , Rfl:     melatonin 3 mg tablet, Take 6 mg by mouth nightly., Disp: , Rfl:     mirtazapine 30 mg tablet, Take 30 mg by mouth nightly., Disp: , Rfl:     nitrofurantoin, macrocrystal-monohydrate, (MACROBID) 100 mg capsule, Take 100 mg by mouth daily before dinner., Disp: , Rfl:     pimavanserin (NUPLAZID) 34 mg capsule, Take 34 mg by mouth daily., Disp: , Rfl:     senna (SENOKOT) 8.6 mg tablet, Take 2 tablets by mouth daily before dinner., Disp: , Rfl:     sertraline 50 mg tablet, Take 150 mg by mouth daily. Taking 50 mg in the AM & then 100 mg in the PM, Disp: , Rfl:     topiramate (TOPAMAX) 25 mg tablet, Take 25 mg by mouth 2 (two) times a day., Disp: , Rfl:   [facility], [facility] AWARxE: Query reviewed and no concerns      Physical Exam  Constitutional:       General: She is not in acute distress.     Appearance: She is not toxic-appearing.      Comments: Older adult   HENT:      Head: Normocephalic and atraumatic.      Right Ear: External ear normal.      Left Ear: External ear normal.      Nose: Nose normal.      Mouth/Throat:      Mouth: Mucous membranes are moist.      Pharynx: Oropharynx is clear.   Eyes:      General: Lids are normal.      Conjunctiva/sclera: Conjunctivae normal.   Cardiovascular:      Rate and Rhythm: Normal rate and regular rhythm.      Pulses: Normal pulses.      Heart sounds: Normal heart sounds. No murmur heard.     Comments: Trace b/l ankle edema  Pulmonary:      Effort: Pulmonary effort is normal.      Breath sounds: Normal breath sounds. No wheezing, rhonchi or rales.      Comments: Decreased breath sounds in b/l  bases  Abdominal:      General: Abdomen is flat. Bowel sounds are normal. There is no distension.      Palpations: Abdomen is soft.      Tenderness: There is no abdominal tenderness.   Musculoskeletal:         General: Swelling present.      Cervical back: Normal range of motion.      Right lower leg: Edema present.      Left lower leg: Edema present.      Comments: Sarcopenia    Skin:     General: Skin is warm and dry.      Findings: Bruising present. No lesion or rash.   Neurological:      Mental Status: She is alert. Mental status is at baseline. She is disoriented.      Motor: Weakness present.      Gait: Gait abnormal.   Psychiatric:         Attention and Perception: Attention normal.         Mood and Affect: Mood and affect normal.         Speech: Speech normal.         Behavior: Behavior normal. Behavior is not agitated or aggressive. Behavior is cooperative.         Thought Content: Thought content normal.         Cognition and Memory: She exhibits impaired recent memory.         Judgment: Judgment normal.         Palliative Assessment  FAST Score:  6c.- Inability to handle mechanics of toileting (unchanged from 4/15/24)  Palliative Performance Scale: 50% (unchanged from 4/15/24)  Mid Upper Arm Circumference (MUAC):  L -27 cm (increased from 26.5 cm on 4/15/24)  ESASr: Pain 3, Tiredness 4, Drowsiness 3, Nausea 0, Appetite 0, Shortness of Breath 0, Depression 2, Anxiety 0 and Wellbeing 5  Palliative Disease State: Controlled with current treatments  Patient Prognostic Awareness: Demonstrates limited understanding of disease process, Demonstrates limited understanding of prognosis and Demonstrates limited understanding of resuscitation status  Palliative Care Risk Stratification: Level Three- High risk.  Requires highest level of intervention.  Not likely to return to usual care    Goals of Care  Advance Directives Status:  Not Received  Advance Directives:    Document type(s) Durable Power of , Living  Will and POLST  Healthcare Proxy Name, Contact Information, and Relationship  - John Porras ([phone])  Goals of Care Discussion: No  Change in medical orders resulting from advance care planning discussions: No change in medical orders  Code status Full code      Assessment:  Assessment/Plan   Diagnoses and all orders for this visit:    Encounter for palliative care in home, non-hospice (Primary)  Assessment & Plan:  Goals are Disease-Directed  Code Status: FULL code  Patient has an advanced directive that is not on file that names  Lev Porras ([phone]) as her health care power of  if she is unable to make her own medical decisions.   Has completed POLST in EHR and in home   Due to confusion from Dementia patient benefits from involving her family in complex medical decision making.       Parkinson's disease, unspecified whether dyskinesia present, unspecified whether manifestations fluctuate (CMS/HCC)  Assessment & Plan:  Follows with Neurologist Dr Payan  Complicated by agitation, anxiety, hallucinations, and vivid dreams  Continues on Carbidopa-Levodopa  mg three times daily   Continues Nuplazid 34 mg in the AM daily   Continues Melatonin 6 mg at bedtime   Plan to follow-up with Dr. Payan this week given hallucinations      Moderate dementia due to Parkinson's disease, with agitation (CMS/HCC)  Assessment & Plan:  Followed by neurologist Dr Payan  Monitor cognitive status  Monitor for falls  Continue close supervision and assist with ADLs  Continues on Sertraline 50 mg in the AM & 100 mg in the PM  Continues on Mirtazapine 30 mg nightly  Has 24/7 caregiver support       Primary hypertension  Assessment & Plan:  Followed by PCP Dr. Corona  Blood pressure fluctuates greatly, goal to avoid hypotension with past syncopal episodes.    Continues on Lisinopril 20 mg daily  Continues on Hydralazine 25 mg twice a day  Normotensive on exam  Monitor BP      Dysphagia,  unspecified type  Assessment & Plan:  Encouraged upright positioning for oral intake  Continue small bites of food, eat slowly, and alternate with sips of water  Aspiration precautions  Continue soft moist foods          History of UTI  Assessment & Plan:  Follows with Dr. Conti for management   Currently on Macrobid 100 mg daily as suppressant   Recently treated for UTI per caregiver   UTI symptoms: dysuria and AMS  Monitor for future UTI symptoms      History of venous thromboembolism  Assessment & Plan:  Hx of DVT and PE  Followed by PCP for managment  Continues on Eliquis 2.5 mg twice daily  Monitor for bleeding with Eliquis         Chronic low back pain with right-sided sciatica, unspecified back pain laterality  Assessment & Plan:  Chronic pain of lumbar spine with radiculopathy & neuropathy of both feet.  Followed by pain management specialists and receives epidural of the spine every 3 months.     Continues on Topamax 25 mg twice a day  Continues on Tylenol 1000 mg three times a day  Continues on Gabapentin 300 mg three times a day  Continues on Butrans 5mcg/hr transdermal patch changed every week   Continue PT/OT through [facility]  Monitor pain      Debility  Assessment & Plan:  Due to Parkinson's disease and chronic back pain   At risk for further decline and debility due to advancing age and multimorbidity  Monitor for falls (0 falls since last visit)  Continue use of assistive devices (walker)  Has 24/7 paid caregiver support  Continues with OT/PT through [facility]

## 2024-05-28 NOTE — ASSESSMENT & PLAN NOTE
Follows with Neurologist Dr Payan  Complicated by agitation, anxiety, hallucinations, and vivid dreams  Continues on Carbidopa-Levodopa  mg three times daily   Continues Nuplazid 34 mg in the AM daily   Continues Melatonin 6 mg at bedtime   Plan to follow-up with Dr. Payan this week given hallucinations

## 2024-05-28 NOTE — ASSESSMENT & PLAN NOTE
Follows with Dr. Conti for management   Currently on Macrobid 100 mg daily as suppressant   Recently treated for UTI per caregiver   UTI symptoms: dysuria and AMS  Monitor for future UTI symptoms

## 2024-05-28 NOTE — ASSESSMENT & PLAN NOTE
Goals are Disease-Directed  Code Status: FULL code  Patient has an advanced directive that is not on file that names  - John Porras ([phone]) as her health care power of  if she is unable to make her own medical decisions.   Has completed POLST in EHR and in home   Due to confusion from Dementia patient benefits from involving her family in complex medical decision making.

## 2024-06-19 NOTE — ASSESSMENT & PLAN NOTE
Caller: Herson Suarez    Relationship: Self    Best call back number: [phone]     What medication are you requesting: SOME SORT OF PACK THAT WILL HELP HER. DOESN'T KNOW WHICH ONE    What are your current symptoms: SORE THROAT, A LOT OF MUCUS, AND LEFT EAR PAIN. NEEDS AS SOON AS POSSIBLE. SHE IS ON VACATION IN [place] AND CANNOT ENJOY IT    How long have you been experiencing symptoms: COUPLE DAY'S    Have you had these symptoms before:    [x] Yes  [] No    Have you been treated for these symptoms before:   [x] Yes  [] No    If a prescription is needed, what is your preferred pharmacy and phone number:      [facility]  [address] [place] [phone]  Open · Closes 9 PM    Additional notes: PLEASE CALL AND LET HERSON KNOW WHEN SOMETHING IS SENT IN         Goals are Disease-Directed  Code Status: FULL code  Patient has an advanced directive that is not on file that names  - John Porras ([phone]) as her health care power of  if she is unable to make her own medical decisions.   Assisted with completion of POLST and uploaded to EHR  Patient lacks complex medical decision making capacity due to confusion from dementia.

## 2024-06-25 NOTE — PROGRESS NOTES
[facility] Home Based PC Note      Date: 24  PATIENT NAME: Mariya Porras  : 1935  EPIC MR #[id number]    REFERRING NP: Verna Romero  REASON FOR CONSULT:   [   ]  JELLY COMMUNITY COORDINATION  [   ]  CAREGIVER SUPPORT  [ X ]  SPIRITUAL COUNSELING  [   ]  ADVANCE CARE PLANNING  [  ]  ISOLATION CONCERNS/SOCIAL ISOLATION  [ X ]  MENTAL HEALTH CONCERNS  [   ]  SPIRITUAL DISTRESS  [  X ]  COPING WITH LIFE LIMITED ILLNESS  [   ]  OTHER- SPECIFY OTHER REASON:  ADDITIONAL INFORMATION:    met with patient on 24.  Patient received sitting in recliner chair in family room.  Patient pleasant and engaged in conversation.  Patient talked about her family, memories of raising children, the loss of her son.  Patient with good overall recall of memories, unable to provide details.  Patient reports feeling isolated at times, thankful for large family who visits regularly.  Patient voiced concern for spouse who has health issues as well.  Patient is inactive [employer].  States prayer brings her peace and comfort.  Supportive conversation and listening ear provided.  PCG Lucia requested regular  visits to help engage patient and decrease isolation.   provided prayer support.  FOLLOW UP NEEDS/PLAN:    will continue to visit patient monthly for supportive conversation with goal of decreasing isolation, provide stimulation for patient and encourage continued use of prayer to increase feelings of peacefulness.  Signature of : Rosario Haynes MA

## 2024-07-08 NOTE — ASSESSMENT & PLAN NOTE
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  Followed by pain management specialists and receives epidural of the spine every 3 months.     Continues on Topamax 25 mg twice a day  Continues on Tylenol 1000 mg three times a day  Continues on Gabapentin 300 mg three times a day  Continues on Butrans 5mcg/hr transdermal patch changed every week   Continue PT/OT through [facility] once a week  Monitor pain

## 2024-07-08 NOTE — ASSESSMENT & PLAN NOTE
Due to Parkinson's disease and chronic back pain   At risk for further decline and debility due to advancing age and multimorbidity  Monitor for falls (0 falls since last visit)  Continue use of assistive devices (walker)  Has 24/7 paid caregiver support  Continues with OT/PT through [facility] weekly   Encouraged use of Incentive Spirometer 10 times BID

## 2024-07-08 NOTE — ASSESSMENT & PLAN NOTE
Goals are Disease-Directed  Code Status: FULL code  Patient has an advanced directive that is not on file that names  - John oPrras ([phone]) as her health care power of  if she is unable to make her own medical decisions.   Has completed POLST in EHR and in home   Due to confusion from Dementia patient benefits from involving her family in complex medical decision making.

## 2024-07-08 NOTE — PROGRESS NOTES
Palliative Home Based Care Established Visit    Subjective     Patient ID: Vicki Porras is a 88 y.o. female.  MRN: [id number]  Date/ Time Visit Made:  Referring Physician: No referring provider defined for this encounter.  Primary Care Physician:Shant Corona MD    The start time of this visit was 12:55 PM.  The end time of this visit was 1:37 PM.      I spent  60 minutes on this date of service performing the following activities: obtaining history, performing examination, documenting, preparing for visit, obtaining / reviewing records, providing counseling and education, and coordinating care.        Reason for Visit:  Palliative Care Follow-up      Mariya Porras is an 88 year old female seen in the home for Palliative Care follow-up visit with a [facility] of Parkinson's Disease, HTN, PE & DVT (2021), GERD, Anxiety, Depression, Chronic Pain, Neuropathy of feet, Dysphagia, and Debility.     At baseline patient lives with her  & has 24/7 paid caregiver support. Patient is confused with short-term memory issues. Patient is dependent in most ADLs. Patient can assist with dressing and toileting. Patient is independent in eating. Dependent in all iADLs. Ambulates with assist and walker for short distances. Has a ramp for wheelchair mobility and a chair glide to move to the second floor. Has an elevator to move between levels.     Patient last seen by Palliative Care on 5/28/24. Since then patient had follow-up with Pain Management provider OSCAR Aguilar on 5/31/24 with no medication changes. Patient met with Palliative Care  Rosario Haynes on 6/25/24.     Met with patient in her recliner. Patient reports slight stomach upset. Notes her stomach feels better after she eats. Was able to eat a full bowl oatmeal and coffee. Is moving her bowels, but hasn't had one in a couple of days. Is drinking miralax. No issues voiding. Is going to a lab today for urine test for pain medications. Reports back  "pain \"isn't too bad.\" Reports no pain at rest. Has some pain when she moves. Sleeping well at nighttime. Reports ongoing low energy. Continues with PT through [facility] once a week, but hasn't been seen for 2 weeks. Had COVID-19, diagnosed 2 weeks ago. Had fevers, malaise, and cough. Symptoms have resolved and notes she tested negative for COVID-19 on Saturday. Lucia joined the last part of the visit. Understands needs to call and schedule follow-up with . Celebrated her birthday with her daughter earlier this week. Struggles to do deep breathing exercises. Has next spinal injection with Pain Management on 7/29. No medication changes.   No falls, ER visits, or Hospital Stays since last Palliative Care visit.         HPI Source:  Patient, Medical Record, , & caregiver  Past Medical History:   Past Medical History:   Diagnosis Date    Anxiety     Arthritis     Basal cell carcinoma     forehead    Glaucoma     Hypertension     Low back pain     Lumbosacral disc disease     Parkinson's disease     Peripheral neuropathy     Seizures (CMS/HCC)      Past Surgical History:    Past Surgical History:   Procedure Laterality Date    BLADDER SUSPENSION  2009    Prolift M    CATARACT EXTRACTION, BILATERAL      COLONOSCOPY      HYSTERECTOMY      JOINT REPLACEMENT      KNEE ARTHROPLASTY  2015    left     Family History:  family history includes No Known Problems in her biological father and biological mother.  Social History:    Social History     Socioeconomic History    Marital status:    Tobacco Use    Smoking status: Former    Smokeless tobacco: Never    Tobacco comments:     as a young teenager   Substance and Sexual Activity    Alcohol use: Yes     Comment: wine    Drug use: No    Sexual activity: Not Currently     Partners: Male     Social Determinants of Health     Financial Resource Strain: Low Risk  (9/24/2023)    Overall Financial Resource Strain (CARDIA)     Difficulty of Paying Living Expenses: Not " hard at all   Food Insecurity: No Food Insecurity (11/27/2023)    Hunger Vital Sign     Worried About Running Out of Food in the Last Year: Never true     Ran Out of Food in the Last Year: Never true   Transportation Needs: No Transportation Needs (9/24/2023)    PRAPARE - Transportation     Lack of Transportation (Medical): No     Lack of Transportation (Non-Medical): No   Housing Stability: Low Risk  (9/24/2023)    Housing Stability Vital Sign     Unable to Pay for Housing in the Last Year: No     Number of Places Lived in the Last Year: 1     Unstable Housing in the Last Year: No       Yarsanism:    Changes or updates to Spiritual Assessment: Radha and Belief: Anglican and No Spiritual concerns identified     Data Review  Lab Studies: Lab Studies: Pertinent radiology and labs reviewed  Labs last six months:    No visits with results within 6 Month(s) from this visit.   Latest known visit with results is:   Admission on 11/27/2023, Discharged on 11/27/2023   Component Date Value Ref Range Status    WBC 11/27/2023 7.79  3.80 - 10.50 K/uL Final    RBC 11/27/2023 3.95  3.93 - 5.22 M/uL Final    Hemoglobin 11/27/2023 12.7  11.8 - 15.7 g/dL Final    Hematocrit 11/27/2023 39.9  35.0 - 45.0 % Final    MCV 11/27/2023 101.0 (H)  83.0 - 98.0 fL Final    MCH 11/27/2023 32.2  28.0 - 33.2 pg Final    MCHC 11/27/2023 31.8 (L)  32.2 - 35.5 g/dL Final    RDW 11/27/2023 13.2  11.7 - 14.4 % Final    Platelets 11/27/2023 232  150 - 369 K/uL Final    MPV 11/27/2023 9.2 (L)  9.4 - 12.3 fL Final    Differential Type 11/27/2023 Auto   Final    nRBC 11/27/2023 0.0  <=0.0 % Final    Immature Granulocytes 11/27/2023 0.5  % Final    Neutrophils 11/27/2023 72.2  % Final    Lymphocytes 11/27/2023 19.3  % Final    Monocytes 11/27/2023 5.9  % Final    Eosinophils 11/27/2023 1.3  % Final    Basophils 11/27/2023 0.8  % Final    Immature Granulocytes, Absolute 11/27/2023 0.04  0.00 - 0.08 K/uL Final    Neutrophils, Absolute 11/27/2023 5.63  1.70 -  7.00 K/uL Final    Lymphocytes, Absolute 11/27/2023 1.50  1.20 - 3.50 K/uL Final    Monocytes, Absolute 11/27/2023 0.46  0.28 - 0.80 K/uL Final    Eosinophils, Absolute 11/27/2023 0.10  0.04 - 0.36 K/uL Final    Basophils, Absolute 11/27/2023 0.06  0.01 - 0.10 K/uL Final    Sodium 11/27/2023 139  136 - 145 mEQ/L Final    Potassium 11/27/2023 4.2  3.5 - 5.1 mEQ/L Final    Results obtained on plasma. Plasma Potassium values may be up to 0.4 mEQ/L less than serum values. The differences may be greater for patients with high platelet or white cell counts.    Chloride 11/27/2023 107  98 - 107 mEQ/L Final    CO2 11/27/2023 26  21 - 31 mEQ/L Final    BUN 11/27/2023 14  7 - 25 mg/dL Final    Creatinine 11/27/2023 0.7  0.6 - 1.2 mg/dL Final    Glucose 11/27/2023 144 (H)  70 - 99 mg/dL Final    Calcium 11/27/2023 9.8  8.6 - 10.3 mg/dL Final    AST (SGOT) 11/27/2023 12 (L)  13 - 39 IU/L Final    ALT (SGPT) 11/27/2023 <3 (L)  7 - 52 IU/L Final    Alkaline Phosphatase 11/27/2023 40  34 - 125 IU/L Final    Total Protein 11/27/2023 6.1  6.0 - 8.2 g/dL Final    Test performed on plasma which typically contains approximately 0.4 g/dL more protein than serum.    Albumin 11/27/2023 3.6  3.5 - 5.7 g/dL Final    Bilirubin, Total 11/27/2023 0.5  0.3 - 1.2 mg/dL Final    eGFR 11/27/2023 >60.0  >=60.0 mL/min/1.73m*2 Final    Anion Gap 11/27/2023 6  3 - 15 mEQ/L Final    High Sens Troponin I 11/27/2023 9.4  <15.0 pg/mL Final    Ventricular rate 11/27/2023 91   Final    Atrial rate 11/27/2023 91   Final    MD Interval 11/27/2023 196   Final    QRS duration 11/27/2023 74   Final    QT Interval 11/27/2023 324   Final    QTC Calculation(Bazett) 11/27/2023 398   Final    P Axis 11/27/2023 23   Final    R Axis 11/27/2023 -21   Final    T Wave Axis 11/27/2023 -22   Final    Magnesium 11/27/2023 1.9  1.8 - 2.5 mg/dL Final    SARS-CoV-2 (COVID-19) 11/27/2023 Negative  Negative Final    Influenza A 11/27/2023 Negative  Negative Final    Influenza B  11/27/2023 Negative  Negative Final    Respiratory Syncytial Virus 11/27/2023 Negative  Negative Final    High Sens Troponin I 11/27/2023 8.5  <15.0 pg/mL Final    Color, Urine 11/27/2023 Yellow  Yellow, Colorless Final    Clarity, Urine 11/27/2023 Cloudy (A)  Clear Final    Specific Gravity, Urine 11/27/2023 1.009  1.005 - 1.030 Final    pH, Urine 11/27/2023 7.0  4.5 - 8.0 Final    Leukocyte Esterase 11/27/2023 +3 (A)  Negative Final    Nitrite, Urine 11/27/2023 Positive (A)  Negative Final    Protein, Urine 11/27/2023 Negative  Negative Final    Glucose, Urine 11/27/2023 Negative  Negative mg/dL Final    Ketones, Urine 11/27/2023 Negative  Negative mg/dL Final    Urobilinogen, Urine 11/27/2023 0.2  <2.0 EU/dL EU/dL Final    Bilirubin, Urine 11/27/2023 Negative  Negative mg/dL Final    Blood, Urine 11/27/2023 Negative  Negative Final    The sensitivity of the occult blood test is equivalent to approximately 4 intact RBC/HPF.    RBC, Urine 11/27/2023 0 TO 4  0 TO 4 /HPF Final    WBC, Urine 11/27/2023 20 TO 35 (A)  0 TO 3 /HPF Final    Squamous Epithelial 11/27/2023 Rare (A)  None Seen /hpf Final    Hyaline Cast 11/27/2023 0 TO 2 (A)  None Seen /lpf Final    Bacteria, Urine 11/27/2023 Rare (A)  None Seen /HPF Final    Mucus 11/27/2023 Rare (A)  None Seen /LPF Final    Urine Culture 11/27/2023 **Positive Culture** (A)   Final    Urine Culture 11/27/2023 >=100,000 cfu/mL Escherichia coli   Final           Labs Reviewed: No new labs    Radiology and Imaging Studies: Pertinent radiology and labs reviewed   None  Radiology and Imaging Reviewed:  No new imaging    Cardiac Studies Reviewed:  No new studies  Medicine Tests reviewed:  No new tests      Review of Systems   Constitutional:  Positive for fatigue. Negative for activity change, appetite change and unexpected weight change.   HENT:  Negative for hearing loss and trouble swallowing.    Eyes:  Negative for visual disturbance.   Respiratory:  Positive for cough.  Negative for shortness of breath and wheezing.    Cardiovascular:  Negative for chest pain, palpitations and leg swelling.   Gastrointestinal:  Positive for nausea. Negative for abdominal pain, constipation and vomiting.   Endocrine: Positive for cold intolerance.   Genitourinary:  Negative for decreased urine volume, difficulty urinating, dysuria and frequency.   Musculoskeletal:  Positive for arthralgias, back pain and gait problem.   Skin:  Negative for rash and wound.   Neurological:  Positive for weakness and numbness. Negative for dizziness and light-headedness.   Hematological:  Bruises/bleeds easily.   Psychiatric/Behavioral:  Positive for confusion. Negative for dysphoric mood and sleep disturbance. The patient is not nervous/anxious.        Objective     Vitals:    07/08/24 1307   BP: 122/68   BP Location: Left upper arm   Patient Position: Sitting   Pulse: 90   Resp: 18   Temp: 36.5 °C (97.7 °F)   TempSrc: Temporal   SpO2: 92%           There is no height or weight on file to calculate BMI.  Allergies:   Allergies   Allergen Reactions    Codeine Hives      n/v    Diazepam Other (see comments)     Blurred vision    Sulfa (Sulfonamide Antibiotics) Hives     Other reaction(s): Unknown    Sulfur Hives         Medications:   Current Outpatient Medications:     acetaminophen (TYLENOL) 500 mg tablet, Take 1,000 mg by mouth 3 (three) times a day., Disp: , Rfl:     apixaban (ELIQUIS) 2.5 mg tablet, Take 2.5 mg by mouth 2 (two) times a day., Disp: , Rfl:     bimatoprost (LUMIGAN) 0.01 % ophthalmic drops, Administer 1 drop into both eyes nightly., Disp: , Rfl:     buprenorphine (BUTRANS) 5 mcg/hour patch weekly, Place 1 patch on the skin every (seven) 7 days., Disp: , Rfl:     carbidopa-levodopa  mg per tablet, Take 1 tablet by mouth 3 (three) times a day. 0700, 1100, 1500, Disp: , Rfl:     docusate sodium (COLACE) 100 mg capsule, Take 100 mg by mouth daily., Disp: , Rfl:     famotidine (PEPCID) 20 mg tablet,  Take 20 mg by mouth daily., Disp: , Rfl:     gabapentin (NEURONTIN) 300 mg capsule, Take 300 mg by mouth 3 (three) times a day., Disp: , Rfl:     hydrALAZINE (APRESOLINE) 50 mg tablet, Take 1 tablet (50 mg total) by mouth 2 (two) times a day. (Patient taking differently: Take 25 mg by mouth 2 (two) times a day.), Disp: , Rfl:     lisinopriL 20 mg tablet, Take 20 mg by mouth daily., Disp: , Rfl:     melatonin 3 mg tablet, Take 6 mg by mouth nightly., Disp: , Rfl:     mirtazapine 30 mg tablet, Take 30 mg by mouth nightly., Disp: , Rfl:     nitrofurantoin, macrocrystal-monohydrate, (MACROBID) 100 mg capsule, Take 100 mg by mouth daily before dinner., Disp: , Rfl:     pimavanserin (NUPLAZID) 34 mg capsule, Take 34 mg by mouth daily., Disp: , Rfl:     senna (SENOKOT) 8.6 mg tablet, Take 2 tablets by mouth daily before dinner., Disp: , Rfl:     sertraline 50 mg tablet, Take 150 mg by mouth daily. Taking 50 mg in the AM & then 100 mg in the PM, Disp: , Rfl:     topiramate (TOPAMAX) 25 mg tablet, Take 25 mg by mouth 2 (two) times a day., Disp: , Rfl:   [facility] AWARxE: Query reviewed and no concerns      Physical Exam  Constitutional:       General: She is awake. She is not in acute distress.     Appearance: Normal appearance. She is well-developed. She is not toxic-appearing.      Comments: Older adult   HENT:      Head: Normocephalic and atraumatic.      Jaw: There is normal jaw occlusion.      Right Ear: External ear normal.      Left Ear: External ear normal.      Nose: Nose normal.      Mouth/Throat:      Mouth: Mucous membranes are moist.      Pharynx: Oropharynx is clear.   Eyes:      General: Lids are normal.      Conjunctiva/sclera: Conjunctivae normal.   Neck:      Trachea: Trachea normal.   Cardiovascular:      Rate and Rhythm: Normal rate and regular rhythm.      Pulses: Normal pulses.      Heart sounds: Normal heart sounds. No murmur heard.  Pulmonary:      Effort: Pulmonary effort is  normal. No accessory muscle usage or respiratory distress.      Breath sounds: Examination of the right-lower field reveals decreased breath sounds. Examination of the left-lower field reveals decreased breath sounds. Decreased breath sounds present. No wheezing, rhonchi or rales.   Abdominal:      General: Abdomen is flat. Bowel sounds are normal. There is no distension.      Palpations: Abdomen is soft.      Tenderness: There is no abdominal tenderness.   Musculoskeletal:         General: Swelling present.      Cervical back: Normal range of motion and neck supple.      Right lower leg: No edema.      Left lower leg: No edema.      Comments: Sarcopenia    Skin:     General: Skin is warm and dry.      Findings: Bruising present. No lesion or rash.   Neurological:      Mental Status: She is alert. Mental status is at baseline. She is disoriented.      Motor: Weakness present.      Gait: Gait abnormal.   Psychiatric:         Attention and Perception: Attention and perception normal.         Mood and Affect: Mood and affect normal.         Speech: Speech normal.         Behavior: Behavior normal. Behavior is not agitated or aggressive. Behavior is cooperative.         Thought Content: Thought content normal.         Cognition and Memory: She exhibits impaired recent memory.         Judgment: Judgment normal.         Palliative Assessment  FAST Score:  6c.- Inability to handle mechanics of toileting (unchanged from 5/28/24)  Palliative Performance Scale: 50% (unchanged from 5/28/24)  Mid Upper Arm Circumference (MUAC):  L -27 cm (unchanged from 5/28/24)  ESASr: Pain 0, Tiredness 3, Drowsiness 2, Nausea 4, Appetite 0, Shortness of Breath 0, Depression 2, Anxiety 0 and Wellbeing 3  Palliative Disease State: Controlled with current treatments  Patient Prognostic Awareness: Demonstrates limited understanding of disease process, Demonstrates limited understanding of prognosis and Demonstrates limited understanding of  resuscitation status  Palliative Care Risk Stratification: Level Three- High risk.  Requires highest level of intervention.  Not likely to return to usual care    Goals of Care  Advance Directives Status:  Not Received  Advance Directives:    Document type(s) Durable Power of , Living Will and POLST  Healthcare Proxy Name, Contact Information, and Relationship  Lev Porras ([phone])  Goals of Care Discussion: No  Change in medical orders resulting from advance care planning discussions: No change in medical orders  Code status Full code      Assessment:  Assessment/Plan   Diagnoses and all orders for this visit:    Encounter for palliative care in home, non-hospice (Primary)  Assessment & Plan:  Goals are Disease-Directed  Code Status: FULL code  Patient has an advanced directive that is not on file that names  Lev Porras ([phone]) as her health care power of  if she is unable to make her own medical decisions.   Has completed POLST in EHR and in home   Due to confusion from Dementia patient benefits from involving her family in complex medical decision making.       Parkinson's disease, unspecified whether dyskinesia present, unspecified whether manifestations fluctuate (CMS/HCC)  Assessment & Plan:  Follows with Neurologist Dr Payan  Complicated by agitation, anxiety, hallucinations, and vivid dreams  Continues on Carbidopa-Levodopa  mg three times daily   Continues Nuplazid 34 mg in the AM daily   Continues Melatonin 6 mg at bedtime   Plan to follow-up with Dr. Payan this week given hallucinations      Moderate dementia due to Parkinson's disease, with agitation (CMS/HCC)  Assessment & Plan:  Followed by neurologist Dr Payan  Monitor cognitive status  Monitor for falls  Continue close supervision and assist with ADLs  Continues on Sertraline 50 mg in the AM & 100 mg in the PM  Continues on Mirtazapine 30 mg nightly  Has 24/7 caregiver support        Primary hypertension  Assessment & Plan:  Followed by PCP Dr. Corona  Blood pressure fluctuates greatly, goal to avoid hypotension with past syncopal episodes.    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  Followed by pain management specialists and receives epidural of the spine every 3 months.     Continues on Topamax 25 mg twice a day  Continues on Tylenol 1000 mg three times a day  Continues on Gabapentin 300 mg three times a day  Continues on Butrans 5mcg/hr transdermal patch changed every week   Continue PT/OT through [facility] once a week  Monitor pain      Debility  Assessment & Plan:  Due to Parkinson's disease and chronic back pain   At risk for further decline and debility due to advancing age and multimorbidity  Monitor for falls (0 falls since last visit)  Continue use of assistive devices (walker)  Has 24/7 paid caregiver support  Continues with OT/PT through [facility] weekly   Encouraged use of Incentive Spirometer 10 times BID

## 2024-08-15 PROBLEM — L89.159 PRESSURE INJURY OF SKIN OF SACRAL REGION: Status: ACTIVE | Noted: 2024-01-01

## 2024-08-15 NOTE — ASSESSMENT & PLAN NOTE
Goals are Disease-Directed  Code Status: FULL code  Patient has an advanced directive that is not on file that names  - John Porras ([phone]) as her health care power of  if she is unable to make her own medical decisions.   Has completed POLST in EHR and in home   Due to confusion from Dementia patient benefits from involving her family in complex medical decision making.

## 2024-08-15 NOTE — PROGRESS NOTES
Palliative Home Based Care Established Visit    Subjective     Patient ID: Vicki Porras is a 89 y.o. female.  MRN: [id number]  Date/ Time Visit Made:  Referring Physician: No referring provider defined for this encounter.  Primary Care Physician:Shant Corona MD    The start time of this visit was 11:28 AM.  The end time of this visit was 12:10 PM.      I spent  60 minutes on this date of service performing the following activities: obtaining history, performing examination, documenting, preparing for visit, obtaining / reviewing records, providing counseling and education, and coordinating care.        Reason for Visit:  Palliative Care Follow-up      Mariya Porras is an 89 year old female seen in the home for Palliative Care follow-up visit with a PMH of Parkinson's Disease, HTN, PE & DVT (2021), GERD, Anxiety, Depression, Chronic Back Pain, Neuropathy of feet, Dysphagia, and Debility.     At baseline patient lives with her  & has 24/7 paid caregiver support. Patient is confused with short-term memory issues. Patient is dependent in most ADLs. Patient can assist with dressing and toileting. Patient is independent in eating. Dependent in all iADLs. Ambulates with assist and walker for short distances. Has a ramp for wheelchair mobility and a chair glide to move to the second floor. Has an elevator to move between levels.     Patient last seen by Palliative Care on 7/8/24. Patient had an appointment with Pain Management Provider Dr. Lei Braswell on 7/29/24 for caudal epidural spinal injection.     Met with patient in recliner. Notes she is feeling unwell today due to her Parkinson's Disease. Reports ongoing fatigue. Slept well last night. Appetite remains good. Finished her oatmeal this morning. No stomach upset. Bowels are moving. Takes Miralax. No UTI symptoms. Reports her back feels okay at rest. Notes that the back pain is worse with movement. Isn't sure if the recent spinal injection she  "had has been effective. Injection done on 7/29. Is able to walk to the restroom and kitchen with aide of walker. Notes the other day she had severe weakness when working with PT, but today she is feeling better. Has PT this afternoon. Has recovered from COVID - no issues breathing or coughing. Patient doesn't recall that she had COVID earlier this summer. Her children from [place] will be visiting [facility] for a long weekend.     Call to patient's daughter Ivy to confirm medications. Reports patient is on a trial of Lunesta 1 mg nightly for 10 days to see if this helps with sleep. Unsure if patient remains on Butrans patch. Discussed per PDMP Butrans was last picked up on 6/28. Ivy will follow-up with her siblings and let provider know at next visit.     Spoke with  Joel who reports he doesn't think patient is using Butrans patch.     Caregiver notes that patient has ongoing \"bed sore\" to buttock that is healing. No other dressings or wounds on patient.     No falls, ER visits, or Hospital stays since last visit.         HPI Source:  Patient, Medical Record, , & caregiver  Past Medical History:   Past Medical History:   Diagnosis Date    Anxiety     Arthritis     Basal cell carcinoma     forehead    Glaucoma     Hypertension     Low back pain     Lumbosacral disc disease     Parkinson's disease     Peripheral neuropathy     Seizures (CMS/HCC)      Past Surgical History:    Past Surgical History:   Procedure Laterality Date    BLADDER SUSPENSION  2009    Prolift M    CATARACT EXTRACTION, BILATERAL      COLONOSCOPY      HYSTERECTOMY      JOINT REPLACEMENT      KNEE ARTHROPLASTY  2015    left     Family History:  family history includes No Known Problems in her biological father and biological mother.  Social History:    Social History     Socioeconomic History    Marital status:    Tobacco Use    Smoking status: Former    Smokeless tobacco: Never    Tobacco comments:     as a young " teenager   Substance and Sexual Activity    Alcohol use: Yes     Comment: wine    Drug use: No    Sexual activity: Not Currently     Partners: Male     Social Determinants of Health     Financial Resource Strain: Low Risk  (9/24/2023)    Overall Financial Resource Strain (CARDIA)     Difficulty of Paying Living Expenses: Not hard at all   Food Insecurity: No Food Insecurity (11/27/2023)    Hunger Vital Sign     Worried About Running Out of Food in the Last Year: Never true     Ran Out of Food in the Last Year: Never true   Transportation Needs: No Transportation Needs (9/24/2023)    PRAPARE - Transportation     Lack of Transportation (Medical): No     Lack of Transportation (Non-Medical): No   Housing Stability: Low Risk  (9/24/2023)    Housing Stability Vital Sign     Unable to Pay for Housing in the Last Year: No     Number of Places Lived in the Last Year: 1     Unstable Housing in the Last Year: No       Jain:    Changes or updates to Spiritual Assessment: Radha and Belief: Mandaen and No Spiritual concerns identified     Data Review  Lab Studies: Lab Studies: Pertinent radiology and labs reviewed  Labs last six months:    No visits with results within 6 Month(s) from this visit.   Latest known visit with results is:   Admission on 11/27/2023, Discharged on 11/27/2023   Component Date Value Ref Range Status    WBC 11/27/2023 7.79  3.80 - 10.50 K/uL Final    RBC 11/27/2023 3.95  3.93 - 5.22 M/uL Final    Hemoglobin 11/27/2023 12.7  11.8 - 15.7 g/dL Final    Hematocrit 11/27/2023 39.9  35.0 - 45.0 % Final    MCV 11/27/2023 101.0 (H)  83.0 - 98.0 fL Final    MCH 11/27/2023 32.2  28.0 - 33.2 pg Final    MCHC 11/27/2023 31.8 (L)  32.2 - 35.5 g/dL Final    RDW 11/27/2023 13.2  11.7 - 14.4 % Final    Platelets 11/27/2023 232  150 - 369 K/uL Final    MPV 11/27/2023 9.2 (L)  9.4 - 12.3 fL Final    Differential Type 11/27/2023 Auto   Final    nRBC 11/27/2023 0.0  <=0.0 % Final    Immature Granulocytes 11/27/2023 0.5   % Final    Neutrophils 11/27/2023 72.2  % Final    Lymphocytes 11/27/2023 19.3  % Final    Monocytes 11/27/2023 5.9  % Final    Eosinophils 11/27/2023 1.3  % Final    Basophils 11/27/2023 0.8  % Final    Immature Granulocytes, Absolute 11/27/2023 0.04  0.00 - 0.08 K/uL Final    Neutrophils, Absolute 11/27/2023 5.63  1.70 - 7.00 K/uL Final    Lymphocytes, Absolute 11/27/2023 1.50  1.20 - 3.50 K/uL Final    Monocytes, Absolute 11/27/2023 0.46  0.28 - 0.80 K/uL Final    Eosinophils, Absolute 11/27/2023 0.10  0.04 - 0.36 K/uL Final    Basophils, Absolute 11/27/2023 0.06  0.01 - 0.10 K/uL Final    Sodium 11/27/2023 139  136 - 145 mEQ/L Final    Potassium 11/27/2023 4.2  3.5 - 5.1 mEQ/L Final    Results obtained on plasma. Plasma Potassium values may be up to 0.4 mEQ/L less than serum values. The differences may be greater for patients with high platelet or white cell counts.    Chloride 11/27/2023 107  98 - 107 mEQ/L Final    CO2 11/27/2023 26  21 - 31 mEQ/L Final    BUN 11/27/2023 14  7 - 25 mg/dL Final    Creatinine 11/27/2023 0.7  0.6 - 1.2 mg/dL Final    Glucose 11/27/2023 144 (H)  70 - 99 mg/dL Final    Calcium 11/27/2023 9.8  8.6 - 10.3 mg/dL Final    AST (SGOT) 11/27/2023 12 (L)  13 - 39 IU/L Final    ALT (SGPT) 11/27/2023 <3 (L)  7 - 52 IU/L Final    Alkaline Phosphatase 11/27/2023 40  34 - 125 IU/L Final    Total Protein 11/27/2023 6.1  6.0 - 8.2 g/dL Final    Test performed on plasma which typically contains approximately 0.4 g/dL more protein than serum.    Albumin 11/27/2023 3.6  3.5 - 5.7 g/dL Final    Bilirubin, Total 11/27/2023 0.5  0.3 - 1.2 mg/dL Final    eGFR 11/27/2023 >60.0  >=60.0 mL/min/1.73m*2 Final    Anion Gap 11/27/2023 6  3 - 15 mEQ/L Final    High Sens Troponin I 11/27/2023 9.4  <15.0 pg/mL Final    Ventricular rate 11/27/2023 91   Final    Atrial rate 11/27/2023 91   Final    SD Interval 11/27/2023 196   Final    QRS duration 11/27/2023 74   Final    QT Interval 11/27/2023 324   Final    QTC  Calculation(Bazett) 11/27/2023 398   Final    P Axis 11/27/2023 23   Final    R Axis 11/27/2023 -21   Final    T Wave Axis 11/27/2023 -22   Final    Magnesium 11/27/2023 1.9  1.8 - 2.5 mg/dL Final    SARS-CoV-2 (COVID-19) 11/27/2023 Negative  Negative Final    Influenza A 11/27/2023 Negative  Negative Final    Influenza B 11/27/2023 Negative  Negative Final    Respiratory Syncytial Virus 11/27/2023 Negative  Negative Final    High Sens Troponin I 11/27/2023 8.5  <15.0 pg/mL Final    Color, Urine 11/27/2023 Yellow  Yellow, Colorless Final    Clarity, Urine 11/27/2023 Cloudy (A)  Clear Final    Specific Gravity, Urine 11/27/2023 1.009  1.005 - 1.030 Final    pH, Urine 11/27/2023 7.0  4.5 - 8.0 Final    Leukocyte Esterase 11/27/2023 +3 (A)  Negative Final    Nitrite, Urine 11/27/2023 Positive (A)  Negative Final    Protein, Urine 11/27/2023 Negative  Negative Final    Glucose, Urine 11/27/2023 Negative  Negative mg/dL Final    Ketones, Urine 11/27/2023 Negative  Negative mg/dL Final    Urobilinogen, Urine 11/27/2023 0.2  <2.0 EU/dL EU/dL Final    Bilirubin, Urine 11/27/2023 Negative  Negative mg/dL Final    Blood, Urine 11/27/2023 Negative  Negative Final    The sensitivity of the occult blood test is equivalent to approximately 4 intact RBC/HPF.    RBC, Urine 11/27/2023 0 TO 4  0 TO 4 /HPF Final    WBC, Urine 11/27/2023 20 TO 35 (A)  0 TO 3 /HPF Final    Squamous Epithelial 11/27/2023 Rare (A)  None Seen /hpf Final    Hyaline Cast 11/27/2023 0 TO 2 (A)  None Seen /lpf Final    Bacteria, Urine 11/27/2023 Rare (A)  None Seen /HPF Final    Mucus 11/27/2023 Rare (A)  None Seen /LPF Final    Urine Culture 11/27/2023 **Positive Culture** (A)   Final    Urine Culture 11/27/2023 >=100,000 cfu/mL Escherichia coli   Final           Labs Reviewed: No new labs    Radiology and Imaging Studies: Pertinent radiology and labs reviewed   None  Radiology and Imaging Reviewed:  No new imaging    Cardiac Studies Reviewed:  No new  studies  Medicine Tests reviewed:  No new tests      Review of Systems   Constitutional:  Positive for fatigue. Negative for activity change, appetite change and unexpected weight change.   HENT:  Negative for hearing loss and trouble swallowing.    Eyes:  Negative for visual disturbance.   Respiratory:  Negative for cough, shortness of breath and wheezing.    Cardiovascular:  Negative for chest pain, palpitations and leg swelling.   Gastrointestinal:  Negative for abdominal pain, constipation, nausea and vomiting.   Endocrine: Positive for cold intolerance.   Genitourinary:  Negative for decreased urine volume, difficulty urinating, dysuria and frequency.   Musculoskeletal:  Positive for arthralgias, back pain and gait problem.   Skin:  Negative for rash and wound.   Neurological:  Positive for weakness and numbness. Negative for dizziness and light-headedness.   Hematological:  Bruises/bleeds easily.   Psychiatric/Behavioral:  Positive for confusion. Negative for dysphoric mood and sleep disturbance. The patient is not nervous/anxious.        Objective     Vitals:    08/15/24 1135   BP: 122/64   BP Location: Left upper arm   Patient Position: Sitting   Pulse: 82   Resp: 18   Temp: 36.4 °C (97.5 °F)   TempSrc: Temporal   SpO2: 96%             There is no height or weight on file to calculate BMI.  Allergies:   Allergies   Allergen Reactions    Codeine Hives      n/v    Diazepam Other (see comments)     Blurred vision    Sulfa (Sulfonamide Antibiotics) Hives     Other reaction(s): Unknown    Sulfur Hives         Medications:   Current Outpatient Medications:     acetaminophen (TYLENOL) 500 mg tablet, Take 1,000 mg by mouth 3 (three) times a day., Disp: , Rfl:     apixaban (ELIQUIS) 2.5 mg tablet, Take 2.5 mg by mouth 2 (two) times a day., Disp: , Rfl:     bimatoprost (LUMIGAN) 0.01 % ophthalmic drops, Administer 1 drop into both eyes nightly., Disp: , Rfl:     buprenorphine (BUTRANS) 5 mcg/hour patch weekly, Place 1  patch on the skin every (seven) 7 days., Disp: , Rfl:     carbidopa-levodopa  mg per tablet, Take 1 tablet by mouth 3 (three) times a day. 0700, 1100, 1500, Disp: , Rfl:     docusate sodium (COLACE) 100 mg capsule, Take 100 mg by mouth daily., Disp: , Rfl:     eszopiclone (LUNESTA) 1 mg tablet tablet, Take 1 mg by mouth nightly. Take immediately before bedtime, Disp: , Rfl:     famotidine (PEPCID) 20 mg tablet, Take 20 mg by mouth daily., Disp: , Rfl:     gabapentin (NEURONTIN) 300 mg capsule, Take 300 mg by mouth 3 (three) times a day., Disp: , Rfl:     hydrALAZINE (APRESOLINE) 50 mg tablet, Take 1 tablet (50 mg total) by mouth 2 (two) times a day. (Patient taking differently: Take 25 mg by mouth 2 (two) times a day.), Disp: , Rfl:     lisinopriL 20 mg tablet, Take 20 mg by mouth daily., Disp: , Rfl:     melatonin 3 mg tablet, Take 6 mg by mouth nightly., Disp: , Rfl:     mirtazapine 30 mg tablet, Take 30 mg by mouth nightly., Disp: , Rfl:     nitrofurantoin, macrocrystal-monohydrate, (MACROBID) 100 mg capsule, Take 100 mg by mouth daily before dinner., Disp: , Rfl:     pimavanserin (NUPLAZID) 34 mg capsule, Take 34 mg by mouth daily., Disp: , Rfl:     senna (SENOKOT) 8.6 mg tablet, Take 2 tablets by mouth daily before dinner., Disp: , Rfl:     sertraline 50 mg tablet, Take 150 mg by mouth daily. Taking 50 mg in the AM & then 100 mg in the PM, Disp: , Rfl:     topiramate (TOPAMAX) 25 mg tablet, Take 25 mg by mouth 2 (two) times a day., Disp: , Rfl:   [facility]: Query reviewed and no concerns      Physical Exam  Constitutional:       General: She is awake. She is not in acute distress.     Appearance: Normal appearance. She is well-developed. She is not toxic-appearing.      Comments: Older adult   HENT:      Head: Normocephalic and atraumatic.      Jaw: There is normal jaw occlusion.      Right Ear: External ear normal.      Left Ear: External ear normal.      Nose: Nose normal.       Mouth/Throat:      Mouth: Mucous membranes are moist.      Pharynx: Oropharynx is clear.   Eyes:      General: Lids are normal. No scleral icterus.     Conjunctiva/sclera: Conjunctivae normal.   Neck:      Trachea: Trachea normal.   Cardiovascular:      Rate and Rhythm: Normal rate and regular rhythm.      Pulses: Normal pulses.           Radial pulses are 2+ on the right side and 2+ on the left side.      Heart sounds: Normal heart sounds. No murmur heard.  Pulmonary:      Effort: Pulmonary effort is normal. No accessory muscle usage or respiratory distress.      Breath sounds: No decreased breath sounds, wheezing, rhonchi or rales.   Abdominal:      General: Abdomen is flat. Bowel sounds are normal. There is no distension.      Palpations: Abdomen is soft.      Tenderness: There is no abdominal tenderness.   Musculoskeletal:         General: Swelling present.      Cervical back: Normal range of motion and neck supple.      Right lower leg: No edema.      Left lower leg: No edema.      Right foot: Foot drop present.      Left foot: Foot drop present.      Comments: Sarcopenia    Skin:     General: Skin is warm and dry.      Findings: Bruising present. No lesion or rash.          Neurological:      Mental Status: She is alert. Mental status is at baseline. She is disoriented.      Motor: Weakness present.      Gait: Gait abnormal.   Psychiatric:         Attention and Perception: Attention and perception normal.         Mood and Affect: Mood and affect normal.         Speech: Speech normal.         Behavior: Behavior normal. Behavior is not agitated or aggressive. Behavior is cooperative.         Thought Content: Thought content normal.         Cognition and Memory: Memory is impaired. She exhibits impaired recent memory.         Judgment: Judgment normal.         Palliative Assessment  FAST Score:  6c.- Inability to handle mechanics of toileting (unchanged from 7/8/24)  Palliative Performance Scale: 50%  (unchanged from 7/8/24)  Mid Upper Arm Circumference (MUAC):  unable to measure today due to clothing - L -27 cm ( 7/8/24)  ESASr: Pain 0, Tiredness 6, Drowsiness 6, Nausea 0, Appetite 0, Shortness of Breath 0, Depression 0, Anxiety 0 and Wellbeing 5  Palliative Disease State: Controlled with current treatments  Patient Prognostic Awareness: Demonstrates limited understanding of disease process, Demonstrates limited understanding of prognosis and Demonstrates limited understanding of resuscitation status  Palliative Care Risk Stratification: Level Three- High risk.  Requires highest level of intervention.  Not likely to return to usual care    Goals of Care  Advance Directives Status:  Not Received  Advance Directives:    Document type(s) Durable Power of , Living Will and POLST  Healthcare Proxy Name, Contact Information, and Relationship  Lev Porras ([phone])  Goals of Care Discussion: No  Change in medical orders resulting from advance care planning discussions: No change in medical orders  Code status Full code      Assessment:  Assessment/Plan   Diagnoses and all orders for this visit:    Encounter for palliative care in home, non-hospice (Primary)  Assessment & Plan:  Goals are Disease-Directed  Code Status: FULL code  Patient has an advanced directive that is not on file that names  - John Porras ([phone]) as her health care power of  if she is unable to make her own medical decisions.   Has completed POLST in EHR and in home   Due to confusion from Dementia patient benefits from involving her family in complex medical decision making.       Parkinson's disease, unspecified whether dyskinesia present, unspecified whether manifestations fluctuate (CMS/[facility])  Assessment & Plan:  Follows with Neurologist Dr Payan  Complicated by agitation, anxiety, hallucinations, and vivid dreams  Continues on Carbidopa-Levodopa  mg three times daily   Continues Nuplazid  34 mg in the AM daily   Continues Melatonin 6 mg at bedtime         Moderate dementia due to Parkinson's disease, with agitation (CMS/HCC)  Assessment & Plan:  Followed by neurologist Dr Payan  Monitor cognitive status  Monitor for falls  Continue close supervision and assist with ADLs  Continues on Sertraline 50 mg in the AM & 100 mg in the PM  Continues on Mirtazapine 30 mg nightly  Has 24/7 caregiver support       Primary hypertension  Assessment & Plan:  Followed by PCP Dr. Corona  Blood pressure fluctuates greatly, goal to avoid hypotension with past syncopal episodes.    Continues on Lisinopril 20 mg daily  Continues on Hydralazine 25 mg twice a day  Normotensive on exam  Monitor BP      Chronic low back pain with right-sided sciatica, unspecified back pain laterality  Assessment & Plan:  Follows with pain management provider Dr. 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  Followed by pain management specialists and receives epidural of the spine every 3 months. Last caudal epidural injection on 7/29/24  Continues on Topamax 25 mg twice a day  Continues on Tylenol 1000 mg three times a day  Continues on Gabapentin 300 mg three times a day  Continues on Butrans 5mcg/hr transdermal patch changed every week   Continue PT/OT through [facility] once a week  Monitor pain      Pressure injury of skin of sacral region, unspecified injury stage  Assessment & Plan:  Unable to assess today  Reported by caregiver, notes wound is healing  Has dressing in place  Continue dressing changes   Monitor for wound      Debility  Assessment & Plan:  Due to Parkinson's disease and chronic back pain   At risk for further decline and debility due to advancing age and multimorbidity  Monitor for falls (0 falls since last visit)  Continue use of assistive devices (walker)  Has 24/7 paid caregiver support  Continues with OT/PT through [facility] weekly   Encouraged use of Incentive Spirometer 10  times BID

## 2024-08-15 NOTE — ASSESSMENT & PLAN NOTE
Unable to assess today  Reported by caregiver, notes wound is healing  Has dressing in place  Continue dressing changes   Monitor for wound

## 2024-08-15 NOTE — ASSESSMENT & PLAN NOTE
Follows with pain management provider Dr. 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  Followed by pain management specialists and receives epidural of the spine every 3 months. Last caudal epidural injection on 7/29/24  Continues on Topamax 25 mg twice a day  Continues on Tylenol 1000 mg three times a day  Continues on Gabapentin 300 mg three times a day  Continues on Butrans 5mcg/hr transdermal patch changed every week   Continue PT/OT through [facility] once a week  Monitor pain

## 2024-09-07 NOTE — TELEPHONE ENCOUNTER
"Incoming call from Amy OT from [facility]; reaching out to NP lizzette; reporting patient declining; becoming very agitated; and during visit verbalized \"wants to die\"; reaching out for guidance on next steps. Amy can be reached at [phone]  "

## 2024-09-08 NOTE — TELEPHONE ENCOUNTER
Returned Amy's call- concern for decline- pt could barely walk during session yesterday- states she was in more pain than baseline. Family inquiring about next steps- will call daughter to discuss.

## 2024-09-08 NOTE — TELEPHONE ENCOUNTER
"TC made to patient's  who states patient is \" back to her usual self today\" She has been up for him and has eaten so far. He states the day Amy saw her was a particularly harder day for patient. He denies having any concerns today and looking forward to Verna's next visit. Encouraged to call [facility] with any questions/concerns or changes. "

## 2024-09-21 NOTE — PROGRESS NOTES
Palliative Home Based Care Established Visit    Subjective     Patient ID: Vicki Porras is a 89 y.o. female.  MRN: [id number]  Date/ Time Visit Made:  Referring Physician: No referring provider defined for this encounter.  Primary Care Physician:Shant Corona MD    The start time of this Face to Face visit was 2:04 PM.  The end time of this Face to Face visit was 3:45 PM.      I spent  90 minutes on this date of service performing the following activities: obtaining history, performing examination, documenting, preparing for visit, obtaining / reviewing records, providing counseling and education, and coordinating care.        Reason for Visit:  Palliative Care Follow-up      Mariya Porras is an 89 year old female seen in the home for Palliative Care follow-up visit with a PMH of Parkinson's Disease, Moderate Dementia, HTN, PE & DVT (2021), GERD, Anxiety/Depression, Chronic Back Pain, Neuropathy of feet, Dysphagia, Stage 2 Pressure Injury to Sacrum, and Debility.     At baseline patient lives with her  & has 24/7 paid caregiver support. Patient is confused with short-term memory issues. Patient is dependent in most ADLs. Patient can assist with dressing and toileting. Patient is independent in eating. Dependent in all iADLs. Ambulates with assist and walker for short distances. Has a ramp for wheelchair mobility and a chair glide to move to the second floor. Has an elevator to move between levels.     Patient last seen by Palliative care on 8/15/24. Since then patient met with Pain Management Provider Floresita Bello on 9/4/24 with plans to continue Butrans 5 mch/hr patch weekly with Oxycodone IR 10 mg daily PRN for breakthrough pain and follow-up caudal epidural steroid injection planned for 10/31/24.     Met with patient's daughter Ivy, caregiver Lucia, and  John. Reviewed about two weeks ago, patient had a decline and is slowing down. Notes PT with [facility] stopped because patient  has reached a plateau. Notes patient hasn't been as mobile. Is now spending most of the day upstairs . Notes that she does come down the stairs for 2-3 hours downstairs. Has a pressure sore, that is starting to get better. Patient is sleeping until 10:30/11 AM, caregiver is giving bed bath most days. Once a week when [facility] OT is here they take patient to the shower. Paying out of pocket for [facility]. Patient then moves to the chair in her room - where she reads the paper and watches TV. Normally in the chair for 1-2 hours, then returns to bed to rotate in bed to alleviate pressure. Is moderate assistance with x 1 person with walker and is able to transfer to chair from bed or wheelchair. Is able to walk ~ 5 feet with walker and x 1 assistance. Sometimes doesn't want to come down the stairs, but caregivers encourage her. Using elevator to move between stairs. With have visit with [facility] Neuropsychologist on 10/7 for another neuro-psychological evaluation. Appetite is decreased - eating 100% a big bowl of oatmeal for breakfast and 100% dinner . Notes patient also had a rough couple weeks of issues swallowing - where could only tolerate soups. Modifying diet to cut up pieces of food, moistened diet, smaller utensils. Enjoys ice cream. Not recently coughing or choking with oral intake. Is tolerating whole pills in applesauce. Bowels are moving with laxatives. Last BM 9/20. Having a BM every 2 days that are soft. Is continent of stool. Drinking 10 fluid ounces a day and 6 ounce cup or coffee. Is voiding. Having urinary incontinence. Using Purewick at nighttime. Sleeping well at nighttime with aide of sleeping medications. Is sleeping for 15 hours at nighttime. Notes patient remains very active at nighttime - talking and thrashing. Worried patient is not getting a restful sleep. Is napping more during the day, but it is on/off. Ivy notes she visits the patient every other weekend and has noticed a big  change in the patient's energy and activity tolerance, specifically patient is wanting to go up to bed earlier. Notes patient is no longer interested in going to medical appointments and notes that it has been a struggle. Notes patient's anxiety is much worse. Notes patient's comprehension of conversations is worse. Notes patient continues on Butrans patch weekly on Mondays. Uses PRN Oxycodone nightly for pain. Continues with pressure injuries to sacrum. Managing with saline wound spray, neosporin with gauze and foam dressing.     Discussed GOC given patient's decline, see ACP note for details.     Met with patient in her bedroom, seated in the chair. Patient notes she is tired. Notes appetite is okay. Unsure of her last BM. Breathing is okay. Ongoing chronic pain in her hip and her back. Notes it is comfortable at rest, but worse with movement. Has a cough with slight amounts of mucus. Notes patient's mucus is clear. Doesn't think she has lost weight. No issues with voiding. Notes ongoing issues with memory.     No falls, ER visits, or hospital admissions since last Palliative Care visit.             HPI Source:  Patient, Medical Record, , daughter, & caregiver  Past Medical History:   Past Medical History:   Diagnosis Date    Anxiety     Arthritis     Basal cell carcinoma     forehead    Glaucoma     Hypertension     Low back pain     Lumbosacral disc disease     Parkinson's disease     Peripheral neuropathy     Seizures (CMS/HCC)      Past Surgical History:    Past Surgical History   Procedure Laterality Date    Bladder suspension  2009    Prolift M    Cataract extraction, bilateral      Colonoscopy      DERM CHEMICAL PEEL (TCA) N/A 6/6/2018    Performed by Beth Gutierrez MD at [facility]    Hysterectomy      Joint replacement      Knee arthroplasty  2015    left    L1 EPIDURAL STEROID INJECTION INTERLAMINAR LUMBAR - LES Right 9/27/2023    Performed by Shant Byrnes Jr., MD at  OR     Ptosis repair BUL with skin pinch cos blepharoplasty BLL with TCA Peel Excision of marginal Lesion YEFRI Bilateral 6/6/2018    Performed by Beth Gutierrez MD at [facility]     Family History:  family history includes No Known Problems in her biological father and biological mother.  Social History:    Social History     Socioeconomic History    Marital status:    Tobacco Use    Smoking status: Former    Smokeless tobacco: Never    Tobacco comments:     as a young teenager   Substance and Sexual Activity    Alcohol use: Yes     Comment: wine    Drug use: No    Sexual activity: Not Currently     Partners: Male     Social Drivers of Health     Financial Resource Strain: Low Risk  (9/24/2023)    Overall Financial Resource Strain (CARDIA)     Difficulty of Paying Living Expenses: Not hard at all   Food Insecurity: No Food Insecurity (11/27/2023)    Hunger Vital Sign     Worried About Running Out of Food in the Last Year: Never true     Ran Out of Food in the Last Year: Never true   Transportation Needs: No Transportation Needs (9/24/2023)    PRAPARE - Transportation     Lack of Transportation (Medical): No     Lack of Transportation (Non-Medical): No   Housing Stability: Low Risk  (9/24/2023)    Housing Stability Vital Sign     Unable to Pay for Housing in the Last Year: No     Number of Places Lived in the Last Year: 1     Unstable Housing in the Last Year: No       Muslim:    Changes or updates to Spiritual Assessment: Radha and Belief: Nondenominational and No Spiritual concerns identified     Data Review  Lab Studies: Lab Studies: Pertinent radiology and labs reviewed  Labs last six months:    No visits with results within 6 Month(s) from this visit.   Latest known visit with results is:   Admission on 11/27/2023, Discharged on 11/27/2023   Component Date Value Ref Range Status    WBC 11/27/2023 7.79  3.80 - 10.50 K/uL Final    RBC 11/27/2023 3.95  3.93 - 5.22 M/uL Final    Hemoglobin 11/27/2023  12.7  11.8 - 15.7 g/dL Final    Hematocrit 11/27/2023 39.9  35.0 - 45.0 % Final    MCV 11/27/2023 101.0 (H)  83.0 - 98.0 fL Final    MCH 11/27/2023 32.2  28.0 - 33.2 pg Final    MCHC 11/27/2023 31.8 (L)  32.2 - 35.5 g/dL Final    RDW 11/27/2023 13.2  11.7 - 14.4 % Final    Platelets 11/27/2023 232  150 - 369 K/uL Final    MPV 11/27/2023 9.2 (L)  9.4 - 12.3 fL Final    Differential Type 11/27/2023 Auto   Final    nRBC 11/27/2023 0.0  <=0.0 % Final    Immature Granulocytes 11/27/2023 0.5  % Final    Neutrophils 11/27/2023 72.2  % Final    Lymphocytes 11/27/2023 19.3  % Final    Monocytes 11/27/2023 5.9  % Final    Eosinophils 11/27/2023 1.3  % Final    Basophils 11/27/2023 0.8  % Final    Immature Granulocytes, Absolute 11/27/2023 0.04  0.00 - 0.08 K/uL Final    Neutrophils, Absolute 11/27/2023 5.63  1.70 - 7.00 K/uL Final    Lymphocytes, Absolute 11/27/2023 1.50  1.20 - 3.50 K/uL Final    Monocytes, Absolute 11/27/2023 0.46  0.28 - 0.80 K/uL Final    Eosinophils, Absolute 11/27/2023 0.10  0.04 - 0.36 K/uL Final    Basophils, Absolute 11/27/2023 0.06  0.01 - 0.10 K/uL Final    Sodium 11/27/2023 139  136 - 145 mEQ/L Final    Potassium 11/27/2023 4.2  3.5 - 5.1 mEQ/L Final    Results obtained on plasma. Plasma Potassium values may be up to 0.4 mEQ/L less than serum values. The differences may be greater for patients with high platelet or white cell counts.    Chloride 11/27/2023 107  98 - 107 mEQ/L Final    CO2 11/27/2023 26  21 - 31 mEQ/L Final    BUN 11/27/2023 14  7 - 25 mg/dL Final    Creatinine 11/27/2023 0.7  0.6 - 1.2 mg/dL Final    Glucose 11/27/2023 144 (H)  70 - 99 mg/dL Final    Calcium 11/27/2023 9.8  8.6 - 10.3 mg/dL Final    AST (SGOT) 11/27/2023 12 (L)  13 - 39 IU/L Final    ALT (SGPT) 11/27/2023 <3 (L)  7 - 52 IU/L Final    Alkaline Phosphatase 11/27/2023 40  34 - 125 IU/L Final    Total Protein 11/27/2023 6.1  6.0 - 8.2 g/dL Final    Test performed on plasma which typically contains approximately 0.4  g/dL more protein than serum.    Albumin 11/27/2023 3.6  3.5 - 5.7 g/dL Final    Bilirubin, Total 11/27/2023 0.5  0.3 - 1.2 mg/dL Final    eGFR 11/27/2023 >60.0  >=60.0 mL/min/1.73m*2 Final    Anion Gap 11/27/2023 6  3 - 15 mEQ/L Final    High Sens Troponin I 11/27/2023 9.4  <15.0 pg/mL Final    Ventricular rate 11/27/2023 91   Final    Atrial rate 11/27/2023 91   Final    MD Interval 11/27/2023 196   Final    QRS duration 11/27/2023 74   Final    QT Interval 11/27/2023 324   Final    QTC Calculation(Bazett) 11/27/2023 398   Final    P Axis 11/27/2023 23   Final    R Axis 11/27/2023 -21   Final    T Wave Axis 11/27/2023 -22   Final    Magnesium 11/27/2023 1.9  1.8 - 2.5 mg/dL Final    SARS-CoV-2 (COVID-19) 11/27/2023 Negative  Negative Final    Influenza A 11/27/2023 Negative  Negative Final    Influenza B 11/27/2023 Negative  Negative Final    Respiratory Syncytial Virus 11/27/2023 Negative  Negative Final    High Sens Troponin I 11/27/2023 8.5  <15.0 pg/mL Final    Color, Urine 11/27/2023 Yellow  Yellow, Colorless Final    Clarity, Urine 11/27/2023 Cloudy (A)  Clear Final    Specific Gravity, Urine 11/27/2023 1.009  1.005 - 1.030 Final    pH, Urine 11/27/2023 7.0  4.5 - 8.0 Final    Leukocyte Esterase 11/27/2023 +3 (A)  Negative Final    Nitrite, Urine 11/27/2023 Positive (A)  Negative Final    Protein, Urine 11/27/2023 Negative  Negative Final    Glucose, Urine 11/27/2023 Negative  Negative mg/dL Final    Ketones, Urine 11/27/2023 Negative  Negative mg/dL Final    Urobilinogen, Urine 11/27/2023 0.2  <2.0 EU/dL EU/dL Final    Bilirubin, Urine 11/27/2023 Negative  Negative mg/dL Final    Blood, Urine 11/27/2023 Negative  Negative Final    The sensitivity of the occult blood test is equivalent to approximately 4 intact RBC/HPF.    RBC, Urine 11/27/2023 0 TO 4  0 TO 4 /HPF Final    WBC, Urine 11/27/2023 20 TO 35 (A)  0 TO 3 /HPF Final    Squamous Epithelial 11/27/2023 Rare (A)  None Seen /hpf Final    Hyaline Cast  11/27/2023 0 TO 2 (A)  None Seen /lpf Final    Bacteria, Urine 11/27/2023 Rare (A)  None Seen /HPF Final    Mucus 11/27/2023 Rare (A)  None Seen /LPF Final    Urine Culture 11/27/2023 **Positive Culture** (A)   Final    Urine Culture 11/27/2023 >=100,000 cfu/mL Escherichia coli   Final           Labs Reviewed: No new labs    Radiology and Imaging Studies: Pertinent radiology and labs reviewed   None  Radiology and Imaging Reviewed:  No new imaging    Cardiac Studies Reviewed:  No new studies  Medicine Tests reviewed:  No new tests      Review of Systems   Constitutional:  Positive for activity change, appetite change and fatigue. Negative for unexpected weight change.   HENT:  Positive for trouble swallowing. Negative for hearing loss.    Eyes:  Negative for visual disturbance.   Respiratory:  Positive for cough. Negative for shortness of breath and wheezing.    Cardiovascular:  Negative for chest pain, palpitations and leg swelling.   Gastrointestinal:  Negative for abdominal pain, constipation, nausea and vomiting.   Endocrine: Positive for cold intolerance.   Genitourinary:  Negative for decreased urine volume, difficulty urinating, dysuria and frequency.   Musculoskeletal:  Positive for arthralgias, back pain and gait problem.   Skin:  Positive for wound. Negative for rash.   Neurological:  Positive for weakness and numbness. Negative for dizziness and light-headedness.   Hematological:  Bruises/bleeds easily.   Psychiatric/Behavioral:  Positive for confusion. Negative for dysphoric mood and sleep disturbance. The patient is not nervous/anxious.        Objective     Vitals:    09/22/24 1524   BP: 129/68   BP Location: Left upper arm   Patient Position: Sitting   Pulse: 76   Resp: 16   Temp: 36.3 °C (97.3 °F)   TempSrc: Temporal   SpO2: 95%               There is no height or weight on file to calculate BMI.  Allergies:   Allergies   Allergen Reactions    Codeine Hives      n/v    Diazepam Other (see comments)      Blurred vision    Sulfa (Sulfonamide Antibiotics) Hives     Other reaction(s): Unknown    Sulfur Hives         Medications:   Current Outpatient Medications:     acetaminophen (TYLENOL) 500 mg tablet, Take 1,000 mg by mouth 2 (two) times a day., Disp: , Rfl:     apixaban (ELIQUIS) 2.5 mg tablet, Take 2.5 mg by mouth 2 (two) times a day., Disp: , Rfl:     bimatoprost (LUMIGAN) 0.01 % ophthalmic drops, Administer 1 drop into both eyes nightly., Disp: , Rfl:     buprenorphine (BUTRANS) 5 mcg/hour patch weekly, Place 1 patch on the skin daily as needed (every 7 days as needed for pain)., Disp: , Rfl:     carbidopa-levodopa  mg per tablet, Take 1 tablet by mouth 3 (three) times a day. 0700, 1100, 1500, Disp: , Rfl:     docusate sodium (COLACE) 100 mg capsule, Take 100 mg by mouth daily., Disp: , Rfl:     famotidine (PEPCID) 20 mg tablet, Take 20 mg by mouth daily., Disp: , Rfl:     gabapentin (NEURONTIN) 300 mg capsule, Take 300 mg by mouth 3 (three) times a day., Disp: , Rfl:     hydrALAZINE (APRESOLINE) 25 mg tablet, Take 25 mg by mouth 3 (three) times a day., Disp: , Rfl:     lisinopriL (PRINIVIL) 10 mg tablet, Take 10 mg by mouth 2 (two) times a day., Disp: , Rfl:     melatonin 3 mg tablet, Take 6 mg by mouth nightly., Disp: , Rfl:     mirtazapine 30 mg tablet, Take 30 mg by mouth nightly., Disp: , Rfl:     nitrofurantoin, macrocrystal-monohydrate, (MACROBID) 100 mg capsule, Take 100 mg by mouth daily before dinner., Disp: , Rfl:     pimavanserin (NUPLAZID) 34 mg capsule, Take 34 mg by mouth daily., Disp: , Rfl:     senna (SENOKOT) 8.6 mg tablet, Take 2 tablets by mouth daily before dinner., Disp: , Rfl:     sertraline 50 mg tablet, Take 200 mg by mouth daily. Taking 50 mg in the AM & then 100 mg in the PM, Disp: , Rfl:     topiramate (TOPAMAX) 25 mg tablet, Take 25 mg by mouth daily., Disp: , Rfl:     eszopiclone (LUNESTA) 1 mg tablet tablet, Take 1 mg by mouth nightly as needed for sleep. Take immediately  before bedtime, Disp: , Rfl:   [facility]  AWARxE: Query reviewed and no concerns      Physical Exam  Constitutional:       General: She is not in acute distress.     Appearance: She is normal weight. She is not toxic-appearing.      Comments: Older adult  Frail  Fatigued      HENT:      Head: Normocephalic and atraumatic.      Jaw: There is normal jaw occlusion.      Right Ear: External ear normal.      Left Ear: External ear normal.      Nose: Nose normal.      Mouth/Throat:      Mouth: Mucous membranes are moist.      Pharynx: Oropharynx is clear.   Eyes:      General: Lids are normal. No scleral icterus.     Conjunctiva/sclera: Conjunctivae normal.   Neck:      Trachea: Trachea normal.   Cardiovascular:      Rate and Rhythm: Normal rate and regular rhythm.      Pulses:           Radial pulses are 2+ on the right side and 2+ on the left side.        Dorsalis pedis pulses are 1+ on the right side and 1+ on the left side.      Heart sounds: Normal heart sounds. No murmur heard.  Pulmonary:      Effort: Pulmonary effort is normal. No accessory muscle usage or respiratory distress.      Breath sounds: No decreased breath sounds, wheezing, rhonchi or rales.   Abdominal:      General: Abdomen is flat. Bowel sounds are normal. There is no distension.      Palpations: Abdomen is soft.      Tenderness: There is no abdominal tenderness.   Musculoskeletal:         General: Swelling present.      Cervical back: Normal range of motion and neck supple.      Right lower le+ Edema present.      Left lower le+ Edema present.      Right foot: Foot drop present.      Left foot: Foot drop present.      Comments: Sarcopenia    Feet:      Right foot:      Skin integrity: Skin integrity normal.      Toenail Condition: Right toenails are normal.      Left foot:      Skin integrity: Skin integrity normal.      Toenail Condition: Left toenails are normal.   Skin:     General: Skin is warm and dry.       Findings: Bruising and wound present. No lesion or rash.          Neurological:      General: No focal deficit present.      Mental Status: She is alert. Mental status is at baseline. She is disoriented.      Motor: Weakness present.      Gait: Gait abnormal.   Psychiatric:         Attention and Perception: Attention normal. She perceives visual hallucinations.         Mood and Affect: Mood is anxious. Affect is flat.         Speech: Speech normal.         Behavior: Behavior is slowed. Behavior is not agitated or aggressive. Behavior is cooperative.         Thought Content: Thought content normal.         Cognition and Memory: Memory is impaired. She exhibits impaired recent memory.         Judgment: Judgment normal.         Palliative Assessment  FAST Score:  6c.- Inability to handle mechanics of toileting (unchanged from 9/4/24)  Palliative Performance Scale: 40% (decreased from 50% on 9/4/24)  Mid Upper Arm Circumference (MUAC):  L - 24.5 cm (decreased from 27 cm 7/8/24)  ESASr: Pain 5, Tiredness 8, Drowsiness 7, Nausea 0, Appetite 0, Shortness of Breath 0, Depression 0, Anxiety 8 and Wellbeing 7  Palliative Disease State: Deteriorating Despite Treatments   Patient Prognostic Awareness: Demonstrates limited understanding of disease process, Demonstrates limited understanding of prognosis and Demonstrates limited understanding of resuscitation status  Palliative Care Risk Stratification: Level Three- High risk.  Requires highest level of intervention.  Not likely to return to usual care    Goals of Care  Advance Directives Status:  Not Received  Advance Directives:    Document type(s) Durable Power of , Living Will and POLST  Healthcare Proxy Name, Contact Information, and Relationship  - John Porras ([phone])  Goals of Care Discussion: Yes  Change in medical orders resulting from advance care planning discussions: Decision to change Code Status  Code status  DNR/DNI      Assessment:  Assessment/Plan   Diagnoses and all orders for this visit:    Encounter for palliative care in home, non-hospice (Primary)    Moderate dementia due to Parkinson's disease, with agitation (CMS/HCC)  Assessment & Plan:  Followed by neurologist Dr Payan & Pcp Dr. Corona   C/b anxiety and insomnia with agitation at times   Monitor cognitive status  Monitor for falls  Continue close supervision and assist with ADLs  Continues on Sertraline 100 mg in the AM & 100 mg in the PM - discussed with family stopping and rotating to Escitalopram 20 mg daily  Continues on Mirtazapine 30 mg nightly - discussed with family weaning off of mirtazapine and starting Trazodone 25 mg nightly for sleep and anxiety.   Continues on Lunesta 1 mg nightly PRN for sleep - discussed concern around benzodiazepine use in order adults and associated risks. Recommended stopping   Has 24/7 caregiver support   Plan for home visit with [facility] Neuropsychologist on 10/7 for another neuro-psychological evaluation.  Will follow-up with Dr. Payan during the week to discuss medications and recommendations for medication changes       Parkinson's disease, unspecified whether dyskinesia present, unspecified whether manifestations fluctuate (CMS/HCC)  Assessment & Plan:  Follows with Neurologist Dr Payan  Complicated by agitation, anxiety, hallucinations, and vivid dreams  Continues on Carbidopa-Levodopa  mg three times daily   Continues Nuplazid 34 mg in the AM daily   Continues Melatonin 6 mg at bedtime   Plan for home visit with [facility] Neuropsych[facility] on 10/7 for another neuro-psychological evaluation.  Will follow-up with Dr. Payan during the week to discuss medications and recommendations for medication changes         Primary hypertension  Assessment & Plan:  Followed by PCP Dr. Corona  Blood pressure fluctuates greatly, goal to avoid hypotension with past syncopal episodes.    Continues on Lisinopril 10  mg BID   Continues on Hydralazine 25 mg TID  Normotensive on exam  Monitor BP      Dysphagia, unspecified type  Assessment & Plan:  Worsening   Tolerating soft chopped diet with moisture added and thin liquids   Encouraged upright positioning for oral intake  Continue small bites of food, eat slowly, and alternate with sips of water  Aspiration precautions  Monitor swallowing  [facility] - [facility] Bridge referral with SLP      Anxiety  Assessment & Plan:  Worsening - notes patient with increased anxiety at nighttime and with mobility  Follows with PCP Dr. Corona & Neurologist Dr. Payan for management  Continues on Sertraline 100 mg in the AM & 100 mg in the PM - discussed with family stopping and rotating to Escitalopram 20 mg daily  Continues on Mirtazapine 30 mg nightly - discussed with family weaning off of mirtazapine and starting Trazodone 25 mg nightly for sleep and anxiety.   Continues on Lunesta 1 mg nightly PRN for sleep - discussed concern around benzodiazepine use in order adults and associated risks. Recommended stopping   Plan for home visit with [facility] Neuro[facility] on 10/7 for another neuro-psychological evaluation.  Will follow-up with Dr. Payan during the week to discuss medications and recommendations for medication changes       Pressure injury of sacral region, stage 2 (CMS/HCC)  Assessment & Plan:  See media for images  Multiple stage 2 pressure injuries to sacrum with roxanna-wound blanchable erythema   Caregiver is doing dressing care with saline wash, neosporin on gauze to open wounds and foam dressing  Recommended Triad paste to wound and foam dressing   [facility] Referral for RN & Wound Care RN   Continue dressing changes   Monitor for wound      Chronic low back pain with right-sided sciatica, unspecified back pain laterality  Assessment & Plan:  Follows with pain management provider Dr. Braswell   Chronic pain of lumbar spine with radiculopathy & neuropathy  of both feet.  Followed by pain management specialists and receives epidural of the spine every 3 months. Last caudal epidural injection on 7/29/24  Continues on Topamax 25 mg twice a day  Continues on Tylenol 1000 mg BID   Continues on Gabapentin 300 mg three times a day  Continues on Butrans 5mcg/hr transdermal patch changed every week   Continues with Oxycodone IR 10 mg every day PRN for pain (using nightly)   Referral placed for [facility] - PT/OT  Monitor pain      Debility  Assessment & Plan:  Due to Parkinson's disease and chronic back pain   At risk for further decline and debility due to advancing age and multimorbidity  Monitor for falls (0 falls since last visit)  Continue use of assistive devices (walker & transport chair)  Has 24/7 paid caregiver support  Family is paying out of pocket for OT with [facility]  [facility] home care referral - RN, PT/OT, SLP, & HHA            Advance Care Planning   Palliative Home Based Care Advance Care Planning Note      Patient Name: Vicki Porras                                                                                 Patient MRN: [id number]  Discussion Date: 09/22/24   Discussion Participants: spouse John , daughter Ivy, and caregiver Lucia    Start time: 3:00 PM  End time: 3:30 PM    Patients current medical state including medical necessity for ACP discussion:    Patient with poor prognosis measured in months or weeks rather than years, Patient with life limiting illness, Patient with progressive chronic illness, Patient with frequent rehospitalizations, Patient with frequent visits to the emergency department, and Patient at risk for overmedicalized death    Todays participants wished to discuss advance care planning.  The following summarizes our discussion.   During our visit today we discussed advance directives and goals of care including:  Patient medical status and prognosis, The value and importance of advance care  planning, Experiences with loved ones who have been seriously ill or have ., Exploration of personal, cultural, or spiritual beliefs that might influence medical decisions., Exploration of goals of care considering current illness or in the event of a sudden injury or illness., Treatment decisions/ types of medical care preferred by the patient. Code Status, Types of advance care planning documents, and Assistance with preparation of advance care plan or healthcare agent document. POLST & OOH DNR    Code status Full code  Health Care Agent/ Surrogate Decision Maker: Patient has a surrogate decision maker.  Surrogate Decision maker is:  - John Porras ([phone])    Today participants wished to discuss Advance Care Planning. The following summarizes our discussion.    During our visit today, we discussed:     Code Status  Do Not Resuscitate / Allow Natural Death    Discussed code status in setting of patient's decline. I counseled related to code status and the benefits and burdens of CPR and mechanical ventilation in the setting of current clinical condition and co-morbidities.  Discussed code status options and limitations of resuscitation, including possibility of further debility if resuscitation attempt is successful. Also discussed that resuscitation will not reverse or cure underlying medical issues.     Family agrees with plan to change code status to DNR/DNI.     Medical Status/Prognosis  Reviewed Parkinson's Disease illness. Discussed it is progressive and incurable. Reviewed concern patient has declined related to her illness with: functional decline, nutritional decline, pressure injuries, and cognitive decline.     Goals of Care    Goals are Palliative.     Reviewed options:  (1) continuing Palliative Care with symptom management, follow-up with medical providers, diagnostic work-up, and return to the hospital as needed for medical interventions.   Versus  (2) transition to hospice care  with comfort focused care. Discussed hospice philosophy with services including: RN, HHA, PT/OT, MSW, , & Volunteers. Discussed DME, wound care, and 24/7 hotline offered by hospice. Discussed with hospice patient is opting to remain home and be comfortable and not return to hospital as illness progresses, not pursuing diagnostic work-up, and not following up with medical specialists.     Family prefers to continue with Palliative Care at this time. Focus on maintaining quality of life and ensuring symptoms are managed. Values being home with family. Treating reversible conditions as they arise.     Treatment Decisions/ types of medical care preferred by the patient  Agreeable with return to the hospital for limited interventions.   Agreeable with use of antibiotics for treatment of infections.   Agreeable with trial of artifical nutrition and hydration.     Advance Care Documents  Assisted with updating POLST and uploaded a copy to the EHR.   Will assist with completion of OOH DNR.     Attestation Statement:  I have spent a total of 30 minutes in face-to-face discussion of patient advance care planning with the patient and/or surrogate decision makers.  No active management of the patient’s problem(s) was undertaken during the time period reported      OSCAR Omer  9/22/2024 4:41 PM

## 2024-09-22 PROBLEM — L89.152 PRESSURE INJURY OF SACRAL REGION, STAGE 2 (CMS/HCC): Status: ACTIVE | Noted: 2024-01-01

## 2024-09-22 NOTE — ASSESSMENT & PLAN NOTE
Followed by PCP Dr. Corona  Blood pressure fluctuates greatly, goal to avoid hypotension with past syncopal episodes.    Continues on Lisinopril 10 mg BID   Continues on Hydralazine 25 mg TID  Normotensive on exam  Monitor BP

## 2024-09-22 NOTE — ASSESSMENT & PLAN NOTE
Worsening - notes patient with increased anxiety at nighttime and with mobility  Follows with PCP Dr. Corona & Neurologist Dr. Payan for management  Continues on Sertraline 100 mg in the AM & 100 mg in the PM - discussed with family stopping and rotating to Escitalopram 20 mg daily  Continues on Mirtazapine 30 mg nightly - discussed with family weaning off of mirtazapine and starting Trazodone 25 mg nightly for sleep and anxiety.   Continues on Lunesta 1 mg nightly PRN for sleep - discussed concern around benzodiazepine use in order adults and associated risks. Recommended stopping   Plan for home visit with Sierra Schwartz Neuropsychologist on 10/7 for another neuro-psychological evaluation.  Will follow-up with Dr. Payan during the week to discuss medications and recommendations for medication changes

## 2024-09-22 NOTE — ASSESSMENT & PLAN NOTE
Follows with pain management provider Dr. 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  Followed by pain management specialists and receives epidural of the spine every 3 months. Last caudal epidural injection on 7/29/24  Continues on Topamax 25 mg twice a day  Continues on Tylenol 1000 mg BID   Continues on Gabapentin 300 mg three times a day  Continues on Butrans 5mcg/hr transdermal patch changed every week   Continues with Oxycodone IR 10 mg every day PRN for pain (using nightly)   Referral placed for [facility] - PT/OT  Monitor pain

## 2024-09-22 NOTE — ASSESSMENT & PLAN NOTE
See media for images  Multiple stage 2 pressure injuries to sacrum with roxanna-wound blanchable erythema   Caregiver is doing dressing care with saline wash, neosporin on gauze to open wounds and foam dressing  Recommended Triad paste to wound and foam dressing   [facility] Home Care Referral for RN & Wound Care RN   Continue dressing changes   Monitor for wound

## 2024-09-22 NOTE — ASSESSMENT & PLAN NOTE
Followed by neurologist Dr Payan & Pcp Dr. Corona   C/b anxiety and insomnia with agitation at times   Monitor cognitive status  Monitor for falls  Continue close supervision and assist with ADLs  Continues on Sertraline 100 mg in the AM & 100 mg in the PM - discussed with family stopping and rotating to Escitalopram 20 mg daily  Continues on Mirtazapine 30 mg nightly - discussed with family weaning off of mirtazapine and starting Trazodone 25 mg nightly for sleep and anxiety.   Continues on Lunesta 1 mg nightly PRN for sleep - discussed concern around benzodiazepine use in order adults and associated risks. Recommended stopping   Has 24/7 caregiver support   Plan for home visit with Sierra Schwartz Neuropsychologist on 10/7 for another neuro-psychological evaluation.  Will follow-up with Dr. Payan during the week to discuss medications and recommendations for medication changes

## 2024-09-22 NOTE — Clinical Note
Good afternoon,  I met with Ms. oPrras today. She has declined since last visit. Sleeping 16-17 hours in a 24 hour period. Worsening fatigue. Functionally she is moderate/maximum assist to transition from bed to wheelchair. Able to ambulate ~ 5 feet with walker and support. Worsening anxiety at nighttime and related to ambulating. Eating 2 meals a day, but has lost weight since July. Worse Dysphagia. Multiple stage 2 pressure injuries to her sacrum. Discussed GOC with her family. Code status now DNR/DNI. Not ready for hospice yet. Hard to get out of home to medical appointments. Would like to simplify follow-up with providers. I will follow-up with Dr. Corona and Dr. Payan during the week. Completed POLST and will work on OOH DNR. I will follow-up in October for ongoing Palliative Care support. Ordering [facility] with RN, PT/OT, SLP, & Wound Care. Please reach out with questions or concerns.  Thank you, OSCAR Omer Palliative Care with [facility] [phone]

## 2024-09-22 NOTE — ASSESSMENT & PLAN NOTE
Follows with Neurologist Dr Payan  Complicated by agitation, anxiety, hallucinations, and vivid dreams  Continues on Carbidopa-Levodopa  mg three times daily   Continues Nuplazid 34 mg in the AM daily   Continues Melatonin 6 mg at bedtime   Plan for home visit with Sierra Schwartz Neuropsychologist on 10/7 for another neuro-psychological evaluation.  Will follow-up with Dr. Payan during the week to discuss medications and recommendations for medication changes

## 2024-09-22 NOTE — ASSESSMENT & PLAN NOTE
Worsening   Tolerating soft chopped diet with moisture added and thin liquids   Encouraged upright positioning for oral intake  Continue small bites of food, eat slowly, and alternate with sips of water  Aspiration precautions  Monitor swallowing  [facility] - [facility] referral with SLP

## 2024-09-22 NOTE — ASSESSMENT & PLAN NOTE
Due to Parkinson's disease and chronic back pain   At risk for further decline and debility due to advancing age and multimorbidity  Monitor for falls (0 falls since last visit)  Continue use of assistive devices (walker & transport chair)  Has 24/7 paid caregiver support  Family is paying out of pocket for OT with [facility]  [facility] home care referral - RN, PT/OT, SLP, & HHA

## 2024-09-24 NOTE — TELEPHONE ENCOUNTER
Call to Neurologist Dr. Payan office. Left message with answering service that provider would like to speak with Dr. Payan regarding patient's medications. Discussed provider met with patient over the weekend and noted decline. Discussed patient with uncontrolled anxiety and insomnia and would like to speak with Dr. Payan regarding medication adjustment with a Palliative focus. Provided contact information.     OSCAR Oemr  Palliative Care [facility]  September 24, 2024 at 8:28 AM

## 2024-09-24 NOTE — TELEPHONE ENCOUNTER
Spoke with Dr. Corona. Reviewed patient's decline and discussion with Neurology team. Discussed plan to rotate from Sertraline to Escitalopram. Discussed plan to stop Lunesta due to worsening confusion. Discussed recommendations for stopping Nuplazid if no clear benefit in daytime hallucinations. 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 Discussed plan to consider weaning Mirtazapine in the future and using Trazodone for sleep. Dr. Corona is in agreement with plan. Dr. Corona makes home visits and does not see patient in the office. Discussed [facility] Pall[facility] services started with [facility]. Dr. Corona feels that EKG is not crucial at this point.     OSCAR Omer  Palliative Care [facility]  September 24, 2024 at 1:10 PM

## 2024-09-24 NOTE — TELEPHONE ENCOUNTER
Call to PCP Dr. Corona office. Left message with office staff. Left a message with office staff. Discussed provider met with patient over the weekend and noted decline. Discussed provider has also left a message with Neurologist Dr. Payan. Is hoping to coordinate regarding patient's care plan moving forward given patient's decline and difficulty getting to medical appointments. Provided contact information.     OSCAR Omer  Palliative Care [facility]  September 24, 2024 at 8:36 AM

## 2024-09-24 NOTE — TELEPHONE ENCOUNTER
Call to patient's son Jose Roberto to discuss updates from medical providers and plan for medication changes. Jose Roberto notes his sister updated him from visit this weekend. Discussed provider spoke with PCP and Neurologist today. Reviewed plan for medication changes.   Discussed stopping Lunesta due to concern for CNS side effects. Plan to rotate from Sertraline 200 mg daily to Escitalopram 20 mg daily. Discussed prior to starting Nuplazid patient with increased agitation and combativeness with caregiver, which Nuplazid seemed to help initially. Notes patient's combativeness has returned - notes feeling like her clothes are wet or feeling like she has sand on her hands. However, unsure how patient's restlessness and combativeness has been as currently they are dealing with her lack if  interest in doing anything. Discussed plan to continue Nuplazid for now with plan to re-evaluate in 1-2 months after Sertraline to Escitalopram. Plan to continue to monitor sleep and to consider rotating from Mirtazapine to Trazodone for sleep.     OSCAR Omer  Palliative Care [facility]  September 24, 2024 at 4:29 PM

## 2024-09-24 NOTE — TELEPHONE ENCOUNTER
Spoke to Maciej JOY from Neurology office. Reports PCP is prescribing Mirtazapine & Sertraline. Discussed plan to rotate from Sertraline to Escitalopram. 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 Agreeable with plan to rotate from Sertraline to Escitalopram and stopping Lunesta. Okay to stop Nuplazide for hallucinations if no benefit. Patient is next due to be seen on in December for telemedicine. Discussed last EKG on file is from 2023. With medication changes, would consider repeating EKG if able.     OSCAR Omer  Palliative Care [facility]  September 24, 2024 at 1:00 PM

## 2024-09-24 NOTE — TELEPHONE ENCOUNTER
Spoke with Neurologist Dr. Corona. Reviewed patient's decline and plan for:  Stopping Lunesta 1 mg nightly PRN for sleep  Plan to rotate from Sertraline 200 mg daily to Escitalopram 20 mg daily  Plan to continue Melatonin 6 mg nightly   Okay to stop Nuplazid 34 mg if no perceived benefit in daytime hallucinations  Will consider weaning mirtazapine in coming months and using Trazodone for sleep if insomnia and REM disorder with dream enactment continues to be an issue.     OSCAR Omer  Palliative Care [facility]  September 24, 2024 at 2:50 PM

## 2024-09-26 NOTE — TELEPHONE ENCOUNTER
Patient son called in, antidepressant has changed Zoloft to Lexapro.    Lunesta discontinued.     Jose Roberto says that patient was up at 3am and has been screaming in distress till 11am.     He is wondering if there is another option for his mother when this happens.

## 2024-09-26 NOTE — TELEPHONE ENCOUNTER
Return call to patient's son Jose Roberto.     Notes patient was unable to rest and was unmanageable. Reports patient was restless overnight. Trying to get in and out of bed. Notes no signs of pain. Notes that patient was restless from 3 AM until 11 AM. Notes patient went to bed without issues and then woke up agitated.     Was able to start Lexapro today. Was able to stop Lunesta, last dose about 2 weeks ago. Notes patient was on Mirtazapine 30 mg initially for sleep. Notes patient has been on Mirtazapine 30 mg nightly for a few years.     Plan to take:  - Mirtazapine 15 mg nightly for 2 weeks starting tonight until 10/10   - Mirtazapine 7.5 mg nightly for 2 weeks starting 10/11 until 10/25 and then stop medication  - Plan to continue Melatonin 6 mg nightly for REM sleep disturbances and dream enactment   - Will prescribe Trazodone 25 mg nightly for sleep, plan to start once weaned off of mirtazapine. Family to reach out to provider if need to start prior to weaning off of mirtazapine.     OSCAR Omer  Palliative Care [facility]  September 26, 2024 at 3:24 PM

## 2024-09-28 NOTE — TELEPHONE ENCOUNTER
"Spoke to pt's caregiver Ayana. Lucia stated she wanted to update us about pt's behaviors since the medication changes. Per Lucia, pt finished zoloft on Wed. And started Lexapro on Thurs. Pt also started to take 15mg of mirtazepine Thursday night. Lucia said she had not started the trazodone yet as she just picked up the medicine this morning. Lucia reports pt has increased agitation since the med changes, pt woke up at about 3am Friday night, agitated, difficult to redirected, this went on till 11am. Last night again, pt did not sleep all night, had strong outburst of anger, yelling/cursing at Lucia. Ayana reported this morning, pt initially recognized her, but later thought she was the next door neighbor, then an hour later, recognized her again. Ayana said pt was in her \"OCD\" mood around noon time, up and down straightening things, unable to redirect.     Per Lucia, pt's behaviors have been worse overall since the change of the medications, now it is affecting her , making him stressed. Ayana not sure the med changes are working. I encouraged Ayana to follow thru NP's plan and give more time for the medication adjustments to work. Ayana will add trazodone tonight. Lucia said she will reach out to Verna next week if pt's behaviors continue to worsen.   "

## 2024-09-28 NOTE — TELEPHONE ENCOUNTER
Per on call gertrudis Hidalgo 6:06 pm  9/27 P.M. BEHAVIOR PROBLEMS INCREASING WITH AGITATION AND NOT SLEEPING THROUGH THE NIGHT. ASKING FOR CRNP TO CALL CAREGIVER PETRA [phone] TO REVIEW MEDS.

## 2024-09-30 NOTE — TELEPHONE ENCOUNTER
Patient speech therapist called in on behalf of patient caregiver. Patient's behaviors have not changed since this weekend with added dose of Trazadone.     Requesting a telephone call to Lucia [phone]

## 2024-10-01 NOTE — TELEPHONE ENCOUNTER
Return call to Jose Roberto. Lucia (caregiver) present for call. Lucia notes with the first day with reducing the Mirtazapine to 15 mg patient had trouble sleeping and was aggressive. Notes patient's mood continues to be labile, more alert but agitated. The next night patient received Mirtazapine 15 mg nightly and started Trazodone 25 mg nightly. Notes patient with increased agitation and screaming throughout the day and the night. Yesterday patient refused to eat during the day, with bites during the day and a small dinner. Notes patient is not working with therapists. Notes with ongoing phlegm and mucous. Patient has been more agitated with personal care - biting and fighting with personal care. Notes overall patient is more agitated and labile during the day. Notes patient did sleep better last night and was calm. Notes patient continues to be agitated with personal care.     Continues on:  - Lexapro 20 mg daily  - Mirtazapine 15 mg nightly  - Trazodone 25 mg nightly  - Melatonin 6 mg nightly    Discussed option to adjust Oxycodone IR 10 mg to daily to see if that is contributing to agitation. Continues on Tylenol 1000 mg every 8 hours.     Family feels patient's are related to her mood feeling depressed and anxious. Notes previously with Neurologist patient's Carbidopa-Levodopa to 3 times a day with moving bedtime to 7 PM and initiation of Nuplazid has helped. Notes patient's symptoms have occurred in the past. Discussed need to give patient time to allow medications to take effect.     Reports no concern for UTI. No complains of dysuria. No concerns for UTI at this time.     OSCAR Omer  Palliative Care [facility]  October 1, 2024 at 12:39 PM

## 2024-10-01 NOTE — TELEPHONE ENCOUNTER
Call to patient's son Jose Roberto. He is with patient's  at cardiology appointment. Requesting provider call back later today when he is at patient's home with caregiver Lucia.     OSCAR Omer  Palliative Care [facility]  October 1, 2024 at 11:11 AM

## 2024-10-09 NOTE — TELEPHONE ENCOUNTER
"Return call to patient's caregiver Lucia. Notes patient continues to be rough the past few days. Notes patient is sleeping better at nighttime. Notes currently patient is not allowing provider to clean her up. Notes she is bitting and aggressive with provider. Notes patient woke at 9 AM and wanted to get out of bed. Notes patient wants to get her to chair, but is incontinent of urine and stool. Not allowing caregiver to bathe or perform wound care.     Continues on:  - Escitalopram 20 mg daily  - Mirtazapine 15 mg nightly   - Trazodone 25 mg nightly    Notes patient had evaluation by Psychiatrist who will take 2-3 weeks to complete report but is recommending Seroquel nightly and PRN Xanax prior to personal care. Discussed concern around patient's medications and interactions.     Daughter Mariya participated on the home. Notes that patient is having a hard time in the mornings. Is very agitated and restless during the day. Notes that patient is very hostile and \"unpleasant.\" Notes patient is sleeping better at nighttime but now having behavior problems.     Mariya inquiring about vaccination options. Discussed RN with home care provider flu vaccine. Encouraged Mariya to explore if PCP can administer COVID vaccine. Discussed if not then provider can provide information regarding in home vaccinations at a cost at next visit.     Plan for provider to follow-up with Neurologist Dr. Payan who is co-managing patient's mood and behavioral issues related to parkinson's disease and dementia.   "

## 2024-10-09 NOTE — TELEPHONE ENCOUNTER
Return call to patient's son. Notes he is not with patient currently, but in [place]. Notes patient continues to be agitated during the day impacting the ability to bathe her etc. He is unable to review medications with provider as he doesn't have the medication list. Jose Roberto is in agreement with provider speaking with caregiver Lucia regarding current situation.     OSCAR Omer  Palliative Care [facility]  October 9, 2024 at 1:20 PM

## 2024-10-09 NOTE — TELEPHONE ENCOUNTER
Call to Neurologist Dr. Payan left message with  requested to speak with Dr. Payan regarding patient's medication management.     OSCAR Omer  Palliative Care [facility]  October 9, 2024 at 1:45 PM

## 2024-10-09 NOTE — TELEPHONE ENCOUNTER
Caregiver called, since the meds have switched, the caregiver has not been able to get her up in the morning to get her cleaned up and states this needs to happen because the patient has bed sores.

## 2024-10-09 NOTE — TELEPHONE ENCOUNTER
Return call to Lucia and daughter Mariya.     Discussed plan to start Clonazepam 0.25 mg BID for anxiety and agitation.   Reviewed plan to continue to wean Mirtazapine as this was not helping with sleep. Plan to continue 15 mg through 10/10 and then wean to 7.5 for 14 weeks then off.   Discussed provider left a message with Dr. Payan's office and is waiting a return call to discuss plan for Nuplazid and Seroquel.     Reviewed plan to continue Trazodone 25 mg nightly, Melatonin 6 mg nightly, and Escitalopram 20 mg daily.     No further questions or concerns.     OSCAR Omer  Palliative Care [facility]  October 9, 2024 at 4:13 PM

## 2024-10-23 PROBLEM — F03.C11 SEVERE DEMENTIA WITH AGITATION (CMS/HCC): Status: ACTIVE | Noted: 2023-11-24

## 2024-10-23 NOTE — ASSESSMENT & PLAN NOTE
Goals are Comfort-Focused  Code Status updated to: DNR/DNI  Due to confusion from Dementia patient benefits from involving her family in complex medical decision making.   Patient has an advanced directive that is not on file that names  - John Porras ([phone]) as her health care power of  if she is unable to make her own medical decisions.   Has completed POLST in the home and in EHR. Assisted with updating to reflect comfort measures only wishes.   Has completed OOH DNR in the home and in EHR.   Focus on being home, avoiding suffering, and being with family.   Plan to transition to [facility] given decline.

## 2024-10-23 NOTE — ASSESSMENT & PLAN NOTE
Follows with pain management provider Dr. 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  Followed by pain management specialists and receives epidural of the spine every 3 months. Last caudal epidural injection on 7/29/24  Stop Topamax 25 mg nightly due to polypharmacy and concern for side effects   Continues on Tylenol 1000 mg BID   Continues on Gabapentin 300 mg three times a day  Continues on Butrans 5mcg/hr transdermal patch changed every week   Continues with Oxycodone IR 10 mg every day PRN for pain (using nightly)   Monitor pain

## 2024-10-23 NOTE — ASSESSMENT & PLAN NOTE
Followed by neurologist Dr Payan & Pcp Dr. Corona   C/b anxiety, agitation, hallucinations, and insomnia   Monitor cognitive status  Monitor for falls  Continue close supervision and assist with ADLs  Continue Escitalopram 20 mg daily   Complete Mirtazapine 7.5 mg nightly wean with last dose tonight  Increase Clonazepam to 0.5 mg BID  Stop Trazodone 25 mg nightly  Start Seroquel 25 mg nightly   Has 24/7 caregiver support   Hospice transition given decline

## 2024-10-23 NOTE — PROGRESS NOTES
Palliative Home Based Care Established Visit    Subjective     Patient ID: Vicki Porras is a 89 y.o. female.  MRN: [id number]  Date/ Time Visit Made:  Referring Physician: No referring provider defined for this encounter.  Primary Care Physician:Shant Corona MD      The start time of this Face to Face visit was 11:27 AM.  The end time of this Face to Face visit was 1:14 PM.      I spent  100 minutes on this date of service performing the following activities: obtaining history, performing examination, entering orders, documenting, preparing for visit, providing counseling and education, and coordinating care. This time excludes time spent discussing ACP.    Reason for Visit:  Palliative Care Follow-up      Mariya Porras is an 89 year old female seen in the home for Palliative Care follow-up visit with a PMH of Parkinson's Disease, Moderate Dementia, HTN, PE & DVT (2021), GERD, Anxiety/Depression, Chronic Back Pain, Neuropathy of feet, Dysphagia, Stage 2 Pressure Injury to Sacrum, and Debility.     Patient last seen by Palliative Care on 9/22/24.     Met with patient seated in recliner. Endorses fatigue. Barely keeps eyes open during visit. Ongoing right hip pain and low back that radiates down to her leg. Reports mild pain at rest that becomes severe with movement. Having trouble falling asleep at nighttime, notes she lays there. Feels appetite is good. Doesn't notice coughing with oral intake. Moving bowels. Feels she is getting weaker. Using wheelchair and elevator to move between floors. Has electronic bed with HOB elevation. Was able to ambulate with walker from bed ~ 10 feet. Has hallucinations at times, thinks there is someone next to her but they aren't there. Notes no auditory hallucinations.     Met with caregiver Lucia. Notes patient with cough. Is now spitting up at dinner with thick phlegm. Is eating 2 meals a day. Oatmeal for breakfast. Is eating dinner. Tolerating soft and cut up food.  Enjoys ice cream for dessert. Bowels are loose/soft. Is now incontinent of urine and stool consistently. Requiring two aides for a shower with shower chair. Had x 1 fall last week where 911 was called for a lift assist. Does appear to be asleep at nighttime. Still has agitation in the AM from 7:30 AM until 10 AM, but then is okay the rest of the day. Some days she allows providers to clean her in the bed. Other days forgets she can't ambulate and gets more agitated. PCP Dr. Corona is supposed to come to house for a visit. Has a scheduled appointment with Pain Management tomorrow with Dr. Kaiser. Notes patient with worsening visual and auditory hallucinations and feeling wetness in her hand. Continues with Butrans patch weekly and Oxycodone IR 10 mg nightly.     Continued conversation with patient's  Joel and children joined virtually - Jeremy Riggs. Patient is weaker. Hostile and agitation in the AM when she wakes up. Is not cooperative and resistant to personal care. Appears to be in significant pain. Patient is given a few hours then her mood is better. Patient is confused and frustrated. Patient with negative outlook. Disorientation with hallucinations and paranoid. Concerned about suffering, physical decline and pain. Notes patient has been unable to get to medical appointments. Discussed GOC, see ACP note for details.     No hospital admissions or ER visits since last Palliative Care visit.     Call to PCP office. Shared plan to transition to hospice given decline. Spoke to . Dr. Corona is not in the office today. Will relay the message.     Left VM with Neurology office updating regarding plan for hospice transition.             HPI Source: Patient, Caregiver, Medical Records, and Family  Past Medical History:   Past Medical History:   Diagnosis Date    Anxiety     Arthritis     Basal cell carcinoma     forehead    Glaucoma     Hypertension     Low back pain     Lumbosacral disc  disease     Parkinson's disease     Peripheral neuropathy     Seizures (CMS/HCC)      Past Surgical History:    Past Surgical History   Procedure Laterality Date    Bladder suspension  2009    Prolift M    Cataract extraction, bilateral      Colonoscopy      DERM CHEMICAL PEEL (TCA) N/A 6/6/2018    Performed by Beth Gutierrez MD at [facility]    Hysterectomy      Joint replacement      Knee arthroplasty  2015    left    L1 EPIDURAL STEROID INJECTION INTERLAMINAR LUMBAR - LES Right 9/27/2023    Performed by Shant Byrnes Jr., MD at  OR    Ptosis repair BUL with skin pinch cos blepharoplasty BLL with TCA Peel Excision of marginal Lesion YEFRI Bilateral 6/6/2018    Performed by Beth Gutierrez MD at [facility]     Family History:  family history includes No Known Problems in her biological father and biological mother.  Social History:    Social History     Socioeconomic History    Marital status:    Tobacco Use    Smoking status: Former    Smokeless tobacco: Never    Tobacco comments:     as a young teenager   Substance and Sexual Activity    Alcohol use: Yes     Comment: wine    Drug use: No    Sexual activity: Not Currently     Partners: Male     Social Drivers of Health     Financial Resource Strain: Low Risk  (9/24/2023)    Overall Financial Resource Strain (CARDIA)     Difficulty of Paying Living Expenses: Not hard at all   Food Insecurity: No Food Insecurity (11/27/2023)    Hunger Vital Sign     Worried About Running Out of Food in the Last Year: Never true     Ran Out of Food in the Last Year: Never true   Transportation Needs: No Transportation Needs (9/24/2023)    PRAPARE - Transportation     Lack of Transportation (Medical): No     Lack of Transportation (Non-Medical): No   Housing Stability: Low Risk  (9/24/2023)    Housing Stability Vital Sign     Unable to Pay for Housing in the Last Year: No     Number of Places Lived in the Last Year: 1     Unstable Housing  in the Last Year: No       Zoroastrianism:    Changes or updates to Spiritual Assessment: Radha and Belief: Latter-day and No Spiritual concerns identified     Data Review  Lab Studies: Lab Studies: Pertinent radiology and labs reviewed  Labs last six months:    No visits with results within 6 Month(s) from this visit.   Latest known visit with results is:   Admission on 11/27/2023, Discharged on 11/27/2023   Component Date Value Ref Range Status    WBC 11/27/2023 7.79  3.80 - 10.50 K/uL Final    RBC 11/27/2023 3.95  3.93 - 5.22 M/uL Final    Hemoglobin 11/27/2023 12.7  11.8 - 15.7 g/dL Final    Hematocrit 11/27/2023 39.9  35.0 - 45.0 % Final    MCV 11/27/2023 101.0 (H)  83.0 - 98.0 fL Final    MCH 11/27/2023 32.2  28.0 - 33.2 pg Final    MCHC 11/27/2023 31.8 (L)  32.2 - 35.5 g/dL Final    RDW 11/27/2023 13.2  11.7 - 14.4 % Final    Platelets 11/27/2023 232  150 - 369 K/uL Final    MPV 11/27/2023 9.2 (L)  9.4 - 12.3 fL Final    Differential Type 11/27/2023 Auto   Final    nRBC 11/27/2023 0.0  <=0.0 % Final    Immature Granulocytes 11/27/2023 0.5  % Final    Neutrophils 11/27/2023 72.2  % Final    Lymphocytes 11/27/2023 19.3  % Final    Monocytes 11/27/2023 5.9  % Final    Eosinophils 11/27/2023 1.3  % Final    Basophils 11/27/2023 0.8  % Final    Immature Granulocytes, Absolute 11/27/2023 0.04  0.00 - 0.08 K/uL Final    Neutrophils, Absolute 11/27/2023 5.63  1.70 - 7.00 K/uL Final    Lymphocytes, Absolute 11/27/2023 1.50  1.20 - 3.50 K/uL Final    Monocytes, Absolute 11/27/2023 0.46  0.28 - 0.80 K/uL Final    Eosinophils, Absolute 11/27/2023 0.10  0.04 - 0.36 K/uL Final    Basophils, Absolute 11/27/2023 0.06  0.01 - 0.10 K/uL Final    Sodium 11/27/2023 139  136 - 145 mEQ/L Final    Potassium 11/27/2023 4.2  3.5 - 5.1 mEQ/L Final    Results obtained on plasma. Plasma Potassium values may be up to 0.4 mEQ/L less than serum values. The differences may be greater for patients with high platelet or white cell counts.     Chloride 11/27/2023 107  98 - 107 mEQ/L Final    CO2 11/27/2023 26  21 - 31 mEQ/L Final    BUN 11/27/2023 14  7 - 25 mg/dL Final    Creatinine 11/27/2023 0.7  0.6 - 1.2 mg/dL Final    Glucose 11/27/2023 144 (H)  70 - 99 mg/dL Final    Calcium 11/27/2023 9.8  8.6 - 10.3 mg/dL Final    AST (SGOT) 11/27/2023 12 (L)  13 - 39 IU/L Final    ALT (SGPT) 11/27/2023 <3 (L)  7 - 52 IU/L Final    Alkaline Phosphatase 11/27/2023 40  34 - 125 IU/L Final    Total Protein 11/27/2023 6.1  6.0 - 8.2 g/dL Final    Test performed on plasma which typically contains approximately 0.4 g/dL more protein than serum.    Albumin 11/27/2023 3.6  3.5 - 5.7 g/dL Final    Bilirubin, Total 11/27/2023 0.5  0.3 - 1.2 mg/dL Final    eGFR 11/27/2023 >60.0  >=60.0 mL/min/1.73m*2 Final    Anion Gap 11/27/2023 6  3 - 15 mEQ/L Final    High Sens Troponin I 11/27/2023 9.4  <15.0 pg/mL Final    Ventricular rate 11/27/2023 91   Final    Atrial rate 11/27/2023 91   Final    OK Interval 11/27/2023 196   Final    QRS duration 11/27/2023 74   Final    QT Interval 11/27/2023 324   Final    QTC Calculation(Bazett) 11/27/2023 398   Final    P Axis 11/27/2023 23   Final    R Axis 11/27/2023 -21   Final    T Wave Axis 11/27/2023 -22   Final    Magnesium 11/27/2023 1.9  1.8 - 2.5 mg/dL Final    SARS-CoV-2 (COVID-19) 11/27/2023 Negative  Negative Final    Influenza A 11/27/2023 Negative  Negative Final    Influenza B 11/27/2023 Negative  Negative Final    Respiratory Syncytial Virus 11/27/2023 Negative  Negative Final    High Sens Troponin I 11/27/2023 8.5  <15.0 pg/mL Final    Color, Urine 11/27/2023 Yellow  Yellow, Colorless Final    Clarity, Urine 11/27/2023 Cloudy (A)  Clear Final    Specific Gravity, Urine 11/27/2023 1.009  1.005 - 1.030 Final    pH, Urine 11/27/2023 7.0  4.5 - 8.0 Final    Leukocyte Esterase 11/27/2023 +3 (A)  Negative Final    Nitrite, Urine 11/27/2023 Positive (A)  Negative Final    Protein, Urine 11/27/2023 Negative  Negative Final    Glucose,  Urine 11/27/2023 Negative  Negative mg/dL Final    Ketones, Urine 11/27/2023 Negative  Negative mg/dL Final    Urobilinogen, Urine 11/27/2023 0.2  <2.0 EU/dL EU/dL Final    Bilirubin, Urine 11/27/2023 Negative  Negative mg/dL Final    Blood, Urine 11/27/2023 Negative  Negative Final    The sensitivity of the occult blood test is equivalent to approximately 4 intact RBC/HPF.    RBC, Urine 11/27/2023 0 TO 4  0 TO 4 /HPF Final    WBC, Urine 11/27/2023 20 TO 35 (A)  0 TO 3 /HPF Final    Squamous Epithelial 11/27/2023 Rare (A)  None Seen /hpf Final    Hyaline Cast 11/27/2023 0 TO 2 (A)  None Seen /lpf Final    Bacteria, Urine 11/27/2023 Rare (A)  None Seen /HPF Final    Mucus 11/27/2023 Rare (A)  None Seen /LPF Final    Urine Culture 11/27/2023 **Positive Culture** (A)   Final    Urine Culture 11/27/2023 >=100,000 cfu/mL Escherichia coli   Final           Labs Reviewed: No new labs    Pertinent Radiology and Imaging Studies reviewed:    AMB REFERRAL TO HOME HOSPICE      Radiology and Imaging Reviewed: No new Imaging  Cardiac Studies Reviewed: No new Studies  Medicine Tests Reviewed: No New Tests      Review of Systems   Constitutional:  Positive for activity change, appetite change and fatigue. Negative for unexpected weight change.   HENT:  Positive for hearing loss and trouble swallowing.    Eyes:  Negative for visual disturbance.   Respiratory:  Positive for cough and shortness of breath. Negative for wheezing.    Cardiovascular:  Positive for leg swelling. Negative for chest pain and palpitations.   Gastrointestinal:  Negative for constipation, diarrhea, nausea and vomiting.   Genitourinary:  Positive for enuresis. Negative for difficulty urinating, dyspareunia, dysuria and hematuria.   Musculoskeletal:  Positive for back pain and gait problem. Negative for arthralgias.   Skin:  Positive for wound. Negative for pallor and rash.   Neurological:  Positive for weakness. Negative for dizziness and light-headedness.    Psychiatric/Behavioral:  Positive for confusion, hallucinations and sleep disturbance. Negative for dysphoric mood. The patient is not nervous/anxious.        Objective     Vitals:    10/23/24 1135   BP: 110/66   BP Location: Left upper arm   Patient Position: Sitting   Pulse: 80   Resp: 20   Temp: 36.2 °C (97.1 °F)   TempSrc: Temporal   SpO2: 96%     There is no height or weight on file to calculate BMI.  Allergies:   Allergies   Allergen Reactions    Codeine Hives      n/v    Diazepam Other (see comments)     Blurred vision    Sulfa (Sulfonamide Antibiotics) Hives     Other reaction(s): Unknown    Sulfur Hives         Medications:   Current Outpatient Medications:     acetaminophen (TYLENOL) 500 mg tablet, Take 1,000 mg by mouth 2 (two) times a day., Disp: , Rfl:     apixaban (ELIQUIS) 2.5 mg tablet, Take 2.5 mg by mouth 2 (two) times a day., Disp: , Rfl:     bimatoprost (LUMIGAN) 0.01 % ophthalmic drops, Administer 1 drop into both eyes nightly., Disp: , Rfl:     buprenorphine (BUTRANS) 5 mcg/hour patch weekly, Place 1 patch on the skin once a week., Disp: , Rfl:     carbidopa-levodopa  mg per tablet, Take 1 tablet by mouth 3 (three) times a day. 0700, 1100, 1500, Disp: , Rfl:     clonazePAM (KlonoPIN) 0.5 mg tablet, Take 1 tablet (0.5 mg total) by mouth 2 (two) times a day., Disp: 60 tablet, Rfl: 0    docusate sodium (COLACE) 100 mg capsule, Take 1 capsule (100 mg total) by mouth daily as needed for constipation., Disp: , Rfl:     escitalopram (LEXAPRO) 20 mg tablet, Take 1 tablet (20 mg total) by mouth daily., Disp: 90 tablet, Rfl: 3    famotidine (PEPCID) 20 mg tablet, Take 20 mg by mouth daily., Disp: , Rfl:     gabapentin (NEURONTIN) 300 mg capsule, Take 300 mg by mouth 3 (three) times a day., Disp: , Rfl:     hydrALAZINE (APRESOLINE) 25 mg tablet, Take 25 mg by mouth 3 (three) times a day., Disp: , Rfl:     lisinopriL (PRINIVIL) 10 mg tablet, Take 10 mg by mouth 2 (two) times a day., Disp: , Rfl:     " melatonin 3 mg tablet, Take 9 mg by mouth nightly., Disp: , Rfl:     nitrofurantoin, macrocrystal-monohydrate, (MACROBID) 100 mg capsule, Take 100 mg by mouth daily before dinner., Disp: , Rfl:     oxyCODONE (ROXICODONE) 10 mg immediate release tablet, Take 10 mg by mouth daily as needed for severe pain., Disp: , Rfl:     polyethylene glycol (MIRALAX) 17 gram packet, Take 17 g by mouth every other day., Disp: , Rfl:     QUEtiapine (SeroqueL) 25 mg tablet, Take 1 tablet (25 mg total) by mouth nightly., Disp: 30 tablet, Rfl: 0    senna (SENOKOT) 8.6 mg tablet, Take 2 tablets by mouth daily before dinner., Disp: , Rfl:   [place] PDMP Portal, [facility] AWARxE: Query reviewed and no concerns    Opioid Risk Tool - Revised        \"A score of 2 or lower indicates low risk for future opioid use disorder; a score of >/= 3 indicates high risk for opioid use disorder\"    Physical Exam  Constitutional:       General: She is not in acute distress.     Appearance: She is normal weight.      Comments: Frail   Older adult    HENT:      Head: Normocephalic and atraumatic.      Jaw: There is normal jaw occlusion.      Right Ear: External ear normal.      Left Ear: External ear normal.      Nose: Nose normal.      Mouth/Throat:      Mouth: Mucous membranes are moist.      Pharynx: Oropharynx is clear.   Eyes:      General: Lids are normal. No scleral icterus.     Conjunctiva/sclera: Conjunctivae normal.   Neck:      Trachea: Trachea normal.   Cardiovascular:      Rate and Rhythm: Normal rate and regular rhythm.      Pulses:           Radial pulses are 2+ on the right side and 2+ on the left side.        Dorsalis pedis pulses are 1+ on the right side and 1+ on the left side.      Heart sounds: No murmur heard.  Pulmonary:      Effort: Pulmonary effort is normal. No tachypnea or respiratory distress.      Breath sounds: No decreased air movement. Examination of the right-lower field reveals decreased breath sounds and rhonchi. " Decreased breath sounds and rhonchi present. No wheezing or rales.   Abdominal:      General: Abdomen is flat. Bowel sounds are decreased. There is no distension.      Palpations: Abdomen is soft.      Tenderness: There is no abdominal tenderness.   Musculoskeletal:      Cervical back: Normal range of motion and neck supple. No muscular tenderness.      Right lower leg: No edema.      Left lower leg: Edema (trace ankle edema) present.      Right foot: Foot drop present.      Left foot: Foot drop present.      Comments: Sarcopenia   X 2 assist with standing    Feet:      Right foot:      Skin integrity: Skin integrity normal.      Toenail Condition: Right toenails are normal.      Left foot:      Skin integrity: Skin integrity normal.      Toenail Condition: Left toenails are normal.   Skin:     General: Skin is warm and dry.      Coloration: Skin is pale.      Findings: Wound present.          Neurological:      General: No focal deficit present.      Mental Status: She is easily aroused. Mental status is at baseline. She is lethargic and disoriented.      Motor: Weakness present.      Gait: Gait abnormal.      Comments: Disoriented to year   Psychiatric:         Attention and Perception: Attention and perception normal.         Mood and Affect: Mood and affect normal.         Speech: Speech normal.         Behavior: Behavior normal. Behavior is cooperative.         Thought Content: Thought content normal.         Cognition and Memory: Cognition is impaired. Memory is impaired.         Judgment: Judgment normal.         Palliative Assessment  FAST Score:  6d. Fecal incontinence (decreased from 6c on 9/22/24)  Palliative Performance Scale: 40% (unchanged from 9/22/24)          Mid Upper Arm Circumference (MUAC):  L - 25 cm (increased from 24.5 cm on 9/22/24)  ESASr: Pain 4 Tiredness 8 Drowsiness 8 Nausea 0 Lack of Appetite 4 Shortness of Breath 4 Depression 0 Anxiety 0 Best Well Being 6    Palliative Disease State:  Deteriorating despite treatments  Patient Prognostic Awareness: Demonstrates limited understanding of disease process, Demonstrates limited understanding of prognosis, and Demonstrates limited understanding of resuscitation status  Palliative Care Risk Stratification: Level Three- High risk.  Requires highest level of intervention.  Not likely to return to usual care      Goals of Care  Advance Directives Status:  Not Received  Advance Directives:    Document type(s) Durable Power of , Living Will, POLST, and Out of Hospital DNR  Healthcare Proxy Name, Contact Information, and Relationship  Lev Porras ([phone])   Goals of Care Discussion: Yes  Change in medical orders resulting from advance care planning discussions: Decision to pursue hospice support due to poor prognosis  Code status Do not resuscitate and Out of Hospital DNR      Assessment:  Assessment/Plan   Diagnoses and all orders for this visit:    Encounter for palliative care in home, non-hospice (Primary)  Assessment & Plan:  Goals are Comfort-Focused  Code Status updated to: DNR/DNI  Due to confusion from Dementia patient benefits from involving her family in complex medical decision making.   Patient has an advanced directive that is not on file that names  Lev Porras ([phone]) as her health care power of  if she is unable to make her own medical decisions.   Has completed POLST in the home and in EHR. Assisted with updating to reflect comfort measures only wishes.   Has completed OOH DNR in the home and in EHR.   Focus on being home, avoiding suffering, and being with family.   Plan to transition to [facility] given decline.         Anxiety  Assessment & Plan:  Worsening - ongoing anxiety in the am from 7-11 am related to AM care   Follows with PCP Dr. Corona & Neurologist Dr. Payan for management  Continue Escitalopram 25 mg nightly   Increase Clonazepam to 0.5 mg BID   Continues Mirtazapine 7.5 mg  taper off with last dose tonight   Monitor mood     Orders:  -     clonazePAM (KlonoPIN) 0.5 mg tablet; Take 1 tablet (0.5 mg total) by mouth 2 (two) times a day.    Parkinson's disease, unspecified whether dyskinesia present, unspecified whether manifestations fluctuate (CMS/HCC)  Assessment & Plan:  Follows with Neurologist Dr Payan  Complicated by agitation, anxiety, hallucinations, and vivid dreams  Continues on Carbidopa-Levodopa  mg three times daily   Stop Nuplazid 34 mg in the AM daily   Start Seroquel 25 mg nightly   Continue Melatonin 9 mg at bedtime   Hospice transition given decline:   Rapid disease progression with progression for independent ambulation to wheelchair/bedbound status  Progression from independence in most ADLs to needing major assistance by caretaker in all ADLs  Weight loss   Stage 2 pressure ulcer         Orders:  -     clonazePAM (KlonoPIN) 0.5 mg tablet; Take 1 tablet (0.5 mg total) by mouth 2 (two) times a day.  -     QUEtiapine (SeroqueL) 25 mg tablet; Take 1 tablet (25 mg total) by mouth nightly.  -     Ambulatory referral to Hospice; Future    Moderate dementia due to Parkinson's disease, with agitation (CMS/HCC)  Assessment & Plan:  Followed by neurologist Dr Payan & Pcp Dr. Corona   C/b anxiety, agitation, hallucinations, and insomnia   Monitor cognitive status  Monitor for falls  Continue close supervision and assist with ADLs  Continue Escitalopram 20 mg daily   Complete Mirtazapine 7.5 mg nightly wean with last dose tonight  Increase Clonazepam to 0.5 mg BID  Stop Trazodone 25 mg nightly  Start Seroquel 25 mg nightly   Has 24/7 caregiver support   Hospice transition given decline     Orders:  -     clonazePAM (KlonoPIN) 0.5 mg tablet; Take 1 tablet (0.5 mg total) by mouth 2 (two) times a day.  -     Ambulatory referral to Hospice; Future    Severe dementia due to Parkinson's disease, with agitation (CMS/HCC)  Assessment & Plan:  Followed by neurologist Dr Payan &  Pcp Dr. Corona   C/b anxiety, agitation, hallucinations, and insomnia   Monitor cognitive status  Monitor for falls  Continue close supervision and assist with ADLs  Continue Escitalopram 20 mg daily   Complete Mirtazapine 7.5 mg nightly wean with last dose tonight  Increase Clonazepam to 0.5 mg BID  Stop Trazodone 25 mg nightly  Start Seroquel 25 mg nightly   Has 24/7 caregiver support   Hospice transition given decline       Dysphagia, unspecified type  Assessment & Plan:  Worsening   Tolerating soft chopped diet with moisture added and thin liquids   Encouraged upright positioning for oral intake  Continue small bites of food, eat slowly, and alternate with sips of water  Aspiration precautions  Monitor swallowing        Chronic low back pain with right-sided sciatica, unspecified back pain laterality  Assessment & Plan:  Follows with pain management provider Dr. 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  Followed by pain management specialists and receives epidural of the spine every 3 months. Last caudal epidural injection on 7/29/24  Stop Topamax 25 mg nightly due to polypharmacy and concern for side effects   Continues on Tylenol 1000 mg BID   Continues on Gabapentin 300 mg three times a day  Continues on Butrans 5mcg/hr transdermal patch changed every week   Continues with Oxycodone IR 10 mg every day PRN for pain (using nightly)   Monitor pain      Pressure injury of sacral region, stage 2 (CMS/HCC)  Assessment & Plan:  Multiple stage 2 pressure injuries to sacrum with roxanna-wound blanchable erythema   Hospice referral for ongoing management of pressure injury   Continue dressing changes   Monitor for wound      Debility  Assessment & Plan:  Due to Parkinson's disease and chronic back pain   At risk for further decline and debility due to advancing age and multimorbidity  Monitor for falls (1 fall since last visit)  Continue use of assistive devices (walker &  transport chair)  Has 24/7 paid caregiver support  No longer able to safely ambulate.   [facility] referral with [facility]            Advance Care Planning   Palliative Home Based Care Advance Care Planning Note      Patient Name: Vicki Porras                                                                                 Patient MRN: [id number]  Discussion Date: 10/23/24   Discussion Participants: spouse Joel, daughter Liz Riggs, and son Jose Roberto, & caregiver Lucia  Start time: 12:10 PM  End time: 12:40 PM    Patients current medical state including medical necessity for ACP discussion:    Patient with life limiting illness, Patient with progressive chronic illness, and Patient with frequent rehospitalizations    Todays participants wished to discuss advance care planning.  The following summarizes our discussion.   During our visit today we discussed advance directives and goals of care including:  Patient medical status and prognosis and Hospice    Code status Do not resuscitate and Out of Hospital DNR  Health Care Agent/ Surrogate Decision Maker: Patient has a surrogate decision maker.  Surrogate Decision maker is:  - John Porras ([phone])     Today participants wished to discuss Advance Care Planning. The following summarizes our discussion.    During our visit today, we discussed:     Medical Status/Prognosis  Reviewed patient with progressive Parkinson's Disease and Dementia. Reviewed the illness is incurable. Reviewed patient's functional, nutritional, and cognitive decline.     Goals of Care  Goals are comfort focused and being home.     Treatment Decisions/ types of medical care preferred by the patient  Would like patient to be home and comfortable.   Hospice philosophy discussed. Reviewed focus on comfort-focused care in the home with symptom management. Discussed when opting for hospice care patient/family understand patient with terminal illness and limited prognosis. Discussed with  hospice care patient will no longer follow-up with medical specialists, obtain blood work or imaging studies, and return to hospital for disease-directed therapies. Discussed services provided by hospice including nursing, home health aid support, social work, , PT/OT, DME, comfort-care kit, and 24-7 phone line for symptom management. Patient and family understand they have chose of hospice agency. Patient/family thankful for information about hospice and would like to transition to comfort-focused care with hospice.  and Patient and family would like to utilize [facility] services.       Attestation Statement:  I have spent a total of 30 minutes in face-to-face discussion of patient advance care planning with the patient and/or surrogate decision makers.  No active management of the patient’s problem(s) was undertaken during the time period reported      OSCAR Omer  10/23/2024 5:23 PM

## 2024-10-23 NOTE — ASSESSMENT & PLAN NOTE
Multiple stage 2 pressure injuries to sacrum with roxanna-wound blanchable erythema   Hospice referral for ongoing management of pressure injury   Continue dressing changes   Monitor for wound

## 2024-10-23 NOTE — ASSESSMENT & PLAN NOTE
Follows with Neurologist Dr Payan  Complicated by agitation, anxiety, hallucinations, and vivid dreams  Continues on Carbidopa-Levodopa  mg three times daily   Stop Nuplazid 34 mg in the AM daily   Start Seroquel 25 mg nightly   Continue Melatonin 9 mg at bedtime   Hospice transition given decline:   Rapid disease progression with progression for independent ambulation to wheelchair/bedbound status  Progression from independence in most ADLs to needing major assistance by caretaker in all ADLs  Weight loss   Stage 2 pressure ulcer

## 2024-10-23 NOTE — ASSESSMENT & PLAN NOTE
Due to Parkinson's disease and chronic back pain   At risk for further decline and debility due to advancing age and multimorbidity  Monitor for falls (1 fall since last visit)  Continue use of assistive devices (walker & transport chair)  Has 24/7 paid caregiver support  No longer able to safely ambulate.   Hospice referral with [facility]

## 2024-10-23 NOTE — Clinical Note
Good afternoon,   Ms. Porras is declining. She had a fall this week and per PT/OT is not safe for ambulation. She has stage 2 pressure injury to sacrum. Ongoing dysphagia, weakness, and debility. She has lost weight. Low energy. Discussed GOC with family and will transition to hospice with [facility].  Thank you, OSCAR Omer [facility] [phone]

## 2024-10-23 NOTE — ASSESSMENT & PLAN NOTE
Worsening - ongoing anxiety in the am from 7-11 am related to AM care   Follows with PCP Dr. Corona & Neurologist Dr. Payan for management  Continue Escitalopram 25 mg nightly   Increase Clonazepam to 0.5 mg BID   Continues Mirtazapine 7.5 mg taper off with last dose tonight   Monitor mood

## 2024-10-23 NOTE — ASSESSMENT & PLAN NOTE
Worsening   Tolerating soft chopped diet with moisture added and thin liquids   Encouraged upright positioning for oral intake  Continue small bites of food, eat slowly, and alternate with sips of water  Aspiration precautions  Monitor swallowing

## (undated) DEVICE — ISOVUE M-300 15ML

## (undated) DEVICE — SPONGE SURGIFOAM ABS GELATIN 12-7 TEMP CONTROLLED 59-86 F

## (undated) DEVICE — CORD BI-POLAR DISP STERILE

## (undated) DEVICE — GLOVE PROTEXIS PI ORTHO 7.5

## (undated) DEVICE — ***USE 144827*** MASK SWISS THERAPY

## (undated) DEVICE — SOLN DURAPREP WAND

## (undated) DEVICE — COVER LIGHTHANDLE (STERILE SINGLE PA

## (undated) DEVICE — NEEDLE DISP HYPO 18GX1-1/2IN

## (undated) DEVICE — SYRINGE DISP LUER-LOK  5 CC

## (undated) DEVICE — TOWEL SURGICAL W17XL27IN BLUE COTTON STANDARD PREWASHED DELI

## (undated) DEVICE — SUTURE PDS II 5-0 P-2 Z503G

## (undated) DEVICE — BANDAGE FLEXIBLE ADHESIVE 3/4 X 3

## (undated) DEVICE — CLOTH PREPPING SAGE 2% CHG 2/PK

## (undated) DEVICE — Device

## (undated) DEVICE — ***USE 138605*** SUTURE SILK  4-0  783G

## (undated) DEVICE — BLADE SCALPEL #15

## (undated) DEVICE — ***USE 56924*** SUTURE ETHILON 6-0 697G

## (undated) DEVICE — DRAPE STERI TOWEL PLASTIC 18X24

## (undated) DEVICE — SYRINGE DISP LUER-LOK  3 CC

## (undated) DEVICE — SYRINGE DISP LUER-LOK 10 CC

## (undated) DEVICE — SUTURE PLAIN GUT 6-0  770G

## (undated) DEVICE — MARKER SURGICAL SKIN FINE

## (undated) DEVICE — NEEDLE DISP SPINAL 20G 3 1/2

## (undated) DEVICE — GLOVE PROTEXIS LATEX MICRO 6.5

## (undated) DEVICE — NEEDLE DISP HYPO 25GX1-1/2IN

## (undated) DEVICE — NEEDLE DISP HYPO 25GX5/8IN

## (undated) DEVICE — COVER MAYO STAND ST 30/CS

## (undated) DEVICE — APPLICATOR COTTON TIP STER 6IN MEDC 2/PK